# Patient Record
Sex: MALE | Race: WHITE | NOT HISPANIC OR LATINO | Employment: OTHER | URBAN - METROPOLITAN AREA
[De-identification: names, ages, dates, MRNs, and addresses within clinical notes are randomized per-mention and may not be internally consistent; named-entity substitution may affect disease eponyms.]

---

## 2019-03-08 ENCOUNTER — HOSPITAL ENCOUNTER (OUTPATIENT)
Dept: GENERAL RADIOLOGY | Facility: HOSPITAL | Age: 49
Discharge: HOME | End: 2019-03-08

## 2020-05-14 ENCOUNTER — HOSPITAL ENCOUNTER (OUTPATIENT)
Dept: GENERAL RADIOLOGY | Facility: HOSPITAL | Age: 50
Discharge: HOME | End: 2020-05-14

## 2020-06-25 ENCOUNTER — OFFICE VISIT CONVERTED (OUTPATIENT)
Dept: NEUROSURGERY | Facility: CLINIC | Age: 50
End: 2020-06-25
Attending: PHYSICIAN ASSISTANT

## 2021-05-10 NOTE — H&P
History and Physical      Patient Name: Nick Turk   Patient ID: [id number]   Sex: Male   YOB: 1970    Referring Provider: JATIN ANNE    Visit Date: June 25, 2020    Provider: Kim Perez PA-C   Location: [facility]   Location Address: [address] [place] [address]  [id number]   Location Phone: [phone]          Chief Complaint  · Back pain      History Of Present Illness  The patient is a 50 year old /White male, who presents on referral from JATIN ANNE, for a neurosurgical evaluation mid back pain.   He also reports chronic neck and chronic lbp and attends pain management for these issues.. He denies bowel or bladder dysfunction and.   RECENT INTERVENTIONS:  He has been previously treated with epidural steroids and NSAIDs. The epidural steroids were partially effective.   INFORMATION REVIEWED:  The following information was reviewed: radiology reports and images. MRI of the thoracic spine revealed small left T4/5 and small central disc protrusion at T5/6 with degenerative changes in the thoracic spine. These were the most notable findings.       Past Medical History  Lumbago/low back pain; Lumbar spondylosis; Pain, Back; Pain, Chronic Pain Syndrome; Pain, Generalized; Pain, Leg; Pars defect of lumbar spine; Sleep disorder; Spondylolisthesis , lumbar         Past Surgical History  Cholecystectomy; Ganglion cyst excision; Lumbar epidural steroid injections; Lumbar spinal fusion         Allergy List  NO KNOWN DRUG ALLERGIES; Bee Stings       Allergies Reconciled  Family Medical History  *Unremarkable         Social History  Tobacco (Former)         Review of Systems  · Constitutional  o Admits  o : weight gain  o Denies  o : chills, excessive sweating, fatigue, fever, sycope/passing out, weight loss  · Eyes  o Denies  o : changes in vision, blurry vision, double vision  · HENT  o Denies  o : loss of hearing, ringing in the ears, ear aches, sore  "throat, nasal congestion, sinus pain, nose bleeds, seasonal allergies  · Cardiovascular  o Denies  o : blood clots, swollen legs, anemia, easy burising or bleeding, transfusions  · Respiratory  o Denies  o : shortness of breath, dry cough, productive cough, pneumonia, COPD  · Gastrointestinal  o Denies  o : difficulty swallowing, reflux  · Genitourinary  o Denies  o : incontinence  · Neurologic  o Admits  o : difficulty with sleep, weakness  o Denies  o : headache, seizure, stroke, tremor, loss of balance, falls, dizziness/vertigo, numbness/tingling/paresthesia , difficulty with coordination, difficulty with dexterity  · Musculoskeletal  o Admits  o : joint pain, weakness, low back pain, mid back pain  o Denies  o : neck stiffness/pain, swollen lymph nodes, muscle aches, spasms, sciatica, pain radiating in arm, pain radiating in leg  · Endocrine  o Denies  o : diabetes, thyroid disorder  · Psychiatric  o Denies  o : anxiety, depression  · All Others Negative      Vitals  Date Time BP Position Site L\R Cuff Size HR RR TEMP (F) WT  HT  BMI kg/m2 BSA m2 O2 Sat        06/25/2020 01:56 PM        97.9 231lbs 16oz 6'  2\" 29.79 2.34           Physical Examination  · Constitutional  o Appearance  o : well-nourished, well developed, alert, in no acute distress  · Respiratory  o Respiratory Effort  o : breathing unlabored  · Cardiovascular  o Peripheral Vascular System  o :   § Extremities  § : no edema or cyanosis  · Musculoskeletal  o Spine  o :   § Inspection/Palpation  § : ttp in the lower thoracic spine  o Right Lower Extremity  o :   § Inspection/Palpation  § : no joint or limb tenderness to palpation, no edema present, no ecchymosis  § Joint Stability  § : joint stability within normal limits  § Range of Motion  § : range of motion normal, no joint crepitations present, no pain on motion, Anirudh's test negative  o Left Lower Extremity  o :   § Inspection/Palpation  § : no joint or limb tenderness to palpation, no " edema present, no ecchymosis  § Joint Stability  § : joint stability within normal limits  § Range of Motion  § : range of motion normal, no joint crepitations present, no pain on motion, Anirudh's test negative  · Skin and Subcutaneous Tissue  o Extremities  o :   § Right Lower Extremity  § : no lesions or areas of discoloration  § Left Lower Extremity  § : no lesions or areas of discoloration  o Back  o : no lesions or areas of discoloration  · Neurologic  o Mental Status Examination  o :   § Orientation  § : alert and oriented to time, person, place and events  o Motor Examination  o :   § RLE Strength  § : strength normal  § RLE Motor Function  § : tone normal, no atrophy, no abnormal movements noted  § LLE Strength  § : strength normal  § LLE Motor Function  § : tone normal, no atrophy, no abnormal movements noted  o Reflexes  o :   § RLE  § : knee and ankle reflexes 2/4, SLR negative, no clonus  § LLE  § : knee and ankle reflexes 2/4, SLR negative, no clonus  o Sensation  o :   § Light Touch  § : sensation intact to light touch in extremities  o Gait and Station  o :   § Gait Screening  § : normal gait, able to stand without difficulty  · Psychiatric  o Mood and Affect  o : mood normal, affect appropriate          Assessment  · Thoracic disc disorder     722.92/M51.9  · Mid back pain     724.5/M54.9    Problems Reconciled  Plan  · Medications  o Medications have been Reconciled  o Transition of Care or Provider Policy  · Instructions  o Encouraged to follow-up with Primary Care Provider for preventative care.  o The ROS and the PFSH were reviewed at today's visit.  o Call or return to office if symptoms worsen or persist.  o I will send a copy of his office note to his pain management clinic. He has two small upper thoracic disc protrusions but pain is lower than these areas. He will f/u with pain management and thoracic surgery is not recommended. F/U here as needed.  · Associate Tasks  o Task ID [id number]  General Task: please send copy of office note to CPA            Electronically Signed by: Kim Perez PA-C -Author on June 25, 2020 02:19:04 PM

## 2021-05-15 VITALS — TEMPERATURE: 97.9 F | WEIGHT: 232 LBS | BODY MASS INDEX: 29.77 KG/M2 | HEIGHT: 74 IN

## 2021-09-15 ENCOUNTER — OFFICE VISIT (OUTPATIENT)
Dept: FAMILY MEDICINE CLINIC | Facility: CLINIC | Age: 51
End: 2021-09-15

## 2021-09-15 VITALS
TEMPERATURE: 97 F | BODY MASS INDEX: 31.72 KG/M2 | WEIGHT: 234.2 LBS | DIASTOLIC BLOOD PRESSURE: 92 MMHG | HEIGHT: 72 IN | HEART RATE: 74 BPM | OXYGEN SATURATION: 98 % | RESPIRATION RATE: 16 BRPM | SYSTOLIC BLOOD PRESSURE: 159 MMHG

## 2021-09-15 DIAGNOSIS — F17.210 CIGARETTE NICOTINE DEPENDENCE WITHOUT COMPLICATION: ICD-10-CM

## 2021-09-15 DIAGNOSIS — Z12.11 SCREEN FOR COLON CANCER: Primary | ICD-10-CM

## 2021-09-15 DIAGNOSIS — Z00.00 ANNUAL PHYSICAL EXAM: ICD-10-CM

## 2021-09-15 DIAGNOSIS — Z72.0 TOBACCO USE: ICD-10-CM

## 2021-09-15 DIAGNOSIS — R06.81 WITNESSED EPISODE OF APNEA: ICD-10-CM

## 2021-09-15 DIAGNOSIS — R06.83 SNORING: ICD-10-CM

## 2021-09-15 DIAGNOSIS — I49.9 IRREGULAR HEART BEAT: ICD-10-CM

## 2021-09-15 PROBLEM — G47.9 SLEEP DISORDER: Status: ACTIVE | Noted: 2021-09-15

## 2021-09-15 PROBLEM — Z79.4 ENCOUNTER FOR LONG-TERM (CURRENT) USE OF INSULIN: Status: ACTIVE | Noted: 2018-02-08

## 2021-09-15 PROBLEM — M23.90 LIGAMENTOUS LAXITY OF KNEE: Status: ACTIVE | Noted: 2021-02-19

## 2021-09-15 PROBLEM — R52 PAIN, GENERALIZED: Status: ACTIVE | Noted: 2021-09-15

## 2021-09-15 PROBLEM — M25.569 KNEE PAIN: Status: ACTIVE | Noted: 2021-02-19

## 2021-09-15 PROBLEM — M25.512 PAIN IN JOINT OF LEFT SHOULDER: Status: ACTIVE | Noted: 2019-02-27

## 2021-09-15 PROBLEM — M54.6 THORACIC BACK PAIN: Status: ACTIVE | Noted: 2019-02-27

## 2021-09-15 PROBLEM — M47.817 LUMBOSACRAL SPONDYLOSIS WITHOUT MYELOPATHY: Status: ACTIVE | Noted: 2018-03-13

## 2021-09-15 PROBLEM — M47.814 THORACIC SPONDYLOSIS WITHOUT MYELOPATHY: Status: ACTIVE | Noted: 2018-06-05

## 2021-09-15 PROBLEM — M19.012 OSTEOARTHRITIS OF LEFT SHOULDER: Status: ACTIVE | Noted: 2021-09-15

## 2021-09-15 PROCEDURE — 99386 PREV VISIT NEW AGE 40-64: CPT | Performed by: NURSE PRACTITIONER

## 2021-09-15 PROCEDURE — 2014F MENTAL STATUS ASSESS: CPT | Performed by: NURSE PRACTITIONER

## 2021-09-15 PROCEDURE — 3008F BODY MASS INDEX DOCD: CPT | Performed by: NURSE PRACTITIONER

## 2021-09-15 PROCEDURE — 93000 ELECTROCARDIOGRAM COMPLETE: CPT | Performed by: NURSE PRACTITIONER

## 2021-09-15 NOTE — PROGRESS NOTES
"Chief Complaint  Establish Care and Snoring (witnessed apnea)    Subjective        Nick Turk presents to [facility]  Here to establish care.    Lumbar issues sees physician.  Getting good exercise and diet. Works as a contractor.  Hasn't had any recent labs    Has family history of CHF in her 30's. She has an indwelling pacemaker.        Past History:    Medical History: has a past medical history of Back pain, Chronic pain syndrome, Forgetfulness, Gallstones, Hypertension, Leg pain, Lumbago (2015), Lumbar spondylosis, Pain, generalized, Pars defect of lumbar spine (2015), Sleep disorder, and Spondylolisthesis, lumbar region (2015).     Surgical History: has a past surgical history that includes Cholecystectomy; Ganglion cyst excision; Lumbar epidural injection; and Lumbar fusion (2015).     Family History: family history is not on file.     Social History: reports that he has been smoking cigarettes. He has been smoking about 1.50 packs per day. He has never used smokeless tobacco. He reports that he does not drink alcohol and does not use drugs.    Allergies: Bee venom        No current outpatient medications on file.    There are no discontinued medications.      Review of Systems   Constitutional: Negative for fever.   Respiratory: Negative for cough and shortness of breath.    Cardiovascular: Negative for chest pain, palpitations and leg swelling.   Neurological: Negative for dizziness, weakness, numbness and headache.        Objective         Vitals:    09/15/21 0755   BP: 159/92   BP Location: Left arm   Patient Position: Sitting   Cuff Size: Large Adult   Pulse: 74   Resp: 16   Temp: 97 °F (36.1 °C)   TempSrc: Infrared   SpO2: 98%   Weight: 106 kg (234 lb 3.2 oz)   Height: 182.9 cm (72\")     Body mass index is 31.76 kg/m².         Physical Exam  Vitals reviewed.   Constitutional:       Appearance: Normal appearance. He is well-developed.   HENT:      Head: Normocephalic " and atraumatic.      Mouth/Throat:      Pharynx: No oropharyngeal exudate.   Eyes:      Conjunctiva/sclera: Conjunctivae normal.      Pupils: Pupils are equal, round, and reactive to light.   Cardiovascular:      Rate and Rhythm: Normal rate. Rhythm irregular.      Heart sounds: No murmur heard.   No friction rub. No gallop.    Pulmonary:      Effort: Pulmonary effort is normal.      Breath sounds: Normal breath sounds. No wheezing or rhonchi.   Skin:     General: Skin is warm and dry.   Neurological:      Mental Status: He is alert and oriented to person, place, and time.   Psychiatric:         Mood and Affect: Mood and affect normal.         Behavior: Behavior normal.         Thought Content: Thought content normal.         Judgment: Judgment normal.             Result Review :        ECG 12 Lead    Date/Time: 9/15/2021 9:30 AM  Performed by: Shola Car APRN  Authorized by: Shola Car APRN   Previous ECG: no previous ECG available  Rhythm: sinus rhythm  Rate: normal  Conduction: conduction normal  ST Segments: ST segments normal  T Waves: T waves normal  QRS axis: normal  Other: no other findings    Clinical impression: normal ECG                Assessment and Plan     Diagnoses and all orders for this visit:    1. Screen for colon cancer (Primary)  -     Amb referral for Screening Colonoscopy    2. Tobacco use    3. Cigarette nicotine dependence without complication    4. Snoring  -     Ambulatory Referral to Sleep Medicine    5. Witnessed episode of apnea  -     Ambulatory Referral to Sleep Medicine    6. Annual physical exam  Comments:  Reviewed immunizations and discussed diet and exercise.  Orders:  -     Comprehensive metabolic panel; Future  -     Lipid Panel w/ Chol/HDL Ratio; Future  -     Urinalysis With Culture If Indicated -; Future  -     CBC No Differential; Future  -     TSH Rfx On Abnormal To Free T4; Future    7. Irregular heart beat  Comments:  sinus rhythm on EKG.  But with sister with  CHF in her 30's with a pacemaker will get baseline work up.  Orders:  -     Cancel: ECG 12 Lead  -     Cancel: ECG 12 Lead  -     Ambulatory Referral to Cardiology    Other orders  -     ECG 12 Lead              Follow Up     Return in about 1 year (around 9/15/2022).    Patient was given instructions and counseling regarding his condition or for health maintenance advice. Please see specific information pulled into the AVS if appropriate.          Yes

## 2021-10-08 ENCOUNTER — LAB (OUTPATIENT)
Dept: LAB | Facility: HOSPITAL | Age: 51
End: 2021-10-08

## 2021-10-08 DIAGNOSIS — Z00.00 ANNUAL PHYSICAL EXAM: ICD-10-CM

## 2021-10-08 LAB
ALBUMIN SERPL-MCNC: 4.2 G/DL (ref 3.5–5.2)
ALBUMIN/GLOB SERPL: 1.6 G/DL
ALP SERPL-CCNC: 54 U/L (ref 39–117)
ALT SERPL W P-5'-P-CCNC: 31 U/L (ref 1–41)
ANION GAP SERPL CALCULATED.3IONS-SCNC: 8.5 MMOL/L (ref 5–15)
AST SERPL-CCNC: 22 U/L (ref 1–40)
BILIRUB SERPL-MCNC: 0.5 MG/DL (ref 0–1.2)
BUN SERPL-MCNC: 10 MG/DL (ref 6–20)
BUN/CREAT SERPL: 11.2 (ref 7–25)
CALCIUM SPEC-SCNC: 9.3 MG/DL (ref 8.6–10.5)
CHLORIDE SERPL-SCNC: 104 MMOL/L (ref 98–107)
CHOLEST SERPL-MCNC: 94 MG/DL (ref 0–200)
CO2 SERPL-SCNC: 26.5 MMOL/L (ref 22–29)
CREAT SERPL-MCNC: 0.89 MG/DL (ref 0.76–1.27)
DEPRECATED RDW RBC AUTO: 43.5 FL (ref 37–54)
ERYTHROCYTE [DISTWIDTH] IN BLOOD BY AUTOMATED COUNT: 12.2 % (ref 12.3–15.4)
GFR SERPL CREATININE-BSD FRML MDRD: 90 ML/MIN/1.73
GLOBULIN UR ELPH-MCNC: 2.6 GM/DL
GLUCOSE SERPL-MCNC: 97 MG/DL (ref 65–99)
HCT VFR BLD AUTO: 51.4 % (ref 37.5–51)
HDLC SERPL QL: 3.76
HDLC SERPL-MCNC: 25 MG/DL (ref 40–60)
HGB BLD-MCNC: 17 G/DL (ref 13–17.7)
LDLC SERPL CALC-MCNC: 45 MG/DL (ref 0–100)
MCH RBC QN AUTO: 32 PG (ref 26.6–33)
MCHC RBC AUTO-ENTMCNC: 33.1 G/DL (ref 31.5–35.7)
MCV RBC AUTO: 96.6 FL (ref 79–97)
PLATELET # BLD AUTO: 197 10*3/MM3 (ref 140–450)
PMV BLD AUTO: 11.7 FL (ref 6–12)
POTASSIUM SERPL-SCNC: 4.4 MMOL/L (ref 3.5–5.2)
PROT SERPL-MCNC: 6.8 G/DL (ref 6–8.5)
RBC # BLD AUTO: 5.32 10*6/MM3 (ref 4.14–5.8)
SODIUM SERPL-SCNC: 139 MMOL/L (ref 136–145)
TRIGL SERPL-MCNC: 132 MG/DL (ref 0–150)
TSH SERPL DL<=0.05 MIU/L-ACNC: 1.22 UIU/ML (ref 0.27–4.2)
VLDLC SERPL-MCNC: 24 MG/DL (ref 5–40)
WBC # BLD AUTO: 9.48 10*3/MM3 (ref 3.4–10.8)

## 2021-10-08 PROCEDURE — 80053 COMPREHEN METABOLIC PANEL: CPT

## 2021-10-08 PROCEDURE — 80061 LIPID PANEL: CPT

## 2021-10-08 PROCEDURE — 85027 COMPLETE CBC AUTOMATED: CPT

## 2021-10-08 PROCEDURE — 36415 COLL VENOUS BLD VENIPUNCTURE: CPT

## 2021-10-08 PROCEDURE — 84443 ASSAY THYROID STIM HORMONE: CPT

## 2021-10-11 ENCOUNTER — OFFICE VISIT (OUTPATIENT)
Dept: CARDIOLOGY | Facility: CLINIC | Age: 51
End: 2021-10-11

## 2021-10-11 ENCOUNTER — TRANSCRIBE ORDERS (OUTPATIENT)
Dept: FAMILY MEDICINE CLINIC | Facility: CLINIC | Age: 51
End: 2021-10-11

## 2021-10-11 ENCOUNTER — LAB (OUTPATIENT)
Dept: LAB | Facility: HOSPITAL | Age: 51
End: 2021-10-11

## 2021-10-11 VITALS
SYSTOLIC BLOOD PRESSURE: 140 MMHG | BODY MASS INDEX: 31.42 KG/M2 | DIASTOLIC BLOOD PRESSURE: 100 MMHG | WEIGHT: 232 LBS | HEIGHT: 72 IN | HEART RATE: 101 BPM

## 2021-10-11 DIAGNOSIS — F17.200 SMOKER: ICD-10-CM

## 2021-10-11 DIAGNOSIS — Z00.00 ROUTINE GENERAL MEDICAL EXAMINATION AT A HEALTH CARE FACILITY: ICD-10-CM

## 2021-10-11 DIAGNOSIS — Z71.89 CARDIAC RISK COUNSELING: Primary | ICD-10-CM

## 2021-10-11 DIAGNOSIS — Z00.00 ROUTINE GENERAL MEDICAL EXAMINATION AT A HEALTH CARE FACILITY: Primary | ICD-10-CM

## 2021-10-11 LAB
BACTERIA UR QL AUTO: ABNORMAL /HPF
BILIRUB UR QL STRIP: NEGATIVE
CLARITY UR: CLEAR
COLOR UR: YELLOW
GLUCOSE UR STRIP-MCNC: NEGATIVE MG/DL
HGB UR QL STRIP.AUTO: NEGATIVE
HYALINE CASTS UR QL AUTO: ABNORMAL /LPF
KETONES UR QL STRIP: NEGATIVE
LEUKOCYTE ESTERASE UR QL STRIP.AUTO: ABNORMAL
NITRITE UR QL STRIP: NEGATIVE
PH UR STRIP.AUTO: 6 [PH] (ref 5–8)
PROT UR QL STRIP: NEGATIVE
RBC # UR: ABNORMAL /HPF
REF LAB TEST METHOD: ABNORMAL
SP GR UR STRIP: 1.02 (ref 1–1.03)
SQUAMOUS #/AREA URNS HPF: ABNORMAL /HPF
UROBILINOGEN UR QL STRIP: ABNORMAL
WBC UR QL AUTO: ABNORMAL /HPF

## 2021-10-11 PROCEDURE — 81001 URINALYSIS AUTO W/SCOPE: CPT

## 2021-10-11 PROCEDURE — 99204 OFFICE O/P NEW MOD 45 MIN: CPT | Performed by: INTERNAL MEDICINE

## 2021-10-11 RX ORDER — ACETAMINOPHEN 500 MG
500 TABLET ORAL EVERY 6 HOURS PRN
COMMUNITY
End: 2022-09-16

## 2021-10-11 NOTE — PROGRESS NOTES
Chief Complaint  Irregular Heart Beat (New patient)    Subjective            Nick Turk presents to [facility]  History of Present Illness    51-year-old white male.  He has no previous cardiac history.  He is a smoker.  Cardiac risk assessment has been recommended by primary care.  He has no symptoms to report.  He stays very active.  He denies chest pain palpitations or excessive shortness of breath.  His sister has some form of cardiomyopathy and has a defibrillator.  His mother had heart disease which was later onset.  He is on no medication.    PMH  Past Medical History:   Diagnosis Date   • Back pain    • Chronic pain syndrome    • Forgetfulness    • Gallstones    • Hypertension    • Leg pain    • Lumbago 2015   • Lumbar spondylosis    • Pain, generalized    • Pars defect of lumbar spine 2015   • Sleep disorder    • Spondylolisthesis, lumbar region 2015    L4/5         SURGICALHX  Past Surgical History:   Procedure Laterality Date   • CHOLECYSTECTOMY     • GANGLION CYST EXCISION     • LUMBAR EPIDURAL INJECTION     • LUMBAR FUSION  2015        SOC  Social History     Socioeconomic History   • Marital status:    Tobacco Use   • Smoking status: Current Every Day Smoker     Packs/day: 1.50     Types: Cigarettes   • Smokeless tobacco: Never Used   Vaping Use   • Vaping Use: Never used   Substance and Sexual Activity   • Alcohol use: Never   • Drug use: Never   • Sexual activity: Yes     Partners: Female         FAMHX  Family History   Problem Relation Age of Onset   • Heart disease Mother    • No Known Problems Father    • Heart failure Sister           ALLERGY  Allergies   Allergen Reactions   • Bee Venom Swelling        MEDSCURRENT    Current Outpatient Medications:   •  acetaminophen (TYLENOL) 500 MG tablet, Take 500 mg by mouth Every 6 (Six) Hours As Needed for Mild Pain ., Disp: , Rfl:       Review of Systems   Constitutional: Negative.   HENT: Negative.    Eyes:  "Negative.    Cardiovascular: Negative for chest pain and dyspnea on exertion.   Respiratory: Negative.  Negative for shortness of breath.    Endocrine: Negative.    Hematologic/Lymphatic: Negative.    Skin: Negative.    Musculoskeletal: Positive for back pain.   Gastrointestinal: Negative.    Genitourinary: Negative.    Neurological: Negative.    Psychiatric/Behavioral: Negative.         Objective     /100 (BP Location: Left arm)   Pulse 101   Ht 182.9 cm (72\")   Wt 105 kg (232 lb)   BMI 31.46 kg/m²       General Appearance:   · well developed  · well nourished  HENT:   · oropharynx moist  · lips not cyanotic  Neck:  · thyroid not enlarged  · supple  Respiratory:  · no respiratory distress  · normal breath sounds  · no rales  Cardiovascular:  · no jugular venous distention  · regular rhythm  · apical impulse normal  · S1 normal, S2 normal  · no S3, no S4   · no murmur  · no rub, no thrill  · carotid pulses normal; no bruit  · pedal pulses normal  · lower extremity edema: none    Musculoskeletal:  · no clubbing of fingers.   · normocephalic, head atraumatic  Skin:   · warm, dry  Psychiatric:  · judgement and insight appropriate  · normal mood and affect      Result Review :     The following data was reviewed by: Nelson Cheatham MD on 10/11/2021:    CMP    CMP 10/8/21   Glucose 97   BUN 10   Creatinine 0.89   eGFR Non African Am 90   Sodium 139   Potassium 4.4   Chloride 104   Calcium 9.3   Albumin 4.20   Total Bilirubin 0.5   Alkaline Phosphatase 54   AST (SGOT) 22   ALT (SGPT) 31           CBC    CBC 10/8/21   WBC 9.48   RBC 5.32   Hemoglobin 17.0   Hematocrit 51.4 (A)   MCV 96.6   MCH 32.0   MCHC 33.1   RDW 12.2 (A)   Platelets 197   (A) Abnormal value            Lipid Panel    Lipid Panel 10/8/21   Total Cholesterol 94   Triglycerides 132   HDL Cholesterol 25 (A)   VLDL Cholesterol 24   LDL Cholesterol  45   (A) Abnormal value            TSH    TSH 10/8/21   TSH 1.220             Data " reviewed: Primary care records reviewed, EKG done there September 15 reviewed by me showing sinus rhythm, normal tracing.     Procedures                    Assessment and Plan        ASSESSMENT:  Encounter Diagnoses   Name Primary?   • Cardiac risk counseling Yes   • Smoker          PLAN:    1.  Nick is asymptomatic.  Using the pooled cohort equation his estimated 10-year risk is 10% which is relatively high risk for his age.  If he were non-smoker was 4.6%.  I recommend low-dose aspirin daily for prevention.  His most recent LDL was 45, so I do not recommend statin therapy based on his cholesterol numbers alone.  I do recommend a calcium score be done and if there is significant plaque burden, statin therapy would be recommended for secondary prevention.  2.  Smoking cessation counseling  3.  We will call the patient with his calcium score results, no additional diagnostics are needed at this time.  Otherwise he will be followed as needed.          Patient was given instructions and counseling regarding his condition or for health maintenance advice. Please see specific information pulled into the AVS if appropriate.             DAVE Cheatham MD  10/11/2021    11:40 EDT

## 2021-10-14 ENCOUNTER — HOSPITAL ENCOUNTER (OUTPATIENT)
Dept: CT IMAGING | Facility: HOSPITAL | Age: 51
Discharge: HOME | End: 2021-10-14
Admitting: INTERNAL MEDICINE

## 2021-10-14 DIAGNOSIS — Z71.89 CARDIAC RISK COUNSELING: ICD-10-CM

## 2021-10-14 PROCEDURE — 75571 CT HRT W/O DYE W/CA TEST: CPT

## 2021-11-05 ENCOUNTER — PREP FOR SURGERY (OUTPATIENT)
Dept: OTHER | Facility: HOSPITAL | Age: 51
End: 2021-11-05

## 2021-11-05 ENCOUNTER — OFFICE VISIT (OUTPATIENT)
Dept: SURGERY | Facility: CLINIC | Age: 51
End: 2021-11-05

## 2021-11-05 VITALS — WEIGHT: 234.4 LBS | BODY MASS INDEX: 31.75 KG/M2 | HEIGHT: 72 IN | HEART RATE: 86 BPM

## 2021-11-05 DIAGNOSIS — Z12.11 SCREENING FOR MALIGNANT NEOPLASM OF COLON: Primary | ICD-10-CM

## 2021-11-05 PROCEDURE — S0260 H&P FOR SURGERY: HCPCS | Performed by: NURSE PRACTITIONER

## 2021-11-05 RX ORDER — SODIUM CHLORIDE 0.9 % (FLUSH) 0.9 %
3 SYRINGE (ML) INJECTION EVERY 12 HOURS SCHEDULED
Status: CANCELLED | OUTPATIENT
Start: 2021-11-05

## 2021-11-05 RX ORDER — SODIUM CHLORIDE 0.9 % (FLUSH) 0.9 %
10 SYRINGE (ML) INJECTION AS NEEDED
Status: CANCELLED | OUTPATIENT
Start: 2021-11-05

## 2021-11-05 RX ORDER — POLYETHYLENE GLYCOL 3350 17 G/17G
POWDER, FOR SOLUTION ORAL
Qty: 238 PACKET | Refills: 0 | Status: SHIPPED | OUTPATIENT
Start: 2021-11-05 | End: 2022-05-12 | Stop reason: HOSPADM

## 2021-11-05 NOTE — PROGRESS NOTES
Chief Complaint: Colonoscopy (consult)    Subjective      Colonoscopy consultation       History of Present Illness  Nick Turk is a 51 y.o. male presents to [facility] for colonoscopy consultation.    Patient presents today on referral from Shola Presley for screening colonoscopy.    Patient denies any abdominal pain, bowel habit, rectal bleeding.    Denies any family history of colorectal cancer.    No previous colonoscopy.     Objective     Past Medical History:   Diagnosis Date   • Back pain    • Chronic pain syndrome    • Forgetfulness    • Gallstones    • Hypertension    • Leg pain    • Lumbago 2015   • Lumbar spondylosis    • Pain, generalized    • Pars defect of lumbar spine 2015   • Sleep disorder    • Spondylolisthesis, lumbar region 2015    L4/5       Past Surgical History:   Procedure Laterality Date   • CHOLECYSTECTOMY     • GANGLION CYST EXCISION     • LUMBAR EPIDURAL INJECTION     • LUMBAR FUSION  2015         Current Outpatient Medications:   •  acetaminophen (TYLENOL) 500 MG tablet, Take 500 mg by mouth Every 6 (Six) Hours As Needed for Mild Pain ., Disp: , Rfl:   •  polyethylene glycol (MIRALAX) 17 g packet, Take as directed.  Instructions given in office.  Dispense: 238 g bottle, Disp: 238 packet, Rfl: 0    Allergies   Allergen Reactions   • Bee Venom Swelling        Family History   Problem Relation Age of Onset   • Heart disease Mother    • No Known Problems Father    • Heart failure Sister        Social History     Socioeconomic History   • Marital status:    Tobacco Use   • Smoking status: Current Every Day Smoker     Packs/day: 1.50     Types: Cigarettes   • Smokeless tobacco: Never Used   Vaping Use   • Vaping Use: Never used   Substance and Sexual Activity   • Alcohol use: Never   • Drug use: Never   • Sexual activity: Yes     Partners: Female       Review of Systems   Constitutional: Negative for chills and fever.   Gastrointestinal:  "Negative for abdominal distention, abdominal pain, anal bleeding, blood in stool, constipation, diarrhea and rectal pain.        Vital Signs:   Pulse 86   Ht 182.9 cm (72\")   Wt 106 kg (234 lb 6.4 oz)   BMI 31.79 kg/m²      Physical Exam  Constitutional:       Appearance: Normal appearance.   HENT:      Head: Normocephalic.   Cardiovascular:      Rate and Rhythm: Normal rate.   Pulmonary:      Effort: Pulmonary effort is normal.   Abdominal:      General: Abdomen is flat.      Palpations: Abdomen is soft.   Skin:     General: Skin is warm and dry.   Neurological:      General: No focal deficit present.      Mental Status: He is alert and oriented to person, place, and time.   Psychiatric:         Mood and Affect: Mood normal.         Thought Content: Thought content normal.          Result Review :              []  Laboratory  []  Radiology  []  Pathology  []  Microbiology  []  EKG/Telemetry   []  Cardiology/Vascular   [x]  Old records  Today I have reviewed Shola Presley's previous office note.     Assessment and Plan    Diagnoses and all orders for this visit:    1. Screening for malignant neoplasm of colon (Primary)    Other orders  -     polyethylene glycol (MIRALAX) 17 g packet; Take as directed.  Instructions given in office.  Dispense: 238 g bottle  Dispense: 238 packet; Refill: 0        Follow Up   Return for Schedule colonoscopy with Dr. Onofre on 11/16.     Hospital arrival time 1230p    Possible risk/complications, benefits, and alternatives to surgical or invasive procedure have been explained to patient and/or legal guardian.   Patient has been evaluated and can tolerate anesthesia and/or sedation. Risks, benefits, and alternatives to anesthesia and sedation have been explained to patient and/or legal guardian.  Patient verbalizes understanding is willing to proceed with the above plan.    Patient was given instructions and counseling regarding his condition or for health maintenance advice. " Please see specific information pulled into the AVS if appropriate.

## 2021-11-05 NOTE — H&P (VIEW-ONLY)
Chief Complaint: Colonoscopy (consult)    Subjective      Colonoscopy consultation       History of Present Illness  Nick Turk is a 51 y.o. male presents to [facility] for colonoscopy consultation.    Patient presents today on referral from Shola Presley for screening colonoscopy.    Patient denies any abdominal pain, bowel habit, rectal bleeding.    Denies any family history of colorectal cancer.    No previous colonoscopy.     Objective     Past Medical History:   Diagnosis Date   • Back pain    • Chronic pain syndrome    • Forgetfulness    • Gallstones    • Hypertension    • Leg pain    • Lumbago 2015   • Lumbar spondylosis    • Pain, generalized    • Pars defect of lumbar spine 2015   • Sleep disorder    • Spondylolisthesis, lumbar region 2015    L4/5       Past Surgical History:   Procedure Laterality Date   • CHOLECYSTECTOMY     • GANGLION CYST EXCISION     • LUMBAR EPIDURAL INJECTION     • LUMBAR FUSION  2015         Current Outpatient Medications:   •  acetaminophen (TYLENOL) 500 MG tablet, Take 500 mg by mouth Every 6 (Six) Hours As Needed for Mild Pain ., Disp: , Rfl:   •  polyethylene glycol (MIRALAX) 17 g packet, Take as directed.  Instructions given in office.  Dispense: 238 g bottle, Disp: 238 packet, Rfl: 0    Allergies   Allergen Reactions   • Bee Venom Swelling        Family History   Problem Relation Age of Onset   • Heart disease Mother    • No Known Problems Father    • Heart failure Sister        Social History     Socioeconomic History   • Marital status:    Tobacco Use   • Smoking status: Current Every Day Smoker     Packs/day: 1.50     Types: Cigarettes   • Smokeless tobacco: Never Used   Vaping Use   • Vaping Use: Never used   Substance and Sexual Activity   • Alcohol use: Never   • Drug use: Never   • Sexual activity: Yes     Partners: Female       Review of Systems   Constitutional: Negative for chills and fever.   Gastrointestinal:  "Negative for abdominal distention, abdominal pain, anal bleeding, blood in stool, constipation, diarrhea and rectal pain.        Vital Signs:   Pulse 86   Ht 182.9 cm (72\")   Wt 106 kg (234 lb 6.4 oz)   BMI 31.79 kg/m²      Physical Exam  Constitutional:       Appearance: Normal appearance.   HENT:      Head: Normocephalic.   Cardiovascular:      Rate and Rhythm: Normal rate.   Pulmonary:      Effort: Pulmonary effort is normal.   Abdominal:      General: Abdomen is flat.      Palpations: Abdomen is soft.   Skin:     General: Skin is warm and dry.   Neurological:      General: No focal deficit present.      Mental Status: He is alert and oriented to person, place, and time.   Psychiatric:         Mood and Affect: Mood normal.         Thought Content: Thought content normal.          Result Review :              []  Laboratory  []  Radiology  []  Pathology  []  Microbiology  []  EKG/Telemetry   []  Cardiology/Vascular   [x]  Old records  Today I have reviewed Shola Presley's previous office note.     Assessment and Plan    Diagnoses and all orders for this visit:    1. Screening for malignant neoplasm of colon (Primary)    Other orders  -     polyethylene glycol (MIRALAX) 17 g packet; Take as directed.  Instructions given in office.  Dispense: 238 g bottle  Dispense: 238 packet; Refill: 0        Follow Up   Return for Schedule colonoscopy with Dr. Onofre on 11/16.     Hospital arrival time 1230p    Possible risk/complications, benefits, and alternatives to surgical or invasive procedure have been explained to patient and/or legal guardian.   Patient has been evaluated and can tolerate anesthesia and/or sedation. Risks, benefits, and alternatives to anesthesia and sedation have been explained to patient and/or legal guardian.  Patient verbalizes understanding is willing to proceed with the above plan.    Patient was given instructions and counseling regarding his condition or for health maintenance advice. " Please see specific information pulled into the AVS if appropriate.

## 2021-11-15 NOTE — PRE-PROCEDURE INSTRUCTIONS
Pt. Instructed on laxative and skin prep, pre-op meds, clear liquid diet. Ok to take Tylenol a.m. of procedure.

## 2021-11-16 ENCOUNTER — HOSPITAL ENCOUNTER (OUTPATIENT)
Facility: HOSPITAL | Age: 51
Setting detail: HOSPITAL OUTPATIENT SURGERY
Discharge: HOME | End: 2021-11-16
Attending: SURGERY | Admitting: SURGERY

## 2021-11-16 ENCOUNTER — ANESTHESIA (OUTPATIENT)
Dept: GASTROENTEROLOGY | Facility: HOSPITAL | Age: 51
End: 2021-11-16

## 2021-11-16 ENCOUNTER — ANESTHESIA EVENT (OUTPATIENT)
Dept: GASTROENTEROLOGY | Facility: HOSPITAL | Age: 51
End: 2021-11-16

## 2021-11-16 VITALS
SYSTOLIC BLOOD PRESSURE: 142 MMHG | HEIGHT: 72 IN | BODY MASS INDEX: 31.59 KG/M2 | WEIGHT: 233.25 LBS | OXYGEN SATURATION: 99 % | DIASTOLIC BLOOD PRESSURE: 90 MMHG | RESPIRATION RATE: 19 BRPM | HEART RATE: 72 BPM | TEMPERATURE: 97.2 F

## 2021-11-16 DIAGNOSIS — Z12.11 SCREENING FOR MALIGNANT NEOPLASM OF COLON: ICD-10-CM

## 2021-11-16 PROBLEM — Z79.4 ENCOUNTER FOR LONG-TERM (CURRENT) USE OF INSULIN: Status: RESOLVED | Noted: 2018-02-08 | Resolved: 2021-11-16

## 2021-11-16 PROCEDURE — 88305 TISSUE EXAM BY PATHOLOGIST: CPT | Performed by: SURGERY

## 2021-11-16 PROCEDURE — C1889 IMPLANT/INSERT DEVICE, NOC: HCPCS | Performed by: SURGERY

## 2021-11-16 PROCEDURE — 25010000002 PROPOFOL 10 MG/ML EMULSION: Performed by: NURSE ANESTHETIST, CERTIFIED REGISTERED

## 2021-11-16 DEVICE — CLIPPING DEVICE
Type: IMPLANTABLE DEVICE | Site: COLON | Status: FUNCTIONAL
Brand: RESOLUTION CLIP

## 2021-11-16 RX ORDER — PROPOFOL 10 MG/ML
VIAL (ML) INTRAVENOUS AS NEEDED
Status: DISCONTINUED | OUTPATIENT
Start: 2021-11-16 | End: 2021-11-16 | Stop reason: SURG

## 2021-11-16 RX ORDER — SODIUM CHLORIDE, SODIUM LACTATE, POTASSIUM CHLORIDE, CALCIUM CHLORIDE 600; 310; 30; 20 MG/100ML; MG/100ML; MG/100ML; MG/100ML
INJECTION, SOLUTION INTRAVENOUS CONTINUOUS PRN
Status: DISCONTINUED | OUTPATIENT
Start: 2021-11-16 | End: 2021-11-16 | Stop reason: SURG

## 2021-11-16 RX ORDER — SODIUM CHLORIDE, SODIUM LACTATE, POTASSIUM CHLORIDE, CALCIUM CHLORIDE 600; 310; 30; 20 MG/100ML; MG/100ML; MG/100ML; MG/100ML
30 INJECTION, SOLUTION INTRAVENOUS CONTINUOUS
Status: DISCONTINUED | OUTPATIENT
Start: 2021-11-16 | End: 2021-11-16 | Stop reason: HOSPADM

## 2021-11-16 RX ORDER — LIDOCAINE HYDROCHLORIDE 20 MG/ML
INJECTION, SOLUTION INFILTRATION; PERINEURAL AS NEEDED
Status: DISCONTINUED | OUTPATIENT
Start: 2021-11-16 | End: 2021-11-16 | Stop reason: SURG

## 2021-11-16 RX ADMIN — PROPOFOL 30 MG: 10 INJECTION, EMULSION INTRAVENOUS at 14:40

## 2021-11-16 RX ADMIN — LIDOCAINE HYDROCHLORIDE 100 MG: 20 INJECTION, SOLUTION INFILTRATION; PERINEURAL at 14:37

## 2021-11-16 RX ADMIN — PROPOFOL 40 MG: 10 INJECTION, EMULSION INTRAVENOUS at 14:46

## 2021-11-16 RX ADMIN — PROPOFOL 30 MG: 10 INJECTION, EMULSION INTRAVENOUS at 14:43

## 2021-11-16 RX ADMIN — PROPOFOL 50 MG: 10 INJECTION, EMULSION INTRAVENOUS at 14:53

## 2021-11-16 RX ADMIN — PROPOFOL 30 MG: 10 INJECTION, EMULSION INTRAVENOUS at 14:56

## 2021-11-16 RX ADMIN — SODIUM CHLORIDE, POTASSIUM CHLORIDE, SODIUM LACTATE AND CALCIUM CHLORIDE 30 ML/HR: 600; 310; 30; 20 INJECTION, SOLUTION INTRAVENOUS at 13:09

## 2021-11-16 RX ADMIN — PROPOFOL 30 MG: 10 INJECTION, EMULSION INTRAVENOUS at 14:49

## 2021-11-16 RX ADMIN — PROPOFOL 50 MG: 10 INJECTION, EMULSION INTRAVENOUS at 14:54

## 2021-11-16 RX ADMIN — SODIUM CHLORIDE, POTASSIUM CHLORIDE, SODIUM LACTATE AND CALCIUM CHLORIDE: 600; 310; 30; 20 INJECTION, SOLUTION INTRAVENOUS at 14:35

## 2021-11-16 RX ADMIN — PROPOFOL 20 MG: 10 INJECTION, EMULSION INTRAVENOUS at 14:57

## 2021-11-16 RX ADMIN — PROPOFOL 70 MG: 10 INJECTION, EMULSION INTRAVENOUS at 14:37

## 2021-11-16 NOTE — DISCHARGE INSTRUCTIONS
Dr. Onofre's Instructions       • Next colonoscopy in 3 years.    • Follow-up with your primary care clinician at your next scheduled appointment time.

## 2021-11-16 NOTE — ANESTHESIA POSTPROCEDURE EVALUATION
Patient: Nick Turk    Procedure Summary     Date: 11/16/21 Room / Location: [facility] ENDOSCOPY 3 / [facility] ENDOSCOPY    Anesthesia Start: 1435 Anesthesia Stop: 1508    Procedure: COLONOSCOPY WITH POLYPECTOMIES, CLIP APPLICATION X1, CAUTERY (N/A ) Diagnosis:       Screening for malignant neoplasm of colon      (Screening for malignant neoplasm of colon [Z12.11])    Surgeons: Nolberto Onofre MD Provider: Sean Shabazz MD    Anesthesia Type: general, MAC ASA Status: 2          Anesthesia Type: general, MAC    Vitals  Vitals Value Taken Time   /75 11/16/21 1508   Temp 36.4 °C (97.5 °F) 11/16/21 1508   Pulse 72 11/16/21 1509   Resp 16 11/16/21 1508   SpO2 96 % 11/16/21 1509   Vitals shown include unvalidated device data.        Post Anesthesia Care and Evaluation    Patient location during evaluation: bedside  Patient participation: complete - patient participated  Level of consciousness: awake  Pain score: 0  Pain management: adequate  Airway patency: patent  Anesthetic complications: No anesthetic complications  PONV Status: none  Cardiovascular status: acceptable and stable  Respiratory status: acceptable and room air  Hydration status: acceptable    Comments: An Anesthesiologist personally participated in the most demanding procedures (including induction and emergence if applicable) in the anesthesia plan, monitored the course of anesthesia administration at frequent intervals and remained physically present and available for immediate diagnosis and treatment of emergencies.

## 2021-11-16 NOTE — ANESTHESIA PREPROCEDURE EVALUATION
Anesthesia Evaluation     Patient summary reviewed and Nursing notes reviewed   no history of anesthetic complications:  NPO Solid Status: > 8 hours  NPO Liquid Status: > 2 hours           Airway   Mallampati: II  TM distance: >3 FB  Neck ROM: full  No difficulty expected  Dental - normal exam     Pulmonary - normal exam   (+) a smoker Current, sleep apnea,   Cardiovascular - normal exam  Exercise tolerance: good (4-7 METS)    ECG reviewed    (+) hypertension,       Neuro/Psych- negative ROS  GI/Hepatic/Renal/Endo    (+) obesity,       Musculoskeletal     (+) back pain, chronic pain,   Abdominal   (+) obese,    Substance History - negative use     OB/GYN negative ob/gyn ROS         Other   arthritis,                      Anesthesia Plan    ASA 2     general and MAC   (Total IV Anesthesia    Patient understands anesthesia not responsible for dental damage.  )  intravenous induction     Anesthetic plan, all risks, benefits, and alternatives have been provided, discussed and informed consent has been obtained with: patient.    Plan discussed with CRNA.

## 2021-11-18 LAB
CYTO UR: NORMAL
LAB AP CASE REPORT: NORMAL
LAB AP CLINICAL INFORMATION: NORMAL
PATH REPORT.FINAL DX SPEC: NORMAL
PATH REPORT.GROSS SPEC: NORMAL

## 2021-12-15 ENCOUNTER — TREATMENT (OUTPATIENT)
Dept: FAMILY MEDICINE CLINIC | Facility: CLINIC | Age: 51
End: 2021-12-15

## 2021-12-15 ENCOUNTER — TELEPHONE (OUTPATIENT)
Dept: FAMILY MEDICINE CLINIC | Facility: CLINIC | Age: 51
End: 2021-12-15

## 2021-12-15 ENCOUNTER — CLINICAL SUPPORT (OUTPATIENT)
Dept: FAMILY MEDICINE CLINIC | Facility: CLINIC | Age: 51
End: 2021-12-15

## 2021-12-15 ENCOUNTER — OFFICE VISIT (OUTPATIENT)
Dept: SLEEP MEDICINE | Facility: HOSPITAL | Age: 51
End: 2021-12-15

## 2021-12-15 VITALS
SYSTOLIC BLOOD PRESSURE: 162 MMHG | DIASTOLIC BLOOD PRESSURE: 95 MMHG | BODY MASS INDEX: 30.88 KG/M2 | WEIGHT: 233 LBS | TEMPERATURE: 97 F | HEART RATE: 96 BPM | OXYGEN SATURATION: 96 % | HEIGHT: 73 IN

## 2021-12-15 DIAGNOSIS — I49.9 IRREGULAR HEART BEAT: Primary | ICD-10-CM

## 2021-12-15 DIAGNOSIS — G47.33 OSA (OBSTRUCTIVE SLEEP APNEA): Primary | ICD-10-CM

## 2021-12-15 PROCEDURE — G0463 HOSPITAL OUTPT CLINIC VISIT: HCPCS | Performed by: PSYCHIATRY & NEUROLOGY

## 2021-12-15 PROCEDURE — 93000 ELECTROCARDIOGRAM COMPLETE: CPT | Performed by: NURSE PRACTITIONER

## 2021-12-15 NOTE — PROGRESS NOTES
ECG 12 Lead    Date/Time: 12/15/2021 2:34 PM  Performed by: Shola Car APRN  Authorized by: Shola Car APRN   Comparison: not compared with previous ECG   Rhythm: sinus rhythm  Rate: normal  BPM: 77  ST Segments: ST segments normal  T Waves: T waves normal  QRS axis: normal    Clinical impression: normal ECG

## 2021-12-15 NOTE — TELEPHONE ENCOUNTER
Patient with irregular heart rate [facility] at [facility]. Dr. Shrestha requested a EKG.

## 2021-12-28 ENCOUNTER — HOSPITAL ENCOUNTER (OUTPATIENT)
Dept: SLEEP MEDICINE | Facility: HOSPITAL | Age: 51
Discharge: HOME | End: 2021-12-28
Admitting: PSYCHIATRY & NEUROLOGY

## 2021-12-28 DIAGNOSIS — G47.33 OSA (OBSTRUCTIVE SLEEP APNEA): ICD-10-CM

## 2021-12-28 PROCEDURE — 95806 SLEEP STUDY UNATT&RESP EFFT: CPT

## 2021-12-30 DIAGNOSIS — G47.33 OSA (OBSTRUCTIVE SLEEP APNEA): Primary | ICD-10-CM

## 2021-12-31 NOTE — PROGRESS NOTES
[facility] sleep center    Nick Turk  1970  51 y.o.  male      PCP:Shola Car APRN    Type of service: Initial New Patient Office Visit  Date of service: 12/15/2021     Chief complaint: Snoring and apnea episodes    History of present illness;  Nick Turk is a new patient for me and was seen today for sleep related problems of snoring, nonrestorative sleep and witnessed apneas. The symptoms are present for years and they are persistent in nature.  The snoring is present in all positions and it is coming progressively loud.  Patient reports that he has been snoring for several years and has not pursued any treatment for snoring.  Over the past 2 years, his wife has been reporting apnea episodes.  Patient is frequently tired when he wakes up in the morning.  He  has no history of prior sleep evaluation and sleep studies. Patient gives no prior surgery namely tonsillectomy, nasal surgery and UPPP.     Patient gives the following sleep history.  Sleep schedule:  Bedtime: 10 PM  Wake time: 6:30 AM  Normally takes about 10 minutes to fall asleep  Average hours of sleep 8  Number of naps per day 1  He reports that he keeps the same schedule on weekdays and weekends.  He is frequently tired when he wakes up and does not feel rested.    Symptoms  In addition to snoring, nonrestorative sleep and witnessed apneas patient gives the following associated symptoms.  Have you ever awakened gasping for breath, coughing, choking: Yes   Change in weight,  Yes approximately 10 pounds weight gain  Morning headaches  Yes   Awaken with a sore throat or dry mouth  No   Leg jerking at night:  No   Crawly feeling/urge sensation to move in the legs: No   Teeth grinding:No   Have you ever awakened at night with a sour taste or burning sensation in your chest:  No   Do you have muscle weakness with laughing or anger or sleep paralysis:  No   Have you ever felt paralyzed while going to sleep or waking up:  No  "  Sleepwalking, nightmares, No   Nocturia (urination at night): 2-3 times per night  Memory Problem:No     Past medical history: (Relevant to sleep medicine)  1. Noncontributory    • Medications are reviewed by me and documented in the encounter  • Allergies reviewed and documented in encounter    Social history:  Do you drive a commercial vehicle:  No   Shift work:  No   Tobacco use:  Yes, started at age 17 years.  He smokes about 1 pack/day  Alcohol use: 0 per week  Caffeinated drinks: 3 sodas per day    FAMILY HISTORY (Your mother, father, brothers and sisters)  1. Noncontributory    REVIEW OF SYSTEMS.  Full review of systems available on the intake form which is scanned in the media tab.  The relevant positive are noted below  1. [place] Sleepiness Scale :Total score: 4   2. Snoring      Physical exam:  Vitals:    12/15/21 0900   BP: 162/95   Pulse: 96   Temp: 97 °F (36.1 °C)   SpO2: 96%   Weight: 106 kg (233 lb)   Height: 185.4 cm (73\")    Body mass index is 30.74 kg/m².       Physical Exam  Vitals reviewed.   Constitutional:       Appearance: Normal appearance.   HENT:      Head: Normocephalic and atraumatic.      Nose: Nose normal.      Mouth/Throat:      Mouth: Mucous membranes are moist.      Comments: Mallampati class I  Cardiovascular:      Rate and Rhythm: Normal rate. Rhythm irregular.      Heart sounds: Normal heart sounds.      Comments: (?) PACs versus PVCs versus AF  Pulmonary:      Effort: Pulmonary effort is normal.      Breath sounds: Normal breath sounds.   Neurological:      Mental Status: He is alert and oriented to person, place, and time.   Psychiatric:         Mood and Affect: Mood normal.         Behavior: Behavior normal.         Thought Content: Thought content normal.          TSH Results:  TSH    TSH 10/8/21   TSH 1.220                 Assessment and plan:    DAQUAN  Cardiac dysrhythmia      Home sleep apnea test  EKG through his PCP.  (Apparently, patient is undergoing some type of " cardiac evaluation) I suggested doing the EKG to document any arrhythmias.    I spent about 45 minutes with patient for care coordination.  • Return for 31 to 90 days after PAP setup, Follow up after study..  Patient's questions were answered.      Maria Elena Shrestha MD  12/30/2021    28-Apr-2019 20:29

## 2022-01-04 ENCOUNTER — TELEPHONE (OUTPATIENT)
Dept: SLEEP MEDICINE | Facility: HOSPITAL | Age: 52
End: 2022-01-04

## 2022-01-04 NOTE — TELEPHONE ENCOUNTER
Called pt - he is having insurance issues and will call back when he gets them straightened out to let us know which DME Company he wants to use.

## 2022-01-05 ENCOUNTER — TELEPHONE (OUTPATIENT)
Dept: SLEEP MEDICINE | Facility: HOSPITAL | Age: 52
End: 2022-01-05

## 2022-01-28 ENCOUNTER — LAB (OUTPATIENT)
Dept: LAB | Facility: HOSPITAL | Age: 52
End: 2022-01-28

## 2022-01-28 ENCOUNTER — TELEPHONE (OUTPATIENT)
Dept: FAMILY MEDICINE CLINIC | Facility: CLINIC | Age: 52
End: 2022-01-28

## 2022-01-28 DIAGNOSIS — Z20.822 EXPOSURE TO COVID-19 VIRUS: ICD-10-CM

## 2022-01-28 DIAGNOSIS — Z20.822 EXPOSURE TO COVID-19 VIRUS: Primary | ICD-10-CM

## 2022-01-28 PROCEDURE — U0004 COV-19 TEST NON-CDC HGH THRU: HCPCS

## 2022-01-29 LAB — SARS-COV-2 RNA PNL SPEC NAA+PROBE: DETECTED

## 2022-03-09 ENCOUNTER — HOSPITAL ENCOUNTER (OUTPATIENT)
Dept: CARDIOLOGY | Facility: HOSPITAL | Age: 52
Discharge: HOME | End: 2022-03-09
Admitting: NURSE PRACTITIONER

## 2022-03-09 DIAGNOSIS — I49.9 IRREGULAR HEART BEAT: ICD-10-CM

## 2022-03-09 PROCEDURE — 93270 REMOTE 30 DAY ECG REV/REPORT: CPT

## 2022-03-22 ENCOUNTER — HOSPITAL ENCOUNTER (OUTPATIENT)
Dept: GENERAL RADIOLOGY | Facility: HOSPITAL | Age: 52
Discharge: HOME | End: 2022-03-22
Admitting: NURSE PRACTITIONER

## 2022-03-22 ENCOUNTER — TRANSCRIBE ORDERS (OUTPATIENT)
Dept: GENERAL RADIOLOGY | Facility: HOSPITAL | Age: 52
End: 2022-03-22

## 2022-03-22 DIAGNOSIS — R52 PAIN: Primary | ICD-10-CM

## 2022-03-22 DIAGNOSIS — R52 PAIN: ICD-10-CM

## 2022-03-22 PROCEDURE — 73030 X-RAY EXAM OF SHOULDER: CPT

## 2022-04-05 ENCOUNTER — OFFICE VISIT (OUTPATIENT)
Dept: SLEEP MEDICINE | Facility: HOSPITAL | Age: 52
End: 2022-04-05

## 2022-04-05 ENCOUNTER — TRANSCRIBE ORDERS (OUTPATIENT)
Dept: ADMINISTRATIVE | Facility: HOSPITAL | Age: 52
End: 2022-04-05

## 2022-04-05 VITALS
BODY MASS INDEX: 31.68 KG/M2 | SYSTOLIC BLOOD PRESSURE: 152 MMHG | DIASTOLIC BLOOD PRESSURE: 94 MMHG | OXYGEN SATURATION: 97 % | HEART RATE: 77 BPM | HEIGHT: 73 IN | WEIGHT: 239 LBS | TEMPERATURE: 98.2 F

## 2022-04-05 DIAGNOSIS — G47.33 OSA (OBSTRUCTIVE SLEEP APNEA): Primary | ICD-10-CM

## 2022-04-05 DIAGNOSIS — M19.90 OSTEOARTHRITIS, UNSPECIFIED OSTEOARTHRITIS TYPE, UNSPECIFIED SITE: Primary | ICD-10-CM

## 2022-04-05 PROCEDURE — G0463 HOSPITAL OUTPT CLINIC VISIT: HCPCS | Performed by: PSYCHIATRY & NEUROLOGY

## 2022-04-07 NOTE — PROGRESS NOTES
[facility]    SLEEP CLINIC FOLLOW UP PROGRESS NOTE.    Nick Turk  1970  51 y.o.  male      PCP: Shola Car APRN      Date of visit: 4/5/2022  The patient is returning for follow-up visit.  Reason for follow-up visit: Obstructive sleep apnea-to discuss test results and assess initial response and compliance to CPAP treatment    HPI:  This is a 51 y.o. years old patient who has a history of obstructive sleep apnea is here for   compliance follow-up and to discuss test results.  He was recently diagnosed with obstructive sleep apnea after he had home sleep apnea test done because of history of snoring and witnessed apneas.  Home sleep apnea test was done on 12/28/2021.  Sleep apnea is moderate in severity with a AHI of 25.5/hr. Patient is now using positive airway pressure therapy with CPAP and the symptoms of snoring, non-restorative sleep and daytime  sleepiness have improved.  The patient reports that he is no longer snoring.  He is sleeping better.  He denies any problems with current device settings.  Normally goes to bed at 10:30 PM and wakes up at 6:30 AM getting approximately 7 to 8 hours of sleep during the night.  The patient wakes up 0 time(s) during the night.   Feels refreshed after waking up.  Patient also denies headaches and nasal congestion.     Interval past medical/surgical history: Noncontributory    Mediactions and allergies are reviewed by me and documented in the encounter.     SOCIAL ( habits pertaining to sleep medicine)  History of smoking:Yes   History of alcohol use: 0 per week  Caffeine use: 5    REVIEW OF SYSTEMS:   [place] Sleepiness Scale :Total score: 5   Nsaal congestion: No  Dry mouth/nose: No  Post nasal drip; no  Acid reflux/Heartburn: No  Abd bloating: No  Morining headache: No  Anxiety: No  Depression: No    Physical Exam :  CONSTITUTIONAL:  Vitals:    04/05/22 0900   BP: 152/94   Pulse: 77   Temp: 98.2 °F (36.8 °C)   SpO2: 97%   Weight: 108 kg (239 lb)  "  Height: 185.4 cm (73\")    Body mass index is 31.53 kg/m².   Neuropsych: Is awake alert and oriented.  Responses are coherent and relevant.  Mood and affect appeared appropriate.      Data reviewed:  The Smart card downloaded on 4/5/2022 has been reviewed independently by me for compliance and discussed the data with the patient.   Compliance; 97.7%  More than 4 hr use, 70.5%  Average use of the device 4 hours 55 minutes per night  Residual AHI: 0.8 /hr (goal < 5.0 /hr)  Mask type: Full face  DME: [employer]    I have reviewed his results of home sleep apnea test with the patient.  Date of study: 12/28/2021  Total monitoring time: 424.0 minutes  Total number of respiratory events 180.  GARRETT 25.5     ASSESSMENT AND PLAN:  · Obstructive sleep apnea ( G 47.33).  The symptoms of sleep apnea have improved with the device and the treatment.  Patient's compliance with the device is excellent for treatment of sleep apnea.  I have independently reviewed the smart card down load and discussed with the patient the download data and encouraged  the patient to continue to use the device.The residual AHI is acceptable. The patient is also instructed to get the supplies from the DME company and and change them on a regular basis.  A prescription for supplies has been sent to the DME company.  I have also discussed the good sleep hygiene habits and adequate amount of sleep needed for good health.  · Return in about 6 months (around 10/5/2022). . Patient's questions were answered.      Maria Elena Shrestha MD  4/7/2022               "

## 2022-04-12 ENCOUNTER — HOSPITAL ENCOUNTER (OUTPATIENT)
Dept: MRI IMAGING | Facility: HOSPITAL | Age: 52
Discharge: HOME | End: 2022-04-12
Admitting: NURSE PRACTITIONER

## 2022-04-12 ENCOUNTER — APPOINTMENT (OUTPATIENT)
Dept: MRI IMAGING | Facility: HOSPITAL | Age: 52
End: 2022-04-12

## 2022-04-12 DIAGNOSIS — M19.90 OSTEOARTHRITIS, UNSPECIFIED OSTEOARTHRITIS TYPE, UNSPECIFIED SITE: ICD-10-CM

## 2022-04-12 PROCEDURE — 73221 MRI JOINT UPR EXTREM W/O DYE: CPT

## 2022-04-13 LAB
MAXIMAL PREDICTED HEART RATE: 170 BPM
STRESS TARGET HR: 145 BPM

## 2022-04-20 ENCOUNTER — TELEPHONE (OUTPATIENT)
Dept: ORTHOPEDIC SURGERY | Facility: CLINIC | Age: 52
End: 2022-04-20

## 2022-04-20 ENCOUNTER — OFFICE VISIT (OUTPATIENT)
Dept: ORTHOPEDIC SURGERY | Facility: CLINIC | Age: 52
End: 2022-04-20

## 2022-04-20 VITALS — HEIGHT: 73 IN | HEART RATE: 84 BPM | WEIGHT: 244.2 LBS | BODY MASS INDEX: 32.37 KG/M2 | OXYGEN SATURATION: 98 %

## 2022-04-20 DIAGNOSIS — M75.122 NONTRAUMATIC COMPLETE TEAR OF LEFT ROTATOR CUFF: Primary | ICD-10-CM

## 2022-04-20 PROCEDURE — 99204 OFFICE O/P NEW MOD 45 MIN: CPT | Performed by: PHYSICIAN ASSISTANT

## 2022-04-20 RX ORDER — HYDROCODONE BITARTRATE AND ACETAMINOPHEN 7.5; 325 MG/1; MG/1
1 TABLET ORAL EVERY 6 HOURS PRN
COMMUNITY
Start: 2022-03-31 | End: 2022-05-12 | Stop reason: HOSPADM

## 2022-04-20 RX ORDER — DICLOFENAC SODIUM 75 MG/1
75 TABLET, DELAYED RELEASE ORAL 2 TIMES DAILY
Qty: 60 TABLET | Refills: 0 | Status: SHIPPED | OUTPATIENT
Start: 2022-04-20 | End: 2022-09-16

## 2022-04-20 NOTE — PATIENT INSTRUCTIONS
Diclofenac prescribed today to use twice weekly as needed. Do not take with Ibuprofen. Able to take Tylenol as needed as well.     Discussed avoiding overhead motion.     Follow up in 4-6 weeks to discuss further treatment.

## 2022-04-20 NOTE — TELEPHONE ENCOUNTER
Pt wife called pt juancho estes Ally today her recommendation was to seen Been .pt wife  wants dr ledezma to look at Mri and give recommendations on what juancho should do because Dr Minaya 1st available not til 5/2/22

## 2022-04-20 NOTE — TELEPHONE ENCOUNTER
Spoke with Yessi about this patient. Per Yessi print the MRI report and check with Dr. Isabel tomorrow to when he is in the office.

## 2022-04-20 NOTE — PROGRESS NOTES
"Chief Complaint  Initial Evaluation of the Left Shoulder    Subjective          Nick Turk presents to [facility] for evaluation of left shoulder pain.  Patient states that over the past year, he has had worsening left shoulder pain.  Patient has been attending pain management for approximately 1 year receiving glenohumeral joint steroid injections.  He states that he has noticed the injections are not providing him with relief.  His last steroid injection was 1 and half month ago.  Patient states he has been using ibuprofen daily with little to no relief.  He states he noticed working on a house several months ago, he was unable to lift heavy items.  Patient states pain is nearly constant.  He denies any known injury or trauma to his left shoulder.  Patient states that pain management ordered recent MRI and presents today with these results.    Objective   Allergies   Allergen Reactions   • Bee Venom Swelling       Vital Signs:   Pulse 84   Ht 185.4 cm (73\")   Wt 111 kg (244 lb 3.2 oz)   SpO2 98%   BMI 32.22 kg/m²       Physical Exam  Constitutional:       Appearance: Normal appearance. Patient is well-developed and normal weight.   HENT:      Head: Normocephalic.      Right Ear: Hearing and external ear normal.      Left Ear: Hearing and external ear normal.      Nose: Nose normal.   Eyes:      Conjunctiva/sclera: Conjunctivae normal.   Cardiovascular:      Rate and Rhythm: Normal rate.   Pulmonary:      Effort: Pulmonary effort is normal.      Breath sounds: No wheezing or rales.   Abdominal:      Palpations: Abdomen is soft.      Tenderness: There is no abdominal tenderness.   Musculoskeletal:      Cervical back: Normal range of motion.   Skin:     Findings: No rash.   Neurological:      Mental Status: Patient is alert and oriented to person, place, and time.   Psychiatric:         Mood and Affect: Mood and affect normal.         Judgment: Judgment normal.     Ortho " Exam  Left shoulder: Tenderness to palpate along the rotator cuff muscles, AC joint and subacromial space.  No atrophy.  No scapular winging.  Active forward flexion to 155 degrees, pain beyond 110 degrees.  Painful cross body adduction.  Full elbow and wrist range of motion.  Full pronation supination.  Good muscle tone of the deltoid, biceps, triceps, wrist flexors and extensors.  Sensation is intact.  Neurovascular intact distally.    Result Review :            Imaging Results (Most Recent)     None       XR Shoulder 2+ View Left    Result Date: 3/22/2022  Narrative: PROCEDURE: XR SHOULDER 2+ VW LEFT  COMPARISON: [facility], , SHOULDER >OR= 2V LT, 3/08/2019, 11:52.  INDICATIONS: CHRONIC LEFT SHOULDER PAIN. WORSE THE LAST FEW MONTHS WITH LIMITED RANGE OF MOTION. NO KNOWN RECENT INJURY.  FINDINGS:  No evidence of acute fracture or dislocation.  Mild age-appropriate hypertrophic degenerative changes at the acromioclavicular joint.  The glenohumeral joint space is well maintained.  Electronic device in the anterior upper chest wall.  The included left lung is clear.      Impression:  Mild age-appropriate DJD at the AC joint, without radiographic evidence of acute fracture or dislocation.      CARLIN GALARZA MD       Electronically Signed and Approved By: CARLIN GALARZA MD on 3/22/2022 at 14:14             MRI Shoulder Left Without Contrast    Result Date: 4/13/2022  Narrative: PROCEDURE: MRI SHOULDER LEFT WO CONTRAST  COMPARISON: None  INDICATIONS: LEFT SHOULDER PAIN X1 YEAR. PAINFUL RANGE OF MOTION.      TECHNIQUE: A variety of imaging planes and parameters were utilized for visualization of suspected pathology.  Images were performed without contrast.   FINDINGS:  Rotator cuff tendinosis is present with intermediate signal changes and thickening.  There is a complete retracted tear of the supraspinatus tendon with the defect measuring up to 4.4 cm in transverse dimension.  A small amount of  subacromial bursitis is present.  There is a partial articular sided tear of the infraspinatus tendon as well as the teres minor tendon.  There is partial articular sided tearing of the superior to mid fibers of the subscapularis tendon.  No additional complete tear identified.  Fluid is seen along the extracapsular portion of the biceps tendon.  Mild to moderate acromioclavicular and glenohumeral osteoarthritic changes are present.  Pan labral degenerative tearing is present, most pronounced along the superior labrum.  No abnormal focal bone marrow signal is seen. The cortical margins are intact. The volume of the rotator cuff musculature is within normal limits. The surrounding soft tissues are unremarkable.      Impression:   1. Rotator cuff tendinosis with complete retracted tear of the supraspinatus tendon.  There is mild partial articular sided tearing of the infraspinatus, subscapularis and teres minor tendons. 2. Mild to moderate acromioclavicular and glenohumeral osteoarthritis. 3. Pan labral degenerative tearing, most pronounced along the superior labrum. 4. Biceps tenosynovitis..     ADEOLA VERDUZCO MD       Electronically Signed and Approved By: ADEOLA VERDUZCO MD on 4/13/2022 at 13:33                      Assessment and Plan    Problem List Items Addressed This Visit        Musculoskeletal and Injuries    Nontraumatic complete tear of left rotator cuff - Primary        Discussed with patient he is not eligible for steroid injection repeated at this time, due to recent being 1.5-month ago.  Advised patient on new anti-inflammatory medication.  Recommend he take twice daily for 2 weeks  Discussed meeting with surgeon, Dr. Minaya at next available to discuss his other options for treatment given completely retracted rotator cuff tendon and failure with steroid injections.      Follow Up   Return in about 4 weeks (around 5/18/2022).  Patient Instructions   Diclofenac prescribed today to use twice weekly as needed.  Do not take with Ibuprofen. Able to take Tylenol as needed as well.     Discussed avoiding overhead motion.     Follow up in 4-6 weeks to discuss further treatment.    Patient was given instructions and counseling regarding his condition or for health maintenance advice. Please see specific information pulled into the AVS if appropriate.

## 2022-04-21 NOTE — TELEPHONE ENCOUNTER
Per Dr. Isabel see Dr. Minaya as scheduled. Only options would be a shoulder injection or a shoulder replacement in the future. Called and spoke with patient. He understands and will follow up with Dr. Minaya.

## 2022-05-02 ENCOUNTER — PREP FOR SURGERY (OUTPATIENT)
Dept: OTHER | Facility: HOSPITAL | Age: 52
End: 2022-05-02

## 2022-05-02 ENCOUNTER — OFFICE VISIT (OUTPATIENT)
Dept: ORTHOPEDIC SURGERY | Facility: CLINIC | Age: 52
End: 2022-05-02

## 2022-05-02 ENCOUNTER — TELEPHONE (OUTPATIENT)
Dept: ORTHOPEDIC SURGERY | Facility: CLINIC | Age: 52
End: 2022-05-02

## 2022-05-02 VITALS — HEIGHT: 73 IN | WEIGHT: 244 LBS | BODY MASS INDEX: 32.34 KG/M2

## 2022-05-02 DIAGNOSIS — M75.122 COMPLETE TEAR OF LEFT ROTATOR CUFF, UNSPECIFIED WHETHER TRAUMATIC: Primary | ICD-10-CM

## 2022-05-02 DIAGNOSIS — M75.122 NONTRAUMATIC COMPLETE TEAR OF LEFT ROTATOR CUFF: Primary | ICD-10-CM

## 2022-05-02 PROBLEM — M75.102 ROTATOR CUFF TEAR, LEFT: Status: ACTIVE | Noted: 2022-05-02

## 2022-05-02 PROCEDURE — 99214 OFFICE O/P EST MOD 30 MIN: CPT | Performed by: ORTHOPAEDIC SURGERY

## 2022-05-02 RX ORDER — CEFAZOLIN SODIUM 2 G/100ML
2 INJECTION, SOLUTION INTRAVENOUS ONCE
Status: CANCELLED | OUTPATIENT
Start: 2022-05-02 | End: 2022-05-02

## 2022-05-02 RX ORDER — CEFAZOLIN SODIUM IN 0.9 % NACL 3 G/100 ML
3 INTRAVENOUS SOLUTION, PIGGYBACK (ML) INTRAVENOUS ONCE
Status: CANCELLED | OUTPATIENT
Start: 2022-05-02 | End: 2022-05-02

## 2022-05-02 NOTE — TELEPHONE ENCOUNTER
PER PATIENT HE HAS BEEN FULLY VACCINATED AND WILL BE BRINGING IN HIS COVID-19 VACCINATION RECORD CARD PRIOR TO HIS SCHEDULED SURGERY.

## 2022-05-02 NOTE — H&P (VIEW-ONLY)
"Chief Complaint  Follow-up of the Left Shoulder     Subjective      Nick Turk presents to [facility] for a follow-up of left shoulder. Patient has been experiencing pain in the left shoulder for approximately 1 year. He has been seeing pain management for the last year where they have given him glenohumeral joint injections. Injections were not very effective. Patient states he has been using ibuprofen daily with little to no relief.  He states he noticed working on a house several months ago, he was unable to lift heavy items.  Patient states pain is nearly constant.  He denies any known injury or trauma to his left shoulder. Mr. Turk is present today with MRI results of the left shoulder.     Allergies   Allergen Reactions   • Bee Venom Swelling        Social History     Socioeconomic History   • Marital status:    Tobacco Use   • Smoking status: Current Every Day Smoker     Packs/day: 0.75     Types: Cigarettes   • Smokeless tobacco: Never Used   Vaping Use   • Vaping Use: Never used   Substance and Sexual Activity   • Alcohol use: Never   • Drug use: Never   • Sexual activity: Yes     Partners: Female        Review of Systems     Objective   Vital Signs:   Ht 185.4 cm (73\")   Wt 111 kg (244 lb)   BMI 32.19 kg/m²       Physical Exam  Constitutional:       Appearance: Normal appearance. Patient is well-developed and normal weight.   HENT:      Head: Normocephalic.      Right Ear: Hearing and external ear normal.      Left Ear: Hearing and external ear normal.      Nose: Nose normal.   Eyes:      Conjunctiva/sclera: Conjunctivae normal.   Cardiovascular:      Rate and Rhythm: Normal rate.   Pulmonary:      Effort: Pulmonary effort is normal.      Breath sounds: No wheezing or rales.   Abdominal:      Palpations: Abdomen is soft.      Tenderness: There is no abdominal tenderness.   Musculoskeletal:      Cervical back: Normal range of motion.   Skin:     Findings: No " rash.   Neurological:      Mental Status: Patient  is alert and oriented to person, place, and time.   Psychiatric:         Mood and Affect: Mood and affect normal.         Judgment: Judgment normal.       Ortho Exam      LEFT SHOULDER: Active forward elevation to 155 degrees. Pain after 110 degrees of forward elevation. Pain with empty can testing. Pain with cross body adduction. Full pronation and supination with pain. Tenderness about the rotator cuff muscles. No atrophy.  Tender biceps. No swelling. Good tone of deltoid, biceps, triceps, wrist extensors, and wrist flexors.        Procedures        Imaging Results (Most Recent)     None           Result Review :       MRI Shoulder Left Without Contrast    Result Date: 4/13/2022  Narrative: PROCEDURE: MRI SHOULDER LEFT WO CONTRAST  COMPARISON: None  INDICATIONS: LEFT SHOULDER PAIN X1 YEAR. PAINFUL RANGE OF MOTION.      TECHNIQUE: A variety of imaging planes and parameters were utilized for visualization of suspected pathology.  Images were performed without contrast.   FINDINGS:  Rotator cuff tendinosis is present with intermediate signal changes and thickening.  There is a complete retracted tear of the supraspinatus tendon with the defect measuring up to 4.4 cm in transverse dimension.  A small amount of subacromial bursitis is present.  There is a partial articular sided tear of the infraspinatus tendon as well as the teres minor tendon.  There is partial articular sided tearing of the superior to mid fibers of the subscapularis tendon.  No additional complete tear identified.  Fluid is seen along the extracapsular portion of the biceps tendon.  Mild to moderate acromioclavicular and glenohumeral osteoarthritic changes are present.  Pan labral degenerative tearing is present, most pronounced along the superior labrum.  No abnormal focal bone marrow signal is seen. The cortical margins are intact. The volume of the rotator cuff musculature is within normal  limits. The surrounding soft tissues are unremarkable.      Impression:   1. Rotator cuff tendinosis with complete retracted tear of the supraspinatus tendon.  There is mild partial articular sided tearing of the infraspinatus, subscapularis and teres minor tendons. 2. Mild to moderate acromioclavicular and glenohumeral osteoarthritis. 3. Pan labral degenerative tearing, most pronounced along the superior labrum. 4. Biceps tenosynovitis..     ADEOLA VERDUZCO MD       Electronically Signed and Approved By: ADEOLA VERDUZCO MD on 4/13/2022 at 13:33                      Assessment and Plan     DX: Left rotator cuff tear     Risks, benefits and post-operative care for left rotator cuff repair with possible biceps tenodesis. He understands and wishes to proceed.     Discussed surgery., Risks/benefits discussed with patient including, but not limited to: infection, bleeding, neurovascular damage, re-rupture, aesthetic deformity, need for further surgery, and death. and Surgery pamphlet given.    Follow Up     Post-operatively.       Patient was given instructions and counseling regarding his condition or for health maintenance advice. Please see specific information pulled into the AVS if appropriate.     Scribed for Abdoulaye Minaya MD by Jaclyn Marc.  05/02/22   09:18 EDT      I have personally performed the services described in this document as scribed by the above individual and it is both accurate and complete. Abdoulaye Minaya MD 05/02/22

## 2022-05-02 NOTE — PROGRESS NOTES
"Chief Complaint  Follow-up of the Left Shoulder     Subjective      Nick Turk presents to [facility] for a follow-up of left shoulder. Patient has been experiencing pain in the left shoulder for approximately 1 year. He has been seeing pain management for the last year where they have given him glenohumeral joint injections. Injections were not very effective. Patient states he has been using ibuprofen daily with little to no relief.  He states he noticed working on a house several months ago, he was unable to lift heavy items.  Patient states pain is nearly constant.  He denies any known injury or trauma to his left shoulder. Mr. Turk is present today with MRI results of the left shoulder.     Allergies   Allergen Reactions   • Bee Venom Swelling        Social History     Socioeconomic History   • Marital status:    Tobacco Use   • Smoking status: Current Every Day Smoker     Packs/day: 0.75     Types: Cigarettes   • Smokeless tobacco: Never Used   Vaping Use   • Vaping Use: Never used   Substance and Sexual Activity   • Alcohol use: Never   • Drug use: Never   • Sexual activity: Yes     Partners: Female        Review of Systems     Objective   Vital Signs:   Ht 185.4 cm (73\")   Wt 111 kg (244 lb)   BMI 32.19 kg/m²       Physical Exam  Constitutional:       Appearance: Normal appearance. Patient is well-developed and normal weight.   HENT:      Head: Normocephalic.      Right Ear: Hearing and external ear normal.      Left Ear: Hearing and external ear normal.      Nose: Nose normal.   Eyes:      Conjunctiva/sclera: Conjunctivae normal.   Cardiovascular:      Rate and Rhythm: Normal rate.   Pulmonary:      Effort: Pulmonary effort is normal.      Breath sounds: No wheezing or rales.   Abdominal:      Palpations: Abdomen is soft.      Tenderness: There is no abdominal tenderness.   Musculoskeletal:      Cervical back: Normal range of motion.   Skin:     Findings: No " rash.   Neurological:      Mental Status: Patient  is alert and oriented to person, place, and time.   Psychiatric:         Mood and Affect: Mood and affect normal.         Judgment: Judgment normal.       Ortho Exam      LEFT SHOULDER: Active forward elevation to 155 degrees. Pain after 110 degrees of forward elevation. Pain with empty can testing. Pain with cross body adduction. Full pronation and supination with pain. Tenderness about the rotator cuff muscles. No atrophy.  Tender biceps. No swelling. Good tone of deltoid, biceps, triceps, wrist extensors, and wrist flexors.        Procedures        Imaging Results (Most Recent)     None           Result Review :       MRI Shoulder Left Without Contrast    Result Date: 4/13/2022  Narrative: PROCEDURE: MRI SHOULDER LEFT WO CONTRAST  COMPARISON: None  INDICATIONS: LEFT SHOULDER PAIN X1 YEAR. PAINFUL RANGE OF MOTION.      TECHNIQUE: A variety of imaging planes and parameters were utilized for visualization of suspected pathology.  Images were performed without contrast.   FINDINGS:  Rotator cuff tendinosis is present with intermediate signal changes and thickening.  There is a complete retracted tear of the supraspinatus tendon with the defect measuring up to 4.4 cm in transverse dimension.  A small amount of subacromial bursitis is present.  There is a partial articular sided tear of the infraspinatus tendon as well as the teres minor tendon.  There is partial articular sided tearing of the superior to mid fibers of the subscapularis tendon.  No additional complete tear identified.  Fluid is seen along the extracapsular portion of the biceps tendon.  Mild to moderate acromioclavicular and glenohumeral osteoarthritic changes are present.  Pan labral degenerative tearing is present, most pronounced along the superior labrum.  No abnormal focal bone marrow signal is seen. The cortical margins are intact. The volume of the rotator cuff musculature is within normal  limits. The surrounding soft tissues are unremarkable.      Impression:   1. Rotator cuff tendinosis with complete retracted tear of the supraspinatus tendon.  There is mild partial articular sided tearing of the infraspinatus, subscapularis and teres minor tendons. 2. Mild to moderate acromioclavicular and glenohumeral osteoarthritis. 3. Pan labral degenerative tearing, most pronounced along the superior labrum. 4. Biceps tenosynovitis..     ADEOLA VERDUZCO MD       Electronically Signed and Approved By: ADEOLA VERDUZCO MD on 4/13/2022 at 13:33                      Assessment and Plan     DX: Left rotator cuff tear     Risks, benefits and post-operative care for left rotator cuff repair with possible biceps tenodesis. He understands and wishes to proceed.     Discussed surgery., Risks/benefits discussed with patient including, but not limited to: infection, bleeding, neurovascular damage, re-rupture, aesthetic deformity, need for further surgery, and death. and Surgery pamphlet given.    Follow Up     Post-operatively.       Patient was given instructions and counseling regarding his condition or for health maintenance advice. Please see specific information pulled into the AVS if appropriate.     Scribed for Abdoulaye Minaya MD by Jaclyn Marc.  05/02/22   09:18 EDT      I have personally performed the services described in this document as scribed by the above individual and it is both accurate and complete. Abdoulaye Minaya MD 05/02/22

## 2022-05-12 ENCOUNTER — HOSPITAL ENCOUNTER (OUTPATIENT)
Facility: HOSPITAL | Age: 52
Discharge: HOME | End: 2022-05-12
Attending: ORTHOPAEDIC SURGERY | Admitting: ORTHOPAEDIC SURGERY

## 2022-05-12 ENCOUNTER — ANESTHESIA (OUTPATIENT)
Dept: PERIOP | Facility: HOSPITAL | Age: 52
End: 2022-05-12

## 2022-05-12 ENCOUNTER — ANESTHESIA EVENT (OUTPATIENT)
Dept: PERIOP | Facility: HOSPITAL | Age: 52
End: 2022-05-12

## 2022-05-12 VITALS
OXYGEN SATURATION: 94 % | HEART RATE: 82 BPM | RESPIRATION RATE: 16 BRPM | TEMPERATURE: 96.9 F | WEIGHT: 235.67 LBS | DIASTOLIC BLOOD PRESSURE: 88 MMHG | BODY MASS INDEX: 31.23 KG/M2 | HEIGHT: 73 IN | SYSTOLIC BLOOD PRESSURE: 138 MMHG

## 2022-05-12 DIAGNOSIS — M75.122 NONTRAUMATIC COMPLETE TEAR OF LEFT ROTATOR CUFF: ICD-10-CM

## 2022-05-12 PROCEDURE — 23412 REPAIR ROTATOR CUFF CHRONIC: CPT | Performed by: ORTHOPAEDIC SURGERY

## 2022-05-12 PROCEDURE — 25010000002 MIDAZOLAM PER 1 MG: Performed by: STUDENT IN AN ORGANIZED HEALTH CARE EDUCATION/TRAINING PROGRAM

## 2022-05-12 PROCEDURE — 25010000002 EPINEPHRINE PER 0.1 MG: Performed by: ORTHOPAEDIC SURGERY

## 2022-05-12 PROCEDURE — 25010000002 PROPOFOL 10 MG/ML EMULSION: Performed by: NURSE ANESTHETIST, CERTIFIED REGISTERED

## 2022-05-12 PROCEDURE — 25010000002 FENTANYL CITRATE (PF) 50 MCG/ML SOLUTION: Performed by: NURSE ANESTHETIST, CERTIFIED REGISTERED

## 2022-05-12 PROCEDURE — 25010000002 ROPIVACAINE PER 1 MG: Performed by: STUDENT IN AN ORGANIZED HEALTH CARE EDUCATION/TRAINING PROGRAM

## 2022-05-12 PROCEDURE — L3670 SO ACRO/CLAV CAN WEB PRE OTS: HCPCS | Performed by: ORTHOPAEDIC SURGERY

## 2022-05-12 PROCEDURE — C1713 ANCHOR/SCREW BN/BN,TIS/BN: HCPCS | Performed by: ORTHOPAEDIC SURGERY

## 2022-05-12 PROCEDURE — 76942 ECHO GUIDE FOR BIOPSY: CPT | Performed by: ORTHOPAEDIC SURGERY

## 2022-05-12 PROCEDURE — 25010000002 MIDAZOLAM PER 1 MG

## 2022-05-12 PROCEDURE — 25010000002 CEFAZOLIN IN DEXTROSE 2-4 GM/100ML-% SOLUTION: Performed by: ORTHOPAEDIC SURGERY

## 2022-05-12 PROCEDURE — 25010000002 DEXAMETHASONE PER 1 MG: Performed by: NURSE ANESTHETIST, CERTIFIED REGISTERED

## 2022-05-12 PROCEDURE — 23430 REPAIR BICEPS TENDON: CPT | Performed by: ORTHOPAEDIC SURGERY

## 2022-05-12 PROCEDURE — 29824 SHO ARTHRS SRG DSTL CLAVICLC: CPT | Performed by: ORTHOPAEDIC SURGERY

## 2022-05-12 PROCEDURE — 25010000002 ONDANSETRON PER 1 MG: Performed by: NURSE ANESTHETIST, CERTIFIED REGISTERED

## 2022-05-12 DEVICE — SUT FW #2 W/TPR NDL 1/2 CIR 38IN 97CM 26.5MM BLU: Type: IMPLANTABLE DEVICE | Site: SHOULDER | Status: FUNCTIONAL

## 2022-05-12 DEVICE — SUTURE ANCHOR, BIO-CORKSCREW FT
Type: IMPLANTABLE DEVICE | Site: SHOULDER | Status: FUNCTIONAL
Brand: ARTHREX®

## 2022-05-12 DEVICE — SUT FW 2/0 38IN 1BLU 1BLK/WHT  AR7201: Type: IMPLANTABLE DEVICE | Site: SHOULDER | Status: FUNCTIONAL

## 2022-05-12 DEVICE — BIO-COMP SWVLK C, CLD 4.75X19.1MM
Type: IMPLANTABLE DEVICE | Site: SHOULDER | Status: FUNCTIONAL
Brand: ARTHREX®

## 2022-05-12 DEVICE — SYS IMP TENODESIS PROX: Type: IMPLANTABLE DEVICE | Site: SHOULDER | Status: FUNCTIONAL

## 2022-05-12 RX ORDER — ROPIVACAINE HYDROCHLORIDE 5 MG/ML
INJECTION, SOLUTION EPIDURAL; INFILTRATION; PERINEURAL
Status: COMPLETED | OUTPATIENT
Start: 2022-05-12 | End: 2022-05-12

## 2022-05-12 RX ORDER — CEFAZOLIN SODIUM IN 0.9 % NACL 3 G/100 ML
3 INTRAVENOUS SOLUTION, PIGGYBACK (ML) INTRAVENOUS ONCE
Status: DISCONTINUED | OUTPATIENT
Start: 2022-05-12 | End: 2022-05-12

## 2022-05-12 RX ORDER — LIDOCAINE HYDROCHLORIDE 20 MG/ML
INJECTION, SOLUTION EPIDURAL; INFILTRATION; INTRACAUDAL; PERINEURAL AS NEEDED
Status: DISCONTINUED | OUTPATIENT
Start: 2022-05-12 | End: 2022-05-12 | Stop reason: SURG

## 2022-05-12 RX ORDER — MIDAZOLAM HYDROCHLORIDE 1 MG/ML
2 INJECTION INTRAMUSCULAR; INTRAVENOUS ONCE
Status: COMPLETED | OUTPATIENT
Start: 2022-05-12 | End: 2022-05-12

## 2022-05-12 RX ORDER — EPINEPHRINE 1 MG/ML
INJECTION, SOLUTION, CONCENTRATE INTRAVENOUS AS NEEDED
Status: DISCONTINUED | OUTPATIENT
Start: 2022-05-12 | End: 2022-05-12 | Stop reason: HOSPADM

## 2022-05-12 RX ORDER — SUCCINYLCHOLINE/SOD CL,ISO/PF 100 MG/5ML
SYRINGE (ML) INTRAVENOUS AS NEEDED
Status: DISCONTINUED | OUTPATIENT
Start: 2022-05-12 | End: 2022-05-12 | Stop reason: SURG

## 2022-05-12 RX ORDER — KETAMINE HYDROCHLORIDE 50 MG/ML
INJECTION, SOLUTION, CONCENTRATE INTRAMUSCULAR; INTRAVENOUS AS NEEDED
Status: DISCONTINUED | OUTPATIENT
Start: 2022-05-12 | End: 2022-05-12 | Stop reason: SURG

## 2022-05-12 RX ORDER — OXYCODONE HYDROCHLORIDE 5 MG/1
5 TABLET ORAL
Status: DISCONTINUED | OUTPATIENT
Start: 2022-05-12 | End: 2022-05-12 | Stop reason: HOSPADM

## 2022-05-12 RX ORDER — MIDAZOLAM HYDROCHLORIDE 1 MG/ML
INJECTION INTRAMUSCULAR; INTRAVENOUS
Status: COMPLETED
Start: 2022-05-12 | End: 2022-05-12

## 2022-05-12 RX ORDER — DEXAMETHASONE SODIUM PHOSPHATE 4 MG/ML
INJECTION, SOLUTION INTRA-ARTICULAR; INTRALESIONAL; INTRAMUSCULAR; INTRAVENOUS; SOFT TISSUE AS NEEDED
Status: DISCONTINUED | OUTPATIENT
Start: 2022-05-12 | End: 2022-05-12 | Stop reason: SURG

## 2022-05-12 RX ORDER — MIDAZOLAM HYDROCHLORIDE 1 MG/ML
2 INJECTION INTRAMUSCULAR; INTRAVENOUS ONCE
Status: DISCONTINUED | OUTPATIENT
Start: 2022-05-12 | End: 2022-05-12 | Stop reason: HOSPADM

## 2022-05-12 RX ORDER — FENTANYL CITRATE 50 UG/ML
INJECTION, SOLUTION INTRAMUSCULAR; INTRAVENOUS AS NEEDED
Status: DISCONTINUED | OUTPATIENT
Start: 2022-05-12 | End: 2022-05-12 | Stop reason: SURG

## 2022-05-12 RX ORDER — PROPOFOL 10 MG/ML
VIAL (ML) INTRAVENOUS AS NEEDED
Status: DISCONTINUED | OUTPATIENT
Start: 2022-05-12 | End: 2022-05-12 | Stop reason: SURG

## 2022-05-12 RX ORDER — SODIUM CHLORIDE, SODIUM LACTATE, POTASSIUM CHLORIDE, CALCIUM CHLORIDE 600; 310; 30; 20 MG/100ML; MG/100ML; MG/100ML; MG/100ML
9 INJECTION, SOLUTION INTRAVENOUS CONTINUOUS PRN
Status: DISCONTINUED | OUTPATIENT
Start: 2022-05-12 | End: 2022-05-12 | Stop reason: HOSPADM

## 2022-05-12 RX ORDER — CEFAZOLIN SODIUM 2 G/100ML
2 INJECTION, SOLUTION INTRAVENOUS ONCE
Status: COMPLETED | OUTPATIENT
Start: 2022-05-12 | End: 2022-05-12

## 2022-05-12 RX ORDER — OXYCODONE AND ACETAMINOPHEN 7.5; 325 MG/1; MG/1
1 TABLET ORAL EVERY 4 HOURS PRN
Qty: 30 TABLET | Refills: 0 | Status: SHIPPED | OUTPATIENT
Start: 2022-05-12 | End: 2022-06-01 | Stop reason: ALTCHOICE

## 2022-05-12 RX ORDER — MAGNESIUM HYDROXIDE 1200 MG/15ML
LIQUID ORAL AS NEEDED
Status: DISCONTINUED | OUTPATIENT
Start: 2022-05-12 | End: 2022-05-12 | Stop reason: HOSPADM

## 2022-05-12 RX ORDER — EPHEDRINE SULFATE 50 MG/ML
INJECTION, SOLUTION INTRAVENOUS AS NEEDED
Status: DISCONTINUED | OUTPATIENT
Start: 2022-05-12 | End: 2022-05-12 | Stop reason: SURG

## 2022-05-12 RX ORDER — DEXMEDETOMIDINE HYDROCHLORIDE 100 UG/ML
INJECTION, SOLUTION INTRAVENOUS AS NEEDED
Status: DISCONTINUED | OUTPATIENT
Start: 2022-05-12 | End: 2022-05-12 | Stop reason: SURG

## 2022-05-12 RX ORDER — ONDANSETRON 2 MG/ML
INJECTION INTRAMUSCULAR; INTRAVENOUS AS NEEDED
Status: DISCONTINUED | OUTPATIENT
Start: 2022-05-12 | End: 2022-05-12 | Stop reason: SURG

## 2022-05-12 RX ORDER — ACETAMINOPHEN 500 MG
1000 TABLET ORAL ONCE
Status: COMPLETED | OUTPATIENT
Start: 2022-05-12 | End: 2022-05-12

## 2022-05-12 RX ORDER — GLYCOPYRROLATE 0.2 MG/ML
0.2 INJECTION INTRAMUSCULAR; INTRAVENOUS
Status: DISCONTINUED | OUTPATIENT
Start: 2022-05-12 | End: 2022-05-12

## 2022-05-12 RX ORDER — ONDANSETRON 2 MG/ML
4 INJECTION INTRAMUSCULAR; INTRAVENOUS ONCE AS NEEDED
Status: DISCONTINUED | OUTPATIENT
Start: 2022-05-12 | End: 2022-05-12 | Stop reason: HOSPADM

## 2022-05-12 RX ADMIN — DEXMEDETOMIDINE HYDROCHLORIDE 4 MCG: 100 INJECTION, SOLUTION, CONCENTRATE INTRAVENOUS at 12:48

## 2022-05-12 RX ADMIN — ONDANSETRON 4 MG: 2 INJECTION INTRAMUSCULAR; INTRAVENOUS at 11:57

## 2022-05-12 RX ADMIN — LIDOCAINE HYDROCHLORIDE 60 MG: 20 INJECTION, SOLUTION EPIDURAL; INFILTRATION; INTRACAUDAL; PERINEURAL at 11:41

## 2022-05-12 RX ADMIN — EPHEDRINE SULFATE 10 MG: 50 INJECTION INTRAVENOUS at 11:53

## 2022-05-12 RX ADMIN — LIDOCAINE HYDROCHLORIDE 40 MG: 20 INJECTION, SOLUTION EPIDURAL; INFILTRATION; INTRACAUDAL; PERINEURAL at 12:51

## 2022-05-12 RX ADMIN — FENTANYL CITRATE 50 MCG: 50 INJECTION, SOLUTION INTRAMUSCULAR; INTRAVENOUS at 11:41

## 2022-05-12 RX ADMIN — EPHEDRINE SULFATE 10 MG: 50 INJECTION INTRAVENOUS at 12:08

## 2022-05-12 RX ADMIN — DEXMEDETOMIDINE HYDROCHLORIDE 4 MCG: 100 INJECTION, SOLUTION, CONCENTRATE INTRAVENOUS at 12:06

## 2022-05-12 RX ADMIN — PROPOFOL 200 MG: 10 INJECTION, EMULSION INTRAVENOUS at 11:41

## 2022-05-12 RX ADMIN — Medication 200 MG: at 11:42

## 2022-05-12 RX ADMIN — KETAMINE HYDROCHLORIDE 25 MG: 50 INJECTION, SOLUTION INTRAMUSCULAR; INTRAVENOUS at 11:41

## 2022-05-12 RX ADMIN — DEXAMETHASONE SODIUM PHOSPHATE 8 MG: 4 INJECTION INTRA-ARTICULAR; INTRALESIONAL; INTRAMUSCULAR; INTRAVENOUS; SOFT TISSUE at 11:57

## 2022-05-12 RX ADMIN — MIDAZOLAM HYDROCHLORIDE 2 MG: 1 INJECTION, SOLUTION INTRAMUSCULAR; INTRAVENOUS at 10:42

## 2022-05-12 RX ADMIN — MIDAZOLAM HYDROCHLORIDE 2 MG: 1 INJECTION, SOLUTION INTRAMUSCULAR; INTRAVENOUS at 10:39

## 2022-05-12 RX ADMIN — ROPIVACAINE HYDROCHLORIDE 30 ML: 5 INJECTION, SOLUTION EPIDURAL; INFILTRATION; PERINEURAL at 10:41

## 2022-05-12 RX ADMIN — DEXMEDETOMIDINE HYDROCHLORIDE 8 MCG: 100 INJECTION, SOLUTION, CONCENTRATE INTRAVENOUS at 12:43

## 2022-05-12 RX ADMIN — CEFAZOLIN SODIUM 2 G: 2 INJECTION, SOLUTION INTRAVENOUS at 11:49

## 2022-05-12 RX ADMIN — SODIUM CHLORIDE, POTASSIUM CHLORIDE, SODIUM LACTATE AND CALCIUM CHLORIDE 9 ML/HR: 600; 310; 30; 20 INJECTION, SOLUTION INTRAVENOUS at 10:05

## 2022-05-12 RX ADMIN — DEXMEDETOMIDINE HYDROCHLORIDE 4 MCG: 100 INJECTION, SOLUTION, CONCENTRATE INTRAVENOUS at 12:01

## 2022-05-12 RX ADMIN — ACETAMINOPHEN 1000 MG: 500 TABLET ORAL at 10:05

## 2022-05-12 RX ADMIN — SODIUM CHLORIDE, POTASSIUM CHLORIDE, SODIUM LACTATE AND CALCIUM CHLORIDE: 600; 310; 30; 20 INJECTION, SOLUTION INTRAVENOUS at 12:28

## 2022-05-12 RX ADMIN — FENTANYL CITRATE 50 MCG: 50 INJECTION, SOLUTION INTRAMUSCULAR; INTRAVENOUS at 11:45

## 2022-05-12 NOTE — ANESTHESIA POSTPROCEDURE EVALUATION
Patient: Nick Turk    Procedure Summary     Date: 05/12/22 Room / Location: [facility] OR  / [facility]    Anesthesia Start: 1136 Anesthesia Stop: 1309    Procedure: LEFT SHOULDER ARTHROSCOPY WITH ROTATOR CUFF REPAIR, SUBACROMINAL DECOMPRESSION, DISTAL CLAVICULECTOMY, BICEPS TENODESIS (Left Shoulder) Diagnosis:       Nontraumatic complete tear of left rotator cuff      (Nontraumatic complete tear of left rotator cuff [M75.122])    Surgeons: Abdoulaye Minaya MD Provider: Lilian Hernandez DO    Anesthesia Type: general, regional ASA Status: 3          Anesthesia Type: general, regional    Vitals  Vitals Value Taken Time   /91 05/12/22 1345   Temp 36.4 °C (97.5 °F) 05/12/22 1304   Pulse 79 05/12/22 1345   Resp 16 05/12/22 1305   SpO2 95 % 05/12/22 1345           Post Anesthesia Care and Evaluation    Patient location during evaluation: bedside  Patient participation: complete - patient participated  Level of consciousness: awake  Pain management: adequate  Airway patency: patent  Anesthetic complications: No anesthetic complications  PONV Status: none  Cardiovascular status: acceptable and stable  Respiratory status: acceptable  Hydration status: acceptable    Comments: An Anesthesiologist personally participated in the most demanding procedures (including induction and emergence if applicable) in the anesthesia plan, monitored the course of anesthesia administration at frequent intervals and remained physically present and available for immediate diagnosis and treatment of emergencies.

## 2022-05-12 NOTE — DISCHARGE INSTRUCTIONS
DISCHARGE INSTRUCTIONS  Post-Operative  Arthroscopic Shoulder Surgery      For your surgery you had:  General anesthesia (you may have a sore throat for the first 24 hours)  You received an anesthesia medication today that can cause hormonal forms of birth control to be ineffective. You should use a different form of birth control (to prevent pregnancy) for 7 days.   IV sedation.  Local anesthesia  Monitored anesthesia care  You may experience dizziness, drowsiness, or light-headedness for several hours following surgery/procedure.  Do not stay alone today or tonight.  Limit your activity for 24 hours.  Resume your diet slowly.  Follow whatever special dietary instructions you may have been given by your doctor.  You should not drive or operate machinery or drink alcohol for 24 hours or while you are taking pain medication.  You should not sign legally binding documents.  Post-Operative Day #1 is counted as the 1st day after surgery.  [] If you had a regional block, you will not have control of your arm for up to 12 hours.  Protect the arm with a sling or follow your physician's specific instructions.  Post-Operative Day #2  Remove your dressing.  Under the dressing you will either have sutures or staples.  Change dressing once or twice daily.  Place a dry dressing or band-aids over the small incisions.  Prior to dressing change, wash your hands.  Post-Operative Day #4  You may shower.  During the shower, you may let the water hit the incision and roll down but do not scrub or place any soap on the incision.  Do not soak it.  Pat it dry and do not put any ointments on the incision unless directed by surgeon. Then replace the dry dressing.    General Instructions  [] Use ice pack to shoulder for 72 hours.  This can help with reducing swelling and pain relief.  Apply 20 minutes on and 20 minutes off.  Never place ice directly on skin.  Avoid getting dressing wet.  The Cold Therapy System can help reduce swelling and  decrease pain.  Utilize device for 30-60 minutes per session, with 30-60 minute breaks in between sessions.  It is recommended to use, as directed, for the first 72 hours after surgery until bedtime.  After 72 hours, continue using the device as needed until your follow-up appointment with your physician.  Never place directly on skin.  Please refer to the instruction sheet given.  Keep arm elevated with sling to decrease swelling and minimize throbbing pain.  The sling will provide support for your shoulder.  It is important to remove the sling 3-5 times daily to work on range-of-motion of the elbow, wrist and hand.  You will receive a prescription for physical therapy, unless otherwise instructed by physician.  After most shoulder surgeries, it is permissible to start exercises by slowing trying to crawl your fingers up a wall until your arm is parallel to the floor.  While doing this support the affected arm at the elbow or closer to the shoulder.  You may also lean over and let the arm and hand do small or large circles or write the alphabet with your hanging arm to keep it loose.  It is normal to have some pain with these gentle exercises.  The exercises help reduce swelling and pain overall and help to avoid a stiff shoulder post-operatively.  It is okay to come out of the sling for showering or for certain activities of daily living but avoid any lifting or carrying with the affected arm until instructed by the physician especially if you have had a repair of the rotator cuff or some type of reconstructive procedure.  It is okay to come out of the sling and support the arm with a pillow if you remain sedentary.  In general, sling use is preferred following a repair or reconstruction for 4 weeks.  If you have had a SAD (sub acromial decompression), continue sling use more liberally because there was not a repair or reconstruction that could be harmed.          DISCHARGE INSTRUCTIONS  Post-Operative    Arthroscopic Shoulder Surgery      Exercise fingers for 10 minutes every hour while awake.  The physician will typically inform the family and the patient whether or not a repair or reconstruction could be harmed.  If you have had a rotator cuff repair or reconstructive procedure, do not let the arm drop down suddenly from an elevated position down to your side despite improvements made in pain and over the 3 months following repair and reconstruction.  It generally takes 8-12 weeks before the repair or reconstruction does not rely on sutures and anchors that are placed for the repair.  You are generally given a prescription for a narcotic medication.  Take as prescribed.  You may use over-the-counter anti-inflammatory medications such as Motrin or Aleve for additional pain or fever reduction if not allergic.  If you are taking the pain medication prescribed, do not take Tylenol too unless you consult with the pharmacist. The pain medications generally have Tylenol in them and too much Tylenol can be harmful.  Sometimes it is recommended to take an anti-inflammatory in between doses of prescription pain medication if additional pain relief is needed.  Consult with your physician or your pharmacist before taking over-the-counter pain medications/anti-inflammatories.  It is not uncommon to have a fever post-operatively especially in the first 24-48 hours.  Anti-inflammatories can be used for fever reduction also.  It is normal to have some redness or irritation around skin sutures or staples, however, if you have any expanding areas of redness with a persistent fever and increasing pain notify the physician as soon as possible.  The incidence of blood clots is low following arthroscopic procedures, however, if you notice any increasing pain or swelling in your calves or legs, contact the physician as soon as possible.   It is difficult to sleep in the customary fashion following a shoulder surgery.  It is usually  necessary to sleep with the shoulder above the level of the heart such as in a recliner, couch or with the head of the bed elevated.  This should slowly improve over the weeks following shoulder surgery.  If unable to urinate 6 to 8 hours after surgery or urinating frequently in small amounts, notify the physician or go to the nearest Emergency Room.  SPECIAL INSTRUCTIONS:        NOTIFY YOUR PHYSICIAN IF YOU EXPERIENCE:  Numbness of fingers.  Inability to move fingers.  Extreme coldness, paleness or blue dis-coloration of fingers.  Any pain other than from the incision, such as swelling of the arm that blocks circulation of fingers.  Follow verbal instructions from your doctor.  Medications per physician instructions as indicated on Discharge Medication Information Sheet.  You should see  ______________________for follow-up care  on     Phone number: __________________________________  Missing your appointment/follow-up could lead to serious complications  If you have an emergency and cannot reach your doctor, go to an Emergency Room nearest your home.         [employer] COOLING UNIT      Information about your [place] cold therapy unit:  [employer]'s patented Automated Temperature Control™ keeps the water in your unit at the correct temperature (40 ?--60 ?) to reduce pain and swelling.  The motor will run at different speeds to keep the water at the correct temperature. As it runs, the motor will sound different at different speeds.  It may turn itself off at times. That is okay.  Depending on the speed at which the motor is running, the blanket may or may not always be completely filled with water.     Pay attention to the LED light.  A green light means the water in the blanket is the correct temperature.  A flashing green light means it will need more ice soon.  A yellow light means add ice now.  A red light means the unit needs ice or water.  A flashing red light means you should check that there is no kink in  the hose between the unit and the blanket, and   Be sure the unit is not lower than the injury site for the unit to run correctly. Please do not place the unit on the floor.    For further explanation on LED lighting and troubleshooting, please refer to the instruction manual attached to the side of the cooler unit, or call:     [phone]    ([phone])

## 2022-05-12 NOTE — ANESTHESIA PREPROCEDURE EVALUATION
Anesthesia Evaluation     Patient summary reviewed and Nursing notes reviewed   no history of anesthetic complications:  NPO Solid Status: > 8 hours  NPO Liquid Status: > 2 hours           Airway   Mallampati: I  TM distance: >3 FB  Dental          Pulmonary - normal exam   (+) a smoker Current,   Cardiovascular - normal exam  Exercise tolerance: good (4-7 METS)    ECG reviewed    (+) dysrhythmias,       Neuro/Psych  (+) weakness (right arm ),    GI/Hepatic/Renal/Endo    (+) obesity,       Musculoskeletal     (+) back pain, chronic pain,       ROS comment: Left shoulder injury  Abdominal  - normal exam   Substance History - negative use     OB/GYN negative ob/gyn ROS         Other   arthritis,      ROS/Med Hx Other:                     Anesthesia Plan    ASA 3     general and regional   (Patient understands anesthesia not responsible for dental damage. Regional anesthesia options discussed with patient. Pt accepts regional block.)  intravenous induction     Anesthetic plan, all risks, benefits, and alternatives have been provided, discussed and informed consent has been obtained with: patient.    Plan discussed with CRNA.        CODE STATUS:

## 2022-05-12 NOTE — ANESTHESIA PROCEDURE NOTES
Peripheral Block      Patient reassessed immediately prior to procedure    Patient location during procedure: pre-op  Start time: 5/12/2022 10:41 AM  Stop time: 5/12/2022 10:48 AM  Reason for block: at surgeon's request and post-op pain management  Performed by  Anesthesiologist: Lilian Hernandez DO  Preanesthetic Checklist  Completed: patient identified, IV checked, site marked, risks and benefits discussed, surgical consent, monitors and equipment checked, pre-op evaluation and timeout performed  Prep:  Pt Position: supine (HOB elevated)  Sterile barriers:cap, washed/disinfected hands, sterile barriers, gloves, mask, partial drape and alcohol skin prep  Prep: ChloraPrep  Patient monitoring: blood pressure monitoring, continuous pulse oximetry and EKG  Procedure    Sedation: yes  Performed under: local infiltration  Guidance:ultrasound guided and nerve stimulator    ULTRASOUND INTERPRETATION.  Using ultrasound guidance a 22 G gauge needle was placed in close proximity to the brachial plexus nerve, at which point, under ultrasound guidance anesthetic was injected in the area of the nerve and spread of the anesthesia was seen on ultrasound in close proximity thereto.  There were no abnormalities seen on ultrasound; a digital image was taken; and the patient tolerated the procedure with no complications. Images:still images obtained, printed/placed on chart    Laterality:left  Block Type:interscalene  Injection Technique:single-shot  Needle Type:echogenic  Needle Gauge:22 G (2in)  Resistance on Injection: none    Medications Used: ropivacaine (NAROPIN) 0.5 % injection, 30 mL  Med administered at 5/12/2022 10:41 AM      Post Assessment  Injection Assessment: negative aspiration for heme, no paresthesia on injection and incremental injection  Patient Tolerance:comfortable throughout block  Complications:no  Additional Notes  The block or continuous infusion is requested by the referring physician for management of  postoperative pain, or pain related to a procedure. Ultrasound guidance (deemed medically necessary). Painless injection, pt was awake and conversant during the procedure without complications. Needle and surrounding structures visualized throughout procedure. No adverse reactions or complications seen during this period. Post-procedure image showed no signs of complication, and anatomy was consistent with an uncomplicated nerve blockade.

## 2022-05-13 ENCOUNTER — TELEPHONE (OUTPATIENT)
Dept: ORTHOPEDIC SURGERY | Facility: CLINIC | Age: 52
End: 2022-05-13

## 2022-05-13 DIAGNOSIS — M75.122 COMPLETE TEAR OF LEFT ROTATOR CUFF, UNSPECIFIED WHETHER TRAUMATIC: Primary | ICD-10-CM

## 2022-05-13 RX ORDER — HYDROCODONE BITARTRATE AND ACETAMINOPHEN 7.5; 325 MG/1; MG/1
1 TABLET ORAL EVERY 4 HOURS PRN
Qty: 42 TABLET | Refills: 0 | Status: SHIPPED | OUTPATIENT
Start: 2022-05-13 | End: 2022-06-01 | Stop reason: SDUPTHER

## 2022-05-13 NOTE — OP NOTE
SHOULDER ARTHROSCOPY WITH ROTATOR CUFF REPAIR  Procedure Report    Patient Name:  Nick Turk  YOB: 1970    Date of Surgery:  5/12/2022       Pre-op Diagnosis:   Nontraumatic complete tear of left rotator cuff [M75.122]   Impingement  Primary AC arthrosis  Partial biceps tendon tear       Post-Op Diagnosis Codes:     * Nontraumatic complete tear of left rotator cuff [M75.122]    Procedure/CPT® Codes:      Procedure(s):  LEFT SHOULDER ARTHROSCOPY WITH MINI OPEN ROTATOR CUFF REPAIR, SUBACROMINAL DECOMPRESSION, DISTAL CLAVICULECTOMY, subpectoral BICEPS TENODESIS    Staff:  Surgeon(s):  Abdoulaye Minaya MD    Assistant: Coleman Craft    Anesthesia: General    Estimated Blood Loss: minimal    Implants:    Implant Name Type Inv. Item Serial No.  Lot No. LRB No. Used Action   SYS IMP TENODESIS PROX - [id number] Implant SYS IMP TENODESIS PROX  [employer]RE[employer] [id number] Left 1 Implanted   SUT FW #2 W/TPR NDL 1/2 CIR 38IN 97CM 26.5MM IRIS - [id number] Implant SUT FW #2 W/TPR NDL 1/2 CIR 38IN 97CM 26.5MM IRIS  [id number] Left 1 Implanted   SYS IMP TENODESIS PROX - [id number] Implant SYS IMP TENODESIS PROX  ARTH[employer]X [id number] Left 1 Implanted   SUT/ANCH BIOCOMP CORKSCREW FUL/THRD NO2 FW W/NDL 5.5 - [id number] Implant SUT/ANCH BIOCOMP CORKSCREW FUL/THRD NO2 FW W/NDL 5.5  AR[employer]X [id number] Left 1 Implanted   SUT/ANCH BIOCOMP SWIVELOCK KNOTLSS NMBR2/IRIS 4.75X19.1MM - [id number] Implant SUT/ANCH BIOCOMP SWIVELOCK KNOTLSS NMBR2/IRIS 4.75X19.1MM  ARTHREX [id number] Left 1 Implanted   SUT FW 2/0 38IN 1BLU 1BLK/WHT  [id number] - [id number] Implant SUT FW 2/0 38IN 1BLU 1BLK/WHT  [id number]  [id number] Left 1 Implanted       Specimen:          None      Complications: See description    Description of Procedure:   The patient was taken to the operating room and placed supine on the table after interscalene block was done in preoperative holding.  After general endotracheal anesthesia was established, the shoulder was  examined.  The patient had full range of motion, no instability.  The patient was placed in the  right lateral decubitus position with axillary roll, well-padded down upper extremity on a beanbag.  The beanbag was deflated and the  left shoulder and upper extremity were prepped and draped in the standard usual fashion using alcohol and ChloraPrep.  A standard posterior lateral portal was established through a stab incision.  The blunt was entered into the glenohumeral joint atraumatically.  An anterior superior portal was established under direct visualization.  A thorough examination of the shoulder ensued.  The glenohumeral cartilage was well maintained.  The biceps was 50% partially torn and was tenotomized with a vapor wand.  The stump of the biceps was debrided with a shaver.  The labrum was intact.  There were no loose bodies or synovitis.  No significant chondromalacia.  There is evidence of a small partial thickness undersurface tear of the supraspinatus.  It was gently debrided with a shaver.  The camera was placed in the subacromial space.  A bursectomy was completed through the lateral portal.  The coracoacromial ligament was frayed.  It was released with a VAPR wand.  The undersurface of the acromion was prepared for an acromioplasty using the VAPR wand and carried out using a bur and checked using the cutting block technique.  The distal clavicle showed significant signs of acromioclavicular arthrosis and 8-10 mm of bone was removed from the distal clavicle using a bur.  There was evidence of afull thickness supraspinatus rotator cuff tear.  It was debrided with a shaver and a mini open rotator cuff repair ensued using one bioabsorbable Arthrex anchor with double length fiber wire and a convergent suture.  The sutures were passed with a Scorpion needle and tied down incorporating into a double row repair with a swivel lock A stout repair of this rotator cuff tear was achieved.  The wound was copiously  irrigated.  The deltoid was closed with a few figure of eight Vicryl, deep fat with 0-Vicryl, subcutaneous with 2-0 Vicryl and the skin was closed with staples.  A subpectoral incision was then made approximately 2 and half centimeters length dissection was carried down bluntly deep to the pectoralis major tendon.  Deep retractors were placed and the biceps was pulled free of its groove.  Excess biceps tendon was cut and the end was whipstitched with a FiberWire suture.  Suture was loaded onto a tight rope in standard fashion a guidepin was drilled across both cortices in the bicipital groove and then the unicortical reamer was then used.  Guidepin was removed and the tight rope was passed to the far cortex it was deployed and the sutures used to toggle the tendon into the tunnel.  1 limb of the suture was used to go back to the tendon exiting the tunnel and the sutures were tied to themselves completing the tenodesis.  The wound was copiously irrigated and the skin was closed with subcu 2-0 Vicryl and skin was closed with staples.  The incisions were washed and dried, and sterile dressings were applied.  The patient was placed in an abduction pillow and sling and tolerated the procedure well, was extubated and taken to the recovery room.    Abdoulaye Minaya MD     Date: 5/13/2022  Time: 06:49 EDT

## 2022-05-13 NOTE — TELEPHONE ENCOUNTER
PATIENT'S WIFE CALLED STATING THAT PATIENT HAS NOT BEEN ABLE TO REST AT ALL SINCE COMING HOME FROM THE HOSPITAL YESTERDAY. SHE SAID SHE IS NOT SURE IF THE PAIN MEDICATION IS NOT CONTROLLING HIS PAIN OR IF IT IS JUST KEEPING HIM RESTLESS AND IS POTENTIALLY TOO STRONG. PATIENT STATES THAT HE TOOK HYDROCODONE AFTER A PREVIOUS BACK SURGERY AND IT DID NOT MAKE HIM FEEL THIS WAY. PATIENT AND WIFE ARE SEEKING DR SUAREZ'S ADVICE ON WHAT TO DO SO HE CAN GET SOME REST/RELIEF. THEY HAVE BEEN ICING THE SHOULDER, HAVE BEEN PROPPING A PILLOW UP BEHIND IT WHEN HE IS LAYING DOWN BUT PATIENT CANNOT GET COMFORTABLE.

## 2022-05-25 ENCOUNTER — OFFICE VISIT (OUTPATIENT)
Dept: ORTHOPEDIC SURGERY | Facility: CLINIC | Age: 52
End: 2022-05-25

## 2022-05-25 VITALS — WEIGHT: 240.8 LBS | HEART RATE: 112 BPM | BODY MASS INDEX: 31.91 KG/M2 | OXYGEN SATURATION: 97 % | HEIGHT: 73 IN

## 2022-05-25 DIAGNOSIS — Z47.89 AFTERCARE FOLLOWING SURGERY OF THE MUSCULOSKELETAL SYSTEM: Primary | ICD-10-CM

## 2022-05-25 PROCEDURE — 99024 POSTOP FOLLOW-UP VISIT: CPT | Performed by: PHYSICIAN ASSISTANT

## 2022-05-25 RX ORDER — CEPHALEXIN 500 MG/1
500 CAPSULE ORAL EVERY 12 HOURS
Qty: 10 CAPSULE | Refills: 0 | Status: SHIPPED | OUTPATIENT
Start: 2022-05-25 | End: 2022-09-16

## 2022-05-25 NOTE — PROGRESS NOTES
"Chief Complaint  Follow-up of the Left Shoulder and Suture / Staple Removal    Subjective          Nick Turk presents to [facility] for s/p left shoulder arthroscopy with mini open RCR, SAD, DC and subpectoral biceps tenodesis performed on 05/12/22 by Dr. Minaya. Patients states pain is moderate. He had better effect from Hydrocodone vs Oxycodone. He has been compliant with sling use. Denies fever, chills, numbness or tingling.     Objective   Allergies   Allergen Reactions   • Bee Venom Swelling       Vital Signs:   Pulse 112   Ht 185.4 cm (73\")   Wt 109 kg (240 lb 12.8 oz)   SpO2 97%   BMI 31.77 kg/m²       Physical Exam  Constitutional:       Appearance: Normal appearance. Patient is well-developed and normal weight.   HENT:      Head: Normocephalic.      Right Ear: Hearing and external ear normal.      Left Ear: Hearing and external ear normal.      Nose: Nose normal.   Eyes:      Conjunctiva/sclera: Conjunctivae normal.   Cardiovascular:      Rate and Rhythm: Normal rate.   Pulmonary:      Effort: Pulmonary effort is normal.      Breath sounds: No wheezing or rales.   Abdominal:      Palpations: Abdomen is soft.      Tenderness: There is no abdominal tenderness.   Musculoskeletal:      Cervical back: Normal range of motion.   Skin:     Findings: No rash.   Neurological:      Mental Status: Patient is alert and oriented to person, place, and time.   Psychiatric:         Mood and Affect: Mood and affect normal.         Judgment: Judgment normal.     Ortho Exam  Left shoulder: Surgical incisions with mild surrounding erythema, no active drainage, sutures removed.  Moderate swelling.  No discoloration.  Skin dry and intact.   full elbow and wrist flexion and extension, full supination and pronation. Sensation and pulses intact. Neurovascular intact distally.     Result Review :   The following data was reviewed by: SHAHZAD Peterson on 05/25/2022:         Imaging Results (Most " Recent)     None                Assessment and Plan    Problem List Items Addressed This Visit    None     Visit Diagnoses     Aftercare following rotator cuff repair    -  Primary    Relevant Orders    Ambulatory Referral to Physical Therapy POST OP (Completed)          Follow Up   Return in about 4 weeks (around 6/22/2022).  Patient Instructions   Keflex prescribed today to take as directed and to completion. Remove steri strips after 7 days. Call with any changes or concerns.   Continue icing the shoulder as needed.   PT prescription provided today to begin in 2 weeks.   Continue sling use for three weeks. Continue with pendulum shoulder motion and active elbow/wrist/hand motion.       Follow up in 4 weeks.     Patient was given instructions and counseling regarding his condition or for health maintenance advice. Please see specific information pulled into the AVS if appropriate.

## 2022-05-25 NOTE — PATIENT INSTRUCTIONS
Keflex prescribed today to take as directed and to completion. Remove steri strips after 7 days. Call with any changes or concerns.   Continue icing the shoulder as needed.   PT prescription provided today to begin in 2 weeks.   Continue sling use for three weeks. Continue with pendulum shoulder motion and active elbow/wrist/hand motion.       Follow up in 4 weeks.

## 2022-06-01 ENCOUNTER — TELEPHONE (OUTPATIENT)
Dept: ORTHOPEDIC SURGERY | Facility: CLINIC | Age: 52
End: 2022-06-01

## 2022-06-01 DIAGNOSIS — M75.122 COMPLETE TEAR OF LEFT ROTATOR CUFF, UNSPECIFIED WHETHER TRAUMATIC: ICD-10-CM

## 2022-06-01 RX ORDER — HYDROCODONE BITARTRATE AND ACETAMINOPHEN 7.5; 325 MG/1; MG/1
1 TABLET ORAL EVERY 4 HOURS PRN
Qty: 42 TABLET | Refills: 0 | Status: SHIPPED | OUTPATIENT
Start: 2022-06-01 | End: 2022-06-27

## 2022-06-16 ENCOUNTER — TELEPHONE (OUTPATIENT)
Dept: ORTHOPEDIC SURGERY | Facility: CLINIC | Age: 52
End: 2022-06-16

## 2022-06-16 DIAGNOSIS — M19.012 PRIMARY OSTEOARTHRITIS OF LEFT SHOULDER: Primary | ICD-10-CM

## 2022-06-16 RX ORDER — HYDROCODONE BITARTRATE AND ACETAMINOPHEN 7.5; 325 MG/1; MG/1
1 TABLET ORAL EVERY 4 HOURS PRN
Qty: 42 TABLET | Refills: 0 | Status: SHIPPED | OUTPATIENT
Start: 2022-06-16 | End: 2022-09-16

## 2022-06-16 NOTE — TELEPHONE ENCOUNTER
Caller: PATIENT     Relationship: SELF     Best call back number:  [phone]    Requested Prescriptions: HYDROCODONE 7.5  Requested Prescriptions      No prescriptions requested or ordered in this encounter        Pharmacy where request should be sent:  [facility] ON [facility]    Does the patient have less than a 3 day supply:  [x] Yes  [] No    Jeanette Huber Rep   06/16/22 09:58 EDT

## 2022-06-27 ENCOUNTER — OFFICE VISIT (OUTPATIENT)
Dept: ORTHOPEDIC SURGERY | Facility: CLINIC | Age: 52
End: 2022-06-27

## 2022-06-27 VITALS — HEIGHT: 73 IN | WEIGHT: 238.6 LBS | BODY MASS INDEX: 31.62 KG/M2 | OXYGEN SATURATION: 96 % | HEART RATE: 99 BPM

## 2022-06-27 DIAGNOSIS — Z47.89 AFTERCARE FOLLOWING SURGERY OF THE MUSCULOSKELETAL SYSTEM: Primary | ICD-10-CM

## 2022-06-27 PROCEDURE — 99024 POSTOP FOLLOW-UP VISIT: CPT | Performed by: PHYSICIAN ASSISTANT

## 2022-06-27 RX ORDER — DICLOFENAC SODIUM 75 MG/1
75 TABLET, DELAYED RELEASE ORAL 2 TIMES DAILY
Qty: 60 TABLET | Refills: 0 | Status: SHIPPED | OUTPATIENT
Start: 2022-06-27 | End: 2022-09-16

## 2022-06-27 NOTE — PROGRESS NOTES
"Chief Complaint  Follow-up of the Left Shoulder    Subjective          Nick Turk presents to [facility] for s/p left shoulder arthroscopy with mini open rotator cuff repair, subacromial decompression and distal clavicle resection with subpectoral biceps tenodesis performed on 05/12/2022 by Dr. Minaya.  Patient states that he has some soreness of the shoulder today.  He states most of his soreness is after therapy sessions.  He states he has been compliant with postop interventions.  He is returned to work.  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  He denies any labors work in his daily duties.  He is currently attending physical therapy [facility] in [facility] 3 times a week.    Objective   Allergies   Allergen Reactions   • Bee Venom Swelling       Vital Signs:   Pulse 99   Ht 185.4 cm (73\")   Wt 108 kg (238 lb 9.6 oz)   SpO2 96%   BMI 31.48 kg/m²       Physical Exam  Constitutional:       Appearance: Normal appearance. Patient is well-developed and normal weight.   HENT:      Head: Normocephalic.      Right Ear: Hearing and external ear normal.      Left Ear: Hearing and external ear normal.      Nose: Nose normal.   Eyes:      Conjunctiva/sclera: Conjunctivae normal.   Cardiovascular:      Rate and Rhythm: Normal rate.   Pulmonary:      Effort: Pulmonary effort is normal.      Breath sounds: No wheezing or rales.   Abdominal:      Palpations: Abdomen is soft.      Tenderness: There is no abdominal tenderness.   Musculoskeletal:      Cervical back: Normal range of motion.   Skin:     Findings: No rash.   Neurological:      Mental Status: Patient is alert and oriented to person, place, and time.   Psychiatric:         Mood and Affect: Mood and affect normal.         Judgment: Judgment normal.     Ortho Exam  Left shoulder: Well-healed surgical incisions, no swelling or discoloration.  Mildly tender along the anterior shoulder.  Skin dry and " intact.  Full passive forward flexion to 175 degrees.  Abduction to 155 degrees.  Passive external rotation to 50 degrees.  Full active elbow and wrist flexion and extension, full pronation and supination.  Full range of motion of the digits.  Sensation and pulses intact.  Neurovascular intact distally.      Result Review :   The following data was reviewed by: SHAHZAD Peterson on 06/27/2022:         Imaging Results (Most Recent)     None                Assessment and Plan    Problem List Items Addressed This Visit        Musculoskeletal and Injuries    Aftercare following mini open rotator cuff repair - Primary          Follow Up   Return in about 6 weeks (around 8/8/2022).  Patient Instructions   Diclofenac refilled today. Use twice daily, as needed. Continue icing shoulder for pain and swelling. Continue with physical therapy to help with shoulder motion and strength. Avoid heavy lifting, pushing and pulling.       Follow up in 6 weeks.     Patient was given instructions and counseling regarding his condition or for health maintenance advice. Please see specific information pulled into the AVS if appropriate.

## 2022-06-27 NOTE — PATIENT INSTRUCTIONS
Diclofenac refilled today. Use twice daily, as needed. Continue icing shoulder for pain and swelling. Continue with physical therapy to help with shoulder motion and strength. Avoid heavy lifting, pushing and pulling.       Follow up in 6 weeks.

## 2022-08-08 ENCOUNTER — OFFICE VISIT (OUTPATIENT)
Dept: ORTHOPEDIC SURGERY | Facility: CLINIC | Age: 52
End: 2022-08-08

## 2022-08-08 VITALS — HEART RATE: 79 BPM | WEIGHT: 238 LBS | BODY MASS INDEX: 31.54 KG/M2 | HEIGHT: 73 IN | OXYGEN SATURATION: 100 %

## 2022-08-08 DIAGNOSIS — Z47.89 AFTERCARE FOLLOWING SURGERY OF THE MUSCULOSKELETAL SYSTEM: Primary | ICD-10-CM

## 2022-08-08 PROCEDURE — 99024 POSTOP FOLLOW-UP VISIT: CPT | Performed by: PHYSICIAN ASSISTANT

## 2022-08-08 NOTE — PROGRESS NOTES
"Chief Complaint  Pain and Follow-up of the Left Shoulder    Subjective      Nick Turk presents to [facility] for follow-up s/p left shoulder arthroscopy with mini open rotator cuff repair, subacromial decompression and distal clavicle resection with subpectoral biceps tenodesis performed on 5/12/2022 by Dr. Minaya.  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  He states he feels he has reached max benefit from PT and would like to resume home exercises only at this point.  PT note reports left shoulder external rotation to 80 degrees, internal rotation to 70, flexion and abduction 4/5.  He does report that he received a left shoulder steroid injection at [facility] last week with significant relief of pain.    Objective   Allergies   Allergen Reactions   • Bee Venom Swelling       Vital Signs:   Pulse 79   Ht 185.4 cm (73\")   Wt 108 kg (238 lb)   SpO2 100%   BMI 31.40 kg/m²       Physical Exam    Constitutional: Awake, alert. Well nourished appearance.    Integumentary: Warm, dry, intact. No obvious rashes.    HENT: Atraumatic, normocephalic.   Respiratory: Non labored respirations .   Cardiovascular: Intact peripheral pulses.    Psychiatric: Normal mood and affect. A&O X3    Ortho Exam  Left shoulder: Surgical scars well-healed.  No edema.  Forward flexion 170 degrees.  Abduction to 170 degrees.  Internal rotation to L5.  Full supination and pronation.  Full flexion and extension of the wrist.  Sensation intact to light touch.  Distal neurovascular intact.    Imaging Results (Most Recent)     None             Assessment and Plan   Problem List Items Addressed This Visit        Musculoskeletal and Injuries    Aftercare following mini open rotator cuff repair - Primary        Follow Up   Return if symptoms worsen or fail to improve.    Patient Instructions   Patient feels he has reached max benefits from PT and " would like to discontinue this. He has home exercises and would like to continue these.     Advised against heavy or overhead lifting, pushing, or pulling.     Follow up as needed. Call with questions or concerns.     Patient was given instructions and counseling regarding his condition or for health maintenance advice. Please see specific information pulled into the AVS if appropriate.

## 2022-08-08 NOTE — PATIENT INSTRUCTIONS
Patient feels he has reached max benefits from PT and would like to discontinue this. He has home exercises and would like to continue these.     Advised against heavy or overhead lifting, pushing, or pulling.     Follow up as needed. Call with questions or concerns.

## 2022-09-16 ENCOUNTER — OFFICE VISIT (OUTPATIENT)
Dept: FAMILY MEDICINE CLINIC | Facility: CLINIC | Age: 52
End: 2022-09-16

## 2022-09-16 VITALS
OXYGEN SATURATION: 98 % | HEIGHT: 73 IN | BODY MASS INDEX: 30.99 KG/M2 | DIASTOLIC BLOOD PRESSURE: 86 MMHG | RESPIRATION RATE: 16 BRPM | WEIGHT: 233.8 LBS | TEMPERATURE: 97.6 F | HEART RATE: 81 BPM | SYSTOLIC BLOOD PRESSURE: 138 MMHG

## 2022-09-16 DIAGNOSIS — Z11.59 NEED FOR HEPATITIS C SCREENING TEST: Primary | ICD-10-CM

## 2022-09-16 DIAGNOSIS — Z00.00 ANNUAL PHYSICAL EXAM: ICD-10-CM

## 2022-09-16 PROCEDURE — 99396 PREV VISIT EST AGE 40-64: CPT | Performed by: NURSE PRACTITIONER

## 2022-09-16 NOTE — PROGRESS NOTES
Chief Complaint  Annual Exam    Subjective        Nick Turk presents to [facility]  History of Present Illness  Annual exam:   Discussed diet and exercise.  Still working and after shoulder surgery has been doing well and not needing any medications.          Past History:    Medical History: has a past medical history of Back pain, Chronic pain syndrome, Forgetfulness, Leg pain, Lumbago (2015), Lumbar spondylosis, Pain, generalized, Pars defect of lumbar spine (2015), Sleep disorder, and Spondylolisthesis, lumbar region (2015).     Surgical History: has a past surgical history that includes Cholecystectomy; Ganglion cyst excision; Lumbar epidural injection; Lumbar fusion (2015); Colonoscopy (N/A, 11/16/2021); and Shoulder arthroscopy w/ rotator cuff repair (Left, 5/12/2022).     Family History: family history includes Heart disease in his mother; Heart failure in his sister; No Known Problems in his father.     Social History: reports that he has been smoking cigarettes. He has been smoking about 0.75 packs per day. He has never used smokeless tobacco. He reports that he does not drink alcohol and does not use drugs.    Allergies: Bee venom        No current outpatient medications on file.    Medications Discontinued During This Encounter   Medication Reason   • acetaminophen (TYLENOL) 500 MG tablet *Therapy completed   • cephalexin (KEFLEX) 500 MG capsule *Therapy completed   • diclofenac (VOLTAREN) 75 MG EC tablet *Therapy completed   • diclofenac (VOLTAREN) 75 MG EC tablet *Therapy completed   • HYDROcodone-acetaminophen (NORCO) 7.5-325 MG per tablet *Therapy completed         Review of Systems   Constitutional: Negative for fever.   Respiratory: Negative for cough and shortness of breath.    Cardiovascular: Negative for chest pain, palpitations and leg swelling.   Neurological: Negative for dizziness, weakness, numbness and headache.        Objective         Vitals:     "09/16/22 0726   BP: 138/86   BP Location: Right arm   Patient Position: Sitting   Cuff Size: Adult   Pulse: 81   Resp: 16   Temp: 97.6 °F (36.4 °C)   TempSrc: Infrared   SpO2: 98%   Weight: 106 kg (233 lb 12.8 oz)   Height: 185.4 cm (72.99\")     Body mass index is 30.85 kg/m².         Physical Exam  Vitals reviewed.   Constitutional:       Appearance: Normal appearance. He is well-developed.   HENT:      Head: Normocephalic and atraumatic.      Right Ear: Tympanic membrane normal.      Left Ear: Tympanic membrane normal.      Nose: Nose normal.      Mouth/Throat:      Pharynx: No oropharyngeal exudate or posterior oropharyngeal erythema.   Eyes:      Conjunctiva/sclera: Conjunctivae normal.      Pupils: Pupils are equal, round, and reactive to light.   Cardiovascular:      Rate and Rhythm: Normal rate and regular rhythm.      Heart sounds: Normal heart sounds. No murmur heard.    No friction rub. No gallop.   Pulmonary:      Effort: Pulmonary effort is normal.      Breath sounds: Normal breath sounds. No wheezing or rhonchi.   Abdominal:      General: Bowel sounds are normal.      Palpations: Abdomen is soft.      Tenderness: There is no abdominal tenderness.   Musculoskeletal:         General: Normal range of motion.   Skin:     General: Skin is warm and dry.   Neurological:      Mental Status: He is alert and oriented to person, place, and time.   Psychiatric:         Mood and Affect: Mood and affect normal.         Behavior: Behavior normal.         Thought Content: Thought content normal.         Judgment: Judgment normal.             Result Review :               Assessment and Plan     Diagnoses and all orders for this visit:    1. Need for hepatitis C screening test (Primary)  -     Hepatitis C antibody; Future    2. Annual physical exam  -     Comprehensive Metabolic Panel; Future  -     Lipid Panel With / Chol / HDL Ratio; Future  -     Urinalysis With Culture If Indicated -; Future  -     CBC & " Differential; Future  -     TSH Rfx On Abnormal To Free T4; Future              Follow Up     Return in about 1 year (around 9/16/2023).    Patient was given instructions and counseling regarding his condition or for health maintenance advice. Please see specific information pulled into the AVS if appropriate.

## 2023-01-13 ENCOUNTER — HOSPITAL ENCOUNTER (OUTPATIENT)
Dept: GENERAL RADIOLOGY | Facility: HOSPITAL | Age: 53
Discharge: HOME | End: 2023-01-13
Admitting: NURSE PRACTITIONER
Payer: COMMERCIAL

## 2023-01-13 ENCOUNTER — TRANSCRIBE ORDERS (OUTPATIENT)
Dept: GENERAL RADIOLOGY | Facility: HOSPITAL | Age: 53
End: 2023-01-13
Payer: COMMERCIAL

## 2023-01-13 DIAGNOSIS — M47.814 THORACIC SPONDYLOSIS WITHOUT MYELOPATHY: Primary | ICD-10-CM

## 2023-01-13 DIAGNOSIS — M47.814 THORACIC SPONDYLOSIS WITHOUT MYELOPATHY: ICD-10-CM

## 2023-01-13 PROCEDURE — 72072 X-RAY EXAM THORAC SPINE 3VWS: CPT

## 2023-03-25 ENCOUNTER — HOSPITAL ENCOUNTER (EMERGENCY)
Facility: HOSPITAL | Age: 53
Discharge: HOME | End: 2023-03-25
Attending: EMERGENCY MEDICINE | Admitting: EMERGENCY MEDICINE
Payer: COMMERCIAL

## 2023-03-25 ENCOUNTER — APPOINTMENT (OUTPATIENT)
Dept: GENERAL RADIOLOGY | Facility: HOSPITAL | Age: 53
End: 2023-03-25
Payer: COMMERCIAL

## 2023-03-25 VITALS
HEART RATE: 103 BPM | HEIGHT: 73 IN | WEIGHT: 240.3 LBS | SYSTOLIC BLOOD PRESSURE: 149 MMHG | OXYGEN SATURATION: 96 % | TEMPERATURE: 97.9 F | BODY MASS INDEX: 31.85 KG/M2 | DIASTOLIC BLOOD PRESSURE: 81 MMHG | RESPIRATION RATE: 18 BRPM

## 2023-03-25 DIAGNOSIS — M54.50 ACUTE MIDLINE LOW BACK PAIN WITHOUT SCIATICA: Primary | ICD-10-CM

## 2023-03-25 PROCEDURE — 72100 X-RAY EXAM L-S SPINE 2/3 VWS: CPT

## 2023-03-25 PROCEDURE — 99283 EMERGENCY DEPT VISIT LOW MDM: CPT

## 2023-03-25 RX ORDER — HYDROCODONE BITARTRATE AND ACETAMINOPHEN 5; 325 MG/1; MG/1
1 TABLET ORAL EVERY 6 HOURS PRN
Status: DISCONTINUED | OUTPATIENT
Start: 2023-03-25 | End: 2023-03-25 | Stop reason: HOSPADM

## 2023-03-25 RX ORDER — HYDROCODONE BITARTRATE AND ACETAMINOPHEN 5; 325 MG/1; MG/1
1 TABLET ORAL EVERY 6 HOURS PRN
Qty: 12 TABLET | Refills: 0 | Status: SHIPPED | OUTPATIENT
Start: 2023-03-25 | End: 2023-03-29 | Stop reason: SDUPTHER

## 2023-03-25 RX ADMIN — HYDROCODONE BITARTRATE AND ACETAMINOPHEN 1 TABLET: 5; 325 TABLET ORAL at 13:41
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
## 2023-03-25 NOTE — DISCHARGE INSTRUCTIONS
Please schedule follow-up appointment with your neurosurgeon that completed your previous spinal surgery for reevaluation of your new symptoms.  Your x-ray completed in the emergency department today has no changes from the x-ray completed at urgent care which was noted to have wedging at the L1 level.  Take Norco as needed for your pain control.

## 2023-03-27 ENCOUNTER — TELEPHONE (OUTPATIENT)
Dept: NEUROSURGERY | Facility: CLINIC | Age: 53
End: 2023-03-27
Payer: COMMERCIAL

## 2023-03-27 ENCOUNTER — TELEPHONE (OUTPATIENT)
Dept: FAMILY MEDICINE CLINIC | Facility: CLINIC | Age: 53
End: 2023-03-27
Payer: COMMERCIAL

## 2023-03-27 NOTE — TELEPHONE ENCOUNTER
Spoke with pt, no openings today.  Was wanting to get the MRI before the 29th, informed him it would have to be approved through the insurance first.
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
## 2023-03-28 NOTE — TELEPHONE ENCOUNTER
Only operative report I could find was from 2015. Scanned it to chart.  
Pt came into the office regarding er follow up. I have pt scheduled with Cinthya.  
16

## 2023-03-29 ENCOUNTER — OFFICE VISIT (OUTPATIENT)
Dept: NEUROSURGERY | Facility: CLINIC | Age: 53
End: 2023-03-29
Payer: COMMERCIAL

## 2023-03-29 VITALS
SYSTOLIC BLOOD PRESSURE: 166 MMHG | WEIGHT: 240.1 LBS | BODY MASS INDEX: 31.82 KG/M2 | HEIGHT: 73 IN | DIASTOLIC BLOOD PRESSURE: 87 MMHG | HEART RATE: 71 BPM

## 2023-03-29 DIAGNOSIS — Z98.1 HISTORY OF LUMBAR SPINAL FUSION: ICD-10-CM

## 2023-03-29 DIAGNOSIS — M54.42 ACUTE MIDLINE LOW BACK PAIN WITH BILATERAL SCIATICA: ICD-10-CM

## 2023-03-29 DIAGNOSIS — M54.41 ACUTE MIDLINE LOW BACK PAIN WITH BILATERAL SCIATICA: ICD-10-CM
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
## 2023-03-29 DIAGNOSIS — M47.27 OSTEOARTHRITIS OF SPINE WITH RADICULOPATHY, LUMBOSACRAL REGION: ICD-10-CM

## 2023-03-29 PROCEDURE — 99215 OFFICE O/P EST HI 40 MIN: CPT | Performed by: NURSE PRACTITIONER

## 2023-03-29 RX ORDER — METHYLPREDNISOLONE 4 MG/1
TABLET ORAL
Qty: 21 TABLET | Refills: 0 | Status: SHIPPED | OUTPATIENT
Start: 2023-03-29

## 2023-03-29 RX ORDER — HYDROCODONE BITARTRATE AND ACETAMINOPHEN 5; 325 MG/1; MG/1
1 TABLET ORAL EVERY 8 HOURS PRN
Qty: 30 TABLET | Refills: 0 | Status: SHIPPED | OUTPATIENT
Start: 2023-03-29

## 2023-03-29 NOTE — PATIENT INSTRUCTIONS
-MRI Lumbar Spine w/wo  -Physical Therapy  -Medrol dospak  -Norco 5/325 mg every 8 hours as needed for pain  -Follow up in 4 weeks
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
## 2023-03-31 ENCOUNTER — PRIOR AUTHORIZATION (OUTPATIENT)
Dept: NEUROSURGERY | Facility: CLINIC | Age: 53
End: 2023-03-31
Payer: COMMERCIAL

## 2023-04-16 ENCOUNTER — HOSPITAL ENCOUNTER (OUTPATIENT)
Dept: MRI IMAGING | Facility: HOSPITAL | Age: 53
Discharge: HOME | End: 2023-04-16
Admitting: NURSE PRACTITIONER
Payer: COMMERCIAL

## 2023-04-16 DIAGNOSIS — Z98.1 HISTORY OF LUMBAR SPINAL FUSION: ICD-10-CM

## 2023-04-16 DIAGNOSIS — M54.42 ACUTE MIDLINE LOW BACK PAIN WITH BILATERAL SCIATICA: ICD-10-CM

## 2023-04-16 DIAGNOSIS — M54.41 ACUTE MIDLINE LOW BACK PAIN WITH BILATERAL SCIATICA: ICD-10-CM
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
## 2023-04-16 DIAGNOSIS — M47.27 OSTEOARTHRITIS OF SPINE WITH RADICULOPATHY, LUMBOSACRAL REGION: ICD-10-CM

## 2023-04-16 PROCEDURE — A9577 INJ MULTIHANCE: HCPCS | Performed by: NURSE PRACTITIONER

## 2023-04-16 PROCEDURE — 0 GADOBENATE DIMEGLUMINE 529 MG/ML SOLUTION: Performed by: NURSE PRACTITIONER

## 2023-04-16 PROCEDURE — 72158 MRI LUMBAR SPINE W/O & W/DYE: CPT

## 2023-04-16 RX ADMIN — GADOBENATE DIMEGLUMINE 20 ML: 529 INJECTION, SOLUTION INTRAVENOUS at 13:36

## 2023-04-18 ENCOUNTER — TELEPHONE (OUTPATIENT)
Dept: NEUROSURGERY | Facility: CLINIC | Age: 53
End: 2023-04-18
Payer: COMMERCIAL

## 2023-04-18 NOTE — TELEPHONE ENCOUNTER
MRI L Spine demonstrating multilevel degenerative changes. Will review at follow up.    Patient informed.

## 2023-04-26 ENCOUNTER — OFFICE VISIT (OUTPATIENT)
Dept: NEUROSURGERY | Facility: CLINIC | Age: 53
End: 2023-04-26
Payer: COMMERCIAL

## 2023-04-26 VITALS
HEIGHT: 73 IN | HEART RATE: 77 BPM | WEIGHT: 236.5 LBS | DIASTOLIC BLOOD PRESSURE: 99 MMHG | SYSTOLIC BLOOD PRESSURE: 151 MMHG | BODY MASS INDEX: 31.34 KG/M2

## 2023-04-26 DIAGNOSIS — M54.41 ACUTE MIDLINE LOW BACK PAIN WITH BILATERAL SCIATICA: Primary | ICD-10-CM

## 2023-04-26 DIAGNOSIS — M54.42 ACUTE MIDLINE LOW BACK PAIN WITH BILATERAL SCIATICA: Primary | ICD-10-CM

## 2023-04-26 DIAGNOSIS — M47.27 OSTEOARTHRITIS OF SPINE WITH RADICULOPATHY, LUMBOSACRAL REGION: ICD-10-CM
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
## 2023-04-26 DIAGNOSIS — Z98.1 HISTORY OF LUMBAR SPINAL FUSION: ICD-10-CM
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
## 2023-08-14 ENCOUNTER — TELEPHONE (OUTPATIENT)
Dept: FAMILY MEDICINE CLINIC | Facility: CLINIC | Age: 53
End: 2023-08-14
Payer: COMMERCIAL

## 2023-08-14 DIAGNOSIS — Z00.00 WELLNESS EXAMINATION: Primary | ICD-10-CM

## 2023-08-14 NOTE — TELEPHONE ENCOUNTER
Patient has had thrush for over a month since coming back from [place]. He has been in and out of UC getting medication with no relief. Patient wanting to get in to see Shola or have labs put in to see if he has a kidney infection.  Patient would like a call back.

## 2023-08-16 ENCOUNTER — LAB (OUTPATIENT)
Dept: LAB | Facility: HOSPITAL | Age: 53
End: 2023-08-16
Payer: COMMERCIAL

## 2023-08-16 DIAGNOSIS — Z11.59 NEED FOR HEPATITIS C SCREENING TEST: ICD-10-CM

## 2023-08-16 DIAGNOSIS — Z00.00 ANNUAL PHYSICAL EXAM: ICD-10-CM

## 2023-08-16 DIAGNOSIS — Z00.00 WELLNESS EXAMINATION: ICD-10-CM

## 2023-08-16 DIAGNOSIS — Z12.5 SCREENING FOR PROSTATE CANCER: ICD-10-CM

## 2023-08-16 LAB
ALBUMIN SERPL-MCNC: 4.1 G/DL (ref 3.5–5.2)
ALBUMIN/GLOB SERPL: 1.7 G/DL
ALP SERPL-CCNC: 46 U/L (ref 39–117)
ALT SERPL W P-5'-P-CCNC: 35 U/L (ref 1–41)
ANION GAP SERPL CALCULATED.3IONS-SCNC: 9.2 MMOL/L (ref 5–15)
AST SERPL-CCNC: 24 U/L (ref 1–40)
BASOPHILS # BLD AUTO: 0.05 10*3/MM3 (ref 0–0.2)
BASOPHILS NFR BLD AUTO: 0.4 % (ref 0–1.5)
BILIRUB SERPL-MCNC: 0.4 MG/DL (ref 0–1.2)
BILIRUB UR QL STRIP: NEGATIVE
BUN SERPL-MCNC: 11 MG/DL (ref 6–20)
BUN/CREAT SERPL: 12.2 (ref 7–25)
CALCIUM SPEC-SCNC: 9.3 MG/DL (ref 8.6–10.5)
CHLORIDE SERPL-SCNC: 105 MMOL/L (ref 98–107)
CHOLEST SERPL-MCNC: 113 MG/DL (ref 0–200)
CLARITY UR: CLEAR
CO2 SERPL-SCNC: 26.8 MMOL/L (ref 22–29)
COLOR UR: YELLOW
CREAT SERPL-MCNC: 0.9 MG/DL (ref 0.76–1.27)
DEPRECATED RDW RBC AUTO: 42.4 FL (ref 37–54)
EGFRCR SERPLBLD CKD-EPI 2021: 102.8 ML/MIN/1.73
EOSINOPHIL # BLD AUTO: 0.14 10*3/MM3 (ref 0–0.4)
EOSINOPHIL NFR BLD AUTO: 1.3 % (ref 0.3–6.2)
ERYTHROCYTE [DISTWIDTH] IN BLOOD BY AUTOMATED COUNT: 12.1 % (ref 12.3–15.4)
GLOBULIN UR ELPH-MCNC: 2.4 GM/DL
GLUCOSE SERPL-MCNC: 99 MG/DL (ref 65–99)
GLUCOSE UR STRIP-MCNC: NEGATIVE MG/DL
HCT VFR BLD AUTO: 50.1 % (ref 37.5–51)
HCV AB SER DONR QL: NORMAL
HDLC SERPL QL: 3.42
HDLC SERPL-MCNC: 33 MG/DL (ref 40–60)
HGB BLD-MCNC: 17.5 G/DL (ref 13–17.7)
HGB UR QL STRIP.AUTO: NEGATIVE
IMM GRANULOCYTES # BLD AUTO: 0.06 10*3/MM3 (ref 0–0.05)
IMM GRANULOCYTES NFR BLD AUTO: 0.5 % (ref 0–0.5)
KETONES UR QL STRIP: NEGATIVE
LDLC SERPL CALC-MCNC: 64 MG/DL (ref 0–100)
LEUKOCYTE ESTERASE UR QL STRIP.AUTO: NEGATIVE
LYMPHOCYTES # BLD AUTO: 2.14 10*3/MM3 (ref 0.7–3.1)
LYMPHOCYTES NFR BLD AUTO: 19.2 % (ref 19.6–45.3)
MCH RBC QN AUTO: 33.2 PG (ref 26.6–33)
MCHC RBC AUTO-ENTMCNC: 34.9 G/DL (ref 31.5–35.7)
MCV RBC AUTO: 95.1 FL (ref 79–97)
MONOCYTES # BLD AUTO: 1.08 10*3/MM3 (ref 0.1–0.9)
MONOCYTES NFR BLD AUTO: 9.7 % (ref 5–12)
NEUTROPHILS NFR BLD AUTO: 68.9 % (ref 42.7–76)
NEUTROPHILS NFR BLD AUTO: 7.66 10*3/MM3 (ref 1.7–7)
NITRITE UR QL STRIP: NEGATIVE
NRBC BLD AUTO-RTO: 0 /100 WBC (ref 0–0.2)
PH UR STRIP.AUTO: 6 [PH] (ref 5–8)
PLATELET # BLD AUTO: 208 10*3/MM3 (ref 140–450)
PMV BLD AUTO: 11.3 FL (ref 6–12)
POTASSIUM SERPL-SCNC: 4.2 MMOL/L (ref 3.5–5.2)
PROT SERPL-MCNC: 6.5 G/DL (ref 6–8.5)
PROT UR QL STRIP: NEGATIVE
RBC # BLD AUTO: 5.27 10*6/MM3 (ref 4.14–5.8)
SODIUM SERPL-SCNC: 141 MMOL/L (ref 136–145)
SP GR UR STRIP: 1.01 (ref 1–1.03)
TRIGL SERPL-MCNC: 76 MG/DL (ref 0–150)
TSH SERPL DL<=0.05 MIU/L-ACNC: 1.3 UIU/ML (ref 0.27–4.2)
UROBILINOGEN UR QL STRIP: NORMAL
VLDLC SERPL-MCNC: 16 MG/DL (ref 5–40)
WBC NRBC COR # BLD: 11.13 10*3/MM3 (ref 3.4–10.8)

## 2023-08-16 PROCEDURE — 36415 COLL VENOUS BLD VENIPUNCTURE: CPT

## 2023-08-16 PROCEDURE — 81003 URINALYSIS AUTO W/O SCOPE: CPT

## 2023-08-16 PROCEDURE — 80061 LIPID PANEL: CPT

## 2023-08-16 PROCEDURE — 86803 HEPATITIS C AB TEST: CPT

## 2023-08-16 PROCEDURE — G0103 PSA SCREENING: HCPCS

## 2023-08-16 PROCEDURE — 80050 GENERAL HEALTH PANEL: CPT

## 2023-08-17 ENCOUNTER — HOSPITAL ENCOUNTER (OUTPATIENT)
Dept: GENERAL RADIOLOGY | Facility: HOSPITAL | Age: 53
Discharge: HOME | End: 2023-08-17
Admitting: NURSE PRACTITIONER
Payer: COMMERCIAL

## 2023-08-17 ENCOUNTER — OFFICE VISIT (OUTPATIENT)
Dept: FAMILY MEDICINE CLINIC | Facility: CLINIC | Age: 53
End: 2023-08-17
Payer: COMMERCIAL

## 2023-08-17 VITALS
BODY MASS INDEX: 31.28 KG/M2 | HEIGHT: 73 IN | WEIGHT: 236 LBS | RESPIRATION RATE: 16 BRPM | DIASTOLIC BLOOD PRESSURE: 78 MMHG | OXYGEN SATURATION: 96 % | HEART RATE: 95 BPM | SYSTOLIC BLOOD PRESSURE: 142 MMHG | TEMPERATURE: 97.7 F

## 2023-08-17 DIAGNOSIS — F17.210 CIGARETTE NICOTINE DEPENDENCE WITHOUT COMPLICATION: ICD-10-CM

## 2023-08-17 DIAGNOSIS — R05.1 ACUTE COUGH: ICD-10-CM

## 2023-08-17 DIAGNOSIS — B37.0: ICD-10-CM

## 2023-08-17 DIAGNOSIS — D72.829 LEUKOCYTOSIS, UNSPECIFIED TYPE: ICD-10-CM

## 2023-08-17 DIAGNOSIS — R10.13 EPIGASTRIC PAIN: ICD-10-CM

## 2023-08-17 DIAGNOSIS — Z12.5 SCREENING FOR PROSTATE CANCER: Primary | ICD-10-CM

## 2023-08-17 LAB — PSA SERPL-MCNC: 1.54 NG/ML (ref 0–4)

## 2023-08-17 PROCEDURE — 71046 X-RAY EXAM CHEST 2 VIEWS: CPT

## 2023-08-17 PROCEDURE — 87081 CULTURE SCREEN ONLY: CPT | Performed by: NURSE PRACTITIONER

## 2023-08-17 RX ORDER — CLOBETASOL PROPIONATE 0.5 MG/G
CREAM TOPICAL SEE ADMIN INSTRUCTIONS
COMMUNITY
Start: 2023-07-22

## 2023-08-17 RX ORDER — VARENICLINE TARTRATE 0.5 MG/1
0.5 TABLET, FILM COATED ORAL 2 TIMES DAILY
Qty: 60 TABLET | Refills: 1 | Status: SHIPPED | OUTPATIENT
Start: 2023-08-17

## 2023-08-17 RX ORDER — ESOMEPRAZOLE MAGNESIUM 40 MG/1
40 CAPSULE, DELAYED RELEASE ORAL
Qty: 30 CAPSULE | Refills: 0 | Status: SHIPPED | OUTPATIENT
Start: 2023-08-17

## 2023-08-17 RX ORDER — TRIAMCINOLONE ACETONIDE 1 MG/G
CREAM TOPICAL SEE ADMIN INSTRUCTIONS
COMMUNITY
Start: 2023-07-15

## 2023-08-17 NOTE — PROGRESS NOTES
Chief Complaint  Thrush (Nystatin and Diflucan for 7 days and hasn't taken it away), Heartburn, tremors, Fatigue, Headache, nasuea, Nocturia (Strong  smell), Night Sweats, and confustion    Subjective        Nick Turk presents to [facility]  History of Present Illness  Thrush that hs has taken diflucan and rash is not gone.  And has taken nystatin    Heartburn: with tremors and fatigue and headache and nausea that started after mission trip to [place].  Drank bottled water and fresh food.    Having nocturia with night sweats and some confusion.  Will have shaking and muscle cramps.    The following portions of the patient's history were personally reviewed and updated as appropriate: allergies, current medications, past medical history, past surgical history, past family history, and past social history.     Body mass index is 31.14 kg/mý.    BMI is >= 30 and <35. (Class 1 Obesity). The following options were offered after discussion;: weight loss educational material (shared in after visit summary)      Past History:    Medical History: has a past medical history of Back pain, Chronic pain syndrome, Forgetfulness, Leg pain, Lumbago (2015), Lumbar spondylosis, Pain, generalized, Pars defect of lumbar spine (2015), Sleep disorder, and Spondylolisthesis, lumbar region (2015).     Surgical History: has a past surgical history that includes Cholecystectomy; Ganglion cyst excision; Lumbar epidural injection; Lumbar fusion (2015); Colonoscopy (N/A, 11/16/2021); Shoulder arthroscopy w/ rotator cuff repair (Left, 05/12/2022); Back surgery; and Spinal fusion.     Family History: family history includes Heart disease in his mother; Heart failure in his sister; No Known Problems in his father.     Social History: reports that he has been smoking cigarettes. He has a 20.00 pack-year smoking history. He has never used smokeless tobacco. He reports that he does not drink alcohol and does not  "use drugs.    Allergies: Bee venom and Meloxicam          Current Outpatient Medications:     clobetasol (TEMOVATE) 0.05 % cream, Apply  topically to the appropriate area as directed See Admin Instructions. (Patient not taking: Reported on 8/17/2023), Disp: , Rfl:     esomeprazole (nexIUM) 40 MG capsule, Take 1 capsule by mouth Every Morning Before Breakfast., Disp: 30 capsule, Rfl: 0    nystatin (MYCOSTATIN) 100,000 unit/mL suspension, SHAKE LIQUID AND TAKE 5 ML BY MOUTH FOUR TIMES DAILY FOR 14 DAYS, Disp: , Rfl:     triamcinolone (KENALOG) 0.1 % cream, Apply  topically to the appropriate area as directed See Admin Instructions. (Patient not taking: Reported on 8/17/2023), Disp: , Rfl:     varenicline (Chantix) 0.5 MG tablet, Take 1 tablet by mouth 2 (Two) Times a Day., Disp: 60 tablet, Rfl: 1    Medications Discontinued During This Encounter   Medication Reason    HYDROcodone-acetaminophen (NORCO) 5-325 MG per tablet *Therapy completed         Review of Systems   Constitutional:  Negative for fever.   Respiratory:  Negative for cough and shortness of breath.    Cardiovascular:  Negative for chest pain, palpitations and leg swelling.   Neurological:  Negative for dizziness, weakness, numbness and headache.      Objective         Vitals:    08/17/23 1301   BP: 142/78   BP Location: Right arm   Patient Position: Sitting   Cuff Size: Large Adult   Pulse: 95   Resp: 16   Temp: 97.7 øF (36.5 øC)   TempSrc: Temporal   SpO2: 96%   Weight: 107 kg (236 lb)   Height: 185.4 cm (72.99\")     Body mass index is 31.14 kg/mý.         Physical Exam  Vitals reviewed.   Constitutional:       Appearance: Normal appearance. He is well-developed.   HENT:      Head: Normocephalic and atraumatic.      Mouth/Throat:      Pharynx: No oropharyngeal exudate.   Eyes:      Conjunctiva/sclera: Conjunctivae normal.      Pupils: Pupils are equal, round, and reactive to light.   Cardiovascular:      Rate and Rhythm: Normal rate and regular rhythm. "      Heart sounds: Normal heart sounds. No murmur heard.    No friction rub. No gallop.   Pulmonary:      Effort: Pulmonary effort is normal.      Breath sounds: Normal breath sounds. No wheezing or rhonchi.   Skin:     General: Skin is warm and dry.   Neurological:      Mental Status: He is alert and oriented to person, place, and time.   Psychiatric:         Mood and Affect: Mood and affect normal.         Behavior: Behavior normal.         Thought Content: Thought content normal.         Judgment: Judgment normal.           Result Review :               Assessment and Plan     Diagnoses and all orders for this visit:    1. Screening for prostate cancer (Primary)  -     PSA Screen; Future    2. Cigarette nicotine dependence without complication  -     varenicline (Chantix) 0.5 MG tablet; Take 1 tablet by mouth 2 (Two) Times a Day.  Dispense: 60 tablet; Refill: 1  -      CT Chest Low Dose Cancer Screening WO; Future    3. Mouth Candida infection  -     Throat / Upper Respiratory Culture - Swab, Throat; Future    4. Epigastric pain  -     esomeprazole (nexIUM) 40 MG capsule; Take 1 capsule by mouth Every Morning Before Breakfast.  Dispense: 30 capsule; Refill: 0    5. Leukocytosis, unspecified type  -     CBC & Differential; Future  -     XR Chest 2 View; Future    6. Acute cough  -     XR Chest 2 View; Future              Follow Up     Return for Next scheduled follow up.    Patient was given instructions and counseling regarding his condition or for health maintenance advice. Please see specific information pulled into the AVS if appropriate.

## 2023-08-19 LAB — BACTERIA SPEC AEROBE CULT: NORMAL

## 2023-08-21 ENCOUNTER — TELEPHONE (OUTPATIENT)
Dept: FAMILY MEDICINE CLINIC | Facility: CLINIC | Age: 53
End: 2023-08-21
Payer: COMMERCIAL

## 2023-08-21 DIAGNOSIS — B37.0: Primary | ICD-10-CM

## 2023-08-21 NOTE — TELEPHONE ENCOUNTER
Patient states he is still experiencing thrush over his whole tongue.   Patient has seen pcp and gotten lab completed as requested. Patient is unsure of what next steps are.

## 2023-08-21 NOTE — TELEPHONE ENCOUNTER
Pt states he has been taking the Nystatin as well and it isn't helping,  He would like to go to ENT- Dr. Jeffrey

## 2023-08-25 ENCOUNTER — TELEPHONE (OUTPATIENT)
Dept: FAMILY MEDICINE CLINIC | Facility: CLINIC | Age: 53
End: 2023-08-25
Payer: COMMERCIAL

## 2023-08-25 DIAGNOSIS — B37.0: ICD-10-CM

## 2023-08-25 DIAGNOSIS — R41.3 MEMORY CHANGES: Primary | ICD-10-CM
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
## 2023-09-13 ENCOUNTER — LAB REQUISITION (OUTPATIENT)
Dept: LAB | Facility: HOSPITAL | Age: 53
End: 2023-09-13
Payer: COMMERCIAL

## 2023-09-13 DIAGNOSIS — Z00.00 ENCOUNTER FOR GENERAL ADULT MEDICAL EXAMINATION WITHOUT ABNORMAL FINDINGS: ICD-10-CM

## 2023-09-13 PROCEDURE — 87102 FUNGUS ISOLATION CULTURE: CPT | Performed by: OTOLARYNGOLOGY

## 2023-09-13 PROCEDURE — 87205 SMEAR GRAM STAIN: CPT | Performed by: OTOLARYNGOLOGY

## 2023-09-13 PROCEDURE — 87070 CULTURE OTHR SPECIMN AEROBIC: CPT | Performed by: OTOLARYNGOLOGY

## 2023-09-15 LAB
BACTERIA SPEC AEROBE CULT: NORMAL
GRAM STN SPEC: NORMAL
GRAM STN SPEC: NORMAL

## 2023-09-20 LAB — FUNGUS WND CULT: NORMAL

## 2023-09-21 ENCOUNTER — LAB (OUTPATIENT)
Dept: LAB | Facility: HOSPITAL | Age: 53
End: 2023-09-21

## 2023-09-21 DIAGNOSIS — D72.829 LEUKOCYTOSIS, UNSPECIFIED TYPE: ICD-10-CM

## 2023-09-21 LAB
BASOPHILS # BLD AUTO: 0.05 10*3/MM3 (ref 0–0.2)
BASOPHILS NFR BLD AUTO: 0.5 % (ref 0–1.5)
DEPRECATED RDW RBC AUTO: 42.5 FL (ref 37–54)
EOSINOPHIL # BLD AUTO: 0.12 10*3/MM3 (ref 0–0.4)
EOSINOPHIL NFR BLD AUTO: 1.3 % (ref 0.3–6.2)
ERYTHROCYTE [DISTWIDTH] IN BLOOD BY AUTOMATED COUNT: 12.1 % (ref 12.3–15.4)
HCT VFR BLD AUTO: 46 % (ref 37.5–51)
HGB BLD-MCNC: 15.9 G/DL (ref 13–17.7)
IMM GRANULOCYTES # BLD AUTO: 0.05 10*3/MM3 (ref 0–0.05)
IMM GRANULOCYTES NFR BLD AUTO: 0.5 % (ref 0–0.5)
LYMPHOCYTES # BLD AUTO: 3.82 10*3/MM3 (ref 0.7–3.1)
LYMPHOCYTES NFR BLD AUTO: 39.9 % (ref 19.6–45.3)
MCH RBC QN AUTO: 32.8 PG (ref 26.6–33)
MCHC RBC AUTO-ENTMCNC: 34.6 G/DL (ref 31.5–35.7)
MCV RBC AUTO: 94.8 FL (ref 79–97)
MONOCYTES # BLD AUTO: 1.09 10*3/MM3 (ref 0.1–0.9)
MONOCYTES NFR BLD AUTO: 11.4 % (ref 5–12)
NEUTROPHILS NFR BLD AUTO: 4.45 10*3/MM3 (ref 1.7–7)
NEUTROPHILS NFR BLD AUTO: 46.4 % (ref 42.7–76)
NRBC BLD AUTO-RTO: 0 /100 WBC (ref 0–0.2)
PLATELET # BLD AUTO: 200 10*3/MM3 (ref 140–450)
PMV BLD AUTO: 11.1 FL (ref 6–12)
RBC # BLD AUTO: 4.85 10*6/MM3 (ref 4.14–5.8)
WBC NRBC COR # BLD: 9.58 10*3/MM3 (ref 3.4–10.8)

## 2023-09-21 PROCEDURE — 36415 COLL VENOUS BLD VENIPUNCTURE: CPT

## 2023-09-21 PROCEDURE — 85025 COMPLETE CBC W/AUTO DIFF WBC: CPT

## 2023-09-22 ENCOUNTER — OFFICE VISIT (OUTPATIENT)
Dept: FAMILY MEDICINE CLINIC | Facility: CLINIC | Age: 53
End: 2023-09-22
Payer: COMMERCIAL

## 2023-09-22 VITALS
HEIGHT: 73 IN | BODY MASS INDEX: 32.02 KG/M2 | WEIGHT: 241.6 LBS | SYSTOLIC BLOOD PRESSURE: 138 MMHG | TEMPERATURE: 97.7 F | DIASTOLIC BLOOD PRESSURE: 75 MMHG | OXYGEN SATURATION: 97 % | HEART RATE: 69 BPM | RESPIRATION RATE: 18 BRPM

## 2023-09-22 DIAGNOSIS — R41.3 MEMORY CHANGES: ICD-10-CM

## 2023-09-22 DIAGNOSIS — Z00.00 ANNUAL PHYSICAL EXAM: Primary | ICD-10-CM

## 2023-09-22 PROCEDURE — 99396 PREV VISIT EST AGE 40-64: CPT | Performed by: NURSE PRACTITIONER
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
## 2023-09-27 LAB — FUNGUS WND CULT: NORMAL

## 2023-10-02 ENCOUNTER — HOSPITAL ENCOUNTER (OUTPATIENT)
Dept: MRI IMAGING | Facility: HOSPITAL | Age: 53
Discharge: HOME | End: 2023-10-02
Payer: COMMERCIAL

## 2023-10-02 ENCOUNTER — HOSPITAL ENCOUNTER (OUTPATIENT)
Dept: CT IMAGING | Facility: HOSPITAL | Age: 53
Discharge: HOME | End: 2023-10-02
Payer: COMMERCIAL

## 2023-10-02 DIAGNOSIS — F17.210 CIGARETTE NICOTINE DEPENDENCE WITHOUT COMPLICATION: ICD-10-CM

## 2023-10-02 DIAGNOSIS — R41.3 MEMORY CHANGES: ICD-10-CM

## 2023-10-02 PROCEDURE — 71271 CT THORAX LUNG CANCER SCR C-: CPT

## 2023-10-02 PROCEDURE — 70551 MRI BRAIN STEM W/O DYE: CPT

## 2023-10-04 LAB — FUNGUS WND CULT: NORMAL

## 2023-10-11 LAB — FUNGUS WND CULT: NORMAL

## 2024-03-01 ENCOUNTER — TELEPHONE (OUTPATIENT)
Dept: FAMILY MEDICINE CLINIC | Facility: CLINIC | Age: 54
End: 2024-03-01
Payer: COMMERCIAL

## 2024-03-01 DIAGNOSIS — K12.1 MOUTH ULCERS: Primary | ICD-10-CM

## 2024-03-01 NOTE — TELEPHONE ENCOUNTER
Patient is needing a urgent referral for ENT. Patient wife states that she talked to mari at her appointment about her 's spot in his mouth. The dentist yesterday told them to ask his PCP because they don't prioritize dental referral when they send them to ENT. They would like to see dr sheffield.

## 2024-03-07 ENCOUNTER — TELEPHONE (OUTPATIENT)
Dept: FAMILY MEDICINE CLINIC | Facility: CLINIC | Age: 54
End: 2024-03-07
Payer: COMMERCIAL

## 2024-03-07 NOTE — TELEPHONE ENCOUNTER
Inside the referral they are discussing scheduling it as urgent - patient is aware they will call either today or tomorrow to schedule.

## 2024-03-07 NOTE — TELEPHONE ENCOUNTER
"Caller: ROSA PRECIADO    Relationship: Emergency Contact    Best call back number: [phone]     What is the medical concern/diagnosis: \"QUESTIONABLE ARE IN ROOF OF MOUTH\", DARK SPOT, CANCER CONCERNS    What specialty or service is being requested: ENT    What is the provider, practice or medical service name: DR RODRICK FISCHER    What is the office location:     [address] [place] [address]     What is the office phone number: [phone]     Any additional details: PATIENT'S WIFE STATES THAT THE ENT OFFICE DENIED THE REFERRAL FROM THE PATIENT'S DENTIST    PATIENT'S WIFE STATES THAT SHE WOULD LIKE TO KNOW IF THE PATIENT NEEDS TO BE SEEN IN OFFICE ASAP.         "

## 2024-03-11 ENCOUNTER — HOSPITAL ENCOUNTER (OUTPATIENT)
Dept: GENERAL RADIOLOGY | Facility: HOSPITAL | Age: 54
Discharge: HOME | End: 2024-03-11
Admitting: ANESTHESIOLOGY
Payer: COMMERCIAL

## 2024-03-11 ENCOUNTER — TRANSCRIBE ORDERS (OUTPATIENT)
Dept: GENERAL RADIOLOGY | Facility: HOSPITAL | Age: 54
End: 2024-03-11
Payer: COMMERCIAL

## 2024-03-11 DIAGNOSIS — M79.672 LEFT FOOT PAIN: Primary | ICD-10-CM

## 2024-03-11 DIAGNOSIS — M79.672 LEFT FOOT PAIN: ICD-10-CM

## 2024-03-11 PROCEDURE — 73630 X-RAY EXAM OF FOOT: CPT

## 2024-03-12 ENCOUNTER — OFFICE VISIT (OUTPATIENT)
Dept: FAMILY MEDICINE CLINIC | Facility: CLINIC | Age: 54
End: 2024-03-12
Payer: COMMERCIAL

## 2024-03-12 VITALS
SYSTOLIC BLOOD PRESSURE: 124 MMHG | OXYGEN SATURATION: 98 % | RESPIRATION RATE: 16 BRPM | HEIGHT: 73 IN | BODY MASS INDEX: 32.6 KG/M2 | HEART RATE: 52 BPM | DIASTOLIC BLOOD PRESSURE: 80 MMHG | WEIGHT: 246 LBS | TEMPERATURE: 98.7 F

## 2024-03-12 DIAGNOSIS — M79.89 SWELLING OF LOWER EXTREMITY: Primary | ICD-10-CM

## 2024-03-12 PROCEDURE — 99213 OFFICE O/P EST LOW 20 MIN: CPT | Performed by: STUDENT IN AN ORGANIZED HEALTH CARE EDUCATION/TRAINING PROGRAM

## 2024-03-12 RX ORDER — FUROSEMIDE 20 MG/1
20 TABLET ORAL DAILY
Qty: 90 TABLET | Refills: 0 | Status: SHIPPED | OUTPATIENT
Start: 2024-03-12

## 2024-03-12 RX ORDER — DICLOFENAC SODIUM 75 MG/1
75 TABLET, DELAYED RELEASE ORAL 2 TIMES DAILY
COMMUNITY

## 2024-03-12 NOTE — PROGRESS NOTES
"Chief Complaint  Lower extremity swelling    Subjective         Nick Turk is a 53 y.o. male who presents to [facility]      53 years old comes to the clinic today for lower extremity swelling.    Patient has been noticing some swelling of lower extremities bilaterally.  Does report sedentary diet/high salt diet and lots of physical work, patient is building a house so on his feet a lot.    Following up with pain management    Denies any chest pain or shortness of breath or any other acute complaint      Objective   Vital Signs:   Vitals:    03/12/24 1002   BP: 124/80   Pulse: 52   Resp: 16   Temp: 98.7 °F (37.1 °C)   SpO2: 98%   Weight: 112 kg (246 lb)   Height: 185.4 cm (73\")      Body mass index is 32.46 kg/m².   Wt Readings from Last 3 Encounters:   03/12/24 112 kg (246 lb)   09/22/23 110 kg (241 lb 9.6 oz)   08/17/23 107 kg (236 lb)      BP Readings from Last 3 Encounters:   03/12/24 124/80   11/26/23 (!) 173/102   09/22/23 138/75        Patient Care Team:  Shola Car APRN as PCP - General (Nurse Practitioner)  Urvashi Lopez APRN as Nurse Practitioner (Nurse Practitioner)          Physical examination; trace lower extremity pitting edema           Assessment and Plan   Diagnoses and all orders for this visit:    1. Swelling of lower extremity (Primary)  -     furosemide (Lasix) 20 MG tablet; Take 1 tablet by mouth Daily.  Dispense: 90 tablet; Refill: 0      After reviewing patient's symptoms and physical findings, conservative management discussed with low-salt diet/compression stockings/elevation and weight loss.    I will go ahead and prescribe Lasix for patient to use as needed for swelling.    Patient already has appointment with PCP in next few days to follow-up and physical.  Patient to call if any worsening or no improvement  Tobacco Use: Medium Risk (3/12/2024)    Patient History     Smoking Tobacco Use: Former     Smokeless Tobacco Use: Never     Passive " Exposure: Not on file            Follow Up   Return if symptoms worsen or fail to improve.  Patient was given instructions and counseling regarding his condition or for health maintenance advice. Please see specific information pulled into the AVS if appropriate.

## 2024-03-15 ENCOUNTER — OFFICE VISIT (OUTPATIENT)
Dept: FAMILY MEDICINE CLINIC | Facility: CLINIC | Age: 54
End: 2024-03-15
Payer: COMMERCIAL

## 2024-03-15 ENCOUNTER — HOSPITAL ENCOUNTER (OUTPATIENT)
Dept: GENERAL RADIOLOGY | Facility: HOSPITAL | Age: 54
Discharge: HOME | End: 2024-03-15
Admitting: NURSE PRACTITIONER
Payer: COMMERCIAL

## 2024-03-15 VITALS
TEMPERATURE: 97.5 F | WEIGHT: 243 LBS | HEART RATE: 83 BPM | SYSTOLIC BLOOD PRESSURE: 136 MMHG | DIASTOLIC BLOOD PRESSURE: 74 MMHG | BODY MASS INDEX: 32.2 KG/M2 | OXYGEN SATURATION: 99 % | RESPIRATION RATE: 16 BRPM | HEIGHT: 73 IN

## 2024-03-15 DIAGNOSIS — G89.29 CHRONIC LEFT SHOULDER PAIN: ICD-10-CM

## 2024-03-15 DIAGNOSIS — E78.2 MIXED HYPERLIPIDEMIA: ICD-10-CM

## 2024-03-15 DIAGNOSIS — K12.1 MOUTH ULCERS: ICD-10-CM

## 2024-03-15 DIAGNOSIS — M25.512 CHRONIC LEFT SHOULDER PAIN: ICD-10-CM

## 2024-03-15 DIAGNOSIS — R61 SWEATING INCREASE: Primary | ICD-10-CM

## 2024-03-15 DIAGNOSIS — R53.83 FATIGUE, UNSPECIFIED TYPE: ICD-10-CM

## 2024-03-15 PROCEDURE — 99214 OFFICE O/P EST MOD 30 MIN: CPT | Performed by: NURSE PRACTITIONER

## 2024-03-15 PROCEDURE — 73030 X-RAY EXAM OF SHOULDER: CPT

## 2024-03-15 NOTE — PROGRESS NOTES
Chief Complaint  Fatigue and Night Sweats    Subjective        Nick Turk presents to [facility]  History of Present Illness  Fatigue:  with sweating at night but getting up at night to urinate just started water pill  yesterday.  Only using cpap machine occasionally.  States still wakes up sweaty.  Denies chest pain.      States a spot on the back of throat was concerning at Dentist and they thought may need further evaluation.    Rolled foot ant has been going to the chiropractor and is better reviewed xray that is negative.    Shoulder left no longer working as well after surgery.  States can rotator but by the afternoon   Fatigue  Associated symptoms include fatigue. Pertinent negatives include no chest pain, coughing, fever, numbness or weakness.   Night Sweats  Associated symptoms include fatigue. Pertinent negatives include no chest pain, coughing, fever, numbness or weakness.       The following portions of the patient's history were personally reviewed and updated as appropriate: allergies, current medications, past medical history, past surgical history, past family history, and past social history.     Body mass index is 32.07 kg/m².           Past History:    Medical History: has a past medical history of Back pain, Chronic pain syndrome, Forgetfulness, Leg pain, Lumbago (2015), Lumbar spondylosis, Pain, generalized, Pars defect of lumbar spine (2015), Sleep disorder, and Spondylolisthesis, lumbar region (2015).     Surgical History: has a past surgical history that includes Cholecystectomy; Ganglion cyst excision; Lumbar epidural injection; Lumbar fusion (2015); Colonoscopy (N/A, 11/16/2021); Shoulder arthroscopy w/ rotator cuff repair (Left, 05/12/2022); Back surgery; and Spinal fusion.     Family History: family history includes Heart disease in his mother; Heart failure in his sister; No Known Problems in his father.     Social History: reports that he has quit  "smoking. His smoking use included cigarettes. He started smoking about 39 years ago. He has a 39.6 pack-year smoking history. He has never used smokeless tobacco. He reports that he does not drink alcohol and does not use drugs.    Allergies: Bee venom and Meloxicam          Current Outpatient Medications:     diclofenac (VOLTAREN) 75 MG EC tablet, Take 1 tablet by mouth 2 (Two) Times a Day., Disp: , Rfl:     furosemide (Lasix) 20 MG tablet, Take 1 tablet by mouth Daily., Disp: 90 tablet, Rfl: 0    There are no discontinued medications.      Review of Systems   Constitutional:  Positive for fatigue and night sweats. Negative for fever.   Respiratory:  Negative for cough and shortness of breath.    Cardiovascular:  Negative for chest pain, palpitations and leg swelling.   Neurological:  Negative for dizziness, weakness, numbness and headache.        Objective         Vitals:    03/15/24 0740   BP: 136/74   BP Location: Right arm   Patient Position: Sitting   Cuff Size: Large Adult   Pulse: 83   Resp: 16   Temp: 97.5 °F (36.4 °C)   TempSrc: Temporal   SpO2: 99%   Weight: 110 kg (243 lb)   Height: 185.4 cm (72.99\")     Body mass index is 32.07 kg/m².         Physical Exam  Vitals reviewed.   Constitutional:       Appearance: Normal appearance. He is well-developed.   HENT:      Head: Normocephalic and atraumatic.      Mouth/Throat:      Pharynx: No oropharyngeal exudate.        Comments: [place] but unable to see what Dentist saw.  So maybe healed.  Eyes:      Conjunctiva/sclera: Conjunctivae normal.      Pupils: Pupils are equal, round, and reactive to light.   Cardiovascular:      Rate and Rhythm: Normal rate and regular rhythm.      Heart sounds: Normal heart sounds. No murmur heard.     No friction rub. No gallop.   Pulmonary:      Effort: Pulmonary effort is normal.      Breath sounds: Normal breath sounds. No wheezing or rhonchi.   Skin:     General: Skin is warm and dry.   Neurological:      Mental Status: " He is alert and oriented to person, place, and time.   Psychiatric:         Mood and Affect: Mood and affect normal.         Behavior: Behavior normal.         Thought Content: Thought content normal.         Judgment: Judgment normal.             Result Review :               Assessment and Plan     Diagnoses and all orders for this visit:    1. Sweating increase (Primary)  -     Ambulatory Referral to Cardiology  -     Urinalysis With Culture If Indicated -; Future  -     CBC (No Diff); Future    2. Mouth ulcers    3. Fatigue, unspecified type  -     TSH Rfx On Abnormal To Free T4; Future  -     Ferritin; Future  -     Folate; Future  -     Vitamin B12; Future  -     Testosterone; Future    4. Mixed hyperlipidemia  -     Lipid Panel With / Chol / HDL Ratio; Future  -     Comprehensive Metabolic Panel; Future    5. Chronic left shoulder pain  -     XR Shoulder 2+ View Left; Future  -     MRI Shoulder Left Without Contrast; Future              Follow Up     Return for Next scheduled follow up.    Patient was given instructions and counseling regarding his condition or for health maintenance advice. Please see specific information pulled into the AVS if appropriate.

## 2024-03-19 ENCOUNTER — LAB (OUTPATIENT)
Dept: LAB | Facility: HOSPITAL | Age: 54
End: 2024-03-19
Payer: COMMERCIAL

## 2024-03-19 DIAGNOSIS — R53.83 FATIGUE, UNSPECIFIED TYPE: ICD-10-CM

## 2024-03-19 DIAGNOSIS — R61 SWEATING INCREASE: ICD-10-CM

## 2024-03-19 DIAGNOSIS — E78.2 MIXED HYPERLIPIDEMIA: ICD-10-CM

## 2024-03-19 LAB
ALBUMIN SERPL-MCNC: 4.1 G/DL (ref 3.5–5.2)
ALBUMIN/GLOB SERPL: 1.4 G/DL
ALP SERPL-CCNC: 50 U/L (ref 39–117)
ALT SERPL W P-5'-P-CCNC: 37 U/L (ref 1–41)
ANION GAP SERPL CALCULATED.3IONS-SCNC: 10.3 MMOL/L (ref 5–15)
AST SERPL-CCNC: 20 U/L (ref 1–40)
BILIRUB SERPL-MCNC: 0.6 MG/DL (ref 0–1.2)
BILIRUB UR QL STRIP: NEGATIVE
BUN SERPL-MCNC: 16 MG/DL (ref 6–20)
BUN/CREAT SERPL: 16.8 (ref 7–25)
CALCIUM SPEC-SCNC: 9.2 MG/DL (ref 8.6–10.5)
CHLORIDE SERPL-SCNC: 103 MMOL/L (ref 98–107)
CHOLEST SERPL-MCNC: 114 MG/DL (ref 0–200)
CLARITY UR: CLEAR
CO2 SERPL-SCNC: 24.7 MMOL/L (ref 22–29)
COLOR UR: YELLOW
CREAT SERPL-MCNC: 0.95 MG/DL (ref 0.76–1.27)
DEPRECATED RDW RBC AUTO: 43.1 FL (ref 37–54)
EGFRCR SERPLBLD CKD-EPI 2021: 96.3 ML/MIN/1.73
ERYTHROCYTE [DISTWIDTH] IN BLOOD BY AUTOMATED COUNT: 12.9 % (ref 12.3–15.4)
FERRITIN SERPL-MCNC: 306 NG/ML (ref 30–400)
FOLATE SERPL-MCNC: 8.16 NG/ML (ref 4.78–24.2)
GLOBULIN UR ELPH-MCNC: 2.9 GM/DL
GLUCOSE SERPL-MCNC: 104 MG/DL (ref 65–99)
GLUCOSE UR STRIP-MCNC: NEGATIVE MG/DL
HCT VFR BLD AUTO: 48.9 % (ref 37.5–51)
HDLC SERPL QL: 3.08
HDLC SERPL-MCNC: 37 MG/DL (ref 40–60)
HGB BLD-MCNC: 16.7 G/DL (ref 13–17.7)
HGB UR QL STRIP.AUTO: NEGATIVE
HOLD SPECIMEN: NORMAL
KETONES UR QL STRIP: NEGATIVE
LDLC SERPL CALC-MCNC: 59 MG/DL (ref 0–100)
LEUKOCYTE ESTERASE UR QL STRIP.AUTO: NEGATIVE
MCH RBC QN AUTO: 31.5 PG (ref 26.6–33)
MCHC RBC AUTO-ENTMCNC: 34.2 G/DL (ref 31.5–35.7)
MCV RBC AUTO: 92.1 FL (ref 79–97)
NITRITE UR QL STRIP: NEGATIVE
PH UR STRIP.AUTO: 6.5 [PH] (ref 5–8)
PLATELET # BLD AUTO: 250 10*3/MM3 (ref 140–450)
PMV BLD AUTO: 10.9 FL (ref 6–12)
POTASSIUM SERPL-SCNC: 4.8 MMOL/L (ref 3.5–5.2)
PROT SERPL-MCNC: 7 G/DL (ref 6–8.5)
PROT UR QL STRIP: NEGATIVE
RBC # BLD AUTO: 5.31 10*6/MM3 (ref 4.14–5.8)
SODIUM SERPL-SCNC: 138 MMOL/L (ref 136–145)
SP GR UR STRIP: 1.01 (ref 1–1.03)
TESTOST SERPL-MCNC: 310 NG/DL (ref 193–740)
TRIGL SERPL-MCNC: 91 MG/DL (ref 0–150)
TSH SERPL DL<=0.05 MIU/L-ACNC: 1.62 UIU/ML (ref 0.27–4.2)
UROBILINOGEN UR QL STRIP: NORMAL
VIT B12 BLD-MCNC: 226 PG/ML (ref 211–946)
VLDLC SERPL-MCNC: 18 MG/DL (ref 5–40)
WBC NRBC COR # BLD AUTO: 9.34 10*3/MM3 (ref 3.4–10.8)

## 2024-03-19 PROCEDURE — 82728 ASSAY OF FERRITIN: CPT

## 2024-03-19 PROCEDURE — 82746 ASSAY OF FOLIC ACID SERUM: CPT

## 2024-03-19 PROCEDURE — 84403 ASSAY OF TOTAL TESTOSTERONE: CPT

## 2024-03-19 PROCEDURE — 82607 VITAMIN B-12: CPT

## 2024-03-19 PROCEDURE — 80061 LIPID PANEL: CPT

## 2024-03-19 PROCEDURE — 80050 GENERAL HEALTH PANEL: CPT

## 2024-03-19 PROCEDURE — 81003 URINALYSIS AUTO W/O SCOPE: CPT

## 2024-03-19 PROCEDURE — 36415 COLL VENOUS BLD VENIPUNCTURE: CPT

## 2024-03-21 ENCOUNTER — TELEPHONE (OUTPATIENT)
Dept: ORTHOPEDIC SURGERY | Facility: CLINIC | Age: 54
End: 2024-03-21
Payer: COMMERCIAL

## 2024-03-21 NOTE — TELEPHONE ENCOUNTER
Caller: Nick Turk    Relationship to patient: Self    Best call back number: [phone] (home)     Patient is needing: PATIENT IS EST WITH DR SUAREZ AND HAS FRACTURED HIS LEFT FOOT. SEEN AT [facility] YESTERDAY. NO AVAILABILITY TO SCHEDULE WITHIN 24 HOURS. PLEASE ADVISE PATIENT ON GETTING SCHEDULED.

## 2024-03-25 ENCOUNTER — OFFICE VISIT (OUTPATIENT)
Dept: ORTHOPEDIC SURGERY | Facility: CLINIC | Age: 54
End: 2024-03-25
Payer: COMMERCIAL

## 2024-03-25 VITALS
HEIGHT: 72 IN | SYSTOLIC BLOOD PRESSURE: 166 MMHG | BODY MASS INDEX: 32.91 KG/M2 | DIASTOLIC BLOOD PRESSURE: 99 MMHG | WEIGHT: 243 LBS | OXYGEN SATURATION: 98 % | HEART RATE: 94 BPM

## 2024-03-25 DIAGNOSIS — S92.352A CLOSED DISPLACED FRACTURE OF FIFTH METATARSAL BONE OF LEFT FOOT, INITIAL ENCOUNTER: ICD-10-CM

## 2024-03-25 DIAGNOSIS — M79.672 LEFT FOOT PAIN: Primary | ICD-10-CM

## 2024-03-25 DIAGNOSIS — M84.30XS STRESS FRACTURE, UNSPECIFIED SITE, SEQUELA: ICD-10-CM

## 2024-03-25 PROCEDURE — 99203 OFFICE O/P NEW LOW 30 MIN: CPT | Performed by: ORTHOPAEDIC SURGERY

## 2024-03-25 NOTE — PROGRESS NOTES
"Chief Complaint  Initial Evaluation of the Left Foot     Subjective      Nick Turk presents to [facility] for initial evaluation of the left foot. He had no injury.  He went home from work and his foot was hurting.  He builds houses.  He went and had an X ray and placed in a CAM boot.  He has pain over the 5 th metatarsal.  The pain started around 3/6/24.      Allergies   Allergen Reactions    Bee Venom Swelling    Meloxicam Confusion and Other (See Comments)        Social History     Socioeconomic History    Marital status:    Tobacco Use    Smoking status: Former     Average packs/day: 1 pack/day for 39.6 years (39.6 ttl pk-yrs)     Types: Cigarettes     Start date: 4/13/1984    Smokeless tobacco: Never   Vaping Use    Vaping status: Never Used   Substance and Sexual Activity    Alcohol use: Never    Drug use: Never    Sexual activity: Yes     Partners: Female     Birth control/protection: Post-menopausal, Tubal ligation        I reviewed the patient's chief complaint, history of present illness, review of systems, past medical history, surgical history, family history, social history, medications, and allergy list.     Review of Systems     Constitutional: Denies fevers, chills, weight loss  Cardiovascular: Denies chest pain, shortness of breath  Skin: Denies rashes, acute skin changes  Neurologic: Denies headache, loss of consciousness        Vital Signs:   /99   Pulse 94   Ht 182.9 cm (72\")   Wt 110 kg (243 lb)   SpO2 98%   BMI 32.96 kg/m²          Physical Exam  General: Alert. No acute distress    Ortho Exam        LEFT FOOT Positive EHL, FHL, GS and TA. Sensation intact to all 5 nerves of the foot. Positive pulses. Neurovascularly intact. Calf soft, Non-tender. Full ROM of the ankle.  Stable to stress. Tender to the 5 th metacarpal  Intact flexion and extension of toes.  He has been in the boot 3-4 days.       Procedures        Imaging Results (Most " Recent)       Procedure Component Value Units Date/Time    XR Foot 3+ View Left [[id number]] Resulted: 03/25/24 0841     Updated: 03/25/24 0849             Result Review :     X-Ray Report:  Left foot X-Ray  Indication: Evaluation of the left foot  AP/Lateral view(s)  Findings: There is a nondisplaced fracture at the base of the fifth metatarsal.   Prior studies available for comparison: yes       XR foot 3+ views left    Result Date: 3/19/2024  Narrative: INDICATION:  Left foot pain, fifth metatarsal. TECHNIQUE:  Three views of the left foot. COMPARISON:  None Available. FINDINGS:   There is a nondisplaced fracture at the base of the fifth metatarsal.  The alignment is anatomic.  No soft tissue abnormality is seen. IMPRESSION: Nondisplaced fracture at the base of the fifth metatarsal. Signed by Yuko Sanchez MD ##### Final ##### Dictated by:    YUKO SANCHEZ MD-RAD Dictated DT/TM: 03/19/2024 10:31 pm Interpreted and electronically signed by:  YUKO SANCHEZ MD-RAD Signed DT/TM:  03/19/2024 10:40 pm    US Venous Doppler Lower Extremity Right (duplex)    Result Date: 3/19/2024  Narrative: EXAMINATION: Right lower extremity duplex sonogram DATE: 03/19/2024 20:03 ACCESSION: [id number] PROVIDED INDICATION: Swelling COMPARISON: None TECHNIQUE: Real-time grayscale and color flow ultrasonographic images were obtained through the right lower extremity prior to and following compression with the transducer with Doppler analysis of flow in the venous system. FINDINGS: The common femoral, deep femoral, femoral, and popliteal veins are compressible with normal respiratory variation and augmentation. The greater saphenous vein is compressible with no evidence of intraluminal thrombus. A nonspecific fluid collection in the medial right calf measures 4.7 x 2.8 x 0.6 cm. There is no internal flow. IMPRESSION: 1. No right lower extremity deep venous thrombosis. 2. A nonspecific fluid collection in the  medial right calf measures 4.7 x 2.8 x 0.6 cm. This could represent old hematoma or other nonspecific fluid collection. Dictated by: Char cA M.D. Signed by Char Ac M.D. on 3/19/2024 20:34 ##### Final ##### Dictated by:    CHAR AC MD-RAD Dictated DT/TM: 03/19/2024 8:34 pm Interpreted and electronically signed by:  CHAR AC MD-RAD Signed DT/TM:  03/19/2024 8:34 pm    XR Shoulder 2+ View Left    Result Date: 3/15/2024  Narrative: PROCEDURE: XR SHOULDER 2+ VW LEFT  COMPARISON: [facility], , XR SHOULDER 2+ VW LEFT, 3/22/2022, 13:22.  INDICATIONS: LEFT SHOULDER PAIN, LIMITED ROM  FINDINGS:  There is been surgical resection of the distal clavicle.  There are cystic degenerative changes identified in the greater tuberosity.  There is marginal osteophyte formation along the inferior aspect of the glenohumeral joint.  No acute fractures are identified.      Impression:   1. Postop changes at the AC joint. 2. Glenohumeral joint osteoarthritis 3. No acute findings      Fernando Garcia MD       Electronically Signed and Approved By: Fernando Garcia MD on 3/15/2024 at 10:27             XR Foot 3+ View Left    Result Date: 3/11/2024  Narrative: PROCEDURE: XR FOOT 3+ VW LEFT  COMPARISON: None  INDICATIONS: PAIN ALONG THE 5TH METATARSAL SINCE HEARING A POP IN THAT AREA 5 DAYS AGO.  FINDINGS:  Bony structures are intact.  The joint spaces and articular surfaces are preserved.  No fractures or destructive bone lesions are identified.      Impression:  Negative left foot      Fernando Garcia MD       Electronically Signed and Approved By: Fernando Garcia MD on 3/11/2024 at 16:20                     Assessment and Plan     Diagnoses and all orders for this visit:    1. Left foot pain (Primary)  -     XR Foot 3+ View Left    2. Closed displaced fracture of fifth metatarsal bone of left foot, initial encounter    3. Pete Stress fracture,        Discussed the treatment plan with the  patient. I reviewed the X-rays that were obtained 3/11/24 with the patient.     Use crutches to take weight off the foot.      Will X ray the left foot at the next visit.     Call or return if worsening symptoms.    Follow Up     4-6 weeks.      Patient was given instructions and counseling regarding his condition or for health maintenance advice. Please see specific information pulled into the AVS if appropriate.     Scribed for Abdoulaye Minaya MD by Maria Eugenia Paredes MA.  03/25/24   08:39 EDT      I have personally performed the services described in this document as scribed by the above individual and it is both accurate and complete. Abdoulaye Minaya MD 03/25/24

## 2024-04-22 ENCOUNTER — HOSPITAL ENCOUNTER (OUTPATIENT)
Dept: MRI IMAGING | Facility: HOSPITAL | Age: 54
Discharge: HOME | End: 2024-04-22
Admitting: NURSE PRACTITIONER
Payer: COMMERCIAL

## 2024-04-22 DIAGNOSIS — M25.512 CHRONIC LEFT SHOULDER PAIN: ICD-10-CM

## 2024-04-22 DIAGNOSIS — G89.29 CHRONIC LEFT SHOULDER PAIN: ICD-10-CM

## 2024-04-22 PROCEDURE — 73221 MRI JOINT UPR EXTREM W/O DYE: CPT

## 2024-04-26 ENCOUNTER — OFFICE VISIT (OUTPATIENT)
Dept: ORTHOPEDIC SURGERY | Facility: CLINIC | Age: 54
End: 2024-04-26
Payer: COMMERCIAL

## 2024-04-26 VITALS
DIASTOLIC BLOOD PRESSURE: 90 MMHG | HEIGHT: 72 IN | OXYGEN SATURATION: 96 % | SYSTOLIC BLOOD PRESSURE: 155 MMHG | HEART RATE: 90 BPM | BODY MASS INDEX: 32.91 KG/M2 | WEIGHT: 243 LBS

## 2024-04-26 DIAGNOSIS — S92.352D CLOSED DISPLACED FRACTURE OF FIFTH METATARSAL BONE OF LEFT FOOT WITH ROUTINE HEALING, SUBSEQUENT ENCOUNTER: ICD-10-CM

## 2024-04-26 DIAGNOSIS — M79.672 LEFT FOOT PAIN: Primary | ICD-10-CM

## 2024-04-26 NOTE — PROGRESS NOTES
"Chief Complaint  Follow-up of the Left Foot     Subjective      Nick Turk presents to [facility] for follow up the left foot. He had no injury. He went home from work and his foot was hurting. 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 He has pain over the 5 th metatarsal. The pain started around 3/6/24. He notes he is at least 50 % better.  He is still wearing the CAM boot.  He has swelling in the foot at times still.     Allergies   Allergen Reactions    Bee Venom Swelling    Meloxicam Confusion and Other (See Comments)        Social History     Socioeconomic History    Marital status:    Tobacco Use    Smoking status: Former     Average packs/day: 1 pack/day for 39.6 years (39.6 ttl pk-yrs)     Types: Cigarettes     Start date: 4/13/1984    Smokeless tobacco: Never   Vaping Use    Vaping status: Never Used   Substance and Sexual Activity    Alcohol use: Never    Drug use: Never    Sexual activity: Yes     Partners: Female     Birth control/protection: Post-menopausal, Tubal ligation        I reviewed the patient's chief complaint, history of present illness, review of systems, past medical history, surgical history, family history, social history, medications, and allergy list.     Review of Systems     Constitutional: Denies fevers, chills, weight loss  Cardiovascular: Denies chest pain, shortness of breath  Skin: Denies rashes, acute skin changes  Neurologic: Denies headache, loss of consciousness        Vital Signs:   /90   Pulse 90   Ht 182.9 cm (72\")   Wt 110 kg (243 lb)   SpO2 96%   BMI 32.96 kg/m²          Physical Exam  General: Alert. No acute distress    Ortho Exam        LEFT FOOT Positive EHL, FHL, GS and TA. Sensation intact to all 5 nerves of the foot. Positive pulses. Neurovascularly intact. Calf soft, Non-tender. Full ROM of the ankle.  Stable to stress. Tender to the 5 th metacarpal  Intact flexion and extension of " toes.       Procedures        Imaging Results (Most Recent)       Procedure Component Value Units Date/Time    XR Foot 3+ View Left [[id number]] Resulted: 04/26/24 1301     Updated: 04/26/24 1302             Result Review :     X-Ray Report:  Left foot X-Ray  Indication: Evaluation of the left foot  AP/Lateral view(s)  Findings: Interval healing of the 5 th metacarpal fracture.   Prior studies available for comparison: yes                  Assessment and Plan     Diagnoses and all orders for this visit:    1. Left foot pain (Primary)  -     XR Foot 3+ View Left    2. Closed displaced fracture of fifth metatarsal bone of left foot with routine healing, subsequent encounter        Discussed the treatment plan with the patient. I reviewed the X-rays that were obtained today with the patient.     Discussed the treatment options with the patient, operative vs non-operative.     Will X ray at the next visit.        Discussed surgery. and Call or return if worsening symptoms.    Follow Up     3 weeks.  May discuss surgical intervention if not healing well.       Patient was given instructions and counseling regarding his condition or for health maintenance advice. Please see specific information pulled into the AVS if appropriate.     Scribed for Abdoulaye Minaya MD by Maria Eugenia Paredes MA.  04/26/24   12:57 EDT    I have personally performed the services described in this document as scribed by the above individual and it is both accurate and complete. Abdoulaye Minaya MD 04/26/24

## 2024-04-29 ENCOUNTER — OFFICE VISIT (OUTPATIENT)
Dept: ORTHOPEDIC SURGERY | Facility: CLINIC | Age: 54
End: 2024-04-29
Payer: COMMERCIAL

## 2024-04-29 VITALS
OXYGEN SATURATION: 97 % | BODY MASS INDEX: 32.91 KG/M2 | HEIGHT: 72 IN | HEART RATE: 67 BPM | WEIGHT: 243 LBS | DIASTOLIC BLOOD PRESSURE: 85 MMHG | SYSTOLIC BLOOD PRESSURE: 153 MMHG

## 2024-04-29 DIAGNOSIS — M25.561 RIGHT KNEE PAIN, UNSPECIFIED CHRONICITY: Primary | ICD-10-CM

## 2024-04-29 DIAGNOSIS — M17.12 OSTEOARTHRITIS OF LEFT KNEE, UNSPECIFIED OSTEOARTHRITIS TYPE: ICD-10-CM

## 2024-04-29 PROCEDURE — 99203 OFFICE O/P NEW LOW 30 MIN: CPT | Performed by: ORTHOPAEDIC SURGERY

## 2024-04-29 NOTE — PROGRESS NOTES
"Chief Complaint  Initial Evaluation of the Right Knee and Initial Evaluation of the Left Scapula     Subjective      Nick Turk presents to [facility] for initial evaluation of the right knee and the left shoulder.  He had a MRI of the left shoulder and is here to review.  He has pain and swelling in the right knee at times.  He has been wearing a brace as needed.     Allergies   Allergen Reactions    Bee Venom Swelling    Meloxicam Confusion and Other (See Comments)        Social History     Socioeconomic History    Marital status:    Tobacco Use    Smoking status: Former     Average packs/day: 1 pack/day for 39.6 years (39.6 ttl pk-yrs)     Types: Cigarettes     Start date: 4/13/1984    Smokeless tobacco: Never   Vaping Use    Vaping status: Never Used   Substance and Sexual Activity    Alcohol use: Never    Drug use: Never    Sexual activity: Yes     Partners: Female     Birth control/protection: Post-menopausal, Tubal ligation        I reviewed the patient's chief complaint, history of present illness, review of systems, past medical history, surgical history, family history, social history, medications, and allergy list.     Review of Systems     Constitutional: Denies fevers, chills, weight loss  Cardiovascular: Denies chest pain, shortness of breath  Skin: Denies rashes, acute skin changes  Neurologic: Denies headache, loss of consciousness        Vital Signs:   /85   Pulse 67   Ht 182.9 cm (72\")   Wt 110 kg (243 lb)   SpO2 97%   BMI 32.96 kg/m²          Physical Exam  General: Alert. No acute distress    Ortho Exam        RIGHT KNEE Flexion 120. Extension 0. Stable to varus/valgus stress. Stable to anterior/posterior drawer. Neurovascularly intact. Negative [place]a. Negative Lachman. Positive EHL, FHL, HS and TA. Sensation intact to light touch all 5 nerves of the foot. Ambulates with Non-antalgic gait. Patella is well tracking. Calf supple, non-tender. " Positive tenderness to the medial joint line. Negative tenderness to the lateral joint line. Negative Crepitus. Good strength to hamstrings, quadriceps, dorsiflexors, and plantar flexors.  Knee Extensor Mechanism intact    LEFT SHOULDER 170 elevation 100 abduction 60 internal SI joint external.  Sensation intact to light touch, median, radial, ulnar nerve. Positive AIN, PIN, ulnar nerve motor. Positive pulses. Good strength in triceps, biceps, deltoid, wrist extensors and wrist flexors.     Procedures        Imaging Results (Most Recent)       Procedure Component Value Units Date/Time    XR Knee 3 View Right [[id number]] Resulted: 04/29/24 0952     Updated: 04/29/24 0956             Result Review :     X-Ray Report:  Right knee X-Ray  Indication: Evaluation of the right knee  AP/Lateral and [place] view(s)  Findings: Mild arthritis.  No fracture or dislocation.   Prior studies available for comparison: no       XR Foot 3+ View Left    Result Date: 4/26/2024  Narrative: X-Ray Report: Left foot X-Ray Indication: Evaluation of the left foot AP/Lateral view(s) Findings: Interval healing of the 5 th metacarpal fracture. Prior studies available for comparison: yes     MRI Shoulder Left Without Contrast    Result Date: 4/22/2024  Narrative: MRI SHOULDER LEFT WO CONTRAST-  Date of Exam: 4/22/2024 1:36 PM  Indication: shoulder pain and repair of tendons in the past.; M25.512-Pain in left shoulder; G89.29-Other chronic pain  Comparison: Radiographs March 15, 2024, MRI April 12, 2022  Technique: Multiplanar multisequence images of the shoulder were performed according to routine shoulder MRI protocol.  FINDINGS:  Rotator cuff: There are postoperative changes of rotator cuff repair. Suture anchors at the greater tuberosity appear to be in satisfactory positions. There is thinning and irregular intermediate signal in the distal supraspinatus tendon. This also appears to involve the anterior infraspinatus. No significant fluid  signal tendon defect is seen at this time. The teres minor and subscapularis tendons appear intact. There is severe supraspinatus muscle atrophy with more fat signal of the muscle. There appears to be moderate atrophy of the infraspinatus, primarily the superior infraspinatus muscle. There is mild subscapularis and teres minor muscle atrophy.  Glenohumeral joint: There is superior subluxation of the humeral head. There is glenohumeral osteophyte formation with suspected high-grade partial thickness cartilage loss. There is a small amount of glenohumeral joint fluid. There is suggestion of mild axillary capsular thickening and mild posterior-superior periarticular capsular edema signal.  There is degeneration and tear of the labrum, most notably at the posterior-superior labrum where there appear to be tiny paralabral cysts.  Biceps tendon: Status post biceps tenodesis. Suture anchor seen at the anterior-proximal humeral shaft.  Acromioclavicular Joint: There are postoperative changes at the acromioclavicular joint with partial resection of the distal clavicle and acromioplasty.. Alignment is within normal limits. No significant edema or fluid.  Bone: No fracture. No definite suspicious marrow signal abnormality.  Miscellaneous: Mild deltoid muscle atrophy. Small amount of fluid in the subacromial/subdeltoid bursa. No definite axillary adenopathy.      Impression: 1.  Status post rotator cuff repair. Findings in the supraspinatus and anterior infraspinatus tendons which could be related to prior tears and postoperative changes, but new/recurrent partial tear cannot be excluded. No significant full-thickness fluid signal tendon defect is visualized on this exam. 2.  Moderate glenohumeral osteoarthritis with degeneration and tear of the labrum. 3.  Findings of possible glenohumeral capsulitis. Correlate clinically. 4.  Postoperative changes at the acromioclavicular joint and biceps tenodesis.     Electronically Signed  By-Cody Messina MD On:4/22/2024 3:19 PM              Assessment and Plan     Diagnoses and all orders for this visit:    1. Right knee pain, unspecified chronicity (Primary)  -     XR Knee 3 View Right    2. Osteoarthritis of left knee, unspecified osteoarthritis type        Discussed the treatment plan with the patient. I reviewed the X-rays that were obtained today with the patient.     Modify activity as needed.       Call or return if worsening symptoms.    Follow Up     PRN      Patient was given instructions and counseling regarding his condition or for health maintenance advice. Please see specific information pulled into the AVS if appropriate.     Scribed for Abdoulaye Minaya MD by Maria Eugenia Paredes MA.  04/29/24   09:50 EDT    I have personally performed the services described in this document as scribed by the above individual and it is both accurate and complete. Abdoulaye Minaya MD 04/29/24

## 2024-05-13 ENCOUNTER — HOSPITAL ENCOUNTER (INPATIENT)
Facility: HOSPITAL | Age: 54
LOS: 1 days | Discharge: HOME | End: 2024-05-15
Attending: EMERGENCY MEDICINE | Admitting: STUDENT IN AN ORGANIZED HEALTH CARE EDUCATION/TRAINING PROGRAM
Payer: COMMERCIAL

## 2024-05-13 ENCOUNTER — APPOINTMENT (OUTPATIENT)
Dept: CT IMAGING | Facility: HOSPITAL | Age: 54
End: 2024-05-13
Payer: COMMERCIAL

## 2024-05-13 DIAGNOSIS — G45.9 TIA (TRANSIENT ISCHEMIC ATTACK): ICD-10-CM

## 2024-05-13 DIAGNOSIS — Z78.9 DECREASED ACTIVITIES OF DAILY LIVING (ADL): ICD-10-CM

## 2024-05-13 DIAGNOSIS — I50.21 ACUTE SYSTOLIC CONGESTIVE HEART FAILURE: Primary | ICD-10-CM

## 2024-05-13 DIAGNOSIS — R13.10 DYSPHAGIA, UNSPECIFIED TYPE: ICD-10-CM

## 2024-05-13 DIAGNOSIS — R26.2 DIFFICULTY IN WALKING: ICD-10-CM

## 2024-05-13 DIAGNOSIS — I10 HYPERTENSION, UNSPECIFIED TYPE: ICD-10-CM

## 2024-05-13 LAB
ALBUMIN SERPL-MCNC: 3.8 G/DL (ref 3.5–5.2)
ALBUMIN/GLOB SERPL: 1.4 G/DL
ALP SERPL-CCNC: 49 U/L (ref 39–117)
ALT SERPL W P-5'-P-CCNC: 26 U/L (ref 1–41)
ANION GAP SERPL CALCULATED.3IONS-SCNC: 9.3 MMOL/L (ref 5–15)
AST SERPL-CCNC: 17 U/L (ref 1–40)
BASOPHILS # BLD AUTO: 0.06 10*3/MM3 (ref 0–0.2)
BASOPHILS NFR BLD AUTO: 0.6 % (ref 0–1.5)
BILIRUB SERPL-MCNC: 0.2 MG/DL (ref 0–1.2)
BILIRUB UR QL STRIP: NEGATIVE
BUN SERPL-MCNC: 13 MG/DL (ref 6–20)
BUN/CREAT SERPL: 14.9 (ref 7–25)
CALCIUM SPEC-SCNC: 8.7 MG/DL (ref 8.6–10.5)
CHLORIDE SERPL-SCNC: 107 MMOL/L (ref 98–107)
CLARITY UR: CLEAR
CO2 SERPL-SCNC: 21.7 MMOL/L (ref 22–29)
COLOR UR: YELLOW
CREAT SERPL-MCNC: 0.87 MG/DL (ref 0.76–1.27)
DEPRECATED RDW RBC AUTO: 42.7 FL (ref 37–54)
EGFRCR SERPLBLD CKD-EPI 2021: 103.2 ML/MIN/1.73
EOSINOPHIL # BLD AUTO: 0.31 10*3/MM3 (ref 0–0.4)
EOSINOPHIL NFR BLD AUTO: 3.3 % (ref 0.3–6.2)
ERYTHROCYTE [DISTWIDTH] IN BLOOD BY AUTOMATED COUNT: 12.2 % (ref 12.3–15.4)
GLOBULIN UR ELPH-MCNC: 2.8 GM/DL
GLUCOSE SERPL-MCNC: 113 MG/DL (ref 65–99)
GLUCOSE UR STRIP-MCNC: NEGATIVE MG/DL
HCT VFR BLD AUTO: 47.8 % (ref 37.5–51)
HGB BLD-MCNC: 15.8 G/DL (ref 13–17.7)
HGB UR QL STRIP.AUTO: NEGATIVE
IMM GRANULOCYTES # BLD AUTO: 0.03 10*3/MM3 (ref 0–0.05)
IMM GRANULOCYTES NFR BLD AUTO: 0.3 % (ref 0–0.5)
KETONES UR QL STRIP: NEGATIVE
LEUKOCYTE ESTERASE UR QL STRIP.AUTO: NEGATIVE
LYMPHOCYTES # BLD AUTO: 2.41 10*3/MM3 (ref 0.7–3.1)
LYMPHOCYTES NFR BLD AUTO: 26 % (ref 19.6–45.3)
MCH RBC QN AUTO: 31.3 PG (ref 26.6–33)
MCHC RBC AUTO-ENTMCNC: 33.1 G/DL (ref 31.5–35.7)
MCV RBC AUTO: 94.8 FL (ref 79–97)
MONOCYTES # BLD AUTO: 1.11 10*3/MM3 (ref 0.1–0.9)
MONOCYTES NFR BLD AUTO: 12 % (ref 5–12)
NEUTROPHILS NFR BLD AUTO: 5.36 10*3/MM3 (ref 1.7–7)
NEUTROPHILS NFR BLD AUTO: 57.8 % (ref 42.7–76)
NITRITE UR QL STRIP: NEGATIVE
NRBC BLD AUTO-RTO: 0 /100 WBC (ref 0–0.2)
PH UR STRIP.AUTO: 6 [PH] (ref 5–8)
PLATELET # BLD AUTO: 223 10*3/MM3 (ref 140–450)
PMV BLD AUTO: 10.5 FL (ref 6–12)
POTASSIUM SERPL-SCNC: 4 MMOL/L (ref 3.5–5.2)
PROT SERPL-MCNC: 6.6 G/DL (ref 6–8.5)
PROT UR QL STRIP: NEGATIVE
RBC # BLD AUTO: 5.04 10*6/MM3 (ref 4.14–5.8)
SODIUM SERPL-SCNC: 138 MMOL/L (ref 136–145)
SP GR UR STRIP: 1.01 (ref 1–1.03)
TROPONIN T SERPL HS-MCNC: 9 NG/L
UROBILINOGEN UR QL STRIP: NORMAL
WBC NRBC COR # BLD AUTO: 9.28 10*3/MM3 (ref 3.4–10.8)

## 2024-05-13 PROCEDURE — 84484 ASSAY OF TROPONIN QUANT: CPT

## 2024-05-13 PROCEDURE — 80307 DRUG TEST PRSMV CHEM ANLYZR: CPT | Performed by: INTERNAL MEDICINE

## 2024-05-13 PROCEDURE — 81003 URINALYSIS AUTO W/O SCOPE: CPT

## 2024-05-13 PROCEDURE — 36415 COLL VENOUS BLD VENIPUNCTURE: CPT

## 2024-05-13 PROCEDURE — 80053 COMPREHEN METABOLIC PANEL: CPT

## 2024-05-13 PROCEDURE — 93005 ELECTROCARDIOGRAM TRACING: CPT

## 2024-05-13 PROCEDURE — 85025 COMPLETE CBC W/AUTO DIFF WBC: CPT

## 2024-05-13 PROCEDURE — 70450 CT HEAD/BRAIN W/O DYE: CPT

## 2024-05-13 PROCEDURE — 99285 EMERGENCY DEPT VISIT HI MDM: CPT

## 2024-05-13 PROCEDURE — 93010 ELECTROCARDIOGRAM REPORT: CPT | Performed by: INTERNAL MEDICINE

## 2024-05-13 RX ORDER — LOSARTAN POTASSIUM 25 MG/1
25 TABLET ORAL DAILY
COMMUNITY
Start: 2024-05-13

## 2024-05-13 NOTE — ED PROVIDER NOTES
Time: 6:31 PM EDT  Date of encounter:  5/13/2024  Independent Historian/Clinical History and Information was obtained by:   Patient    History is limited by: N/A    Chief Complaint: Hypertension      History of Present Illness:  Patient is a 54 y.o. year old male who presents to the emergency department for evaluation of hypertension.  Was seen at his PCP office today and states his blood pressure was 160/120.  Does not take any medication for hypertension and was prescribed medication today.  Patient states he is taking it twice today with no relief.  Admits to headache and vision changes for the past week.  Wife states that for the past couple days patient has had trouble doing task and getting words out.    HPI    Patient Care Team  Primary Care Provider: Provider, No Known    Past Medical History:     Allergies   Allergen Reactions    Bee Venom Swelling    Meloxicam Confusion and Other (See Comments)     Past Medical History:   Diagnosis Date    Back pain     Chronic pain syndrome     CVA (cerebral vascular accident) 5/14/2024    Forgetfulness     Leg pain     Lumbago 2015    Lumbar spondylosis     Pain, generalized     Pars defect of lumbar spine 2015    Sleep disorder     Spondylolisthesis, lumbar region 2015    L4/5     Past Surgical History:   Procedure Laterality Date    BACK SURGERY      CHOLECYSTECTOMY      COLONOSCOPY N/A 11/16/2021    Procedure: COLONOSCOPY WITH POLYPECTOMIES, CLIP APPLICATION X1, CAUTERY;  Surgeon: Nolberto Onofre MD;  Location: [facility] ENDOSCOPY;  Service: General;  Laterality: N/A;  COLON POLYPS    GANGLION CYST EXCISION      LUMBAR EPIDURAL INJECTION      LUMBAR FUSION  2015    SHOULDER ARTHROSCOPY W/ ROTATOR CUFF REPAIR Left 05/12/2022    Procedure: LEFT SHOULDER ARTHROSCOPY WITH ROTATOR CUFF REPAIR, SUBACROMINAL DECOMPRESSION, DISTAL CLAVICULECTOMY, BICEPS TENODESIS;  Surgeon: Abdoulaye Minaya MD;  [place] [facility] [place] [facility] [place]  Service: [place]opedics;  Laterality: Left;    SPINAL FUSION  "      Family History   Problem Relation Age of Onset    Heart disease Mother     No Known Problems Father     Heart failure Sister     Gustavo Hyperthermia Neg Hx        Home Medications:  Prior to Admission medications    Medication Sig Start Date End Date Taking? Authorizing Provider   diclofenac (VOLTAREN) 75 MG EC tablet Take 1 tablet by mouth 2 (Two) Times a Day.    Provider, MD Caleb   furosemide (Lasix) 20 MG tablet Take 1 tablet by mouth Daily. 3/12/24   Reza Espinoza MD        Social History:   Social History     Tobacco Use    Smoking status: Former     Average packs/day: 1 pack/day for 39.6 years (39.6 ttl pk-yrs)     Types: Cigarettes     Start date: 4/13/1984    Smokeless tobacco: Never   Vaping Use    Vaping status: Never Used   Substance Use Topics    Alcohol use: Never    Drug use: Never         Review of Systems:  Review of Systems   Constitutional:  Negative for chills and fever.   HENT:  Negative for congestion, ear pain and sore throat.    Eyes:  Positive for visual disturbance. Negative for pain.   Respiratory:  Negative for cough, chest tightness and shortness of breath.    Cardiovascular:  Negative for chest pain.   Gastrointestinal:  Negative for abdominal pain, diarrhea, nausea and vomiting.   Genitourinary:  Negative for flank pain and hematuria.   Musculoskeletal:  Negative for joint swelling.   Skin:  Negative for pallor.   Neurological:  Positive for headaches. Negative for seizures.   All other systems reviewed and are negative.       Physical Exam:  /81 (BP Location: Right arm, Patient Position: Lying)   Pulse 67   Temp 97.9 °F (36.6 °C) (Oral)   Resp 18   Ht 188 cm (74\")   Wt 112 kg (246 lb 4.1 oz)   SpO2 93%   BMI 31.62 kg/m²     Physical Exam  Constitutional:       Appearance: Normal appearance.   HENT:      Head: Normocephalic and atraumatic.      Nose: Nose normal.      Mouth/Throat:      Mouth: Mucous membranes are moist.   Eyes:      Extraocular Movements: " Extraocular movements intact.      Conjunctiva/sclera: Conjunctivae normal.      Pupils: Pupils are equal, round, and reactive to light.   Cardiovascular:      Rate and Rhythm: Normal rate and regular rhythm.      Pulses: Normal pulses.      Heart sounds: Normal heart sounds.   Pulmonary:      Effort: Pulmonary effort is normal.      Breath sounds: Normal breath sounds.   Abdominal:      General: There is no distension.      Palpations: Abdomen is soft.      Tenderness: There is no abdominal tenderness.   Musculoskeletal:         General: Normal range of motion.      Cervical back: Normal range of motion.   Skin:     General: Skin is warm and dry.      Capillary Refill: Capillary refill takes less than 2 seconds.      Coloration: Skin is not cyanotic.   Neurological:      General: No focal deficit present.      Mental Status: He is alert and oriented to person, place, and time. Mental status is at baseline.   Psychiatric:         Attention and Perception: Attention and perception normal.         Mood and Affect: Mood normal.         Behavior: Behavior normal.                  Procedures:  Procedures      Medical Decision Making:      Comorbidities that affect care:    Chronic pain syndrome, Hypertension    External Notes reviewed:    Previous Clinic Note: Patient seen orthopedic surgery on 4/29/2024 for right knee pain and osteoarthritis of left knee.      The following orders were placed and all results were independently analyzed by me:  Orders Placed This Encounter   Procedures    CT Head Without Contrast    MRI Brain Without Contrast    MRI Angiogram Head Without Contrast    MRI Angiogram Neck Without Contrast    Comprehensive Metabolic Panel    Urinalysis With Microscopic If Indicated (No Culture) - Urine, Clean Catch    Single High Sensitivity Troponin T    CBC Auto Differential    CBC (No Diff)    Comprehensive Metabolic Panel    Activity - Strict Bed Rest with HOB Flat    Code Status and Medical  Interventions:    IP General Consult (Use specialty-specific consult if known)    Inpatient Hospitalist Consult    ECG 12 Lead Other; htn    Adult Transthoracic Echo Complete W/ Cont if Necessary Per Protocol (With Agitated Saline)    Inpatient Admission    CBC & Differential       Medications Given in the Emergency Department:  Medications   sodium chloride 0.9 % infusion (100 mL/hr Intravenous New Bag 5/14/24 0242)   aspirin chewable tablet 81 mg (has no administration in time range)     Or   aspirin suppository 300 mg (has no administration in time range)   acetaminophen (TYLENOL) tablet 650 mg (has no administration in time range)     Or   acetaminophen (TYLENOL) suppository 650 mg (has no administration in time range)   HYDROcodone-acetaminophen (NORCO) 5-325 MG per tablet 1 tablet (1 tablet Oral Given 5/14/24 0233)        ED Course:    ED Course as of 05/14/24 0251   Mon May 13, 2024   1831 --- PROVIDER IN TRIAGE NOTE ---    The patient was evaluated by Brenden eastman in triage. Orders were placed and the patient is currently awaiting disposition.    [AJ]   2125 ECG 12 Lead Other; htn  Sinus rhythm rate of 77.  Normal NM interval.  Left bundle branch block.  No acute ST elevation.  EKG interpreted by me. [LD]      ED Course User Index  [AJ] Brenden Curtis PA-C  [LD] Jeffery Mixon MD       Labs:    Lab Results (last 24 hours)       Procedure Component Value Units Date/Time    CBC & Differential [[id number]]  (Abnormal) Collected: 05/13/24 1902    Specimen: Blood Updated: 05/13/24 1911    Narrative:      The following orders were created for panel order CBC & Differential.  Procedure                               Abnormality         Status                     ---------                               -----------         ------                     CBC Auto Differential[[id number]]        Abnormal            Final result                 Please view results for these tests on the individual orders.     Comprehensive Metabolic Panel [[id number]]  (Abnormal) Collected: 05/13/24 1902    Specimen: Blood Updated: 05/13/24 1934     Glucose 113 mg/dL      BUN 13 mg/dL      Creatinine 0.87 mg/dL      Sodium 138 mmol/L      Potassium 4.0 mmol/L      Comment: Slight hemolysis detected by analyzer. Result may be falsely elevated.        Chloride 107 mmol/L      CO2 21.7 mmol/L      Calcium 8.7 mg/dL      Total Protein 6.6 g/dL      Albumin 3.8 g/dL      ALT (SGPT) 26 U/L      AST (SGOT) 17 U/L      Alkaline Phosphatase 49 U/L      Total Bilirubin 0.2 mg/dL      Globulin 2.8 gm/dL      A/G Ratio 1.4 g/dL      BUN/Creatinine Ratio 14.9     Anion Gap 9.3 mmol/L      eGFR 103.2 mL/min/1.73     Narrative:      GFR Normal >60  Chronic Kidney Disease <60  Kidney Failure <15      Urinalysis With Microscopic If Indicated (No Culture) - Urine, Clean Catch [[id number]]  (Normal) Collected: 05/13/24 1902    Specimen: Urine, Clean Catch Updated: 05/13/24 1911     Color, UA Yellow     Appearance, UA Clear     pH, UA 6.0     Specific Gravity, UA 1.011     Glucose, UA Negative     Ketones, UA Negative     Bilirubin, UA Negative     Blood, UA Negative     Protein, UA Negative     Leuk Esterase, UA Negative     Nitrite, UA Negative     Urobilinogen, UA 0.2 E.U./dL    Narrative:      Urine microscopic not indicated.    Single High Sensitivity Troponin T [[id number]]  (Normal) Collected: 05/13/24 1902    Specimen: Blood Updated: 05/13/24 1934     HS Troponin T 9 ng/L     Narrative:      High Sensitive Troponin T Reference Range:  <14.0 ng/L- Negative Female for AMI  <22.0 ng/L- Negative Male for AMI  >=14 - Abnormal Female indicating possible myocardial injury.  >=22 - Abnormal Male indicating possible myocardial injury.   Clinicians would have to utilize clinical acumen, EKG, Troponin, and serial changes to determine if it is an Acute Myocardial Infarction or myocardial injury due to an underlying chronic condition.         CBC Auto  Differential [[id number]]  (Abnormal) Collected: 05/13/24 1902    Specimen: Blood Updated: 05/13/24 1911     WBC 9.28 10*3/mm3      RBC 5.04 10*6/mm3      Hemoglobin 15.8 g/dL      Hematocrit 47.8 %      MCV 94.8 fL      MCH 31.3 pg      MCHC 33.1 g/dL      RDW 12.2 %      RDW-SD 42.7 fl      MPV 10.5 fL      Platelets 223 10*3/mm3      Neutrophil % 57.8 %      Lymphocyte % 26.0 %      Monocyte % 12.0 %      Eosinophil % 3.3 %      Basophil % 0.6 %      Immature Grans % 0.3 %      Neutrophils, Absolute 5.36 10*3/mm3      Lymphocytes, Absolute 2.41 10*3/mm3      Monocytes, Absolute 1.11 10*3/mm3      Eosinophils, Absolute 0.31 10*3/mm3      Basophils, Absolute 0.06 10*3/mm3      Immature Grans, Absolute 0.03 10*3/mm3      nRBC 0.0 /100 WBC              Imaging:    CT Head Without Contrast    Result Date: 5/13/2024  CT HEAD WO CONTRAST-  Date of Exam: 5/13/2024 7:06 PM  Indication: Stroke rule out.  Comparison: MRI brain from October 2, 2023  Technique: CT scan of the head without IV contrast.  Automated exposure control and iterative reconstruction methods were used.  FINDINGS No acute intracranial hemorrhage or extra-axial collection is identified. The ventricles appear normal in caliber, with no evidence of mass effect or midline shift. The basal cisterns appear patent. The gray-white differentiation appears preserved.  The calvarium appears intact. The visualized paranasal sinuses are clear. The mastoid air cells are well-aerated.      No acute intracranial process identified.  Electronically Signed By-Olayinka Vaughan MD On:5/13/2024 7:46 PM         Differential Diagnosis and Discussion:    Metabolic: Differential diagnosis includes but is not limited to hypertension, hyperglycemia, hyperkalemia, hypocalcemia, metabolic acidosis, hypokalemia, hypoglycemia, malnutrition, hypothyroidism, hyperthyroidism, and adrenal insufficiency.   Stroke: Differential diagnosis in this patient with signs of possible ischemic  stroke include TIA or ischemic stroke, hemorrhagic stroke, hypoglycemic episode, toxic or metabolic encephalopathy, paresthesias.    All labs were reviewed and interpreted by me.  All X-rays impressions were independently interpreted by me.  EKG was interpreted by me.  CT scan radiology impression was interpreted by me.    MDM  Number of Diagnoses or Management Options  Diagnosis management comments: Patient is afebrile nontoxic-appearing.  Vital signs are stable.  At time of evaluation he is lying in bed in no acute distress.  Blood pressure improved with out intervention.  Labs show no significant abnormality.  CT showed no acute findings.  Patient was discussed with teleneurology who recommended admission for stroke workup.  Discussed patient with hospitalist and will admit for further care.       Amount and/or Complexity of Data Reviewed  Clinical lab tests: reviewed  Tests in the radiology section of CPT®: reviewed  Review and summarize past medical records: yes  Independent visualization of images, tracings, or specimens: yes    Risk of Complications, Morbidity, and/or Mortality  Presenting problems: moderate  Management options: moderate                 Patient Care Considerations:    SEPSIS was considered but is NOT present in the emergency department as SIRS criteria is not present.      Consultants/Shared Management Plan:    Hospitalist: I have discussed the case with Dr Martin who agrees to accept the patient for admission.  Consultant: I have discussed the case with Teleneurology who agrees to consult on the patient.    Social Determinants of Health:    Patient is independent, reliable, and has access to care.       Disposition and Care Coordination:    Admit:   Through independent evaluation of the patient's history, physical, and imperical data, the patient meets criteria for inpatient admission to the hospital.        Final diagnoses:   TIA (transient ischemic attack)   Hypertension, unspecified  type        ED Disposition       ED Disposition   Decision to Admit    Condition   --    Comment   Level of Care: Progressive Care [20]   Diagnosis: CVA (cerebral vascular accident) [628210]   Certification: I Certify That Inpatient Hospital Services Are Medically Necessary For Greater Than 2 Midnights                 This medical record created using voice recognition software.             Jeffery Mixon MD  05/14/24 0259

## 2024-05-14 ENCOUNTER — APPOINTMENT (OUTPATIENT)
Dept: MRI IMAGING | Facility: HOSPITAL | Age: 54
End: 2024-05-14
Payer: COMMERCIAL

## 2024-05-14 ENCOUNTER — APPOINTMENT (OUTPATIENT)
Dept: CARDIOLOGY | Facility: HOSPITAL | Age: 54
End: 2024-05-14
Payer: COMMERCIAL

## 2024-05-14 PROBLEM — I67.4 HYPERTENSIVE ENCEPHALOPATHY: Status: ACTIVE | Noted: 2024-05-14

## 2024-05-14 PROBLEM — I63.9 CVA (CEREBRAL VASCULAR ACCIDENT): Status: ACTIVE | Noted: 2024-05-14

## 2024-05-14 PROBLEM — R03.0 ELEVATED BLOOD PRESSURE READING: Status: ACTIVE | Noted: 2024-05-14

## 2024-05-14 LAB
AMPHET+METHAMPHET UR QL: NEGATIVE
BARBITURATES UR QL SCN: NEGATIVE
BENZODIAZ UR QL SCN: NEGATIVE
BH CV ECHO MEAS - ACS: 2.17 CM
BH CV ECHO MEAS - AO MAX PG: 5 MMHG
BH CV ECHO MEAS - AO MEAN PG: 2.7 MMHG
BH CV ECHO MEAS - AO ROOT DIAM: 2.8 CM
BH CV ECHO MEAS - AO V2 MAX: 111.3 CM/SEC
BH CV ECHO MEAS - AO V2 VTI: 23.2 CM
BH CV ECHO MEAS - AVA(I,D): 2.23 CM2
BH CV ECHO MEAS - EDV(CUBED): 156.1 ML
BH CV ECHO MEAS - EDV(MOD-SP2): 212 ML
BH CV ECHO MEAS - EDV(MOD-SP4): 208 ML
BH CV ECHO MEAS - EF(MOD-BP): 19.1 %
BH CV ECHO MEAS - EF(MOD-SP2): 22.2 %
BH CV ECHO MEAS - EF(MOD-SP4): 16.3 %
BH CV ECHO MEAS - ESV(CUBED): 109.1 ML
BH CV ECHO MEAS - ESV(MOD-SP2): 165 ML
BH CV ECHO MEAS - ESV(MOD-SP4): 174 ML
BH CV ECHO MEAS - FS: 11.3 %
BH CV ECHO MEAS - IVS/LVPW: 0.76 CM
BH CV ECHO MEAS - IVSD: 0.87 CM
BH CV ECHO MEAS - LA DIMENSION: 4.1 CM
BH CV ECHO MEAS - LAT PEAK E' VEL: 6 CM/SEC
BH CV ECHO MEAS - LV DIASTOLIC VOL/BSA (35-75): 88.3 CM2
BH CV ECHO MEAS - LV MASS(C)D: 208 GRAMS
BH CV ECHO MEAS - LV MAX PG: 3 MMHG
BH CV ECHO MEAS - LV MEAN PG: 1.71 MMHG
BH CV ECHO MEAS - LV SYSTOLIC VOL/BSA (12-30): 73.8 CM2
BH CV ECHO MEAS - LV V1 MAX: 87 CM/SEC
BH CV ECHO MEAS - LV V1 VTI: 16.3 CM
BH CV ECHO MEAS - LVIDD: 5.4 CM
BH CV ECHO MEAS - LVIDS: 4.8 CM
BH CV ECHO MEAS - LVOT AREA: 3.2 CM2
BH CV ECHO MEAS - LVOT DIAM: 2.01 CM
BH CV ECHO MEAS - LVPWD: 1.15 CM
BH CV ECHO MEAS - MED PEAK E' VEL: 5.4 CM/SEC
BH CV ECHO MEAS - MV A MAX VEL: 59.6 CM/SEC
BH CV ECHO MEAS - MV DEC SLOPE: 229 CM/SEC2
BH CV ECHO MEAS - MV DEC TIME: 0.28 SEC
BH CV ECHO MEAS - MV E MAX VEL: 64.7 CM/SEC
BH CV ECHO MEAS - MV E/A: 1.09
BH CV ECHO MEAS - PA V2 MAX: 111.3 CM/SEC
BH CV ECHO MEAS - RVDD: 3.5 CM
BH CV ECHO MEAS - SV(LVOT): 51.8 ML
BH CV ECHO MEAS - SV(MOD-SP2): 47 ML
BH CV ECHO MEAS - SV(MOD-SP4): 34 ML
BH CV ECHO MEAS - SVI(LVOT): 22 ML/M2
BH CV ECHO MEAS - SVI(MOD-SP2): 19.9 ML/M2
BH CV ECHO MEAS - SVI(MOD-SP4): 14.4 ML/M2
BH CV ECHO MEAS - TAPSE (>1.6): 2.09 CM
BH CV ECHO MEAS - TR MAX PG: 16.4 MMHG
BH CV ECHO MEAS - TR MAX VEL: 202.2 CM/SEC
BH CV ECHO MEASUREMENTS AVERAGE E/E' RATIO: 11.35
CANNABINOIDS SERPL QL: NEGATIVE
CHOLEST SERPL-MCNC: 94 MG/DL (ref 0–200)
COCAINE UR QL: NEGATIVE
FENTANYL UR-MCNC: NEGATIVE NG/ML
GLUCOSE BLDC GLUCOMTR-MCNC: 115 MG/DL (ref 70–99)
HBA1C MFR BLD: 5 % (ref 4.8–5.6)
HDLC SERPL-MCNC: 30 MG/DL (ref 40–60)
IVRT: 119 MS
LDLC SERPL CALC-MCNC: 44 MG/DL (ref 0–100)
LDLC/HDLC SERPL: 1.44 {RATIO}
LEFT ATRIUM VOLUME INDEX: 27.2 ML/M2
LV EF 2D ECHO EST: 35 %
METHADONE UR QL SCN: NEGATIVE
OPIATES UR QL: NEGATIVE
OXYCODONE UR QL SCN: NEGATIVE
QT INTERVAL: 419 MS
QTC INTERVAL: 472 MS
TRIGL SERPL-MCNC: 104 MG/DL (ref 0–150)
VLDLC SERPL-MCNC: 20 MG/DL (ref 5–40)

## 2024-05-14 PROCEDURE — 70551 MRI BRAIN STEM W/O DYE: CPT

## 2024-05-14 PROCEDURE — 92610 EVALUATE SWALLOWING FUNCTION: CPT

## 2024-05-14 PROCEDURE — 99231 SBSQ HOSP IP/OBS SF/LOW 25: CPT | Performed by: PSYCHIATRY & NEUROLOGY

## 2024-05-14 PROCEDURE — 97165 OT EVAL LOW COMPLEX 30 MIN: CPT

## 2024-05-14 PROCEDURE — 94799 UNLISTED PULMONARY SVC/PX: CPT

## 2024-05-14 PROCEDURE — 25010000002 SULFUR HEXAFLUORIDE MICROSPH 60.7-25 MG RECONSTITUTED SUSPENSION: Performed by: INTERNAL MEDICINE

## 2024-05-14 PROCEDURE — 80061 LIPID PANEL: CPT | Performed by: STUDENT IN AN ORGANIZED HEALTH CARE EDUCATION/TRAINING PROGRAM

## 2024-05-14 PROCEDURE — 97161 PT EVAL LOW COMPLEX 20 MIN: CPT

## 2024-05-14 PROCEDURE — 93306 TTE W/DOPPLER COMPLETE: CPT | Performed by: INTERNAL MEDICINE

## 2024-05-14 PROCEDURE — 93306 TTE W/DOPPLER COMPLETE: CPT

## 2024-05-14 PROCEDURE — 94761 N-INVAS EAR/PLS OXIMETRY MLT: CPT

## 2024-05-14 PROCEDURE — 70547 MR ANGIOGRAPHY NECK W/O DYE: CPT

## 2024-05-14 PROCEDURE — 99222 1ST HOSP IP/OBS MODERATE 55: CPT | Performed by: PSYCHIATRY & NEUROLOGY

## 2024-05-14 PROCEDURE — 25810000003 SODIUM CHLORIDE 0.9 % SOLUTION: Performed by: STUDENT IN AN ORGANIZED HEALTH CARE EDUCATION/TRAINING PROGRAM

## 2024-05-14 PROCEDURE — 83036 HEMOGLOBIN GLYCOSYLATED A1C: CPT | Performed by: STUDENT IN AN ORGANIZED HEALTH CARE EDUCATION/TRAINING PROGRAM

## 2024-05-14 PROCEDURE — 99222 1ST HOSP IP/OBS MODERATE 55: CPT | Performed by: STUDENT IN AN ORGANIZED HEALTH CARE EDUCATION/TRAINING PROGRAM

## 2024-05-14 PROCEDURE — 25010000002 KETOROLAC TROMETHAMINE PER 15 MG: Performed by: FAMILY MEDICINE

## 2024-05-14 PROCEDURE — 70544 MR ANGIOGRAPHY HEAD W/O DYE: CPT

## 2024-05-14 PROCEDURE — 82948 REAGENT STRIP/BLOOD GLUCOSE: CPT | Performed by: STUDENT IN AN ORGANIZED HEALTH CARE EDUCATION/TRAINING PROGRAM

## 2024-05-14 PROCEDURE — 25010000002 KETOROLAC TROMETHAMINE PER 15 MG: Performed by: INTERNAL MEDICINE

## 2024-05-14 RX ORDER — HYDRALAZINE HYDROCHLORIDE 20 MG/ML
10 INJECTION INTRAMUSCULAR; INTRAVENOUS EVERY 4 HOURS PRN
Status: DISCONTINUED | OUTPATIENT
Start: 2024-05-14 | End: 2024-05-15 | Stop reason: HOSPADM

## 2024-05-14 RX ORDER — FAMOTIDINE 20 MG/1
20 TABLET, FILM COATED ORAL NIGHTLY
Status: DISCONTINUED | OUTPATIENT
Start: 2024-05-14 | End: 2024-05-15 | Stop reason: HOSPADM

## 2024-05-14 RX ORDER — HYDROCODONE BITARTRATE AND ACETAMINOPHEN 5; 325 MG/1; MG/1
1 TABLET ORAL EVERY 6 HOURS PRN
Status: DISCONTINUED | OUTPATIENT
Start: 2024-05-14 | End: 2024-05-15 | Stop reason: HOSPADM

## 2024-05-14 RX ORDER — SODIUM CHLORIDE 9 MG/ML
100 INJECTION, SOLUTION INTRAVENOUS CONTINUOUS
Status: DISCONTINUED | OUTPATIENT
Start: 2024-05-14 | End: 2024-05-14

## 2024-05-14 RX ORDER — ASPIRIN 81 MG/1
81 TABLET, CHEWABLE ORAL DAILY
Status: DISCONTINUED | OUTPATIENT
Start: 2024-05-14 | End: 2024-05-15 | Stop reason: HOSPADM

## 2024-05-14 RX ORDER — TEMAZEPAM 15 MG/1
15 CAPSULE ORAL NIGHTLY PRN
Status: DISCONTINUED | OUTPATIENT
Start: 2024-05-14 | End: 2024-05-15 | Stop reason: HOSPADM

## 2024-05-14 RX ORDER — SODIUM CHLORIDE 9 MG/ML
40 INJECTION, SOLUTION INTRAVENOUS AS NEEDED
Status: DISCONTINUED | OUTPATIENT
Start: 2024-05-14 | End: 2024-05-15 | Stop reason: HOSPADM

## 2024-05-14 RX ORDER — LOSARTAN POTASSIUM 50 MG/1
50 TABLET ORAL
Status: DISCONTINUED | OUTPATIENT
Start: 2024-05-15 | End: 2024-05-15 | Stop reason: HOSPADM

## 2024-05-14 RX ORDER — ATORVASTATIN CALCIUM 40 MG/1
80 TABLET, FILM COATED ORAL NIGHTLY
Status: DISCONTINUED | OUTPATIENT
Start: 2024-05-14 | End: 2024-05-15 | Stop reason: HOSPADM

## 2024-05-14 RX ORDER — LOSARTAN POTASSIUM 50 MG/1
50 TABLET ORAL
Status: DISCONTINUED | OUTPATIENT
Start: 2024-05-14 | End: 2024-05-14

## 2024-05-14 RX ORDER — ACETAMINOPHEN 650 MG/1
650 SUPPOSITORY RECTAL EVERY 4 HOURS PRN
Status: DISCONTINUED | OUTPATIENT
Start: 2024-05-14 | End: 2024-05-15 | Stop reason: HOSPADM

## 2024-05-14 RX ORDER — BUTALBITAL, ACETAMINOPHEN AND CAFFEINE 300; 40; 50 MG/1; MG/1; MG/1
1 CAPSULE ORAL EVERY 6 HOURS PRN
Status: DISCONTINUED | OUTPATIENT
Start: 2024-05-14 | End: 2024-05-15 | Stop reason: HOSPADM

## 2024-05-14 RX ORDER — CYCLOBENZAPRINE HCL 10 MG
10 TABLET ORAL ONCE
Status: COMPLETED | OUTPATIENT
Start: 2024-05-14 | End: 2024-05-14

## 2024-05-14 RX ORDER — ASPIRIN 300 MG/1
300 SUPPOSITORY RECTAL DAILY
Status: DISCONTINUED | OUTPATIENT
Start: 2024-05-14 | End: 2024-05-15 | Stop reason: HOSPADM

## 2024-05-14 RX ORDER — SODIUM CHLORIDE 0.9 % (FLUSH) 0.9 %
10 SYRINGE (ML) INJECTION EVERY 12 HOURS SCHEDULED
Status: DISCONTINUED | OUTPATIENT
Start: 2024-05-14 | End: 2024-05-15 | Stop reason: HOSPADM

## 2024-05-14 RX ORDER — ONDANSETRON 2 MG/ML
4 INJECTION INTRAMUSCULAR; INTRAVENOUS EVERY 6 HOURS PRN
Status: DISCONTINUED | OUTPATIENT
Start: 2024-05-14 | End: 2024-05-15 | Stop reason: HOSPADM

## 2024-05-14 RX ORDER — KETOROLAC TROMETHAMINE 30 MG/ML
15 INJECTION, SOLUTION INTRAMUSCULAR; INTRAVENOUS ONCE
Status: COMPLETED | OUTPATIENT
Start: 2024-05-14 | End: 2024-05-14

## 2024-05-14 RX ORDER — ENOXAPARIN SODIUM 100 MG/ML
40 INJECTION SUBCUTANEOUS EVERY 24 HOURS
Status: DISCONTINUED | OUTPATIENT
Start: 2024-05-14 | End: 2024-05-15 | Stop reason: HOSPADM

## 2024-05-14 RX ORDER — SODIUM CHLORIDE 0.9 % (FLUSH) 0.9 %
10 SYRINGE (ML) INJECTION AS NEEDED
Status: DISCONTINUED | OUTPATIENT
Start: 2024-05-14 | End: 2024-05-15 | Stop reason: HOSPADM

## 2024-05-14 RX ORDER — KETOROLAC TROMETHAMINE 30 MG/ML
30 INJECTION, SOLUTION INTRAMUSCULAR; INTRAVENOUS EVERY 6 HOURS PRN
Status: DISCONTINUED | OUTPATIENT
Start: 2024-05-14 | End: 2024-05-15 | Stop reason: HOSPADM

## 2024-05-14 RX ORDER — ACETAMINOPHEN 325 MG/1
650 TABLET ORAL EVERY 4 HOURS PRN
Status: DISCONTINUED | OUTPATIENT
Start: 2024-05-14 | End: 2024-05-15 | Stop reason: HOSPADM

## 2024-05-14 RX ADMIN — ASPIRIN 81 MG: 81 TABLET, CHEWABLE ORAL at 08:50

## 2024-05-14 RX ADMIN — HYDROCODONE BITARTRATE AND ACETAMINOPHEN 1 TABLET: 5; 325 TABLET ORAL at 02:33

## 2024-05-14 RX ADMIN — ATORVASTATIN CALCIUM 80 MG: 40 TABLET, FILM COATED ORAL at 20:38

## 2024-05-14 RX ADMIN — FAMOTIDINE 20 MG: 20 TABLET ORAL at 20:38

## 2024-05-14 RX ADMIN — BUTALBITAL, ACETAMINOPHEN AND CAFFEINE 1 CAPSULE: 300; 40; 50 CAPSULE ORAL at 11:11

## 2024-05-14 RX ADMIN — KETOROLAC TROMETHAMINE 30 MG: 30 INJECTION, SOLUTION INTRAMUSCULAR; INTRAVENOUS at 13:33

## 2024-05-14 RX ADMIN — Medication 10 ML: at 08:50

## 2024-05-14 RX ADMIN — CYCLOBENZAPRINE 10 MG: 10 TABLET, FILM COATED ORAL at 22:14

## 2024-05-14 RX ADMIN — KETOROLAC TROMETHAMINE 15 MG: 30 INJECTION, SOLUTION INTRAMUSCULAR; INTRAVENOUS at 20:43

## 2024-05-14 RX ADMIN — SULFUR HEXAFLUORIDE 2 ML: KIT at 07:00

## 2024-05-14 RX ADMIN — TEMAZEPAM 15 MG: 15 CAPSULE ORAL at 20:38

## 2024-05-14 RX ADMIN — Medication 10 ML: at 20:42

## 2024-05-14 RX ADMIN — ACETAMINOPHEN 650 MG: 325 TABLET ORAL at 06:32

## 2024-05-14 RX ADMIN — HYDROCODONE BITARTRATE AND ACETAMINOPHEN 1 TABLET: 5; 325 TABLET ORAL at 08:50

## 2024-05-14 RX ADMIN — SODIUM CHLORIDE 100 ML/HR: 9 INJECTION, SOLUTION INTRAVENOUS at 02:42

## 2024-05-14 NOTE — H&P
Patient Care Team:  Provider, No Known as PCP - General Lopez, April, APRN as Nurse Practitioner (Nurse Practitioner)    Chief complaint hypertension, visual changes    Subjective     Patient is a 54 y.o. male presents with complaints of hypertension at home.  Patient states that he had blood pressure in his PCPs office of 160/120.  He does not take any medication for hypertension however was prescribed some today.  He took it twice without improvement of his blood pressure.  He has had headaches as well as visual disturbance.  Patient states that over the past week he has had some blurry vision and tunnel vision.  Yesterday however he had 16 episodes the day prior.  Patient and wife also recount that the patient took an abnormally long time and multiple attempts to perform an easy task that he would not have a problem with.  This involved measuring out liquid into a measuring cup.      Review of Systems   Pertinent items are noted in HPI    History  Past Medical History:   Diagnosis Date    Back pain     Chronic pain syndrome     CVA (cerebral vascular accident) 5/14/2024    Forgetfulness     Leg pain     Lumbago 2015    Lumbar spondylosis     Pain, generalized     Pars defect of lumbar spine 2015    Sleep disorder     Spondylolisthesis, lumbar region 2015    L4/5     Past Surgical History:   Procedure Laterality Date    BACK SURGERY      CHOLECYSTECTOMY      COLONOSCOPY N/A 11/16/2021    Procedure: COLONOSCOPY WITH POLYPECTOMIES, CLIP APPLICATION X1, CAUTERY;  Surgeon: Nolberto Onofre MD;  Location: [facility] ENDOSCOPY;  Service: General;  Laterality: N/A;  COLON POLYPS    GANGLION CYST EXCISION      LUMBAR EPIDURAL INJECTION      LUMBAR FUSION  2015    SHOULDER ARTHROSCOPY W/ ROTATOR CUFF REPAIR Left 05/12/2022    Procedure: LEFT SHOULDER ARTHROSCOPY WITH ROTATOR CUFF REPAIR, SUBACROMINAL DECOMPRESSION, DISTAL CLAVICULECTOMY, BICEPS TENODESIS;  Surgeon: Abdoulaye Minaya MD;  Location: [facility] OR [facility] [place];  Service:  Orthopedics;  Laterality: Left;    SPINAL FUSION       Family History   Problem Relation Age of Onset    Heart disease Mother     No Known Problems Father     Heart failure Sister     Malig Hyperthermia Neg Hx      Social History     Tobacco Use    Smoking status: Former     Average packs/day: 1 pack/day for 39.6 years (39.6 ttl pk-yrs)     Types: Cigarettes     Start date: 4/13/1984    Smokeless tobacco: Never   Vaping Use    Vaping status: Never Used   Substance Use Topics    Alcohol use: Never    Drug use: Never     Medications Prior to Admission   Medication Sig Dispense Refill Last Dose    losartan (COZAAR) 25 MG tablet    5/13/2024 at 1400     Allergies:  Bee venom and Meloxicam    Objective     Vital Signs  Temp:  [97.5 °F (36.4 °C)-97.9 °F (36.6 °C)] 97.9 °F (36.6 °C)  Heart Rate:  [67-80] 67  Resp:  [16-18] 18  BP: (128-191)/() 145/81    Physical Exam:      General Appearance:  Alert, cooperative, in no acute distress   Head:  Normocephalic, without obvious abnormality, atraumatic   Eyes:  Lids and lashes normal, conjunctivae and sclerae normal, no icterus, no pallor, corneas clear, PERRLA   Ears:  Ears appear intact with no abnormalities noted   Throat:  No oral lesions, no thrush, oral mucosa moist   Neck:  No adenopathy, supple, trachea midline, no thyromegaly, no carotid bruit, no JVD   Back:  No kyphosis present, no scoliosis present, no skin lesions, erythema or scars, no tenderness to percussion or palpation, range of motion normal   Lungs:  Clear to auscultation, respirations regular, even and unlabored    Heart:  Regular rhythm and normal rate, normal S1 and S2, no murmur, no gallop, no rub, no click   Chest Wall:  No abnormalities observed   Abdomen:  Normal bowel sounds, no masses, no organomegaly, soft non-tender, non-distended, no guarding, no rebound tenderness   Rectal:  Deferred   Extremities:  Moves all extremities well, no edema, no cyanosis, no redness   Pulses:  Pulses palpable and  equal bilaterally   Skin:  No bleeding, bruising or rash   Lymph nodes:  No palpable adenopathy   Neurologic:  Cranial nerves 2 - 12 grossly intact, sensation intact, DTR present and equal bilaterally           Results Review:    I reviewed the patient's new clinical results.  I reviewed the patient's new imaging results and agree with the interpretation.  I reviewed the patient's other test results and agree with the interpretation  I personally viewed and interpreted the patient's EKG/Telemetry data    Assessment & Plan       CVA (cerebral vascular accident)    Elevated blood pressure reading    Hypertensive encephalopathy      Admit to hospitalist service  Start patient on stroke pathway  Neurochecks  MRI head, MRA head and neck  Aspirin  Speech therapy, PT OT  Supportive care        I discussed the patients findings and my recommendations with patient and family.     Fernando Martin MD  05/14/24  04:18 EDT

## 2024-05-14 NOTE — THERAPY EVALUATION
Acute Care - Physical Therapy Initial Evaluation   Raghav     Patient Name: Nick Turk  : 1970  MRN: [id number]  Today's Date: 2024    Admit date: 2024     Referring Physician: Amarjit Johnston MD     Surgery Date:* No surgery found *            Visit Dx:     ICD-10-CM ICD-9-CM   1. TIA (transient ischemic attack)  G45.9 435.9   2. Hypertension, unspecified type  I10 401.9   3. Decreased activities of daily living (ADL)  Z78.9 V49.89   4. Difficulty in walking  R26.2 719.7     Patient Active Problem List   Diagnosis    Congenital spondylolisthesis    Lumbosacral spondylosis without myelopathy    Disorder of vertebra    Knee pain    Ligamentous laxity of knee    Osteoarthritis of left shoulder    Pain in joint of left shoulder    Pain, generalized    Sleep disorder    Low back pain    Thoracic back pain    Thoracic spondylosis without myelopathy    Nontraumatic complete tear of left rotator cuff    Rotator cuff tear, left    Aftercare following mini open rotator cuff repair    CVA (cerebral vascular accident)    Elevated blood pressure reading    Hypertensive encephalopathy     Past Medical History:   Diagnosis Date    Back pain     Chronic pain syndrome     CVA (cerebral vascular accident) 2024    Forgetfulness     Leg pain     Lumbago 2015    Lumbar spondylosis     Pain, generalized     Pars defect of lumbar spine 2015    Sleep disorder     Spondylolisthesis, lumbar region 2015    L4/5     Past Surgical History:   Procedure Laterality Date    BACK SURGERY      CHOLECYSTECTOMY      COLONOSCOPY N/A 2021    Procedure: COLONOSCOPY WITH POLYPECTOMIES, CLIP APPLICATION X1, CAUTERY;  Surgeon: Nolberto Onofre MD;  Location: [facility] ENDOSCOPY;  Service: General;  Laterality: N/A;  COLON POLYPS    GANGLION CYST EXCISION      LUMBAR EPIDURAL INJECTION      LUMBAR FUSION  2015    SHOULDER ARTHROSCOPY W/ ROTATOR CUFF REPAIR Left 2022    Procedure: LEFT SHOULDER ARTHROSCOPY WITH ROTATOR  CUFF REPAIR, SUBACROMINAL DECOMPRESSION, DISTAL CLAVICULECTOMY, BICEPS TENODESIS;  Surgeon: Abdoulaye Minaya MD;  [place] [facility] [place] [facility] [place]  Service: Orthopedics;  Laterality: Left;    SPINAL FUSION       PT Assessment (Last 12 Hours)       PT Evaluation and Treatment       Row Name 05/14/24 1056          Physical Therapy Time and Intention    Subjective Information no complaints (P)   -     Document Type evaluation (P)   -     Mode of Treatment individual therapy;physical therapy (P)   -     Patient Effort excellent (P)   -     Symptoms Noted During/After Treatment none (P)   -       Row Name 05/14/24 1056          General Information    Patient Profile Reviewed yes (P)   -     Patient Observations alert;cooperative;agree to therapy (P)   -     Prior Level of Function independent:;all household mobility;community mobility;ADL's (P)   -     Equipment Currently Used at Home none (P)   -     Existing Precautions/Restrictions no known precautions/restrictions (P)   -     Barriers to Rehab none identified (P)   -[facility] Name 05/14/24 1056          Living Environment    Current Living Arrangements home (P)   -     Home Accessibility stairs to enter home (P)   -     People in Home spouse;child(flavia), dependent (P)   Wife and 2 daughters  -     Primary Care Provided by self (P)   -       Row Name 05/14/24 1056          Home Main Entrance    Number of Stairs, Main Entrance none (P)   -     Stair Railings, Main Entrance none (P)   -       Row Name 05/14/24 1056          Home Use of Assistive/Adaptive Equipment    Equipment Currently Used at Home none (P)   -[facility] Name 05/14/24 1056          Pain    Pretreatment Pain Rating 0/10 - no pain (P)   -     Posttreatment Pain Rating 0/10 - no pain (P)   -[facility] Name 05/14/24 1056          Cognition    Orientation Status (Cognition) oriented x 4 (P)   -[facility] Name 05/14/24 1056          Range of Motion (ROM)    Range of Motion  bilateral lower extremities;ROM is WFL (P)   -MF       Row Name 05/14/24 1056          Strength (Manual Muscle Testing)    Strength (Manual Muscle Testing) bilateral lower extremities;strength is WFL (P)   All 5/5  -MF       Row Name 05/14/24 1056          Bed Mobility    Comment, (Bed Mobility) No bed mobility. Patient seated EOB upon arrival. (P)   -MF       Row Name 05/14/24 1056          Transfers    Transfers sit-stand transfer;stand-sit transfer (P)   -MF       Row Name 05/14/24 1056          Sit-Stand Transfer    Sit-Stand [place] (Transfers) independent (P)   -MF       Row Name 05/14/24 1056          Stand-Sit Transfer    Stand-Sit [place] (Transfers) independent (P)   -MF       Row Name 05/14/24 1056          Gait/Stairs (Locomotion)    Gait/Stairs Locomotion gait/ambulation independence (P)   -     [place] Level (Gait) independent (P)   -     Patient was able to Ambulate yes (P)   -     Distance in Feet (Gait) 200 (P)   -MF       Row Name 05/14/24 1056          Balance    Balance Assessment standing dynamic balance (P)   -     Dynamic Standing Balance independent (P)   -     Position/Device Used, Standing Balance unsupported (P)   -MF       Row Name 05/14/24 1056          Plan of Care Review    Plan of Care Reviewed With patient (P)   -     Outcome Evaluation Patient presents with no strength or ROM deficits of the LEs. Skilled therapy is not needed at this time. He is safe to return home when cleared. (P)   -MF       Row Name 05/14/24 1056          Positioning and Restraints    Pre-Treatment Position in bed (P)   -     Post Treatment Position bed (P)   -     In Bed sitting EOB;call light within reach;encouraged to call for assist;exit alarm on (P)   -MF       Row Name 05/14/24 1056          Therapy Assessment/Plan (PT)    Criteria for Skilled Interventions Met (PT) no problems identified which require skilled intervention (P)   -     Therapy Frequency (PT) evaluation only  (P)   -       Row Name 05/14/24 1056          PT Evaluation Complexity    History, PT Evaluation Complexity 3 or more personal factors and/or comorbidities (P)   -     Examination of Body Systems (PT Eval Complexity) total of 4 or more elements (P)   -     Clinical Presentation (PT Evaluation Complexity) stable (P)   -     Clinical Decision Making (PT Evaluation Complexity) low complexity (P)   -     Overall Complexity (PT Evaluation Complexity) low complexity (P)   -       Row Name 05/14/24 1056          Therapy Plan Review/Discharge Plan (PT)    Therapy Plan Review (PT) evaluation/treatment results reviewed (P)   -               User Key  (r) = Recorded By, (t) = Taken By, (c) = Cosigned By      Initials Name Provider Type     Edward Campbell, PT Student PT Student                    Physical Therapy Education       Title: PT OT SLP Therapies (Done)       Topic: Physical Therapy (Done)       Point: Mobility training (Done)       Learning Progress Summary             Patient Acceptance, E,TB, VU by  at 5/14/2024 1101                         Point: Precautions (Done)       Learning Progress Summary             Patient Acceptance, E,TB, VU by  at 5/14/2024 1101                                         User Key       Initials Effective Dates Name Provider Type Discipline     04/01/24 -  Edward Campbell, PT Student PT Student PT                  PT Recommendation and Plan  Anticipated Discharge Disposition (PT): (P) home  Therapy Frequency (PT): (P) evaluation only  Plan of Care Reviewed With: (P) patient  Outcome Evaluation: (P) Patient presents with no strength or ROM deficits of the LEs. Skilled therapy is not needed at this time. He is safe to return home when cleared.   Outcome Measures       Row Name 05/14/24 1100             How much help from another person do you currently need...    Turning from your back to your side while in flat bed without using bedrails? 4 (P)   -      Moving from  lying on back to sitting on the side of a flat bed without bedrails? 4 (P)   -MF      Moving to and from a bed to a chair (including a wheelchair)? 4 (P)   -MF      Standing up from a chair using your arms (e.g., wheelchair, bedside chair)? 4 (P)   -MF      Climbing 3-5 steps with a railing? 4 (P)   -MF      To walk in hospital room? 4 (P)   -MF      AM-PAC 6 Clicks Score (PT) 24 (P)   -MF      Highest Level of Mobility Goal 8 --> Walked 250 feet or more (P)   -MF         Functional Assessment    Outcome Measure Options AM-PAC 6 Clicks Basic Mobility (PT) (P)   -MF                User Key  (r) = Recorded By, (t) = Taken By, (c) = Cosigned By      Initials Name Provider Type    Edward Montano, PT Student PT Student                     Time Calculation:    PT Charges       Row Name 05/14/24 1055             Time Calculation    PT Received On 05/14/24 (P)   -MF         Untimed Charges    PT Eval/Re-eval Minutes 31 (P)   -MF         Total Minutes    Untimed Charges Total Minutes 31 (P)   -MF       Total Minutes 31 (P)   -MF                User Key  (r) = Recorded By, (t) = Taken By, (c) = Cosigned By      Initials Name Provider Type    Edward Montano, PT Student PT Student                  Therapy Charges for Today       Code Description Service Date Service Provider Modifiers Qty    99691317182  PT EVAL LOW COMPLEXITY 3 5/14/2024 Edward Campbell, PT Student GP 1            PT G-Codes  Outcome Measure Options: (P) AM-PAC 6 Clicks Basic Mobility (PT)  AM-PAC 6 Clicks Score (PT): (P) 24  AM-PAC 6 Clicks Score (OT): 24    Edward Campbell PT Student  5/14/2024

## 2024-05-14 NOTE — CONSULTS
[facility]  [facility] Tele[facility]urology Consult Services    Stat Consult    Patient Name:   Nick Turk  YOB: 1970  Identification Number:   MRN - [id number]  Date of Service:   05/13/2024 22:22:24    Diagnosis:        G45.9 - Transient cerebral ischemic attack, unspecified        I67.4 - Hypertensive encephalopathy    Impression  55 y/o male with possible TIA, vs hypertensive encephalopathy  currently non-focal neuro exam  recc MRI brain/MRA head and neck  stroke eval      Recommendations:  Our recommendations are outlined below.    Diagnostic Studies :  MRI head without contrastMRA head without contrastMRA neck with contrast    Laboratory Studies :  Lipid panel  I ordered  Hemoglobin A1c    Antithrombotic Medication :  Aspirin 81 mg PO daily    Nursing Recommendations :  IV Fluids, avoid dextrose containing fluids, Maintain euglycemiaNeuro checks q4 hrs x 24 hrs and then per shiftHead of bed 30 degreesContinue with Telemetry    Consultations :  Recommend Speech therapy if failed dysphagia screenPhysical therapy/Occupational therapy      ----------------------------------------------------------------------------------------------------      Advanced Imaging:  Advanced Imaging Deferred because:    Non-disabling symptoms as verified by the patient; no cortical signs so not consistent with LVO        Metrics:  [facility] Notification Time: 05/13/2024 22:20:30  Stamp Time: 05/13/2024 22:22:24  Callback Response Time: 05/13/2024 22:25:32    Primary Provider Notified of Diagnostic Impression and Management Plan on: 05/14/2024 00:40:57      CT HEAD:  Reviewed        ----------------------------------------------------------------------------------------------------    Chief Complaint:  speech changes    History of Present Illness:  Patient is a 54 year old Male.  54M Presents to ED with elevated BP and wife reported he has difficulty getting words out and vision changes describe as blurry  over the weekend and an episode of confusion today that completely resolved. HCT negative  No hx of stroke  Symptoms were brief lasting a few minutes  Denies focal weakness, numbness, gait changes       Past Medical History:       Hypertension    Medications:    No Anticoagulant use   No Antiplatelet use  Reviewed EMR for current medications    Allergies:   Reviewed    Social History:  Patient Is:   Smoking: No  Alcohol Use: No  Drug Use: No    Family History:    There is no family history of premature cerebrovascular disease pertinent to this consultation    ROS :  14 Points Review of Systems was performed and was negative except mentioned in HPI.    Past Surgical History:  There Is No Surgical History Contributory To Today’s Visit       Examination:  BP(150/105), Pulse(72),  1A: Level of Consciousness - Alert; keenly responsive + 0  1B: Ask Month and Age - Both Questions Right + 0  1C: Blink Eyes & Squeeze Hands - Performs Both Tasks + 0  2: Test Horizontal Extraocular Movements - Normal + 0  3: Test Visual Fields - No Visual Loss + 0  4: Test Facial Palsy (Use Grimace if Obtunded) - Normal symmetry + 0  5A: Test Left Arm Motor Drift - No Drift for 10 Seconds + 0  5B: Test Right Arm Motor Drift - No Drift for 10 Seconds + 0  6A: Test Left Leg Motor Drift - No Drift for 5 Seconds + 0  6B: Test Right Leg Motor Drift - No Drift for 5 Seconds + 0  7: Test Limb Ataxia (FNF/Heel-Shin) - No Ataxia + 0  8: Test Sensation - Normal; No sensory loss + 0  9: Test Language/Aphasia - Normal; No aphasia + 0  10: Test Dysarthria - Normal + 0  11: Test Extinction/Inattention - No abnormality + 0    NIHSS Score: 0    Spoke with : ED doc        Patient / Family was informed the Neurology Consult would occur via TeleHealth consult by way of interactive audio and video telecommunications and consented to receiving care in this manner.    Patient is being evaluated for possible acute neurologic impairment and high probability of  imminent or life - threatening deterioration.I spent total of 35 minutes providing care to this patient, including time for face to face visit via telemedicine, review of medical records, imaging studies and discussion of findings with providers, the patient and / or family.      Dr Matthew Strange      [facility]  For Inpatient follow-up with [facility] physician please call [facility] [phone]. This is not an outpatient service. Post hospital discharge, please contact hospital directly.    Please do not communicate with [facility] physicians via secure chat. If you have any questions, Please contact [facility].  Please call or reconsult our service if there are any clinical or diagnostic changes.

## 2024-05-14 NOTE — THERAPY EVALUATION
Acute Care - Speech Language Pathology   Swallow Initial Evaluation  Raghav     Patient Name: Nick Turk  : 1970  MRN: [id number]  Today's Date: 2024               Admit Date: 2024    Visit Dx:     ICD-10-CM ICD-9-CM   1. TIA (transient ischemic attack)  G45.9 435.9   2. Hypertension, unspecified type  I10 401.9   3. Decreased activities of daily living (ADL)  Z78.9 V49.89   4. Difficulty in walking  R26.2 719.7   5. Dysphagia, unspecified type  R13.10 787.20     Patient Active Problem List   Diagnosis    Congenital spondylolisthesis    Lumbosacral spondylosis without myelopathy    Disorder of vertebra    Knee pain    Ligamentous laxity of knee    Osteoarthritis of left shoulder    Pain in joint of left shoulder    Pain, generalized    Sleep disorder    Low back pain    Thoracic back pain    Thoracic spondylosis without myelopathy    Nontraumatic complete tear of left rotator cuff    Rotator cuff tear, left    Aftercare following mini open rotator cuff repair    CVA (cerebral vascular accident)    Elevated blood pressure reading    Hypertensive encephalopathy     Past Medical History:   Diagnosis Date    Back pain     Chronic pain syndrome     CVA (cerebral vascular accident) 2024    Forgetfulness     Leg pain     Lumbago 2015    Lumbar spondylosis     Pain, generalized     Pars defect of lumbar spine 2015    Sleep disorder     Spondylolisthesis, lumbar region 2015    L4/5     Past Surgical History:   Procedure Laterality Date    BACK SURGERY      CHOLECYSTECTOMY      COLONOSCOPY N/A 2021    Procedure: COLONOSCOPY WITH POLYPECTOMIES, CLIP APPLICATION X1, CAUTERY;  Surgeon: Nolberto Onofre MD;  Location: [facility] ENDOSCOPY;  Service: General;  Laterality: N/A;  COLON POLYPS    GANGLION CYST EXCISION      LUMBAR EPIDURAL INJECTION      LUMBAR FUSION  2015    SHOULDER ARTHROSCOPY W/ ROTATOR CUFF REPAIR Left 2022    Procedure: LEFT SHOULDER ARTHROSCOPY WITH ROTATOR CUFF REPAIR,  SUBACROMINAL DECOMPRESSION, DISTAL CLAVICULECTOMY, BICEPS TENODESIS;  Surgeon: Abdoulaye Minaya MD;  [place] [facility] [place] [facility] [place]  Service: Orthopedics;  Laterality: Left;    SPINAL FUSION         SLP Recommendation and Plan  SLP Swallowing Diagnosis: swallow WFL/no suspected pharyngeal impairment (05/14/24 1103)  SLP Diet Recommendation: regular textures, thin liquids (05/14/24 1103)  Recommended Precautions and Strategies: upright posture during/after eating (05/14/24 1103)  SLP Rec. for Method of Medication Administration: meds whole (05/14/24 1103)     Monitor for Signs of Aspiration: notify SLP if any concerns (05/14/24 1103)     Swallow Criteria for Skilled Therapeutic Interventions Met: no problems identified which require skilled intervention (05/14/24 1103)  Anticipated Discharge Disposition (SLP): No further SLP services warranted, home (05/14/24 1103)     Therapy Frequency (Swallow): evaluation only (05/14/24 1103)                                                     SWALLOW EVALUATION (Last 72 Hours)       SLP Adult Swallow Evaluation       Row Name 05/14/24 1103                   Rehab Evaluation    Document Type evaluation  -SN        Subjective Information no complaints  -SN        Patient Observations alert;cooperative  -SN        Patient Effort good  -SN        Symptoms Noted During/After Treatment none  -SN           General Information    Current Method of Nutrition regular textures;thin liquids  -SN        Prior Level of Function-Swallowing no diet consistency restrictions  -SN        Plans/Goals Discussed with patient  -SN        Barriers to Rehab none identified  -SN        Patient's Goals for Discharge return home  -SN           Oral Motor Structure and Function    Dentition Assessment natural, present and adequate  -SN        Secretion Management WNL/WFL  -SN        Mucosal Quality moist, healthy  -SN        Gag Response WFL  -SN        Volitional Swallow WFL  -SN        Volitional Cough WFL  -SN            Oral Musculature and Cranial Nerve Assessment    Oral Motor General Assessment WFL  -SN           General Eating/Swallowing Observations    Respiratory Support Currently in Use room air  -SN        Eating/Swallowing Skills self-fed  -SN        Positioning During Eating upright 90 degree;upright in bed  -SN        Utensils Used spoon;cup;straw  -SN        Consistencies Trialed regular textures;pureed;thin liquids  -SN           Clinical Swallow Eval    Oral Prep Phase WFL  -SN        Oral Transit WFL  -SN        Oral Residue WFL  -SN        Pharyngeal Phase no overt signs/symptoms of pharyngeal impairment  -SN        Esophageal Phase unremarkable  -SN        Clinical Swallow Evaluation Summary PAtient tolerating consistencies at bedside. Oral motor examination within normal limits. MRI results pending.  -SN           SLP Evaluation Clinical Impression    SLP Swallowing Diagnosis swallow WFL/no suspected pharyngeal impairment  -SN        Swallow Criteria for Skilled Therapeutic Interventions Met no problems identified which require skilled intervention  -SN           Recommendations    Therapy Frequency (Swallow) evaluation only  -SN        SLP Diet Recommendation regular textures;thin liquids  -SN        Recommended Precautions and Strategies upright posture during/after eating  -SN        SLP Rec. for Method of Medication Administration meds whole  -SN        Monitor for Signs of Aspiration notify SLP if any concerns  -SN                  User Key  (r) = Recorded By, (t) = Taken By, (c) = Cosigned By      Initials Name Effective Dates    Lucia Null MS-CCC/SLP, CNT 03/31/21 -                 Patient scored level 7 of 7 on functional communication measures for swallowing, indicating a 0% limitation in function.  At bedside, patient exhibits swallow function adequate for tolerance of regular solids and thin liquids.  No further direct speech pathology services are needed at this time.    EDUCATION  The  patient has been educated in the following areas:   Dysphagia (Swallowing Impairment).                Time Calculation:    Time Calculation- SLP       Row Name 05/14/24 1103             Time Calculation- SLP    SLP Start Time 1000  -SN      SLP Stop Time 1045  -SN      SLP Time Calculation (min) 45 min  -SN      SLP Received On 05/14/24  -SN         Untimed Charges    70976-XC Eval Oral Pharyng Swallow Minutes 45  -SN         Total Minutes    Untimed Charges Total Minutes 45  -SN       Total Minutes 45  -SN                User Key  (r) = Recorded By, (t) = Taken By, (c) = Cosigned By      Initials Name Provider Type    SN Lucia Mayberry MS-CCC/SLP, LEONEL Speech and Language Pathologist                    Therapy Charges for Today       Code Description Service Date Service Provider Modifiers Qty    27681264274 HC ST EVAL ORAL PHARYNG SWALLOW 3 5/14/2024 Lucia Mayberry MS-CCC/SLP, LEONEL GN 1                 BEVERLY Wilson/LEONEL THURSTON  5/14/2024

## 2024-05-14 NOTE — PLAN OF CARE
Goal Outcome Evaluation:  Plan of Care Reviewed With: (P) patient           Outcome Evaluation: (P) Patient presents with no strength or ROM deficits of the LEs. Skilled therapy is not needed at this time. He is safe to return home when cleared.      Anticipated Discharge Disposition (PT): (P) home

## 2024-05-14 NOTE — THERAPY EVALUATION
Patient Name: Nick Turk  : 1970    MRN: [id number]                              Today's Date: 2024       Admit Date: 2024    Visit Dx:     ICD-10-CM ICD-9-CM   1. TIA (transient ischemic attack)  G45.9 435.9   2. Hypertension, unspecified type  I10 401.9   3. Decreased activities of daily living (ADL)  Z78.9 V49.89     Patient Active Problem List   Diagnosis    Congenital spondylolisthesis    Lumbosacral spondylosis without myelopathy    Disorder of vertebra    Knee pain    Ligamentous laxity of knee    Osteoarthritis of left shoulder    Pain in joint of left shoulder    Pain, generalized    Sleep disorder    Low back pain    Thoracic back pain    Thoracic spondylosis without myelopathy    Nontraumatic complete tear of left rotator cuff    Rotator cuff tear, left    Aftercare following mini open rotator cuff repair    CVA (cerebral vascular accident)    Elevated blood pressure reading    Hypertensive encephalopathy     Past Medical History:   Diagnosis Date    Back pain     Chronic pain syndrome     CVA (cerebral vascular accident) 2024    Forgetfulness     Leg pain     Lumbago     Lumbar spondylosis     Pain, generalized     Pars defect of lumbar spine     Sleep disorder     Spondylolisthesis, lumbar region 2015    L4/5     Past Surgical History:   Procedure Laterality Date    BACK SURGERY      CHOLECYSTECTOMY      COLONOSCOPY N/A 2021    Procedure: COLONOSCOPY WITH POLYPECTOMIES, CLIP APPLICATION X1, CAUTERY;  Surgeon: Nolberto Onofre MD;  Location: [facility] ENDOSCOPY;  Service: General;  Laterality: N/A;  COLON POLYPS    GANGLION CYST EXCISION      LUMBAR EPIDURAL INJECTION      LUMBAR FUSION  2015    SHOULDER ARTHROSCOPY W/ ROTATOR CUFF REPAIR Left 2022    Procedure: LEFT SHOULDER ARTHROSCOPY WITH ROTATOR CUFF REPAIR, SUBACROMINAL DECOMPRESSION, DISTAL CLAVICULECTOMY, BICEPS TENODESIS;  Surgeon: Abdoulaye Minaya MD;  Location: [facility] OR [facility];  Service: Orthopedics;   Laterality: Left;    SPINAL FUSION        General Information       Row Name 05/14/24 1044          OT Time and Intention    Document Type evaluation  -ES     Mode of Treatment individual therapy;occupational therapy  -ES       Row Name 05/14/24 1044          General Information    Patient Profile Reviewed yes  -ES     Prior Level of Function independent:;ADL's;all household mobility;community mobility  Patient independent with ADLs at baseline. Patient is employed. No device for functional mobility. Walk in shower, standing shower completion. [place] in stance. No home O2 use. Denies recent falls.  -ES     Existing Precautions/Restrictions no known precautions/restrictions  -ES     Barriers to Rehab none identified  -ES       Row Name 05/14/24 1044          Occupational Profile    Reason for Services/Referral (Occupational Profile) Patient is 54yr old male admitted to [facility] on 5/13/2024 with reports of hypertension, headache and visual disturbances.  OT evaluation and treatment ordered d/t recent decline in ADLs/transfer ability and discharge planning recommendations. No previous OT services for current condition.  -ES       Row Name 05/14/24 1044          Living Environment    People in Home spouse;child(flavia), adult  -ES       Row Name 05/14/24 1044          Cognition    Orientation Status (Cognition) oriented x 4  Patient pleasant and cooperative, agreeable to therapy evaluation.  -ES               User Key  (r) = Recorded By, (t) = Taken By, (c) = Cosigned By      Initials Name Provider Type    ES Kim Blue, OTR/L, CSRS Occupational Therapist                     Mobility/ADL's       Row Name 05/14/24 1047          Bed Mobility    Bed Mobility supine-sit;sit-supine  -ES     Supine-Sit [place] (Bed Mobility) independent  -ES     Sit-Supine [place] (Bed Mobility) independent  -ES       Row Name 05/14/24 1047          Transfers    Transfers sit-stand transfer;stand-sit transfer  -ES        Row Name 05/14/24 1047          Sit-Stand Transfer    Sit-Stand [place] (Transfers) independent  -ES     Assistive Device (Sit-Stand Transfers) other (see comments)  no assistive device  -ES       Row Name 05/14/24 1047          Stand-Sit Transfer    Stand-Sit [place] (Transfers) independent  -ES     Assistive Device (Stand-Sit Transfers) other (see comments)  no assistive device  -ES       Row Name 05/14/24 1047          Functional Mobility    Functional Mobility- Ind. Level independent  -ES     Functional Mobility- Device other (see comments)  no assistive device  -ES       Row Name 05/14/24 1047          Activities of Daily Living    BADL Assessment/Intervention bathing;upper body dressing;lower body dressing;grooming;feeding;toileting  -ES       Row Name 05/14/24 1047          Bathing Assessment/Intervention    [place] Level (Bathing) bathing skills;independent  -ES       Row Name 05/14/24 1047          Upper Body Dressing Assessment/Training    [place] Level (Upper Body Dressing) upper body dressing skills;independent  -ES       Row Name 05/14/24 1047          Lower Body Dressing Assessment/Training    [place] Level (Lower Body Dressing) lower body dressing skills;independent  -ES       Row Name 05/14/24 1047          Grooming Assessment/Training    [place] Level (Grooming) grooming skills;independent  -ES       Row Name 05/14/24 1047          Self-Feeding Assessment/Training    [place] Level (Feeding) feeding skills;independent  -ES       Row Name 05/14/24 1047          Toileting Assessment/Training    [place] Level (Toileting) toileting skills;independent  -ES               User Key  (r) = Recorded By, (t) = Taken By, (c) = Cosigned By      Initials Name Provider Type    Kim Altmairano, RADHAR/L, CSRS Occupational Therapist                   Obj/Interventions       Row Name 05/14/24 1048          Sensory Assessment (Somatosensory)    Sensory Assessment (Somatosensory)  sensation intact  -ES     Sensory Assessment bilateral upper extremity sensation intact to light and heavy touch, no sensory deficits reported at time of assessment  -ES       Row Name 05/14/24 1048          Vision Assessment/Intervention    Visual Impairment/Limitations WFL  -ES     Vision Assessment Comment quick vision screen intact to all four visual quadrants.  -ES       Row Name 05/14/24 1048          Range of Motion Comprehensive    General Range of Motion no range of motion deficits identified  -ES       Row Name 05/14/24 1048          Strength Comprehensive (MMT)    General Manual Muscle Testing (MMT) Assessment no strength deficits identified  -ES     Comment, General Manual Muscle Testing (MMT) Assessment BUEs 4+/5  -ES       Row Name 05/14/24 1048          Motor Skills    Motor Skills functional endurance  -ES     Functional Endurance good  -ES       Row Name 05/14/24 1048          Balance    Balance Assessment sitting dynamic balance;standing dynamic balance  -ES     Dynamic Sitting Balance independent  -ES     Position, Sitting Balance unsupported  -ES     Dynamic Standing Balance independent  -ES     Position/Device Used, Standing Balance unsupported  no assistive device  -ES               User Key  (r) = Recorded By, (t) = Taken By, (c) = Cosigned By      Initials Name Provider Type    ES Kim Blue, OTR/L, CSRS Occupational Therapist                   Goals/Plan    No documentation.                  Clinical Impression       Row Name 05/14/24 1050          Plan of Care Review    Plan of Care Reviewed With patient  -ES     Outcome Evaluation Patient presents at or near baseline functional status at time of evaluation with no identified functional deficits that impede patient independence with activities of daily living.  No indicated need for skilled occupational therapy intervention in the acute care setting.  Occupational therapy will sign off at this time.  -ES       Row Name 05/14/24 1054           Therapy Assessment/Plan (OT)    Criteria for Skilled Therapeutic Interventions Met (OT) no problems identified which require skilled intervention  -ES     Therapy Frequency (OT) evaluation only  -ES       Row Name 05/14/24 1050          Therapy Plan Review/Discharge Plan (OT)    Anticipated Discharge Disposition (OT) home  -ES       Row Name 05/14/24 1050          Positioning and Restraints    Pre-Treatment Position in bed  -ES     Post Treatment Position bed  -ES     In Bed supine;call light within reach;encouraged to call for assist;exit alarm on  -ES               User Key  (r) = Recorded By, (t) = Taken By, (c) = Cosigned By      Initials Name Provider Type    ES Kim Blue, OTR/L, CSRS Occupational Therapist                   Outcome Measures       Row Name 05/14/24 1051          How much help from another is currently needed...    Putting on and taking off regular lower body clothing? 4  -ES     Bathing (including washing, rinsing, and drying) 4  -ES     Toileting (which includes using toilet bed pan or urinal) 4  -ES     Putting on and taking off regular upper body clothing 4  -ES     Taking care of personal grooming (such as brushing teeth) 4  -ES     Eating meals 4  -ES     AM-PAC 6 Clicks Score (OT) 24  -ES       Row Name 05/14/24 0753 05/14/24 0230       How much help from another person do you currently need...    Turning from your back to your side while in flat bed without using bedrails? 4  -JR 4  -EP    Moving from lying on back to sitting on the side of a flat bed without bedrails? 4  -JR 4  -EP    Moving to and from a bed to a chair (including a wheelchair)? 4  -JR 4  -EP    Standing up from a chair using your arms (e.g., wheelchair, bedside chair)? 4  -JR 4  -EP    Climbing 3-5 steps with a railing? 4  -JR 4  -EP    To walk in hospital room? 4  -JR 4  -EP    AM-PAC 6 Clicks Score (PT) 24  -JR 24  -EP    Highest Level of Mobility Goal 8 --> Walked 250 feet or more  -JR 8 --> Walked 250  feet or more  -EP      Row Name 05/14/24 1051          Functional Assessment    Outcome Measure Options AM-PAC 6 Clicks Daily Activity (OT)  -ES               User Key  (r) = Recorded By, (t) = Taken By, (c) = Cosigned By      Initials Name Provider Type    Daniela More, RN Registered Nurse    ES Kim Blue, OTR/L, CSRS Occupational Therapist    Kim Garg RN Registered Nurse                      OT Recommendation and Plan  Therapy Frequency (OT): evaluation only  Plan of Care Review  Plan of Care Reviewed With: patient  Outcome Evaluation: Patient presents at or near baseline functional status at time of evaluation with no identified functional deficits that impede patient independence with activities of daily living.  No indicated need for skilled occupational therapy intervention in the acute care setting.  Occupational therapy will sign off at this time.     Time Calculation:   Evaluation Complexity (OT)  Review Occupational Profile/Medical/Therapy History Complexity: brief/low complexity  Assessment, Occupational Performance/Identification of Deficit Complexity: 3-5 performance deficits  Clinical Decision Making Complexity (OT): problem focused assessment/low complexity  Overall Complexity of Evaluation (OT): low complexity     Time Calculation- OT       Row Name 05/14/24 1051             Time Calculation- OT    OT Received On 05/14/24  -ES      OT Goal Re-Cert Due Date 05/23/24  -ES         Untimed Charges    OT Eval/Re-eval Minutes 32  -ES         Total Minutes    Untimed Charges Total Minutes 32  -ES       Total Minutes 32  -ES                User Key  (r) = Recorded By, (t) = Taken By, (c) = Cosigned By      Initials Name Provider Type    Kim Altamirano, OTR/L, CSRS Occupational Therapist                  Therapy Charges for Today       Code Description Service Date Service Provider Modifiers Qty    [id number]  OT EVAL LOW COMPLEXITY 3 5/14/2024 Kim Blue, OTR/L, CSRS GO 1                  Kim Blue, OTR/L, CSRS  5/14/2024

## 2024-05-14 NOTE — PLAN OF CARE
Goal Outcome Evaluation:            Q 2 vitals until 0800. Patient states head hurting pain medication didn't touch it 9/10 pain. No sign of distress noted. NIH q shift or PRN , neuro checks q2.

## 2024-05-14 NOTE — CONSULTS
Consult received per Stroke Protocol. Patient without a noted DM diagnosis, with a current HbA1c of 5%, and an estimated average glucose of 97 mg/dl. Blood glucose values have remained WDL, with a low of 113 mg/dl, and a high of 115 mg/dl.

## 2024-05-14 NOTE — PROGRESS NOTES
"[facility]  [facility] Tele[facility]    Routine Consult Follow-Up    Patient Name:   Nick Turk  YOB: 1970  Identification Number:   MRN - [id number]  Date of Service:   05/14/2024 08:04:50    Diagnosis        G45.9 - Transient cerebral ischemic attack, unspecified        I67.4 - Hypertensive encephalopathy    Impression  55 y/o male PMH HTN who presents with transient word finding difficulty, blurry vision, difficulty performing tasks would usually not have issue with which occurred in setting of HTN. Diff dx includes TIA/stroke vs hypertensive encephalopathy. Recommend:    MRI brain w/o cont  MRA head/neck  PT/OT  ASA and statin for now  BP- gradual reduction to normotension  neurology will follow    Our recommendations are outlined below    Diagnostic Studies :  MRI head without contrastMRA head without contrastMRA neck with contrast    Antithrombotic Medication :  Aspirin 81 mg PO dailyStatins for LDL goal less than 70    Nursing Recommendations :  IV Fluids, avoid dextrose containing fluids, Maintain euglycemiaNeuro checks q4 hrs x 24 hrs and then per shiftHead of bed 30 degreesContinue with Telemetry    Consultations :  Recommend Speech therapy if failed dysphagia screenPhysical therapy/Occupational therapy    Disposition :  Neurology will followOutpatient Neurology follow up in 1-3 weeks    Subjective  No acute events overnight. HA remains.    Hospital Course  54M Presents to ED with elevated BP and wife reported he has difficulty getting words out and vision changes over the weekend and an episode of confusion today that completely resolved. HCT negative. Denies loss of awareness, confusion was more difficulty when doing an activity, had slight blurry vision when driving but no loss of vision. Would say wrong word that didn't mean to say.    Per H&P: \"54 y.o. male presents with complaints of hypertension at home. Patient states that he had blood pressure in his " "PCPs office of 160/120. He does not take any medication for hypertension however was prescribed some today. He took it twice without improvement of his blood pressure. He has had headaches as well as visual disturbance. Patient states that over the past week he has had some blurry vision and tunnel vision. Yesterday however he had 16 episodes the day prior. Patient and wife also recount that the patient took an abnormally long time and multiple attempts to perform an easy task that he would not have a problem with. This involved measuring out liquid into a measuring cup\"    No anti platelet.    Imaging  CT head w/o cont:  No acute intracranial process identified.      Examination  BP(145/81), Pulse(88), Temp(97.9), Resp(18),  1A: Level of Consciousness - Alert; keenly responsive + 0  1B: Ask Month and Age - Both Questions Right + 0  1C: Blink Eyes & Squeeze Hands - Performs Both Tasks + 0  2: Test Horizontal Extraocular Movements - Normal + 0  3: Test Visual Fields - No Visual Loss + 0  4: Test Facial Palsy (Use Grimace if Obtunded) - Normal symmetry + 0  5A: Test Left Arm Motor Drift - No Drift for 10 Seconds + 0  5B: Test Right Arm Motor Drift - No Drift for 10 Seconds + 0  6A: Test Left Leg Motor Drift - No Drift for 5 Seconds + 0  6B: Test Right Leg Motor Drift - No Drift for 5 Seconds + 0  7: Test Limb Ataxia (FNF/Heel-Shin) - No Ataxia + 0  8: Test Sensation - Normal; No sensory loss + 0  9: Test Language/Aphasia - Normal; No aphasia + 0  10: Test Dysarthria - Normal + 0  11: Test Extinction/Inattention - No abnormality + 0    NIHSS Score: 0  NIHSS Free Text : AAOx 3  Left arm tingling          Patient/Family was informed the Neurology Consult would happen via TeleHealth consult by way of interactive audio and video telecommunications and consented to receiving care in this manner.    Telehealth Neurology consultation was provided. I spent minutes providing telehealth care. This includes time spent for face to " face visit via telemedicine, review of medical records, imaging studies and discussion of findings with providers, the patient and/or family.      Dr Grace Wong      [facility]  For Inpatient follow-up with [facility] physician please call [facility] [phone]. This is not an outpatient service. Post hospital discharge, please contact hospital directly.    Please do not communicate with [facility] physicians via secure chat. If you have any questions, Please contact [facility].  Please call or reconsult our service if there are any clinical or diagnostic changes.

## 2024-05-15 ENCOUNTER — READMISSION MANAGEMENT (OUTPATIENT)
Dept: CALL CENTER | Facility: HOSPITAL | Age: 54
End: 2024-05-15
Payer: COMMERCIAL

## 2024-05-15 VITALS
RESPIRATION RATE: 16 BRPM | OXYGEN SATURATION: 97 % | TEMPERATURE: 98.2 F | HEART RATE: 73 BPM | BODY MASS INDEX: 31.6 KG/M2 | DIASTOLIC BLOOD PRESSURE: 64 MMHG | WEIGHT: 246.25 LBS | HEIGHT: 74 IN | SYSTOLIC BLOOD PRESSURE: 124 MMHG

## 2024-05-15 PROBLEM — I50.21 ACUTE SYSTOLIC CONGESTIVE HEART FAILURE: Status: ACTIVE | Noted: 2024-05-15

## 2024-05-15 PROBLEM — I63.9 CVA (CEREBRAL VASCULAR ACCIDENT): Status: RESOLVED | Noted: 2024-05-14 | Resolved: 2024-05-15

## 2024-05-15 PROBLEM — I67.4 HYPERTENSIVE ENCEPHALOPATHY: Status: RESOLVED | Noted: 2024-05-14 | Resolved: 2024-05-15

## 2024-05-15 PROBLEM — R03.0 ELEVATED BLOOD PRESSURE READING: Status: RESOLVED | Noted: 2024-05-14 | Resolved: 2024-05-15

## 2024-05-15 LAB
ALBUMIN SERPL-MCNC: 3.6 G/DL (ref 3.5–5.2)
ALBUMIN/GLOB SERPL: 1.5 G/DL
ALP SERPL-CCNC: 45 U/L (ref 39–117)
ALT SERPL W P-5'-P-CCNC: 22 U/L (ref 1–41)
ANION GAP SERPL CALCULATED.3IONS-SCNC: 7.9 MMOL/L (ref 5–15)
AST SERPL-CCNC: 14 U/L (ref 1–40)
BILIRUB SERPL-MCNC: 0.2 MG/DL (ref 0–1.2)
BUN SERPL-MCNC: 17 MG/DL (ref 6–20)
BUN/CREAT SERPL: 21 (ref 7–25)
CALCIUM SPEC-SCNC: 8.8 MG/DL (ref 8.6–10.5)
CHLORIDE SERPL-SCNC: 107 MMOL/L (ref 98–107)
CO2 SERPL-SCNC: 25.1 MMOL/L (ref 22–29)
CREAT SERPL-MCNC: 0.81 MG/DL (ref 0.76–1.27)
DEPRECATED RDW RBC AUTO: 43.5 FL (ref 37–54)
EGFRCR SERPLBLD CKD-EPI 2021: 105.4 ML/MIN/1.73
ERYTHROCYTE [DISTWIDTH] IN BLOOD BY AUTOMATED COUNT: 12.2 % (ref 12.3–15.4)
GLOBULIN UR ELPH-MCNC: 2.4 GM/DL
GLUCOSE SERPL-MCNC: 109 MG/DL (ref 65–99)
HCT VFR BLD AUTO: 47.2 % (ref 37.5–51)
HGB BLD-MCNC: 15.5 G/DL (ref 13–17.7)
MCH RBC QN AUTO: 31.5 PG (ref 26.6–33)
MCHC RBC AUTO-ENTMCNC: 32.8 G/DL (ref 31.5–35.7)
MCV RBC AUTO: 95.9 FL (ref 79–97)
NT-PROBNP SERPL-MCNC: 610.7 PG/ML (ref 0–900)
PLATELET # BLD AUTO: 199 10*3/MM3 (ref 140–450)
PMV BLD AUTO: 10.9 FL (ref 6–12)
POTASSIUM SERPL-SCNC: 4.3 MMOL/L (ref 3.5–5.2)
PROT SERPL-MCNC: 6 G/DL (ref 6–8.5)
RBC # BLD AUTO: 4.92 10*6/MM3 (ref 4.14–5.8)
SODIUM SERPL-SCNC: 140 MMOL/L (ref 136–145)
WBC NRBC COR # BLD AUTO: 7.98 10*3/MM3 (ref 3.4–10.8)

## 2024-05-15 PROCEDURE — 36415 COLL VENOUS BLD VENIPUNCTURE: CPT | Performed by: STUDENT IN AN ORGANIZED HEALTH CARE EDUCATION/TRAINING PROGRAM

## 2024-05-15 PROCEDURE — 85027 COMPLETE CBC AUTOMATED: CPT | Performed by: STUDENT IN AN ORGANIZED HEALTH CARE EDUCATION/TRAINING PROGRAM

## 2024-05-15 PROCEDURE — 80053 COMPREHEN METABOLIC PANEL: CPT | Performed by: STUDENT IN AN ORGANIZED HEALTH CARE EDUCATION/TRAINING PROGRAM

## 2024-05-15 PROCEDURE — 99239 HOSP IP/OBS DSCHRG MGMT >30: CPT | Performed by: INTERNAL MEDICINE

## 2024-05-15 PROCEDURE — 83880 ASSAY OF NATRIURETIC PEPTIDE: CPT | Performed by: INTERNAL MEDICINE

## 2024-05-15 PROCEDURE — 99231 SBSQ HOSP IP/OBS SF/LOW 25: CPT | Performed by: PSYCHIATRY & NEUROLOGY

## 2024-05-15 RX ORDER — ASPIRIN 81 MG/1
81 TABLET, CHEWABLE ORAL DAILY
Qty: 30 TABLET | Refills: 0 | Status: SHIPPED | OUTPATIENT
Start: 2024-05-16 | End: 2024-06-15

## 2024-05-15 RX ORDER — ATORVASTATIN CALCIUM 20 MG/1
20 TABLET, FILM COATED ORAL DAILY
Qty: 30 TABLET | Refills: 0 | Status: SHIPPED | OUTPATIENT
Start: 2024-05-15 | End: 2024-05-24 | Stop reason: HOSPADM

## 2024-05-15 RX ORDER — CARVEDILOL 3.12 MG/1
3.12 TABLET ORAL 2 TIMES DAILY WITH MEALS
Qty: 60 TABLET | Refills: 0 | Status: SHIPPED | OUTPATIENT
Start: 2024-05-15 | End: 2024-06-14

## 2024-05-15 RX ORDER — METOPROLOL SUCCINATE 25 MG/1
25 TABLET, EXTENDED RELEASE ORAL
Status: DISCONTINUED | OUTPATIENT
Start: 2024-05-15 | End: 2024-05-15

## 2024-05-15 RX ADMIN — LOSARTAN POTASSIUM 50 MG: 50 TABLET, FILM COATED ORAL at 09:09

## 2024-05-15 RX ADMIN — ASPIRIN 81 MG: 81 TABLET, CHEWABLE ORAL at 09:08

## 2024-05-15 RX ADMIN — Medication 10 ML: at 09:09

## 2024-05-15 NOTE — PLAN OF CARE
Goal Outcome Evaluation:            Patient bp stable . Headache pain has resolved. No sign of distress noted.

## 2024-05-15 NOTE — DISCHARGE SUMMARY
[facility]        HOSPITALIST  DISCHARGE SUMMARY    Patient Name: Nick Turk  : 1970  MRN: [id number]    Date of Admission: 2024  Date of Discharge:  5/15/2024  Primary Care Physician: Ashley Cabezas APRN    Consults       Date and Time Order Name Status Description    2024 12:41 AM Inpatient Hospitalist Consult      2024 10:08 PM IP General Consult (Use specialty-specific consult if known)              Active and Resolved Hospital Problems:  Active Hospital Problems    Diagnosis POA   • Acute systolic congestive heart failure [I50.21] No      Resolved Hospital Problems    Diagnosis POA   • **CVA (cerebral vascular accident) [I63.9] Yes   • Elevated blood pressure reading [R03.0] Yes   • Hypertensive encephalopathy [I67.4] Yes   Obstructive sleep apnea on home CPAP.  Global hypokinesis with a EF of 35%.  New diagnosis.  Blurred vision and confusion.  Possibly TIA versus hypertensive encephalopathy.  Resolved.    Hospital Course     Hospital Course:  Nick Turk is a 54 y.o. male  presents with complaints of hypertension at home.  Patient states that he had blood pressure in his PCPs office of 160/120.  He does not take any medication for hypertension however was prescribed some today.  He took it twice without improvement of his blood pressure.  He has had headaches as well as visual disturbance.  Patient states that over the past week he has had some blurry vision and tunnel vision.  Yesterday however he had 16 episodes the day prior.  Patient and wife also recount that the patient took an abnormally long time and multiple attempts to perform an easy task that he would not have a problem with.  This involved measuring out liquid into a measuring cup.   Patient seen by neurology.  MRI of the brain, MRA of the head and neck negative.  Patient cleared by neurology    Interval follow-up;  Blood pressure stable.  Denies orthopnea or PND.  Remains on room air.  No more  headache, blurred vision or confusion.  Ambulating without any problem.  Does have left walking boot cast.  Discussed with cardiology on-call over the phone about his global hypokinesis and low EF.  No need of stress test as an inpatient since there is no chest pain and troponin is negative.    DISCHARGE Follow Up Recommendations for labs and diagnostics: PCP, cardiology and neurology.  Cardiology to set up loop recorder as an outpatient.  Continue home CPAP.      Day of Discharge     Vital Signs:  Temp:  [97.1 °F (36.2 °C)-98.2 °F (36.8 °C)] 98.2 °F (36.8 °C)  Heart Rate:  [63-73] 73  Resp:  [16-20] 16  BP: (118-166)/() 124/64    Physical Exam:   pearance:   Alert, cooperative, in no acute distress   Head:   Normocephalic, without obvious abnormality, atraumatic   Eyes:   Lids and lashes normal, conjunctivae and sclerae normal, no icterus, no pallor, corneas clear, PERRLA   Ears:   Ears appear intact with no abnormalities noted   Throat:   No oral lesions, no thrush, oral mucosa moist   Neck:   No adenopathy, supple, trachea midline, no thyromegaly, no carotid bruit, no JVD   Back:   No kyphosis present, no scoliosis present, no skin lesions, erythema or scars, no tenderness to percussion or palpation, range of motion normal   Lungs:   Clear to auscultation, respirations regular, even and unlabored    Heart:   Regular rhythm and normal rate, normal S1 and S2, no murmur, no gallop, no rub, no click   Chest Wall:   No abnormalities observed   Abdomen:   Normal bowel sounds, no masses, no organomegaly, soft non-tender, non-distended, no guarding, no rebound tenderness   Rectal:   Deferred   Extremities:   Left lower extremity in walking boot, no edema, no cyanosis, no redness   Pulses:   Pulses palpable and equal bilaterally   Skin:   No bleeding, bruising or rash   Lymph nodes:   No palpable adenopathy   Neurologic:   Cranial nerves 2 - 12 grossly intact, sensation intact, DTR present and equal bilaterally        Discharge Details        Discharge Medications        New Medications        Instructions Start Date   aspirin 81 MG chewable tablet   81 mg, Oral, Daily   Start Date: May 16, 2024     atorvastatin 20 MG tablet  Commonly known as: Lipitor   20 mg, Oral, Daily      carvedilol 3.125 MG tablet  Commonly known as: Coreg   3.125 mg, Oral, 2 Times Daily With Meals      empagliflozin 10 MG tablet tablet  Commonly known as: JARDIANCE   10 mg, Oral, Daily             Continue These Medications        Instructions Start Date   losartan 25 MG tablet  Commonly known as: COZAAR   Take 1 tablet by mouth Daily.               Allergies   Allergen Reactions   • Bee Venom Swelling   • Meloxicam Confusion and Other (See Comments)       Discharge Disposition:  Home or Self Care in private vehicle with spouse    Diet:  Diet Instructions       Diet: Cardiac Diets; Healthy Heart (2-3 Na+); Thin (IDDSI 0)      Discharge Diet: Cardiac Diets    Cardiac Diet: Healthy Heart (2-3 Na+)    Fluid Consistency: Thin (IDDSI 0)            Discharge Activity:   Activity Instructions       Activity as Tolerated              CODE STATUS:  Code Status and Medical Interventions:   Ordered at: 05/14/24 0047     Level Of Support Discussed With:    Patient     Code Status (Patient has no pulse and is not breathing):    CPR (Attempt to Resuscitate)     Medical Interventions (Patient has pulse or is breathing):    Full Support         Future Appointments   Date Time Provider Department Center   5/17/2024  2:45 PM Abdoulaye Minaya MD [facility]   9/25/2024  7:00 AM Shola Car APRN [facility]       Additional Instructions for the Follow-ups that You Need to Schedule       Discharge Follow-up with PCP   As directed       Currently Documented PCP:    Ashley Cabezas APRN    PCP Phone Number:    [phone]     Follow Up Details: 1 week        Discharge Follow-up with Specified Provider: Dr. Shrestha; 3 Weeks   As directed      To: Dr. Shrestha    Follow Up: 3 Weeks   Follow Up Details: TIA versus hypertensive encephalopathy        Discharge Follow-up with Specified Provider: Dr. Tobin; 2 Weeks   As directed      To: Dr. Tobin   Follow Up: 2 Weeks   Follow Up Details: Acute systolic CHF.  Global hypokinesis.  Need loop recorder for possible TIA                Pertinent  and/or Most Recent Results     PROCEDURES:   * Cannot find OR case *     LAB RESULTS:      Lab 05/15/24  0357 05/13/24  1902   WBC 7.98 9.28   HEMOGLOBIN 15.5 15.8   HEMATOCRIT 47.2 47.8   PLATELETS 199 223   NEUTROS ABS  --  5.36   IMMATURE GRANS (ABS)  --  0.03   LYMPHS ABS  --  2.41   MONOS ABS  --  1.11*   EOS ABS  --  0.31   MCV 95.9 94.8         Lab 05/15/24  0357 05/14/24  0351 05/13/24  1902   SODIUM 140  --  138   POTASSIUM 4.3  --  4.0   CHLORIDE 107  --  107   CO2 25.1  --  21.7*   ANION GAP 7.9  --  9.3   BUN 17  --  13   CREATININE 0.81  --  0.87   EGFR 105.4  --  103.2   GLUCOSE 109*  --  113*   CALCIUM 8.8  --  8.7   HEMOGLOBIN A1C  --  5.00  --          Lab 05/15/24  0357 05/13/24  1902   TOTAL PROTEIN 6.0 6.6   ALBUMIN 3.6 3.8   GLOBULIN 2.4 2.8   ALT (SGPT) 22 26   AST (SGOT) 14 17   BILIRUBIN 0.2 0.2   ALK PHOS 45 49         Lab 05/15/24  0357 05/13/24  1902   PROBNP 610.7  --    HSTROP T  --  9         Lab 05/14/24 0351   CHOLESTEROL 94   LDL CHOL 44   HDL CHOL 30*   TRIGLYCERIDES 104             Brief Urine Lab Results  (Last result in the past 365 days)        Color   Clarity   Blood   Leuk Est   Nitrite   Protein   CREAT   Urine HCG        05/13/24 1902 Yellow   Clear   Negative   Negative   Negative   Negative                 Microbiology Results (last 10 days)       ** No results found for the last 240 hours. **            MRI Brain Without Contrast    Result Date: 5/14/2024  Impression: Unremarkable brain MRI, MRA neck and MRA brain. Specifically no signs of a recent infarct, flow-limiting stenoses or large vessel occlusion.   Electronically Signed ByNick POOLE  MD Rachel On:5/14/2024 11:15 AM      MRI Angiogram Head Without Contrast    Result Date: 5/14/2024  Impression: Unremarkable brain MRI, MRA neck and MRA brain. Specifically no signs of a recent infarct, flow-limiting stenoses or large vessel occlusion.   Electronically Signed ByNick Ramos MD On:5/14/2024 11:15 AM      MRI Angiogram Neck Without Contrast    Result Date: 5/14/2024  Impression: Unremarkable brain MRI, MRA neck and MRA brain. Specifically no signs of a recent infarct, flow-limiting stenoses or large vessel occlusion.   Electronically Signed By-Aiden Ramos MD On:5/14/2024 11:15 AM      CT Head Without Contrast    Result Date: 5/13/2024  Impression: No acute intracranial process identified.  Electronically Signed By-Olayinka Vaughan MD On:5/13/2024 7:46 PM      MRI Shoulder Left Without Contrast    Result Date: 4/22/2024  Impression: 1.  Status post rotator cuff repair. Findings in the supraspinatus and anterior infraspinatus tendons which could be related to prior tears and postoperative changes, but new/recurrent partial tear cannot be excluded. No significant full-thickness fluid signal tendon defect is visualized on this exam. 2.  Moderate glenohumeral osteoarthritis with degeneration and tear of the labrum. 3.  Findings of possible glenohumeral capsulitis. Correlate clinically. 4.  Postoperative changes at the acromioclavicular joint and biceps tenodesis.     Electronically Signed ByAbhilash Messina MD On:4/22/2024 3:19 PM               Results for orders placed during the hospital encounter of 05/13/24    Adult Transthoracic Echo Complete W/ Cont if Necessary Per Protocol (With Agitated Saline)    Interpretation Summary  •  The study is technically difficult for diagnosis.  •  Left ventricular systolic function is moderately decreased. Estimated left ventricular EF = 35%  •  Left ventricular wall thickness is consistent with hypertrophy probable mild in nature difficult to say if his  ventricular not as walls were not well-seen in multiple different views  •  The left atrial cavity is mildly dilated.  •  Global hypokinesis more severely affecting the septum with a dyssynchronous contraction pattern possibly secondary  Conduction abnormality      Imaging Results (All)       Procedure Component Value Units Date/Time    MRI Brain Without Contrast [[id number]] Collected: 05/14/24 1109     Updated: 05/14/24 1118    Narrative:      EXAMINATION: MRI BRAIN WO CONTRAST-, MRI ANGIOGRAM NECK WO CONTRAST-,  MRI ANGIOGRAM HEAD WO CONTRAST-     DATE OF EXAM: 5/14/2024 10:15 AM     INDICATION: Stroke, follow up.     COMPARISON: Head CT 5/13/2024     TECHNIQUE: Multiplanar multisequence images of the brain were performed  without contrast according to routine brain MRI protocol. 3D  time-of-flight imaging of the head and neck obtained per angiogram  protocol.     FINDINGS:  MRI brain: No areas of diffusion restriction to suggest a recent  infarct. Negative for acute intracranial hemorrhage, large mass lesion,  midline shift or hydrocephalus. Normal parenchymal volume. No  significant T2/FLAIR signal abnormality. No extra-axial collection.  Major intracranial flow voids appear preserved. Globes symmetric.  Midline structures normal. No suspicious areas of susceptibility  artifact to suggest sequelae of prior hemorrhage. No obstructive sinus  disease or air-fluid level. No large mastoid effusion.     MRA neck: No flow signal abnormality to suggest large vessel occlusion,  flow-limiting stenoses, vascular malformation or aneurysm.     MRA head: No flow signal abnormality to suggest large vessel occlusion,  flow-limiting stenoses, vascular malformation or aneurysm. Anterior  communicating artery appears patent. Bilateral posterior communicating  arteries appear patent.       Impression:      Unremarkable brain MRI, MRA neck and MRA brain. Specifically no signs of  a recent infarct, flow-limiting stenoses or large  vessel occlusion.        Electronically Signed ByNick Ramos MD On:5/14/2024 11:15 AM       MRI Angiogram Head Without Contrast [[id number]] Collected: 05/14/24 1109     Updated: 05/14/24 1118    Narrative:      EXAMINATION: MRI BRAIN WO CONTRAST-, MRI ANGIOGRAM NECK WO CONTRAST-,  MRI ANGIOGRAM HEAD WO CONTRAST-     DATE OF EXAM: 5/14/2024 10:15 AM     INDICATION: Stroke, follow up.     COMPARISON: Head CT 5/13/2024     TECHNIQUE: Multiplanar multisequence images of the brain were performed  without contrast according to routine brain MRI protocol. 3D  time-of-flight imaging of the head and neck obtained per angiogram  protocol.     FINDINGS:  MRI brain: No areas of diffusion restriction to suggest a recent  infarct. Negative for acute intracranial hemorrhage, large mass lesion,  midline shift or hydrocephalus. Normal parenchymal volume. No  significant T2/FLAIR signal abnormality. No extra-axial collection.  Major intracranial flow voids appear preserved. Globes symmetric.  Midline structures normal. No suspicious areas of susceptibility  artifact to suggest sequelae of prior hemorrhage. No obstructive sinus  disease or air-fluid level. No large mastoid effusion.     MRA neck: No flow signal abnormality to suggest large vessel occlusion,  flow-limiting stenoses, vascular malformation or aneurysm.     MRA head: No flow signal abnormality to suggest large vessel occlusion,  flow-limiting stenoses, vascular malformation or aneurysm. Anterior  communicating artery appears patent. Bilateral posterior communicating  arteries appear patent.       Impression:      Unremarkable brain MRI, MRA neck and MRA brain. Specifically no signs of  a recent infarct, flow-limiting stenoses or large vessel occlusion.        Electronically Signed ByNick Ramos MD On:5/14/2024 11:15 AM       MRI Angiogram Neck Without Contrast [[id number]] Collected: 05/14/24 1109     Updated: 05/14/24 1118    Narrative:      EXAMINATION: MRI  BRAIN WO CONTRAST-, MRI ANGIOGRAM NECK WO CONTRAST-,  MRI ANGIOGRAM HEAD WO CONTRAST-     DATE OF EXAM: 5/14/2024 10:15 AM     INDICATION: Stroke, follow up.     COMPARISON: Head CT 5/13/2024     TECHNIQUE: Multiplanar multisequence images of the brain were performed  without contrast according to routine brain MRI protocol. 3D  time-of-flight imaging of the head and neck obtained per angiogram  protocol.     FINDINGS:  MRI brain: No areas of diffusion restriction to suggest a recent  infarct. Negative for acute intracranial hemorrhage, large mass lesion,  midline shift or hydrocephalus. Normal parenchymal volume. No  significant T2/FLAIR signal abnormality. No extra-axial collection.  Major intracranial flow voids appear preserved. Globes symmetric.  Midline structures normal. No suspicious areas of susceptibility  artifact to suggest sequelae of prior hemorrhage. No obstructive sinus  disease or air-fluid level. No large mastoid effusion.     MRA neck: No flow signal abnormality to suggest large vessel occlusion,  flow-limiting stenoses, vascular malformation or aneurysm.     MRA head: No flow signal abnormality to suggest large vessel occlusion,  flow-limiting stenoses, vascular malformation or aneurysm. Anterior  communicating artery appears patent. Bilateral posterior communicating  arteries appear patent.       Impression:      Unremarkable brain MRI, MRA neck and MRA brain. Specifically no signs of  a recent infarct, flow-limiting stenoses or large vessel occlusion.        Electronically Signed By-Aiden Ramos MD On:5/14/2024 11:15 AM       CT Head Without Contrast [[id number]] Collected: 05/13/24 1943     Updated: 05/13/24 1948    Narrative:      CT HEAD WO CONTRAST-     Date of Exam: 5/13/2024 7:06 PM     Indication: Stroke rule out.     Comparison: MRI brain from October 2, 2023     Technique: CT scan of the head without IV contrast.  Automated exposure  control and iterative reconstruction methods  were used.     FINDINGS  No acute intracranial hemorrhage or extra-axial collection is  identified. The ventricles appear normal in caliber, with no evidence of  mass effect or midline shift. The basal cisterns appear patent. The  gray-white differentiation appears preserved.     The calvarium appears intact. The visualized paranasal sinuses are  clear. The mastoid air cells are well-aerated.       Impression:      No acute intracranial process identified.     Electronically Signed By-Olayinka Vaughan MD On:5/13/2024 7:46 PM                Labs Pending at Discharge:          Time spent on Discharge including face to face service: 40 minutes  Part of this note may be an electronic transcription/translation of spoken language to printed text using the Dragon Dictation System.     TElectronically signed by Amarjit Johnston MD, 05/15/24, 6:00 PM EDT.

## 2024-05-15 NOTE — PLAN OF CARE
Goal Outcome Evaluation:                           Pt discharging home accompanied by wife. IV and telemetry dc'ed.

## 2024-05-15 NOTE — PROGRESS NOTES
[facility]  [facility] TeleNeurology Consult Services    Routine Consult Follow-Up    Patient Name:   Nick Turk  YOB: 1970  Identification Number:   MRN - [id number]  Date of Service:   05/15/2024 07:32:13    Diagnosis        G45.9 - Transient cerebral ischemic attack, unspecified        I67.4 - Hypertensive encephalopathy    Impression  55 y/o male PMH HTN who presents with transient word finding difficulty, blurry vision, difficulty performing tasks would usually not have issue with which occurred in setting of HTN. MRI brain, MRA head/neck unremarkable. Diff dx includes TIA/stroke vs hypertensive encephalopathy. Recommend:    Continue ASA 81 mg daily and statin for possibility of TIA  BP- gradual reduction to normotension  Outpatient f/up with neurology in 2-3 weeks  Outpatient cardiac monitoring given LA enlargement on TTE  Cardiac w/up reduced EF per primary team  Please call with any further questions.    Our recommendations are outlined below    Diagnostic Studies :  Holter/Loop Recorder as outpatient with cardiology follow up    Antithrombotic Medication :  Aspirin 81 mg PO dailyStatins for LDL goal less than 70    Nursing Recommendations :  IV Fluids, avoid dextrose containing fluids, Maintain euglycemiaNeuro checks q4 hrs x 24 hrs and then per shiftHead of bed 30 degreesContinue with Telemetry    Consultations :  Recommend Speech therapy if failed dysphagia screenPhysical therapy/Occupational therapy    Disposition :  No further recommendationsOutpatient Neurology follow up in 1-3 weeks    Subjective  No acute events overnight.    Hospital Course  54M Presents to ED with elevated BP and wife reported he has difficulty getting words out and vision changes over the weekend and an episode of confusion today that completely resolved. HCT negative. Denies loss of awareness, confusion was more difficulty when doing an activity, had slight blurry vision when driving but no loss of  "vision. Would say wrong word that didn't mean to say.    Per H&P: \"54 y.o. male presents with complaints of hypertension at home. Patient states that he had blood pressure in his PCPs office of 160/120. He does not take any medication for hypertension however was prescribed some today. He took it twice without improvement of his blood pressure. He has had headaches as well as visual disturbance. Patient states that over the past week he has had some blurry vision and tunnel vision. Yesterday however he had 16 episodes the day prior. Patient and wife also recount that the patient took an abnormally long time and multiple attempts to perform an easy task that he would not have a problem with. This involved measuring out liquid into a measuring cup\"    No anti platelet.    Imaging  CT head w/o cont:  No acute intracranial process identified.    MRI brain, MRA head/neck:  Unremarkable brain MRI, MRA neck and MRA brain. Specifically no signs of  a recent infarct, flow-limiting stenoses or large vessel occlusion.    TTE:   The study is technically difficult for diagnosis.   Left ventricular systolic function is moderately decreased. Estimated left ventricular EF = 35%   Left ventricular wall thickness is consistent with hypertrophy probable mild in nature difficult to say if his ventricular not as walls were not well-seen in multiple different views   The left atrial cavity is mildly dilated.   Global hypokinesis more severely affecting the septum with a dyssynchronous contraction pattern possibly secondary  Conduction abnormality        Examination  BP(123/70), Pulse(65), Temp(97.5), Resp(17),  1A: Level of Consciousness - Alert; keenly responsive + 0  1B: Ask Month and Age - Both Questions Right + 0  1C: Blink Eyes & Squeeze Hands - Performs Both Tasks + 0  2: Test Horizontal Extraocular Movements - Normal + 0  3: Test Visual Fields - No Visual Loss + 0  4: Test Facial Palsy (Use Grimace if Obtunded) - Normal symmetry + " 0  5A: Test Left Arm Motor Drift - No Drift for 10 Seconds + 0  5B: Test Right Arm Motor Drift - No Drift for 10 Seconds + 0  6A: Test Left Leg Motor Drift - No Drift for 5 Seconds + 0  6B: Test Right Leg Motor Drift - No Drift for 5 Seconds + 0  7: Test Limb Ataxia (FNF/Heel-Shin) - No Ataxia + 0  8: Test Sensation - Normal; No sensory loss + 0  9: Test Language/Aphasia - Normal; No aphasia + 0  10: Test Dysarthria - Normal + 0  11: Test Extinction/Inattention - No abnormality + 0    NIHSS Score: 0  NIHSS Free Text : AAOx 3  Left arm tingling          Patient/Family was informed the Neurology Consult would happen via TeleHealth consult by way of interactive audio and video telecommunications and consented to receiving care in this manner.    Telehealth Neurology consultation was provided. I spent minutes providing telehealth care. This includes time spent for face to face visit via telemedicine, review of medical records, imaging studies and discussion of findings with providers, the patient and/or family.      Dr Grace Wong      [facility]  For Inpatient follow-up with [facility] physician please call [facility] [phone]. This is not an outpatient service. Post hospital discharge, please contact hospital directly.    Please do not communicate with [facility] physicians via secure chat. If you have any questions, Please contact [facility].  Please call or reconsult our service if there are any clinical or diagnostic changes.

## 2024-05-16 NOTE — OUTREACH NOTE
Prep Survey      Flowsheet Row Responses   [facility] facility patient discharged from? [facility]   Is LACE score < 7 ? Yes   Eligibility Readm Mgmt   Discharge diagnosis CVA   Does the patient have one of the following disease processes/diagnoses(primary or secondary)? Stroke   Does the patient have Home health ordered? No   Is there a DME ordered? No   Prep survey completed? Yes            MATHEUS MARINELLI - Registered Nurse

## 2024-05-17 ENCOUNTER — OFFICE VISIT (OUTPATIENT)
Dept: ORTHOPEDIC SURGERY | Facility: CLINIC | Age: 54
End: 2024-05-17
Payer: COMMERCIAL

## 2024-05-17 VITALS
BODY MASS INDEX: 31.57 KG/M2 | HEART RATE: 76 BPM | OXYGEN SATURATION: 96 % | SYSTOLIC BLOOD PRESSURE: 142 MMHG | WEIGHT: 246 LBS | DIASTOLIC BLOOD PRESSURE: 86 MMHG | HEIGHT: 74 IN

## 2024-05-17 DIAGNOSIS — M79.672 LEFT FOOT PAIN: Primary | ICD-10-CM

## 2024-05-17 DIAGNOSIS — S92.352D CLOSED DISPLACED FRACTURE OF FIFTH METATARSAL BONE OF LEFT FOOT WITH ROUTINE HEALING, SUBSEQUENT ENCOUNTER: ICD-10-CM

## 2024-05-17 NOTE — PROGRESS NOTES
"Chief Complaint  Follow-up of the Left Foot     Subjective      Nick Turk presents to [facility] for follow up of the left foot. He went home from work and his foot was hurting. 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 He has pain over the 5 th metatarsal. The pain started around 3/6/24. He notes he is at least 90 % better.  He is still wearing the CAM boot.  He has swelling in the foot at times still.  He has no pain with walking barefoot.     Allergies   Allergen Reactions    Bee Venom Swelling    Meloxicam Confusion and Other (See Comments)        Social History     Socioeconomic History    Marital status:    Tobacco Use    Smoking status: Former     Average packs/day: 1 pack/day for 39.6 years (39.6 ttl pk-yrs)     Types: Cigarettes     Start date: 4/13/1984    Smokeless tobacco: Never   Vaping Use    Vaping status: Never Used   Substance and Sexual Activity    Alcohol use: Never    Drug use: Never    Sexual activity: Yes     Partners: Female     Birth control/protection: Post-menopausal, Tubal ligation        I reviewed the patient's chief complaint, history of present illness, review of systems, past medical history, surgical history, family history, social history, medications, and allergy list.     Review of Systems     Constitutional: Denies fevers, chills, weight loss  Cardiovascular: Denies chest pain, shortness of breath  Skin: Denies rashes, acute skin changes  Neurologic: Denies headache, loss of consciousness        Vital Signs:   /86   Pulse 76   Ht 188 cm (74\")   Wt 112 kg (246 lb)   SpO2 96%   BMI 31.58 kg/m²          Physical Exam  General: Alert. No acute distress    Ortho Exam        LEFT FOOT Positive EHL, FHL, GS and TA. Sensation intact to all 5 nerves of the foot. Positive pulses. Neurovascularly intact. Calf soft, Non-tender. Full ROM of the ankle. Stable to stress. Non Tender to the 5 th metacarpal Intact " flexion and extension of toes.       Procedures        Imaging Results (Most Recent)       Procedure Component Value Units Date/Time    XR Foot 3+ View Left [[id number]] Resulted: 05/17/24 1453     Updated: 05/17/24 1455             Result Review :     X-Ray Report:  Left foot X-Ray  Indication: Evaluation of the left foot  AP/Lateral view(s)  Findings:  Interval healing of the 5 th metacarpal fracture   Prior studies available for comparison: yes                  Assessment and Plan     Diagnoses and all orders for this visit:    1. Left foot pain (Primary)  -     XR Foot 3+ View Left    2. Closed displaced fracture of fifth metatarsal bone of left foot with routine healing, subsequent encounter        Discussed the treatment plan with the patient. I reviewed the X-rays that were obtained today with the patient.     Continue CAM boot.     Will obtain X-Rays at next visit.    Call or return if worsening symptoms.    Follow Up     3-4 weeks with X ray.        Patient was given instructions and counseling regarding his condition or for health maintenance advice. Please see specific information pulled into the AVS if appropriate.     Scribed for Abdoulaye Minaya MD by Maria Eugenia Paredes MA.  05/17/24   14:58 EDT    I have personally performed the services described in this document as scribed by the above individual and it is both accurate and complete. Abdoulaye Minaya MD 05/17/24

## 2024-05-20 ENCOUNTER — HOSPITAL ENCOUNTER (INPATIENT)
Facility: HOSPITAL | Age: 54
LOS: 3 days | Discharge: HOME | DRG: 287 | End: 2024-05-24
Attending: EMERGENCY MEDICINE | Admitting: STUDENT IN AN ORGANIZED HEALTH CARE EDUCATION/TRAINING PROGRAM
Payer: COMMERCIAL

## 2024-05-20 ENCOUNTER — READMISSION MANAGEMENT (OUTPATIENT)
Dept: CALL CENTER | Facility: HOSPITAL | Age: 54
End: 2024-05-20
Payer: COMMERCIAL

## 2024-05-20 ENCOUNTER — APPOINTMENT (OUTPATIENT)
Dept: GENERAL RADIOLOGY | Facility: HOSPITAL | Age: 54
DRG: 287 | End: 2024-05-20
Payer: COMMERCIAL

## 2024-05-20 DIAGNOSIS — R26.2 DIFFICULTY IN WALKING: ICD-10-CM

## 2024-05-20 DIAGNOSIS — Z78.9 DECREASED ACTIVITIES OF DAILY LIVING (ADL): ICD-10-CM

## 2024-05-20 DIAGNOSIS — I50.21 ACUTE HFREF (HEART FAILURE WITH REDUCED EJECTION FRACTION): ICD-10-CM

## 2024-05-20 DIAGNOSIS — I63.9 CEREBROVASCULAR ACCIDENT (CVA), UNSPECIFIED MECHANISM: ICD-10-CM

## 2024-05-20 DIAGNOSIS — I24.9 ACUTE CORONARY SYNDROME: ICD-10-CM

## 2024-05-20 DIAGNOSIS — R07.9 CHEST PAIN, UNSPECIFIED TYPE: Primary | ICD-10-CM

## 2024-05-20 PROBLEM — I51.9 LEFT VENTRICULAR SYSTOLIC DYSFUNCTION (LVSD): Status: ACTIVE | Noted: 2024-05-20

## 2024-05-20 PROBLEM — I10 ESSENTIAL HYPERTENSION: Status: ACTIVE | Noted: 2024-05-20

## 2024-05-20 PROBLEM — I51.89 LEFT VENTRICULAR SYSTOLIC DYSFUNCTION (LVSD): Status: ACTIVE | Noted: 2024-05-20

## 2024-05-20 LAB
ALBUMIN SERPL-MCNC: 4.3 G/DL (ref 3.5–5.2)
ALBUMIN/GLOB SERPL: 1.4 G/DL
ALP SERPL-CCNC: 58 U/L (ref 39–117)
ALT SERPL W P-5'-P-CCNC: 32 U/L (ref 1–41)
ANION GAP SERPL CALCULATED.3IONS-SCNC: 9.7 MMOL/L (ref 5–15)
AST SERPL-CCNC: 22 U/L (ref 1–40)
BASOPHILS # BLD AUTO: 0.07 10*3/MM3 (ref 0–0.2)
BASOPHILS NFR BLD AUTO: 0.6 % (ref 0–1.5)
BILIRUB SERPL-MCNC: 0.7 MG/DL (ref 0–1.2)
BUN SERPL-MCNC: 15 MG/DL (ref 6–20)
BUN/CREAT SERPL: 16.5 (ref 7–25)
CALCIUM SPEC-SCNC: 9 MG/DL (ref 8.6–10.5)
CHLORIDE SERPL-SCNC: 103 MMOL/L (ref 98–107)
CO2 SERPL-SCNC: 24.3 MMOL/L (ref 22–29)
CREAT SERPL-MCNC: 0.91 MG/DL (ref 0.76–1.27)
DEPRECATED RDW RBC AUTO: 41.5 FL (ref 37–54)
EGFRCR SERPLBLD CKD-EPI 2021: 100.8 ML/MIN/1.73
EOSINOPHIL # BLD AUTO: 0.27 10*3/MM3 (ref 0–0.4)
EOSINOPHIL NFR BLD AUTO: 2.4 % (ref 0.3–6.2)
ERYTHROCYTE [DISTWIDTH] IN BLOOD BY AUTOMATED COUNT: 12.1 % (ref 12.3–15.4)
GEN 5 2HR TROPONIN T REFLEX: 11 NG/L
GLOBULIN UR ELPH-MCNC: 3.1 GM/DL
GLUCOSE SERPL-MCNC: 114 MG/DL (ref 65–99)
HCT VFR BLD AUTO: 49.1 % (ref 37.5–51)
HGB BLD-MCNC: 16.8 G/DL (ref 13–17.7)
HOLD SPECIMEN: NORMAL
HOLD SPECIMEN: NORMAL
IMM GRANULOCYTES # BLD AUTO: 0.04 10*3/MM3 (ref 0–0.05)
IMM GRANULOCYTES NFR BLD AUTO: 0.4 % (ref 0–0.5)
LIPASE SERPL-CCNC: 74 U/L (ref 13–60)
LYMPHOCYTES # BLD AUTO: 1.99 10*3/MM3 (ref 0.7–3.1)
LYMPHOCYTES NFR BLD AUTO: 17.7 % (ref 19.6–45.3)
MAGNESIUM SERPL-MCNC: 2 MG/DL (ref 1.6–2.6)
MCH RBC QN AUTO: 31.8 PG (ref 26.6–33)
MCHC RBC AUTO-ENTMCNC: 34.2 G/DL (ref 31.5–35.7)
MCV RBC AUTO: 93 FL (ref 79–97)
MONOCYTES # BLD AUTO: 1.26 10*3/MM3 (ref 0.1–0.9)
MONOCYTES NFR BLD AUTO: 11.2 % (ref 5–12)
NEUTROPHILS NFR BLD AUTO: 67.7 % (ref 42.7–76)
NEUTROPHILS NFR BLD AUTO: 7.63 10*3/MM3 (ref 1.7–7)
NRBC BLD AUTO-RTO: 0 /100 WBC (ref 0–0.2)
NT-PROBNP SERPL-MCNC: 291.7 PG/ML (ref 0–900)
PLATELET # BLD AUTO: 229 10*3/MM3 (ref 140–450)
PMV BLD AUTO: 10.5 FL (ref 6–12)
POTASSIUM SERPL-SCNC: 4.2 MMOL/L (ref 3.5–5.2)
PROT SERPL-MCNC: 7.4 G/DL (ref 6–8.5)
RBC # BLD AUTO: 5.28 10*6/MM3 (ref 4.14–5.8)
SODIUM SERPL-SCNC: 137 MMOL/L (ref 136–145)
TROPONIN T DELTA: 0 NG/L
TROPONIN T SERPL HS-MCNC: 11 NG/L
WBC NRBC COR # BLD AUTO: 11.26 10*3/MM3 (ref 3.4–10.8)
WHOLE BLOOD HOLD COAG: NORMAL
WHOLE BLOOD HOLD SPECIMEN: NORMAL

## 2024-05-20 PROCEDURE — 93005 ELECTROCARDIOGRAM TRACING: CPT

## 2024-05-20 PROCEDURE — 25010000002 HEPARIN (PORCINE) PER 1000 UNITS: Performed by: INTERNAL MEDICINE

## 2024-05-20 PROCEDURE — 83880 ASSAY OF NATRIURETIC PEPTIDE: CPT | Performed by: EMERGENCY MEDICINE

## 2024-05-20 PROCEDURE — 71045 X-RAY EXAM CHEST 1 VIEW: CPT

## 2024-05-20 PROCEDURE — 36415 COLL VENOUS BLD VENIPUNCTURE: CPT

## 2024-05-20 PROCEDURE — 84484 ASSAY OF TROPONIN QUANT: CPT | Performed by: EMERGENCY MEDICINE

## 2024-05-20 PROCEDURE — 83690 ASSAY OF LIPASE: CPT | Performed by: EMERGENCY MEDICINE

## 2024-05-20 PROCEDURE — 93005 ELECTROCARDIOGRAM TRACING: CPT | Performed by: EMERGENCY MEDICINE

## 2024-05-20 PROCEDURE — 85025 COMPLETE CBC W/AUTO DIFF WBC: CPT

## 2024-05-20 PROCEDURE — 99223 1ST HOSP IP/OBS HIGH 75: CPT | Performed by: INTERNAL MEDICINE

## 2024-05-20 PROCEDURE — 83735 ASSAY OF MAGNESIUM: CPT | Performed by: EMERGENCY MEDICINE

## 2024-05-20 PROCEDURE — 99285 EMERGENCY DEPT VISIT HI MDM: CPT

## 2024-05-20 PROCEDURE — 80053 COMPREHEN METABOLIC PANEL: CPT | Performed by: EMERGENCY MEDICINE

## 2024-05-20 RX ORDER — LOSARTAN POTASSIUM 25 MG/1
25 TABLET ORAL DAILY
Status: DISCONTINUED | OUTPATIENT
Start: 2024-05-20 | End: 2024-05-21

## 2024-05-20 RX ORDER — SODIUM CHLORIDE 0.9 % (FLUSH) 0.9 %
10 SYRINGE (ML) INJECTION EVERY 12 HOURS SCHEDULED
Status: DISCONTINUED | OUTPATIENT
Start: 2024-05-20 | End: 2024-05-24 | Stop reason: HOSPADM

## 2024-05-20 RX ORDER — ASPIRIN 81 MG/1
81 TABLET, CHEWABLE ORAL DAILY
Status: DISCONTINUED | OUTPATIENT
Start: 2024-05-20 | End: 2024-05-24 | Stop reason: HOSPADM

## 2024-05-20 RX ORDER — BISACODYL 10 MG
10 SUPPOSITORY, RECTAL RECTAL DAILY PRN
Status: DISCONTINUED | OUTPATIENT
Start: 2024-05-20 | End: 2024-05-24 | Stop reason: HOSPADM

## 2024-05-20 RX ORDER — POLYETHYLENE GLYCOL 3350 17 G/17G
17 POWDER, FOR SOLUTION ORAL DAILY PRN
Status: DISCONTINUED | OUTPATIENT
Start: 2024-05-20 | End: 2024-05-24 | Stop reason: HOSPADM

## 2024-05-20 RX ORDER — HEPARIN SODIUM 5000 [USP'U]/ML
5000 INJECTION, SOLUTION INTRAVENOUS; SUBCUTANEOUS EVERY 8 HOURS SCHEDULED
Status: DISCONTINUED | OUTPATIENT
Start: 2024-05-20 | End: 2024-05-21

## 2024-05-20 RX ORDER — NITROGLYCERIN 0.4 MG/1
0.4 TABLET SUBLINGUAL
Status: DISCONTINUED | OUTPATIENT
Start: 2024-05-20 | End: 2024-05-21

## 2024-05-20 RX ORDER — SODIUM CHLORIDE 0.9 % (FLUSH) 0.9 %
10 SYRINGE (ML) INJECTION AS NEEDED
Status: DISCONTINUED | OUTPATIENT
Start: 2024-05-20 | End: 2024-05-21

## 2024-05-20 RX ORDER — BISACODYL 5 MG/1
5 TABLET, DELAYED RELEASE ORAL DAILY PRN
Status: DISCONTINUED | OUTPATIENT
Start: 2024-05-20 | End: 2024-05-24 | Stop reason: HOSPADM

## 2024-05-20 RX ORDER — AMOXICILLIN 250 MG
2 CAPSULE ORAL 2 TIMES DAILY PRN
Status: DISCONTINUED | OUTPATIENT
Start: 2024-05-20 | End: 2024-05-24 | Stop reason: HOSPADM

## 2024-05-20 RX ORDER — SODIUM CHLORIDE 0.9 % (FLUSH) 0.9 %
10 SYRINGE (ML) INJECTION AS NEEDED
Status: DISCONTINUED | OUTPATIENT
Start: 2024-05-20 | End: 2024-05-24 | Stop reason: HOSPADM

## 2024-05-20 RX ORDER — SODIUM CHLORIDE 9 MG/ML
40 INJECTION, SOLUTION INTRAVENOUS AS NEEDED
Status: DISCONTINUED | OUTPATIENT
Start: 2024-05-20 | End: 2024-05-24 | Stop reason: HOSPADM

## 2024-05-20 RX ORDER — ATORVASTATIN CALCIUM 20 MG/1
20 TABLET, FILM COATED ORAL NIGHTLY
Status: DISCONTINUED | OUTPATIENT
Start: 2024-05-20 | End: 2024-05-22

## 2024-05-20 RX ORDER — CARVEDILOL 3.12 MG/1
3.12 TABLET ORAL 2 TIMES DAILY WITH MEALS
Status: DISCONTINUED | OUTPATIENT
Start: 2024-05-20 | End: 2024-05-21

## 2024-05-20 RX ORDER — TESTOSTERONE CYPIONATE 200 MG/ML
100 INJECTION, SOLUTION INTRAMUSCULAR
COMMUNITY
Start: 2024-05-13

## 2024-05-20 RX ADMIN — NITROGLYCERIN 1 INCH: 20 OINTMENT TOPICAL at 13:09

## 2024-05-20 RX ADMIN — HEPARIN SODIUM 5000 UNITS: 5000 INJECTION INTRAVENOUS; SUBCUTANEOUS at 21:00

## 2024-05-20 RX ADMIN — HEPARIN SODIUM 5000 UNITS: 5000 INJECTION INTRAVENOUS; SUBCUTANEOUS at 16:34

## 2024-05-20 RX ADMIN — EMPAGLIFLOZIN 10 MG: 10 TABLET, FILM COATED ORAL at 16:34

## 2024-05-20 RX ADMIN — ATORVASTATIN CALCIUM 20 MG: 20 TABLET, FILM COATED ORAL at 21:00

## 2024-05-20 RX ADMIN — Medication 10 ML: at 21:00

## 2024-05-20 NOTE — H&P
[facility]   HOSPITALIST HISTORY AND PHYSICAL  Date: 2024   Patient Name: Nick Turk  : 1970  MRN: [id number]  Primary Care Physician:  Ashley Cabezas APRN  Date of admission: 2024    Subjective   Subjective     Chief Complaint: Chest pain    HPI:  Nick Turk is a 54 y.o. male with recently diagnosed to systolic congestive heart failure, obstructive sleep apnea on CPAP at home, history of chronic tobacco abuse (previously smoked 1.5 packs/day for 39 years and quit smoking in 2023), obesity (BMI: 31.91) that presented to the ED with complaints of chest pain and discomfort.  Patient was at PCP appointment this morning with his heart rate was reportedly varying between the 40s up into the 80s and patient had associated chest discomfort.  He was given dose of nitroglycerin by PCP and then sent to ED for evaluation.  Patient had recurrent chest discomfort in the ED and was given Nitropaste, there is subsequent improvement in his chest discomfort.  Of note, patient was recently admitted to the hospital from -15/2024 and treated for hypertension.  During that hospitalization patient echocardiogram done that showed patient to have EF of 35% and global hypokinesis.  Patient was discharged on aspirin, atorvastatin, carvedilol, Jardiance and told to continue home losartan.  Additionally, holding it at home patient has noted that he is becoming more easily fatigued than he used to and patient also noted that he did experience diaphoresis today while at work much more easily than he had in the past.  Hospitalist service contacted for further evaluation and management.      Personal History     Past Medical History:  Chronic systolic congestive heart failure  Obstructive sleep apnea on CPAP at home  History of chronic tobacco abuse with cigarettes  Obesity  Essential hypertension    Past Surgical History:  Cholecystectomy  Lumbar fusion    Family History:   Mother: History of heart  disease  Father: No reported history of heart disease    Social History:   Tobacco: Smoker.  Previously smoked 1.5 packs/day for 39 years  Alcohol: Denies use  Illicit Substances: Denies use    Home Medications:  Testosterone Cypionate, aspirin, atorvastatin, carvedilol, empagliflozin, and losartan    Allergies:  Allergies   Allergen Reactions    Bee Venom Swelling    Meloxicam Confusion and Other (See Comments)       Review of Systems  All systems were reviewed and negative except for:   -Cardiovascular ROS: positive for - chest pain and irregular heartbeat    Objective   Objective     Vitals:   Temp:  [98.2 °F (36.8 °C)] 98.2 °F (36.8 °C)  Heart Rate:  [65-80] 72  Resp:  [16-18] 18  BP: (117-125)/(72-88) 122/88    Physical Exam    Constitutional: Awake, alert, no acute distress   Eyes: Pupils equal, sclerae anicteric, no conjunctival injection   HENT: NCAT, mucous membranes moist   Neck: Supple, no thyromegaly, no lymphadenopathy, trachea midline   Respiratory: Clear to auscultation bilaterally, nonlabored respirations    Cardiovascular: RRR, no murmurs, rubs, or gallops, palpable pedal pulses bilaterally   Gastrointestinal: Positive bowel sounds, soft, nontender, nondistended   Musculoskeletal: No bilateral ankle edema, no clubbing or cyanosis to extremities   Psychiatric: Appropriate affect, cooperative   Neurologic: Oriented x 3, strength symmetric in all extremities, Cranial Nerves grossly intact, speech clear   Skin: No rashes     Result Review    Result Review:  I have personally reviewed the results from the time of this admission to 5/20/2024 13:59 EDT and agree with these findings:  [x]  Laboratory:  CMP          5/13/2024    19:02 5/15/2024    03:57 5/20/2024    10:29   CMP   Glucose 113  109  114    BUN 13  17  15    Creatinine 0.87  0.81  0.91    EGFR 103.2  105.4  100.8    Sodium 138  140  137    Potassium 4.0  4.3  4.2    Chloride 107  107  103    Calcium 8.7  8.8  9.0    Total Protein 6.6  6.0  7.4     Albumin 3.8  3.6  4.3    Globulin 2.8  2.4  3.1    Total Bilirubin 0.2  0.2  0.7    Alkaline Phosphatase 49  45  58    AST (SGOT) 17  14  22    ALT (SGPT) 26  22  32    Albumin/Globulin Ratio 1.4  1.5  1.4    BUN/Creatinine Ratio 14.9  21.0  16.5    Anion Gap 9.3  7.9  9.7      CBC          5/13/2024    19:02 5/15/2024    03:57 5/20/2024    10:29   CBC   WBC 9.28  7.98  11.26    RBC 5.04  4.92  5.28    Hemoglobin 15.8  15.5  16.8    Hematocrit 47.8  47.2  49.1    MCV 94.8  95.9  93.0    MCH 31.3  31.5  31.8    MCHC 33.1  32.8  34.2    RDW 12.2  12.2  12.1    Platelets 223  199  229    High-sensitivity troponin negative and flat.  proBNP not elevated.  []  Microbiology:  [x]  Radiology: CXR imaging personally viewed.  No acute infiltrate noted.  No evidence of pleural effusion.  [x]  EKG/Telemetry:   []  Cardiology/Vascular:   []  Pathology:  []  Old records:  []  Other:      Assessment & Plan   Assessment / Plan     Assessment:  Chest pain, rule out ACS  Chronic systolic congestive heart failure  Essential hypertension  History of chronic tobacco abuse with cigarettes  Obesity (BMI: 31.91)    Plan:  -Admit to telemetry bed  -Care discussed with the ED physician.  Patient was given nitroglycerin paste in the emergency department.  -Cardiology service to be consulted.  Patient with reduced ejection fraction manage prior to recent admission was unknown.  Patient also experiencing chest pain and discomfort with minimal exertion that is improved with nitroglycerin.  Cardiology to evaluate patient and possibly pursue cardiac catheterization  -Patient to remain n.p.o. pending cardiology evaluation  -Start appropriate home medications once medication list is reconciled and reviewed  -As needed nitroglycerin available for chest discomfort  -Monitor electrolytes and renal function with BMP and magnesium level in the AM  -Monitor WBC and Hgb with CBC in the AM  -Clinical course will dictate further management     DVT  Prophylaxis: Heparin  Diet: NPO  Dispo: Admit to telemetry bed  Code Status: Full code     Personally reviewed patients labs and imaging, discussed with patient and nurse at bedside. Discussed management with the ED physician and the following consultants: Cardiology.     Part of this note may be an electronic transcription/translation of spoken language to printed text using the Dragon dictation system.      DVT prophylaxis:  Medical DVT prophylaxis orders are signed and held.          CODE STATUS:    Code Status (Patient has no pulse and is not breathing): CPR (Attempt to Resuscitate)  Medical Interventions (Patient has pulse or is breathing): Full Support      Admission Status: I believe this patient meets inpatient status.    Electronically signed by Vipul Elaine MD, 05/20/24, 1:59 PM EDT.

## 2024-05-20 NOTE — ED PROVIDER NOTES
Time: 1:23 PM EDT  Date of encounter:  5/20/2024  Independent Historian/Clinical History and Information was obtained by:   Patient and Family    History is limited by: N/A    Chief Complaint: Chest pain      History of Present Illness:  Patient is a 54 y.o. year old male who presents to the emergency department for evaluation of chest pain relieved with nitroglycerin at PCP office.  Patient has recent diagnosis of acute systolic congestive heart failure with an ejection fraction of 35% was not started on diuretics.  He presents complaining of chest discomfort in the left chest.    HPI    Patient Care Team  Primary Care Provider: Ashley Cabezas APRN    Past Medical History:     Allergies   Allergen Reactions    Bee Venom Swelling    Meloxicam Confusion and Other (See Comments)     Past Medical History:   Diagnosis Date    Back pain     Chronic pain syndrome     CVA (cerebral vascular accident) 5/14/2024    Forgetfulness     Leg pain     Lumbago 2015    Lumbar spondylosis     Pain, generalized     Pars defect of lumbar spine 2015    Sleep disorder     Spondylolisthesis, lumbar region 2015    L4/5     Past Surgical History:   Procedure Laterality Date    BACK SURGERY      CHOLECYSTECTOMY      COLONOSCOPY N/A 11/16/2021    Procedure: COLONOSCOPY WITH POLYPECTOMIES, CLIP APPLICATION X1, CAUTERY;  Surgeon: Nolberto Onofre MD;  Location: [facility] ENDOSCOPY;  Service: General;  Laterality: N/A;  COLON POLYPS    GANGLION CYST EXCISION      LUMBAR EPIDURAL INJECTION      LUMBAR FUSION  2015    SHOULDER ARTHROSCOPY W/ ROTATOR CUFF REPAIR Left 05/12/2022    Procedure: LEFT SHOULDER ARTHROSCOPY WITH ROTATOR CUFF REPAIR, SUBACROMINAL DECOMPRESSION, DISTAL CLAVICULECTOMY, BICEPS TENODESIS;  Surgeon: Abdoulaye Minaya MD;  Location: [facility] OR [facility] [place];  Service: Orthopedics;  Laterality: Left;    SPINAL FUSION       Family History   Problem Relation Age of Onset    Heart disease Mother     No Known Problems Father     Heart failure Sister      Malig Hyperthermia Neg Hx        Home Medications:  Prior to Admission medications    Medication Sig Start Date End Date Taking? Authorizing Provider   aspirin 81 MG chewable tablet Chew 1 tablet Daily for 30 days. 5/16/24 6/15/24  Amarjit Johnston MD   atorvastatin (Lipitor) 20 MG tablet Take 1 tablet by mouth Daily for 30 days. 5/15/24 6/14/24  Amarjit Johnston MD   carvedilol (Coreg) 3.125 MG tablet Take 1 tablet by mouth 2 (Two) Times a Day With Meals for 30 days. 5/15/24 6/14/24  Amarjit Johnston MD   empagliflozin (JARDIANCE) 10 MG tablet tablet Take 1 tablet by mouth Daily for 30 days. 5/15/24 6/14/24  Amarjit Johnston MD   losartan (COZAAR) 25 MG tablet Take 1 tablet by mouth Daily. 5/13/24   ProviderCaleb MD        Social History:   Social History     Tobacco Use    Smoking status: Former     Average packs/day: 1 pack/day for 39.6 years (39.6 ttl pk-yrs)     Types: Cigarettes     Start date: 4/13/1984    Smokeless tobacco: Never   Vaping Use    Vaping status: Never Used   Substance Use Topics    Alcohol use: Never    Drug use: Never         Review of Systems:  Review of Systems   Constitutional:  Negative for chills and fever.   HENT:  Negative for congestion, rhinorrhea and sore throat.    Eyes:  Negative for pain and visual disturbance.   Respiratory:  Negative for apnea, cough, chest tightness and shortness of breath.    Cardiovascular:  Positive for chest pain. Negative for palpitations.   Gastrointestinal:  Negative for abdominal pain, diarrhea, nausea and vomiting.   Genitourinary:  Negative for difficulty urinating and dysuria.   Musculoskeletal:  Negative for joint swelling and myalgias.   Skin:  Negative for color change.   Neurological:  Negative for seizures and headaches.   Psychiatric/Behavioral: Negative.     All other systems reviewed and are negative.       Physical Exam:  /82 (BP Location: Right arm, Patient Position: Lying)   Pulse 80   Temp 97.5 °F (36.4 °C) (Oral)    "Resp 18   Ht 188 cm (74.02\")   Wt 113 kg (248 lb 10.9 oz)   SpO2 95%   BMI 31.91 kg/m²     Physical Exam  Vitals and nursing note reviewed.   Constitutional:       General: He is not in acute distress.     Appearance: Normal appearance. He is not toxic-appearing.   HENT:      Head: Normocephalic and atraumatic.      Jaw: There is normal jaw occlusion.   Eyes:      General: Lids are normal.      Extraocular Movements: Extraocular movements intact.      Conjunctiva/sclera: Conjunctivae normal.      Pupils: Pupils are equal, round, and reactive to light.   Cardiovascular:      Rate and Rhythm: Normal rate and regular rhythm.      Pulses: Normal pulses.      Heart sounds: Normal heart sounds.   Pulmonary:      Effort: Pulmonary effort is normal. No respiratory distress.      Breath sounds: Normal breath sounds. No wheezing or rhonchi.   Abdominal:      General: Abdomen is flat.      Palpations: Abdomen is soft.      Tenderness: There is no abdominal tenderness. There is no guarding or rebound.   Musculoskeletal:         General: Normal range of motion.      Cervical back: Normal range of motion and neck supple.      Right lower leg: No edema.      Left lower leg: No edema.   Skin:     General: Skin is warm and dry.   Neurological:      Mental Status: He is alert and oriented to person, place, and time. Mental status is at baseline.   Psychiatric:         Mood and Affect: Mood normal.                  Procedures:  Procedures      Medical Decision Making:      Comorbidities that affect care:    Hypertension, diabetes, hyperlipidemia    External Notes reviewed:    Hospital Discharge Summary: After admission for CVA and hypertension management       The following orders were placed and all results were independently analyzed by me:  Orders Placed This Encounter   Procedures    XR Chest 1 View    [place] Draw    High Sensitivity Troponin T    Comprehensive Metabolic Panel    Lipase    BNP    Magnesium    CBC Auto " Differential    High Sensitivity Troponin T 2Hr    Basic Metabolic Panel    CBC (No Diff)    Magnesium    NPO Diet NPO Type: Strict NPO    Diet: Cardiac; Healthy Heart (2-3 Na+); Fluid Consistency: Thin (IDDSI 0)    Undress & Gown    Continuous Pulse Oximetry    Vital Signs    Notify Provider (With Default Parameters)    Ambulate Patient    Up in Chair    Up With Assistance    Intake & Output    Weigh Patient    Daily Weights    Oral Care    Saline Lock & Maintain IV Access    Obtain Informed Consent    Hold Apixaban, Rivaroxaban, Edoxaban, Dabigatran, Vorapaxar, Atopaxar 48 Hours Before Scheduled Cardiac Procedure    Please Call and Notify Pharmacy of Order to Hold Apixaban, Rivaroxaban, Edoxaban, Dabigatran, Vorapaxar, Atopaxar 48 Hours Before Scheduled Cardiac Procedure    Hold Metformin & Metformin Containing Products 48 Hours Prior to Scheduled Cardiac Procedure    Notify Pharmacy of Order to Hold Metformin & Metformin Containing Products 48 Hours Prior to Scheduled Cardiac Procedure    Notify MD    Code Status and Medical Interventions:    Inpatient Cardiology Consult    Oxygen Therapy- Nasal Cannula; Titrate 1-6 LPM Per SpO2; 90% or Greater    ECG 12 Lead ED Triage Standing Order; Chest Pain    ECG 12 Lead ED Triage Standing Order; Chest Pain    Insert Peripheral IV    Insert Peripheral IV    Inpatient Admission    CBC & Differential    Green Top (Gel)    Lavender Top    Gold Top - SST    Light Blue Top       Medications Given in the Emergency Department:  Medications   sodium chloride 0.9 % flush 10 mL (has no administration in time range)   aspirin chewable tablet 81 mg (81 mg Oral Not Given 5/20/24 1634)   atorvastatin (LIPITOR) tablet 20 mg (has no administration in time range)   empagliflozin (JARDIANCE) tablet 10 mg (10 mg Oral Given 5/20/24 1634)   losartan (COZAAR) tablet 25 mg ( Oral Dose Auto Held 5/28/24 0900)   carvedilol (COREG) tablet 3.125 mg ( Oral Dose Auto Held 5/28/24 1800)   sodium  chloride 0.9 % flush 10 mL (has no administration in time range)   sodium chloride 0.9 % flush 10 mL (has no administration in time range)   sodium chloride 0.9 % infusion 40 mL (has no administration in time range)   heparin (porcine) 5000 UNIT/ML injection 5,000 Units (5,000 Units Subcutaneous Given 5/20/24 1634)   sennosides-docusate (PERICOLACE) 8.6-50 MG per tablet 2 tablet (has no administration in time range)     And   polyethylene glycol (MIRALAX) packet 17 g (has no administration in time range)     And   bisacodyl (DULCOLAX) EC tablet 5 mg (has no administration in time range)     And   bisacodyl (DULCOLAX) suppository 10 mg (has no administration in time range)   nitroglycerin (NITROSTAT) SL tablet 0.4 mg (has no administration in time range)   nitroglycerin (NITROSTAT) ointment 1 inch (1 inch Topical Given 5/20/24 1309)        ED Course:    ED Course as of 05/20/24 1842   Mon May 20, 2024   1244 ECG 12 Lead ED Triage Standing Order; Chest Pain [TC]      ED Course User Index  [TC] Jose De Los Santos MD       Labs:    Lab Results (last 24 hours)       Procedure Component Value Units Date/Time    High Sensitivity Troponin T [[id number]]  (Normal) Collected: 05/20/24 1029    Specimen: Blood Updated: 05/20/24 1056     HS Troponin T 11 ng/L     Narrative:      High Sensitive Troponin T Reference Range:  <14.0 ng/L- Negative Female for AMI  <22.0 ng/L- Negative Male for AMI  >=14 - Abnormal Female indicating possible myocardial injury.  >=22 - Abnormal Male indicating possible myocardial injury.   Clinicians would have to utilize clinical acumen, EKG, Troponin, and serial changes to determine if it is an Acute Myocardial Infarction or myocardial injury due to an underlying chronic condition.         CBC & Differential [[id number]]  (Abnormal) Collected: 05/20/24 1029    Specimen: Blood Updated: 05/20/24 1034    Narrative:      The following orders were created for panel order CBC & Differential.  Procedure                                Abnormality         Status                     ---------                               -----------         ------                     CBC Auto Differential[[id number]]        Abnormal            Final result                 Please view results for these tests on the individual orders.    Comprehensive Metabolic Panel [[id number]]  (Abnormal) Collected: 05/20/24 1029    Specimen: Blood Updated: 05/20/24 1056     Glucose 114 mg/dL      BUN 15 mg/dL      Creatinine 0.91 mg/dL      Sodium 137 mmol/L      Potassium 4.2 mmol/L      Comment: Slight hemolysis detected by analyzer. Result may be falsely elevated.        Chloride 103 mmol/L      CO2 24.3 mmol/L      Calcium 9.0 mg/dL      Total Protein 7.4 g/dL      Albumin 4.3 g/dL      ALT (SGPT) 32 U/L      AST (SGOT) 22 U/L      Alkaline Phosphatase 58 U/L      Total Bilirubin 0.7 mg/dL      Globulin 3.1 gm/dL      A/G Ratio 1.4 g/dL      BUN/Creatinine Ratio 16.5     Anion Gap 9.7 mmol/L      eGFR 100.8 mL/min/1.73     Narrative:      GFR Normal >60  Chronic Kidney Disease <60  Kidney Failure <15      Lipase [[id number]]  (Abnormal) Collected: 05/20/24 1029    Specimen: Blood Updated: 05/20/24 1056     Lipase 74 U/L     BNP [[id number]]  (Normal) Collected: 05/20/24 1029    Specimen: Blood Updated: 05/20/24 1054     proBNP 291.7 pg/mL     Narrative:      This assay is used as an aid in the diagnosis of individuals suspected of having heart failure. It can be used as an aid in the diagnosis of acute decompensated heart failure (ADHF) in patients presenting with signs and symptoms of ADHF to the emergency department (ED). In addition, NT-proBNP of <300 pg/mL indicates ADHF is not likely.    Age Range Result Interpretation  NT-proBNP Concentration (pg/mL:      <50             Positive            >450                   Gray                 300-450                    Negative             <300    50-75           Positive            >900                  Iraheta                 300-900                  Negative            <300      >75             Positive            >1800                  Gray                300-1800                  Negative            <300    Magnesium [[id number]]  (Normal) Collected: 05/20/24 1029    Specimen: Blood Updated: 05/20/24 1056     Magnesium 2.0 mg/dL     CBC Auto Differential [[id number]]  (Abnormal) Collected: 05/20/24 1029    Specimen: Blood Updated: 05/20/24 1034     WBC 11.26 10*3/mm3      RBC 5.28 10*6/mm3      Hemoglobin 16.8 g/dL      Hematocrit 49.1 %      MCV 93.0 fL      MCH 31.8 pg      MCHC 34.2 g/dL      RDW 12.1 %      RDW-SD 41.5 fl      MPV 10.5 fL      Platelets 229 10*3/mm3      Neutrophil % 67.7 %      Lymphocyte % 17.7 %      Monocyte % 11.2 %      Eosinophil % 2.4 %      Basophil % 0.6 %      Immature Grans % 0.4 %      Neutrophils, Absolute 7.63 10*3/mm3      Lymphocytes, Absolute 1.99 10*3/mm3      Monocytes, Absolute 1.26 10*3/mm3      Eosinophils, Absolute 0.27 10*3/mm3      Basophils, Absolute 0.07 10*3/mm3      Immature Grans, Absolute 0.04 10*3/mm3      nRBC 0.0 /100 WBC     High Sensitivity Troponin T 2Hr [[id number]]  (Normal) Collected: 05/20/24 1249    Specimen: Blood Updated: 05/20/24 1321     HS Troponin T 11 ng/L      Troponin T Delta 0 ng/L     Narrative:      High Sensitive Troponin T Reference Range:  <14.0 ng/L- Negative Female for AMI  <22.0 ng/L- Negative Male for AMI  >=14 - Abnormal Female indicating possible myocardial injury.  >=22 - Abnormal Male indicating possible myocardial injury.   Clinicians would have to utilize clinical acumen, EKG, Troponin, and serial changes to determine if it is an Acute Myocardial Infarction or myocardial injury due to an underlying chronic condition.                  Imaging:    XR Chest 1 View    Result Date: 5/20/2024  XR CHEST 1 VW-  Date of Exam: 5/20/2024 11:03 AM  Indication: Chest Pain Triage Protocol  Comparison: August 17, 2023  Findings: The heart is not  definitely enlarged. The lungs seem clear. There are no pleural effusions.      Impression: 1.  An acute pulmonary process is not apparent.   Electronically Signed By-Chalo Villafuerte MD On:5/20/2024 11:06 AM         Differential Diagnosis and Discussion:    Chest Pain:  Based on the patient's signs and symptoms, I considered aortic dissection, myocardial infaction, pulmonary embolism, cardiac tamponade, pericarditis, pneumothorax, musculoskeletal chest pain and other differential diagnosis as an etiology of the patient's chest pain.     All labs were reviewed and interpreted by me.  All X-rays impressions were independently interpreted by me.  EKG was interpreted by me.    MDM  Number of Diagnoses or Management Options  Chest pain, unspecified type  Diagnosis management comments: In summary this is a 54-year-old male patient presents to the emergency department for evaluation of chest pain.  He did receive nitroglycerin at the PCP office with improvement of his chest pain.  CBC independently reviewed by me and shows no critical abnormalities.  CMP independently reviewed by me and shows no critical abnormalities.  High-sensitivity troponin unremarkable, EKG shows no acute ischemia, chest x-ray unremarkable.  Given patient's complaint he is a moderate risk heart score and nitroglycerin ointment was applied.  Cardiology consulted.  Patient case has been discussed with the hospitalist team who will admit to the hospital for further evaluation and continuation of treatment.                 Patient Care Considerations:    CT CHEST: I considered ordering a CT scan of the chest, however no shortness of breath      Consultants/Shared Management Plan:    Hospitalist: I have discussed the case with Dr. Elaine who agrees to accept the patient for admission.  Consultant: I have discussed the case with Dr. Gavino Bautista who agrees to consult on the patient.    Social Determinants of Health:    Patient is independent, reliable, and has  access to care.       Disposition and Care Coordination:    Admit:   Through independent evaluation of the patient's history, physical, and imperical data, the patient meets criteria for inpatient admission to the hospital.        Final diagnoses:   Chest pain, unspecified type        ED Disposition       ED Disposition   Decision to Admit    Condition   --    Comment   Level of Care: Telemetry [5]   Diagnosis: Chest pain [[id number]]   Certification: I Certify That Inpatient Hospital Services Are Medically Necessary For Greater Than 2 Midnights                 This medical record created using voice recognition software.             Jose De Los Santos MD  05/20/24 9807

## 2024-05-20 NOTE — PLAN OF CARE
Problem: Adult Inpatient Plan of Care  Goal: Plan of Care Review  Outcome: Ongoing, Progressing  Flowsheets (Taken 5/20/2024 1650)  Progress: improving  Plan of Care Reviewed With: patient  Outcome Evaluation: Patient admitted to 4MTU from ED this afternoon. Patient alert and oriented throughout shift. VSS. Patient ad ozzie, no gait deficit noted. Patient NPO at midnight for potential cardiac cath tomorrow. Continue with plan of care.   Goal Outcome Evaluation:  Plan of Care Reviewed With: patient        Progress: improving  Outcome Evaluation: Patient admitted to 4MTU from ED this afternoon. Patient alert and oriented throughout shift. VSS. Patient ad ozzie, no gait deficit noted. Patient NPO at midnight for potential cardiac cath tomorrow. Continue with plan of care.

## 2024-05-21 ENCOUNTER — APPOINTMENT (OUTPATIENT)
Dept: CT IMAGING | Facility: HOSPITAL | Age: 54
DRG: 287 | End: 2024-05-21
Payer: COMMERCIAL

## 2024-05-21 PROBLEM — I50.22 CHRONIC SYSTOLIC CONGESTIVE HEART FAILURE, NYHA CLASS 2: Status: ACTIVE | Noted: 2024-05-21

## 2024-05-21 LAB
ANION GAP SERPL CALCULATED.3IONS-SCNC: 5.9 MMOL/L (ref 5–15)
ANION GAP SERPL CALCULATED.3IONS-SCNC: 8.8 MMOL/L (ref 5–15)
BUN SERPL-MCNC: 19 MG/DL (ref 6–20)
BUN SERPL-MCNC: 19 MG/DL (ref 6–20)
BUN/CREAT SERPL: 21.6 (ref 7–25)
BUN/CREAT SERPL: 22.1 (ref 7–25)
CALCIUM SPEC-SCNC: 8.8 MG/DL (ref 8.6–10.5)
CALCIUM SPEC-SCNC: 9 MG/DL (ref 8.6–10.5)
CHLORIDE SERPL-SCNC: 104 MMOL/L (ref 98–107)
CHLORIDE SERPL-SCNC: 106 MMOL/L (ref 98–107)
CO2 SERPL-SCNC: 27.1 MMOL/L (ref 22–29)
CO2 SERPL-SCNC: 27.2 MMOL/L (ref 22–29)
CREAT SERPL-MCNC: 0.86 MG/DL (ref 0.76–1.27)
CREAT SERPL-MCNC: 0.88 MG/DL (ref 0.76–1.27)
DEPRECATED RDW RBC AUTO: 42.8 FL (ref 37–54)
EGFRCR SERPLBLD CKD-EPI 2021: 102.8 ML/MIN/1.73
EGFRCR SERPLBLD CKD-EPI 2021: 103.5 ML/MIN/1.73
ERYTHROCYTE [DISTWIDTH] IN BLOOD BY AUTOMATED COUNT: 12 % (ref 12.3–15.4)
GLUCOSE SERPL-MCNC: 90 MG/DL (ref 65–99)
GLUCOSE SERPL-MCNC: 97 MG/DL (ref 65–99)
HCT VFR BLD AUTO: 47.2 % (ref 37.5–51)
HGB BLD-MCNC: 15.5 G/DL (ref 13–17.7)
MAGNESIUM SERPL-MCNC: 2.1 MG/DL (ref 1.6–2.6)
MCH RBC QN AUTO: 31.5 PG (ref 26.6–33)
MCHC RBC AUTO-ENTMCNC: 32.8 G/DL (ref 31.5–35.7)
MCV RBC AUTO: 95.9 FL (ref 79–97)
PLATELET # BLD AUTO: 203 10*3/MM3 (ref 140–450)
PMV BLD AUTO: 11 FL (ref 6–12)
POTASSIUM SERPL-SCNC: 3.8 MMOL/L (ref 3.5–5.2)
POTASSIUM SERPL-SCNC: 4.1 MMOL/L (ref 3.5–5.2)
QT INTERVAL: 432 MS
QT INTERVAL: 437 MS
QTC INTERVAL: 474 MS
QTC INTERVAL: 501 MS
RBC # BLD AUTO: 4.92 10*6/MM3 (ref 4.14–5.8)
SODIUM SERPL-SCNC: 139 MMOL/L (ref 136–145)
SODIUM SERPL-SCNC: 140 MMOL/L (ref 136–145)
WBC NRBC COR # BLD AUTO: 9.18 10*3/MM3 (ref 3.4–10.8)

## 2024-05-21 PROCEDURE — G0378 HOSPITAL OBSERVATION PER HR: HCPCS

## 2024-05-21 PROCEDURE — 93458 L HRT ARTERY/VENTRICLE ANGIO: CPT | Performed by: INTERNAL MEDICINE

## 2024-05-21 PROCEDURE — 83735 ASSAY OF MAGNESIUM: CPT | Performed by: INTERNAL MEDICINE

## 2024-05-21 PROCEDURE — B2111ZZ FLUOROSCOPY OF MULTIPLE CORONARY ARTERIES USING LOW OSMOLAR CONTRAST: ICD-10-PCS | Performed by: INTERNAL MEDICINE

## 2024-05-21 PROCEDURE — 85027 COMPLETE CBC AUTOMATED: CPT | Performed by: INTERNAL MEDICINE

## 2024-05-21 PROCEDURE — 36415 COLL VENOUS BLD VENIPUNCTURE: CPT | Performed by: INTERNAL MEDICINE

## 2024-05-21 PROCEDURE — 25010000002 MIDAZOLAM PER 1MG: Performed by: INTERNAL MEDICINE

## 2024-05-21 PROCEDURE — 94761 N-INVAS EAR/PLS OXIMETRY MLT: CPT

## 2024-05-21 PROCEDURE — C1769 GUIDE WIRE: HCPCS | Performed by: INTERNAL MEDICINE

## 2024-05-21 PROCEDURE — 80048 BASIC METABOLIC PNL TOTAL CA: CPT | Performed by: INTERNAL MEDICINE

## 2024-05-21 PROCEDURE — 25510000001 IOPAMIDOL PER 1 ML: Performed by: INTERNAL MEDICINE

## 2024-05-21 PROCEDURE — 99214 OFFICE O/P EST MOD 30 MIN: CPT | Performed by: INTERNAL MEDICINE

## 2024-05-21 PROCEDURE — 4A023N7 MEASUREMENT OF CARDIAC SAMPLING AND PRESSURE, LEFT HEART, PERCUTANEOUS APPROACH: ICD-10-PCS | Performed by: INTERNAL MEDICINE

## 2024-05-21 PROCEDURE — C1894 INTRO/SHEATH, NON-LASER: HCPCS | Performed by: INTERNAL MEDICINE

## 2024-05-21 PROCEDURE — 25010000002 HEPARIN (PORCINE) PER 1000 UNITS: Performed by: INTERNAL MEDICINE

## 2024-05-21 PROCEDURE — 70450 CT HEAD/BRAIN W/O DYE: CPT

## 2024-05-21 PROCEDURE — 94799 UNLISTED PULMONARY SVC/PX: CPT

## 2024-05-21 PROCEDURE — 25810000003 SODIUM CHLORIDE 0.9 % SOLUTION: Performed by: INTERNAL MEDICINE

## 2024-05-21 PROCEDURE — 25010000002 FENTANYL CITRATE (PF) 50 MCG/ML SOLUTION: Performed by: INTERNAL MEDICINE

## 2024-05-21 RX ORDER — HEPARIN SODIUM 1000 [USP'U]/ML
INJECTION, SOLUTION INTRAVENOUS; SUBCUTANEOUS
Status: DISCONTINUED | OUTPATIENT
Start: 2024-05-21 | End: 2024-05-21 | Stop reason: HOSPADM

## 2024-05-21 RX ORDER — VERAPAMIL HYDROCHLORIDE 2.5 MG/ML
INJECTION, SOLUTION INTRAVENOUS
Status: DISCONTINUED | OUTPATIENT
Start: 2024-05-21 | End: 2024-05-21 | Stop reason: HOSPADM

## 2024-05-21 RX ORDER — MORPHINE SULFATE 2 MG/ML
1 INJECTION, SOLUTION INTRAMUSCULAR; INTRAVENOUS EVERY 4 HOURS PRN
Status: DISCONTINUED | OUTPATIENT
Start: 2024-05-21 | End: 2024-05-21

## 2024-05-21 RX ORDER — ONDANSETRON 4 MG/1
4 TABLET, ORALLY DISINTEGRATING ORAL EVERY 6 HOURS PRN
Status: DISCONTINUED | OUTPATIENT
Start: 2024-05-21 | End: 2024-05-24 | Stop reason: HOSPADM

## 2024-05-21 RX ORDER — ASPIRIN 81 MG/1
TABLET, CHEWABLE ORAL
Status: DISCONTINUED | OUTPATIENT
Start: 2024-05-21 | End: 2024-05-21 | Stop reason: HOSPADM

## 2024-05-21 RX ORDER — NALOXONE HCL 0.4 MG/ML
0.4 VIAL (ML) INJECTION
Status: DISCONTINUED | OUTPATIENT
Start: 2024-05-21 | End: 2024-05-24 | Stop reason: HOSPADM

## 2024-05-21 RX ORDER — MIDAZOLAM HYDROCHLORIDE 2 MG/2ML
INJECTION, SOLUTION INTRAMUSCULAR; INTRAVENOUS
Status: DISCONTINUED | OUTPATIENT
Start: 2024-05-21 | End: 2024-05-21 | Stop reason: HOSPADM

## 2024-05-21 RX ORDER — ENOXAPARIN SODIUM 100 MG/ML
40 INJECTION SUBCUTANEOUS DAILY
Status: DISCONTINUED | OUTPATIENT
Start: 2024-05-21 | End: 2024-05-22

## 2024-05-21 RX ORDER — LOSARTAN POTASSIUM 25 MG/1
25 TABLET ORAL DAILY
Status: DISCONTINUED | OUTPATIENT
Start: 2024-05-21 | End: 2024-05-24 | Stop reason: HOSPADM

## 2024-05-21 RX ORDER — NITROGLYCERIN 0.4 MG/1
0.4 TABLET SUBLINGUAL
Status: DISCONTINUED | OUTPATIENT
Start: 2024-05-21 | End: 2024-05-24 | Stop reason: HOSPADM

## 2024-05-21 RX ORDER — ACETAMINOPHEN 325 MG/1
650 TABLET ORAL EVERY 4 HOURS PRN
Status: DISCONTINUED | OUTPATIENT
Start: 2024-05-21 | End: 2024-05-24 | Stop reason: HOSPADM

## 2024-05-21 RX ORDER — SODIUM CHLORIDE 9 MG/ML
75 INJECTION, SOLUTION INTRAVENOUS CONTINUOUS
Status: ACTIVE | OUTPATIENT
Start: 2024-05-21 | End: 2024-05-22

## 2024-05-21 RX ORDER — SODIUM CHLORIDE 9 MG/ML
100 INJECTION, SOLUTION INTRAVENOUS CONTINUOUS
Status: ACTIVE | OUTPATIENT
Start: 2024-05-21 | End: 2024-05-21

## 2024-05-21 RX ORDER — LIDOCAINE HYDROCHLORIDE 20 MG/ML
INJECTION, SOLUTION INFILTRATION; PERINEURAL
Status: DISCONTINUED | OUTPATIENT
Start: 2024-05-21 | End: 2024-05-21 | Stop reason: HOSPADM

## 2024-05-21 RX ORDER — ONDANSETRON 2 MG/ML
4 INJECTION INTRAMUSCULAR; INTRAVENOUS EVERY 6 HOURS PRN
Status: DISCONTINUED | OUTPATIENT
Start: 2024-05-21 | End: 2024-05-24 | Stop reason: HOSPADM

## 2024-05-21 RX ORDER — FENTANYL CITRATE 50 UG/ML
INJECTION, SOLUTION INTRAMUSCULAR; INTRAVENOUS
Status: DISCONTINUED | OUTPATIENT
Start: 2024-05-21 | End: 2024-05-21 | Stop reason: HOSPADM

## 2024-05-21 RX ORDER — SPIRONOLACTONE 25 MG/1
25 TABLET ORAL DAILY
Status: DISCONTINUED | OUTPATIENT
Start: 2024-05-21 | End: 2024-05-24 | Stop reason: HOSPADM

## 2024-05-21 RX ADMIN — Medication 10 ML: at 20:04

## 2024-05-21 RX ADMIN — ATORVASTATIN CALCIUM 20 MG: 20 TABLET, FILM COATED ORAL at 20:04

## 2024-05-21 RX ADMIN — Medication 10 ML: at 08:04

## 2024-05-21 RX ADMIN — ACETAMINOPHEN 650 MG: 325 TABLET ORAL at 13:41

## 2024-05-21 RX ADMIN — SODIUM CHLORIDE 75 ML/HR: 9 INJECTION, SOLUTION INTRAVENOUS at 14:26

## 2024-05-21 RX ADMIN — SPIRONOLACTONE 25 MG: 25 TABLET ORAL at 08:03

## 2024-05-21 RX ADMIN — LOSARTAN POTASSIUM 25 MG: 25 TABLET, FILM COATED ORAL at 08:03

## 2024-05-21 RX ADMIN — EMPAGLIFLOZIN 10 MG: 10 TABLET, FILM COATED ORAL at 08:03

## 2024-05-21 NOTE — PROGRESS NOTES
[facility]   Hospitalist Progress Note  Date: 2024  Patient Name: Nick Turk  : 1970  MRN: [id number]  Date of admission: 2024  Consultants:   -Cardiology: Dr. Lv Bautista    Subjective   Subjective     Chief Complaint: Chest pain    Summary:   Nick Turk is a 54 y.o. male with recently diagnosed to systolic congestive heart failure, obstructive sleep apnea on CPAP at home, history of chronic tobacco abuse (previously smoked 1.5 packs/day for 39 years and quit smoking in 2023), obesity (BMI: 31.91) that presented to the ED with complaints of chest pain and discomfort.  Cardiology consulted.  Patient taken for cardiac catheterization on 2024 which showed an LVEDP of 22 mmHg due to LV dysfunction, mild nonobstructive CAD.  Cardiology recommending optimization of medical therapy.    Interval Followup:   Patient seen after cardiac catheterization.  Still a little drowsy from procedure.  Denies any active chest pain.  No complaints of shortness of breath.  Nursing with no additional acute issues to report.    Procedures:   -Left heart catheterization (2024)    Objective   Objective     Vitals:   Temp:  [97.3 °F (36.3 °C)-97.9 °F (36.6 °C)] 97.7 °F (36.5 °C)  Heart Rate:  [57-97] 97  Resp:  [18] 18  BP: (112-152)/() 151/104  Flow (L/min):  [2] 2  Physical Exam   Gen: No acute distress, Conversant, Pleasant, sitting up in bed  Resp: CTAB, No w/r/r, No respiratory distress appreciated  Card: RRR, No m/r/g  Abd: Soft, Nontender, Nondistended, + bowel sounds    Result Review    Result Review:  I have personally reviewed the results as below and agree with these findings:  []  Laboratory:   CMP          5/15/2024    03:57 2024    10:29 2024    04:39   CMP   Glucose 109  114  97    BUN 17  15  19    Creatinine 0.81  0.91  0.88    EGFR 105.4  100.8  102.8    Sodium 140  137  139    Potassium 4.3  4.2  4.1    Chloride 107  103  106    Calcium 8.8  9.0  9.0     Total Protein 6.0  7.4     Albumin 3.6  4.3     Globulin 2.4  3.1     Total Bilirubin 0.2  0.7     Alkaline Phosphatase 45  58     AST (SGOT) 14  22     ALT (SGPT) 22  32     Albumin/Globulin Ratio 1.5  1.4     BUN/Creatinine Ratio 21.0  16.5  21.6    Anion Gap 7.9  9.7  5.9      CBC          5/15/2024    03:57 5/20/2024    10:29 5/21/2024    04:39   CBC   WBC 7.98  11.26  9.18    RBC 4.92  5.28  4.92    Hemoglobin 15.5  16.8  15.5    Hematocrit 47.2  49.1  47.2    MCV 95.9  93.0  95.9    MCH 31.5  31.8  31.5    MCHC 32.8  34.2  32.8    RDW 12.2  12.1  12.0    Platelets 199  229  203    Magnesium within normal limits  []  Microbiology:   []  Radiology:   [x]  EKG/Telemetry:    []  Cardiology/Vascular:    []  Pathology:  []  Old records:  []  Other:    Assessment & Plan   Assessment / Plan     Assessment:  Chest pain  Chronic systolic congestive heart failure, not acutely exacerbated  Essential hypertension  Transient bradycardia  History of chronic tobacco abuse with cigarettes  Obesity (BMI: 30.84)    Plan:  -Cardiology consulted and following, appreciate assistance and recommendations in the care of this patient.  -Continue aspirin, atorvastatin, Jardiance, losartan and spironolactone  -Due to insurance Jardiance not covered as an outpatient so at discharge patient will discharge on Farxiga  -Patient still a little disoriented after procedure.  Not safe for discharge at this time.  Suspect this to improve over time and likely patient can discharge on morning of 05/22/2024  -Management discussed with cardiology.  Recommending guideline directed medical therapy.  Patient also will have a cardiac monitor set up to evaluate for underlying arrhythmia per cardiology.  -Will monitor electrolytes and renal function with BMP and magnesium level in the AM  -Will monitor WBC and Hgb with CBC in the AM  -Clinical course will dictate further management     DVT Prophylaxis: Lovenox  Diet:   Diet Order   Procedures    Diet:  Cardiac; Healthy Heart (2-3 Na+); Fluid Consistency: Thin (IDDSI 0)     Dispo: Home when medically appropriate for discharge     Personally reviewed patients labs and imaging, discussed with patient and nurse at bedside. Discussed management with the following consultants: Cardiology.     Part of this note may be an electronic transcription/translation of spoken language to printed text using the Dragon dictation system.    DVT prophylaxis:  Medical DVT prophylaxis orders are present.        CODE STATUS:   Code Status (Patient has no pulse and is not breathing): CPR (Attempt to Resuscitate)  Medical Interventions (Patient has pulse or is breathing): Full Support        Electronically signed by Vipul Elaine MD, 5/21/2024, 14:00 EDT.

## 2024-05-21 NOTE — CONSULTS
[facility]   Cardiology Consult Note    Patient Name: Nick Turk  : 1970  MRN: [id number]  Primary Care Physician:  Ashley Cabezas APRN  Referring Physician: No ref. provider found    Date of admission: 2024    Subjective   Subjective     Reason for Consultation : Chest discomfort and heart failure chest pain and shortness of breath    Chief Complaint : Chest pain    HPI:  Nick Turk is a 54 y.o. male The patient has past medical history of essential hypertension and hyperlipidemia.  He was recently in the emergency room due to elevated blood pressure and was sent home on medical therapy.  Earlier today the patient had a chest discomfort associated with shortness of breath.  EKG showed normal sinus rhythm with repolarization abnormalities.  Previous echo done recently showed an EF of 35%.  The patient is a active smoker about 1.5 pack/day for over 40 years.  He is high second troponin was negative chest discomfort    Review of Systems   All systems were reviewed and negative except for: Chest discomfort and diaphoresis    Personal History     Past Medical History:   Diagnosis Date    Back pain     Chronic pain syndrome     CVA (cerebral vascular accident) 2024    Essential hypertension 2024    Forgetfulness     Leg pain     Lumbago 2015    Lumbar spondylosis     Pain, generalized     Pars defect of lumbar spine     Sleep disorder     Spondylolisthesis, lumbar region 2015    L4/5        Reviewed      Family History: family history includes Heart disease in his mother; Heart failure in his sister; No Known Problems in his father. Otherwise pertinent FHx was reviewed and not pertinent to current issue.    Social History:  reports that he has quit smoking. His smoking use included cigarettes. He started smoking about 40 years ago. He has a 39.6 pack-year smoking history. He has never used smokeless tobacco. He reports that he does not drink alcohol and does not use drugs.    Home  Medications:  Testosterone Cypionate, aspirin, atorvastatin, carvedilol, empagliflozin, and losartan    Allergies:  Allergies   Allergen Reactions    Bee Venom Swelling    Meloxicam Confusion and Other (See Comments)       Objective    Objective     Vitals:   Temp:  [97.5 °F (36.4 °C)-98.2 °F (36.8 °C)] 97.7 °F (36.5 °C)  Heart Rate:  [65-80] 71  Resp:  [16-18] 18  BP: (116-125)/(72-88) 120/73      Physical Exam:   Constitutional: Awake, alert, No acute distress    Eyes: PERRLA, sclerae anicteric, no conjunctival injection   HENT: NCAT, mucous membranes moist   Neck: Supple, no thyromegaly, no lymphadenopathy, trachea midline   Respiratory: Clear to auscultation bilaterally, nonlabored respirations    Cardiovascular: RRR, no murmurs, rubs, or gallops, palpable pedal pulses bilaterally   Gastrointestinal: Positive bowel sounds, soft, nontender, nondistended   Musculoskeletal: No bilateral ankle edema, no clubbing or cyanosis to extremities   Psychiatric: Appropriate affect, cooperative   Neurologic: Oriented x 3, strength symmetric in all extremities, Cranial Nerves grossly intact to confrontation, speech clear   Skin: No rashes     Result Review    Result Review:  I have personally reviewed the results from the time of this admission to 5/20/2024 23:24 EDT and agree with these findings:  [x]  Laboratory  []  Microbiology  [x]  Radiology  [x]  EKG/Telemetry   [x]  Cardiology/Vascular   []  Pathology  [x]  Old records  []  Other:  Most notable findings include:     CMP          5/13/2024    19:02 5/15/2024    03:57 5/20/2024    10:29   CMP   Glucose 113  109  114    BUN 13  17  15    Creatinine 0.87  0.81  0.91    EGFR 103.2  105.4  100.8    Sodium 138  140  137    Potassium 4.0  4.3  4.2    Chloride 107  107  103    Calcium 8.7  8.8  9.0    Total Protein 6.6  6.0  7.4    Albumin 3.8  3.6  4.3    Globulin 2.8  2.4  3.1    Total Bilirubin 0.2  0.2  0.7    Alkaline Phosphatase 49  45  58    AST (SGOT) 17  14  22    ALT  (SGPT) 26  22  32    Albumin/Globulin Ratio 1.4  1.5  1.4    BUN/Creatinine Ratio 14.9  21.0  16.5    Anion Gap 9.3  7.9  9.7       CBC          5/13/2024    19:02 5/15/2024    03:57 5/20/2024    10:29   CBC   WBC 9.28  7.98  11.26    RBC 5.04  4.92  5.28    Hemoglobin 15.8  15.5  16.8    Hematocrit 47.8  47.2  49.1    MCV 94.8  95.9  93.0    MCH 31.3  31.5  31.8    MCHC 33.1  32.8  34.2    RDW 12.2  12.2  12.1    Platelets 223  199  229       Lab Results   Component Value Date    TROPONINT 11 05/20/2024         Assessment & Plan   Assessment / Plan     Brief Patient Summary:  Nick Turk is a 54 y.o. male who has major risk factor for coronary artery disease now with intermittent episodes of chest discomfort associated with diaphoresis and some shortness of breath.  His EKG showed normal sinus rhythm with repolarization abnormalities.  The high sensitive troponins was unremarkable.  His 2D echo showed EF of 35% with global hypokinesis.  I would optimize medical therapy with antiplatelet    Active Hospital Problems:  Active Hospital Problems    Diagnosis     **Chest pain     Acute coronary syndrome     Essential hypertension     Left ventricular systolic dysfunction (LVSD)          Plan: I would optimize medical therapy with antiplatelets, statin therapy and blood pressure control.  Consider the use of nitrates.  I would start Jardiance 10 mg oral daily and Aldactone as tolerated.  Consider the use of angiotensin receptor blockers as tolerated.  Will proceed with a left heart catheterization and possible revascularization if indicated.  Keep the patient n.p.o. past midnight.    I spent over 45 minutes of time during this evaluation including 50% of the time in coordination of care.    ,    Electronically signed by Lv Bautista MD, 05/20/24, 11:17 PM EDT.

## 2024-05-21 NOTE — PROGRESS NOTES
[facility]     Cardiology Progress Note    Patient Name: Nick Turk  : 1970  MRN: [id number]  Primary Care Physician:  Ashley Cabezas APRN  Date of admission: 2024    Subjective   Subjective     Chief Complaint: Chest discomfort    Interval HPI:    Patient with hypertension was recently discharged after ED visit with well-controlled blood pressure.  Developed chest discomfort and generalized weakness.  His EKG shows sinus rhythm and left bundle branch block.  The high second troponin was unremarkable.  He had a 2D echo which showed an EF of 35%.    Review of Systems   All systems were reviewed and negative except for: Chest discomfort    Objective   Objective     Vitals:   Temp:  [97.3 °F (36.3 °C)-98.2 °F (36.8 °C)] 97.3 °F (36.3 °C)  Heart Rate:  [65-81] 81  Resp:  [16-18] 18  BP: (116-138)/(72-88) 132/86  Flow (L/min):  [2] 2  Physical Exam      Alert, oriented  Neck. No bruit, JVD +  Heart. Regular, no gallops,  Lungs: clear to auscultation  Abdomen: soft, bs+  LE no edema  Neurologic. No motor deficits.     Scheduled Meds:aspirin, 81 mg, Oral, Daily  atorvastatin, 20 mg, Oral, Nightly  [Held by provider] carvedilol, 3.125 mg, Oral, BID With Meals  empagliflozin, 10 mg, Oral, Daily  heparin (porcine), 5,000 Units, Subcutaneous, Q8H  [Held by provider] losartan, 25 mg, Oral, Daily  sodium chloride, 10 mL, Intravenous, Q12H  spironolactone, 25 mg, Oral, Daily      Continuous Infusions:sodium chloride, 100 mL/hr           Result Review    Result Review:  I have personally reviewed the results from the time of this admission to 2024 07:34 EDT and agree with these findings:  [x]  Laboratory  []  Microbiology  [x]  Radiology  [x]  EKG/Telemetry   [x]  Cardiology/Vascular   []  Pathology  []  Old records  []  Other:  Most notable findings include:     CBC          5/15/2024    03:57 2024    10:29 2024    04:39   CBC   WBC 7.98  11.26  9.18    RBC 4.92  5.28  4.92    Hemoglobin 15.5   16.8  15.5    Hematocrit 47.2  49.1  47.2    MCV 95.9  93.0  95.9    MCH 31.5  31.8  31.5    MCHC 32.8  34.2  32.8    RDW 12.2  12.1  12.0    Platelets 199  229  203      CMP          5/15/2024    03:57 5/20/2024    10:29 5/21/2024    04:39   CMP   Glucose 109  114  97    BUN 17  15  19    Creatinine 0.81  0.91  0.88    EGFR 105.4  100.8  102.8    Sodium 140  137  139    Potassium 4.3  4.2  4.1    Chloride 107  103  106    Calcium 8.8  9.0  9.0    Total Protein 6.0  7.4     Albumin 3.6  4.3     Globulin 2.4  3.1     Total Bilirubin 0.2  0.7     Alkaline Phosphatase 45  58     AST (SGOT) 14  22     ALT (SGPT) 22  32     Albumin/Globulin Ratio 1.5  1.4     BUN/Creatinine Ratio 21.0  16.5  21.6    Anion Gap 7.9  9.7  5.9       CARDIAC LABS:      Lab 05/20/24  1249 05/20/24  1029 05/15/24  0357   PROBNP  --  291.7 610.7   HSTROP T 11 11  --         Assessment & Plan   Assessment / Plan     Brief Patient Summary:  Nick Turk is a 54 y.o. male with admitted episode of chest discomfort and uncontrolled hypertension.  Had  transient bradycardia.  His EF is 35%.    Active Hospital Problems:  Active Hospital Problems    Diagnosis     **Chest pain     Acute coronary syndrome     Essential hypertension     Left ventricular systolic dysfunction (LVSD)            Plan:     The patient had a left heart catheterization which showed an LVEDP of 22 mmHg due to LV dysfunction.  He has mild nonobstructive coronary artery disease.  Plan is to optimize medical therapy with Jardiance, angiotensin receptor blocker and Aldactone as tolerated.  The beta-blockers are being held due to the episode of bradycardia.  Patient should have a cardiac monitor for about 14 days upon discharge to evaluate underlying rhythm.  Patient class II heart failure [place] Heart Association.    I spent over 25 minutes during this evaluation including 50% of the time in coordination of care       CODE STATUS:   Code Status (Patient has no pulse and is not  breathing): CPR (Attempt to Resuscitate)  Medical Interventions (Patient has pulse or is breathing): Full Support      Electronically signed by Lv Bautista MD, 05/21/24, 7:34 AM EDT.

## 2024-05-21 NOTE — PLAN OF CARE
Goal Outcome Evaluation:           Progress: no change  Outcome Evaluation: No acute changes throughout shift, pt had cardiac cath this AM, remains drowsy, MD aware, NS running @ 75 ml/hr.

## 2024-05-21 NOTE — OUTREACH NOTE
Stroke Week 1 Survey      Flowsheet Row Responses   Religion facility patient discharged from? [facility]   Does the patient have one of the following disease processes/diagnoses(primary or secondary)? Stroke   Week 1 attempt successful? No   Unsuccessful attempts Attempt 1   Revoke Readmitted            MATHEUS MARINELLI - Registered Nurse

## 2024-05-21 NOTE — PLAN OF CARE
Goal Outcome Evaluation:            Patient alert and oriented x4, patient denies pain, patient can make needs known, NPO at midnight urine output noted.

## 2024-05-22 ENCOUNTER — APPOINTMENT (OUTPATIENT)
Dept: CT IMAGING | Facility: HOSPITAL | Age: 54
DRG: 287 | End: 2024-05-22
Payer: COMMERCIAL

## 2024-05-22 ENCOUNTER — APPOINTMENT (OUTPATIENT)
Dept: MRI IMAGING | Facility: HOSPITAL | Age: 54
DRG: 287 | End: 2024-05-22
Payer: COMMERCIAL

## 2024-05-22 LAB
ANION GAP SERPL CALCULATED.3IONS-SCNC: 7.2 MMOL/L (ref 5–15)
BUN SERPL-MCNC: 19 MG/DL (ref 6–20)
BUN/CREAT SERPL: 19 (ref 7–25)
CALCIUM SPEC-SCNC: 9 MG/DL (ref 8.6–10.5)
CHLORIDE SERPL-SCNC: 105 MMOL/L (ref 98–107)
CO2 SERPL-SCNC: 25.8 MMOL/L (ref 22–29)
CREAT SERPL-MCNC: 1 MG/DL (ref 0.76–1.27)
DEPRECATED RDW RBC AUTO: 43.1 FL (ref 37–54)
EGFRCR SERPLBLD CKD-EPI 2021: 90 ML/MIN/1.73
ERYTHROCYTE [DISTWIDTH] IN BLOOD BY AUTOMATED COUNT: 12.1 % (ref 12.3–15.4)
GLUCOSE BLDC GLUCOMTR-MCNC: 150 MG/DL (ref 70–99)
GLUCOSE SERPL-MCNC: 92 MG/DL (ref 65–99)
HCT VFR BLD AUTO: 51.6 % (ref 37.5–51)
HGB BLD-MCNC: 17 G/DL (ref 13–17.7)
MAGNESIUM SERPL-MCNC: 2.1 MG/DL (ref 1.6–2.6)
MCH RBC QN AUTO: 31.9 PG (ref 26.6–33)
MCHC RBC AUTO-ENTMCNC: 32.9 G/DL (ref 31.5–35.7)
MCV RBC AUTO: 96.8 FL (ref 79–97)
PLATELET # BLD AUTO: 221 10*3/MM3 (ref 140–450)
PMV BLD AUTO: 11 FL (ref 6–12)
POTASSIUM SERPL-SCNC: 4.2 MMOL/L (ref 3.5–5.2)
RBC # BLD AUTO: 5.33 10*6/MM3 (ref 4.14–5.8)
SODIUM SERPL-SCNC: 138 MMOL/L (ref 136–145)
WBC NRBC COR # BLD AUTO: 9.76 10*3/MM3 (ref 3.4–10.8)

## 2024-05-22 PROCEDURE — G0378 HOSPITAL OBSERVATION PER HR: HCPCS

## 2024-05-22 PROCEDURE — 25010000002 ENOXAPARIN PER 10 MG: Performed by: INTERNAL MEDICINE

## 2024-05-22 PROCEDURE — 80048 BASIC METABOLIC PNL TOTAL CA: CPT | Performed by: INTERNAL MEDICINE

## 2024-05-22 PROCEDURE — 99214 OFFICE O/P EST MOD 30 MIN: CPT | Performed by: INTERNAL MEDICINE

## 2024-05-22 PROCEDURE — 0 GADOBENATE DIMEGLUMINE 529 MG/ML SOLUTION: Performed by: INTERNAL MEDICINE

## 2024-05-22 PROCEDURE — 70496 CT ANGIOGRAPHY HEAD: CPT

## 2024-05-22 PROCEDURE — 70553 MRI BRAIN STEM W/O & W/DYE: CPT

## 2024-05-22 PROCEDURE — 83735 ASSAY OF MAGNESIUM: CPT | Performed by: INTERNAL MEDICINE

## 2024-05-22 PROCEDURE — 25510000001 IOPAMIDOL PER 1 ML: Performed by: INTERNAL MEDICINE

## 2024-05-22 PROCEDURE — 70498 CT ANGIOGRAPHY NECK: CPT

## 2024-05-22 PROCEDURE — 94799 UNLISTED PULMONARY SVC/PX: CPT

## 2024-05-22 PROCEDURE — 85027 COMPLETE CBC AUTOMATED: CPT | Performed by: INTERNAL MEDICINE

## 2024-05-22 PROCEDURE — A9577 INJ MULTIHANCE: HCPCS | Performed by: INTERNAL MEDICINE

## 2024-05-22 PROCEDURE — 82948 REAGENT STRIP/BLOOD GLUCOSE: CPT

## 2024-05-22 RX ORDER — CLOPIDOGREL BISULFATE 75 MG/1
75 TABLET ORAL DAILY
Status: DISCONTINUED | OUTPATIENT
Start: 2024-05-22 | End: 2024-05-24 | Stop reason: HOSPADM

## 2024-05-22 RX ORDER — ATORVASTATIN CALCIUM 40 MG/1
80 TABLET, FILM COATED ORAL NIGHTLY
Status: DISCONTINUED | OUTPATIENT
Start: 2024-05-22 | End: 2024-05-24 | Stop reason: HOSPADM

## 2024-05-22 RX ADMIN — ASPIRIN 81 MG: 81 TABLET, CHEWABLE ORAL at 08:40

## 2024-05-22 RX ADMIN — Medication 10 ML: at 08:40

## 2024-05-22 RX ADMIN — ATORVASTATIN CALCIUM 80 MG: 40 TABLET, FILM COATED ORAL at 20:28

## 2024-05-22 RX ADMIN — EMPAGLIFLOZIN 10 MG: 10 TABLET, FILM COATED ORAL at 08:40

## 2024-05-22 RX ADMIN — CLOPIDOGREL BISULFATE 75 MG: 75 TABLET ORAL at 11:07

## 2024-05-22 RX ADMIN — IOPAMIDOL 70 ML: 755 INJECTION, SOLUTION INTRAVENOUS at 22:05

## 2024-05-22 RX ADMIN — SPIRONOLACTONE 25 MG: 25 TABLET ORAL at 08:40

## 2024-05-22 RX ADMIN — Medication 10 ML: at 20:28

## 2024-05-22 RX ADMIN — GADOBENATE DIMEGLUMINE 20 ML: 529 INJECTION, SOLUTION INTRAVENOUS at 09:59

## 2024-05-22 RX ADMIN — ENOXAPARIN SODIUM 40 MG: 100 INJECTION SUBCUTANEOUS at 08:40

## 2024-05-22 RX ADMIN — LOSARTAN POTASSIUM 25 MG: 25 TABLET, FILM COATED ORAL at 08:40

## 2024-05-22 NOTE — NURSING NOTE
MRI results relayed to this RN @ 1038, Hospitalist and Cardiology made aware, transfer orders and appropriate orders placed by hospitalist. Patient and family updated @ bedside of plan of care and pending transfer. Report given to BLAYNE Carlos, This nurse then escorted patient and family to 218-2,

## 2024-05-22 NOTE — PROGRESS NOTES
"[facility]  [facility]    Routine Consult New    Patient Name:   Nick Turk  YOB: 1970  Identification Number:   MRN - [id number]  Date of Service:   05/22/2024 07:54:16    Diagnosis        I63.30 - Cerebrovascular accident (CVA) due to thrombosis of cerebral artery (HCCC)    Impression  54yoM with hx of HTN, CHF, bradycardia, came in for CP evaluation, and is now s/p cath. Having double vision since cath. EOM intact on exam. Per their description of \"discoordinate\" gaze and persistent double vision, suspect a small brainstem stroke. further imaging with MRI brain reveals and acute ischemic left midbrain stroke.  A1c 5.0 LDL 44  Echo 5/14 with EF 35%, mildly dilated LA,  continue antiplatelets ASA/Plavix. For stroke prevention, can DC Plavix after 21 days (although I have no objection if he needs to stay on dual therapy for longer period for other medica indication)  statin for goal LDL <70  telemetry. holter at discharge to watch for afib  -checking CTAs as part of stroke workup       Antithrombotic Medication :  Aspirin 81 mg PO dailyClopidogrel 75 mg dailyStatins for LDL goal less than 70    Nursing Recommendations :  IV Fluids, avoid dextrose containing fluids, Maintain euglycemiaNeuro checks q4 hrs x 24 hrs and then per shiftHead of bed 30 degreesContinue with Telemetry    Consultations :  Recommend Speech therapy if failed dysphagia screenPhysical therapy/Occupational therapy    Disposition :  Neurology will follow    ----------------------------------------------------------------------------------------------------    Chief Complaint:  blurry vision, family request    History of Present Illness:  54yoM with hx of HTN, CHF, bradycardia, came in for CP evaluation, and is now s/p cath.  [place] more disoriented after the procedure, back to baseline cognitive state now. But still c/o double vison.  Has double vision \"since he woke up from the procedure\". " "It is \"vertical and constant\". Denies focal weakness. No numbness or sensory loss.  no HA, no speech, no swallowing issues.  Feels balance is \"off\" since the procedure.  Wife at bedside describes his \"R eye yesterday was discoordinate as if deviating to the side\"         Past Medical History:       Hypertension    Medications:    No Anticoagulant use   Antiplatelet use: Yes ASA81, [id number]  Reviewed EMR for current medications    Allergies:   Reviewed  Description: meloxicam    Social History:  Alcohol Use: No    Family History:    There is no family history of premature cerebrovascular disease pertinent to this consultation    ROS :  14 Points Review of Systems was performed and was negative except mentioned in HPI.    Past Surgical History:  There Is No Surgical History Contributory To Today’s Visit    Examination    Neuro Exam:  General: Alert,Awake, Oriented to Time, Place, Person    Speech: Fluent:    Language: Intact:    Face: Symmetric:    Facial Sensation: Intact:    Visual Fields: Intact:    Extraocular Movements: Intact:  ductions and versions normal, no abnormalities perceived    Motor Exam: No Drift:    Sensation: Intact:    Coordination: Intact: RUE, RLE, LUE, LLE           Patient/Family was informed the Neurology Consult would happen via TeleHealth consult by way of interactive audio and video telecommunications and consented to receiving care in this manner.    Telehealth Neurology consultation was provided. I spent 15 minutes providing telehealth care. This includes time spent for face to face visit via telemedicine, review of medical records, imaging studies and discussion of findings with providers, the patient and/or family.      Dr Maciej Walden      [facility]  For Inpatient follow-up with [facility] physician please call [facility] [phone]. This is not an outpatient service. Post hospital discharge, please contact hospital directly.    Please do not communicate with [facility] " physicians via secure chat. If you have any questions, Please contact [facility].  Please call or reconsult our service if there are any clinical or diagnostic changes.

## 2024-05-22 NOTE — PROGRESS NOTES
[facility]     Cardiology Progress Note    Patient Name: Nick Turk  : 1970  MRN: [id number]  Primary Care Physician:  Ashley Cabezas APRN  Date of admission: 2024    Subjective   Subjective     Chief Complaint: Double vision    Interval HPI:    Patient cardiac wise stable.  He has known ischemic cardiomyopathy probably due to hypertension.  He had a coronary angiography yesterday which showed mild no significant coronary artery disease.  The LVEDP was elevated about 22 mmHg suggestive of LV dysfunction.  He was sedated and it took a while to get off sedation.  The patient reported double vision and some unsteady gait.  Denies muscle weakness tingling.  His speech is clear.  He had a non-contrast head CT which was which was unremarkable.    Review of Systems   All systems were reviewed and negative except for: diplopia    Objective   Objective     Vitals:   Temp:  [97.3 °F (36.3 °C)-98.1 °F (36.7 °C)] 98.1 °F (36.7 °C)  Heart Rate:  [] 77  Resp:  [18] 18  BP: (112-158)/() 158/88  Flow (L/min):  [2] 2  Physical Exam      Alert, oriented  Neck. No jvd, no bruits.  Lungs : no rales, no wheezing  Heart: regular, no gallops  Lungs No rales, no wheezing  Abdomen: soft, bs+  LE no edema  Neurologic: No nystagmus, no motor deficits. Gait disturbance improves when he covers one eye.       Scheduled Meds:aspirin, 81 mg, Oral, Daily  atorvastatin, 20 mg, Oral, Nightly  empagliflozin, 10 mg, Oral, Daily  enoxaparin, 40 mg, Subcutaneous, Daily  losartan, 25 mg, Oral, Daily  sodium chloride, 10 mL, Intravenous, Q12H  spironolactone, 25 mg, Oral, Daily      Continuous Infusions:Pharmacy to Dose enoxaparin (LOVENOX),            Result Review    Result Review:  I have personally reviewed the results from the time of this admission to 2024 08:37 EDT and agree with these findings:  [x]  Laboratory  []  Microbiology  [x]  Radiology  [x]  EKG/Telemetry   [x]  Cardiology/Vascular   []  Pathology  []   Old records  []  Other:  Most notable findings include:     CBC          5/20/2024    10:29 5/21/2024    04:39 5/22/2024    05:17   CBC   WBC 11.26  9.18  9.76    RBC 5.28  4.92  5.33    Hemoglobin 16.8  15.5  17.0    Hematocrit 49.1  47.2  51.6    MCV 93.0  95.9  96.8    MCH 31.8  31.5  31.9    MCHC 34.2  32.8  32.9    RDW 12.1  12.0  12.1    Platelets 229  203  221      CMP          5/20/2024    10:29 5/21/2024    04:39 5/21/2024    18:44 5/22/2024    05:17   CMP   Glucose 114  97  90  92    BUN 15  19  19  19    Creatinine 0.91  0.88  0.86  1.00    EGFR 100.8  102.8  103.5  90.0    Sodium 137  139  140  138    Potassium 4.2  4.1  3.8  4.2    Chloride 103  106  104  105    Calcium 9.0  9.0  8.8  9.0    Total Protein 7.4       Albumin 4.3       Globulin 3.1       Total Bilirubin 0.7       Alkaline Phosphatase 58       AST (SGOT) 22       ALT (SGPT) 32       Albumin/Globulin Ratio 1.4       BUN/Creatinine Ratio 16.5  21.6  22.1  19.0    Anion Gap 9.7  5.9  8.8  7.2       CARDIAC LABS:      Lab 05/20/24  1249 05/20/24  1029   PROBNP  --  291.7   HSTROP T 11 11        Assessment & Plan   Assessment / Plan     Brief Patient Summary:  Nick Turk is a 54 y.o. male with class II heart failure [place] Heart Association, with his EF of 35%.  He has mild coronary artery disease.  Patient now with double vision and steady gait no apparent motor deficit.  His noncontrast head CT was unremarkable.    Active Hospital Problems:  Active Hospital Problems    Diagnosis     **Chest pain     Acute coronary syndrome     Chronic systolic congestive heart failure, NYHA class 2     Essential hypertension     Left ventricular systolic dysfunction (LVSD)            Plan:     Neurological consult was requested for possible TIA stroke after cardiac catheterization, possible posterior circulation.  The patient scheduled to have a MRI of the brain today.  Will continue with blood pressure control and antiplatelet therapy.  Continue with  statin therapy.  Initiate physical therapy.  Had a discussion with the patient and patient's wife in regards to chances of TIA stroke after cardiac catheterization is about 1%.  This did the fact that his neurological problem now may be related to the procedure.  Wait for the full for consultation and recommendations.  Also discussed the possibility of looking for paroxysmal atrial fibrillation with a cardiac monitor upon discharge.    I Spent over 25 minutes of time during this evaluation including 50% of time in coordination of care       CODE STATUS:   Code Status (Patient has no pulse and is not breathing): CPR (Attempt to Resuscitate)  Medical Interventions (Patient has pulse or is breathing): Full Support      Electronically signed by Lv Bautista MD, 05/22/24, 8:37 AM EDT.

## 2024-05-22 NOTE — PLAN OF CARE
Goal Outcome Evaluation:           Patient alert and oriented x4, patient able to ambulate to bathroom, frequent rounfing due to pt complaints of double vision since awakening from procedure 5/21/2024 see MD to nurse communication, Urine output noted, oral rehydration promoted.

## 2024-05-22 NOTE — PROGRESS NOTES
[facility]   Hospitalist Progress Note  Date: 2024  Patient Name: Nick Turk  : 1970  MRN: [id number]  Date of admission: 2024  Consultants:   -Cardiology: Dr. Lv Bautista  -Neurology    Subjective   Subjective     Chief Complaint: Chest pain    Summary:   Nick Turk is a 54 y.o. male with recently diagnosed to systolic congestive heart failure, obstructive sleep apnea on CPAP at home, history of chronic tobacco abuse (previously smoked 1.5 packs/day for 39 years and quit smoking in 2023), obesity (BMI: 31.91) that presented to the ED with complaints of chest pain and discomfort.  Cardiology consulted.  Patient taken for cardiac catheterization on 2024 which showed an LVEDP of 22 mmHg due to LV dysfunction, mild nonobstructive CAD.  Cardiology recommending optimization of medical therapy.    Interval Followup:   There was some delay in patient coming off of sedation.  Patient had some complaints of vision and unsteady gait.  No focal deficits noted.  Patient with continued double vision this morning, neurology consulted and MRI imaging of the brain ordered.  Patient denies any chest pain or shortness of breath.  Patient actually stated he is feeling a little bit better today than he was when he first came to the hospital.    Procedures:   -Left heart catheterization (2024)    Objective   Objective     Vitals:   Temp:  [97.3 °F (36.3 °C)-98.1 °F (36.7 °C)] 98.1 °F (36.7 °C)  Heart Rate:  [] 89  Resp:  [18] 18  BP: (120-158)/() 158/88  Physical Exam   Gen: No acute distress, sitting up in bed, pleasant, conversant  Resp: CTAB, No w/r/r, good aeration, equal chest rise bilaterally  Card: RRR, No m/r/g  Abd: Soft, Nontender, Nondistended, + bowel sounds    Result Review    Result Review:  I have personally reviewed the results as below and agree with these findings:  []  Laboratory:   CMP          2024    10:29 2024    04:39 2024    18:44  5/22/2024    05:17   CMP   Glucose 114  97  90  92    BUN 15  19  19  19    Creatinine 0.91  0.88  0.86  1.00    EGFR 100.8  102.8  103.5  90.0    Sodium 137  139  140  138    Potassium 4.2  4.1  3.8  4.2    Chloride 103  106  104  105    Calcium 9.0  9.0  8.8  9.0    Total Protein 7.4       Albumin 4.3       Globulin 3.1       Total Bilirubin 0.7       Alkaline Phosphatase 58       AST (SGOT) 22       ALT (SGPT) 32       Albumin/Globulin Ratio 1.4       BUN/Creatinine Ratio 16.5  21.6  22.1  19.0    Anion Gap 9.7  5.9  8.8  7.2      CBC          5/20/2024    10:29 5/21/2024    04:39 5/22/2024    05:17   CBC   WBC 11.26  9.18  9.76    RBC 5.28  4.92  5.33    Hemoglobin 16.8  15.5  17.0    Hematocrit 49.1  47.2  51.6    MCV 93.0  95.9  96.8    MCH 31.8  31.5  31.9    MCHC 34.2  32.8  32.9    RDW 12.1  12.0  12.1    Platelets 229  203  221    Magnesium within normal limits  []  Microbiology:   [x]  Radiology: MRI brain imaging personally reviewed.  Acute infarct within the flo/midbrain  [x]  EKG/Telemetry:    []  Cardiology/Vascular:    []  Pathology:  []  Old records:  []  Other:    Assessment & Plan   Assessment / Plan     Assessment:  Acute infarct with an flo/midbrain  Chronic systolic congestive heart failure, not acutely exacerbated  Essential hypertension  Transient bradycardia  History of chronic tobacco abuse with cigarettes  Obesity (BMI: 30.84)    Plan:  -Cardiology and Neurology consulted and following, appreciate assistance and recommendations in the care of this patient.  -Continue aspirin, atorvastatin, Jardiance, losartan and spironolactone  -Due to insurance Jardiance not covered as an outpatient so at discharge patient will discharge on Farxiga  -MRI brain imaging obtained and findings noted above.  Will start Plavix.  -CTA head and neck ordered  -Continue telemetry monitoring.  -Care discussed with cardiology.  Patient may benefit with a cardiac monitor upon discharge to look for paroxysmal atrial  fibrillation.  -Monitor electrolytes and renal function with BMP and magnesium level in the AM  -Monitor WBC and Hgb with CBC in the AM  -Clinical course will dictate further management     DVT Prophylaxis: Lovenox  Diet:   Diet Order   Procedures    Diet: Cardiac; Healthy Heart (2-3 Na+); Fluid Consistency: Thin (IDDSI 0)     Dispo: Home when medically appropriate for discharge     Personally reviewed patients labs and imaging, discussed with patient and nurse at bedside. Discussed management with the following consultants: Cardiology and Neurology.     Part of this note may be an electronic transcription/translation of spoken language to printed text using the Dragon dictation system.    DVT prophylaxis:  Medical and mechanical DVT prophylaxis orders are present.        CODE STATUS:   Code Status (Patient has no pulse and is not breathing): CPR (Attempt to Resuscitate)  Medical Interventions (Patient has pulse or is breathing): Full Support        Electronically signed by Vipul Elaine MD, 5/22/2024, 10:51 EDT.

## 2024-05-22 NOTE — PLAN OF CARE
Goal Outcome Evaluation:  Plan of Care Reviewed With: patient        Progress: no change               VSS. Pt has rested this shift. No complaints of pain. Will continue to monitor

## 2024-05-23 PROBLEM — I63.9 STROKE: Status: ACTIVE | Noted: 2024-05-23

## 2024-05-23 LAB
ANION GAP SERPL CALCULATED.3IONS-SCNC: 7.9 MMOL/L (ref 5–15)
BUN SERPL-MCNC: 14 MG/DL (ref 6–20)
BUN/CREAT SERPL: 16.3 (ref 7–25)
CALCIUM SPEC-SCNC: 8.9 MG/DL (ref 8.6–10.5)
CHLORIDE SERPL-SCNC: 107 MMOL/L (ref 98–107)
CHOLEST SERPL-MCNC: 78 MG/DL (ref 0–200)
CO2 SERPL-SCNC: 23.1 MMOL/L (ref 22–29)
CREAT SERPL-MCNC: 0.86 MG/DL (ref 0.76–1.27)
DEPRECATED RDW RBC AUTO: 42.4 FL (ref 37–54)
EGFRCR SERPLBLD CKD-EPI 2021: 103.5 ML/MIN/1.73
ERYTHROCYTE [DISTWIDTH] IN BLOOD BY AUTOMATED COUNT: 12.2 % (ref 12.3–15.4)
GLUCOSE SERPL-MCNC: 110 MG/DL (ref 65–99)
HBA1C MFR BLD: 5.5 % (ref 4.8–5.6)
HCT VFR BLD AUTO: 50 % (ref 37.5–51)
HDLC SERPL-MCNC: 31 MG/DL (ref 40–60)
HGB BLD-MCNC: 16.9 G/DL (ref 13–17.7)
LDLC SERPL CALC-MCNC: 20 MG/DL (ref 0–100)
LDLC/HDLC SERPL: 0.45 {RATIO}
MAGNESIUM SERPL-MCNC: 2.2 MG/DL (ref 1.6–2.6)
MCH RBC QN AUTO: 31.8 PG (ref 26.6–33)
MCHC RBC AUTO-ENTMCNC: 33.8 G/DL (ref 31.5–35.7)
MCV RBC AUTO: 94 FL (ref 79–97)
PHOSPHATE SERPL-MCNC: 3.2 MG/DL (ref 2.5–4.5)
PLATELET # BLD AUTO: 213 10*3/MM3 (ref 140–450)
PMV BLD AUTO: 10.7 FL (ref 6–12)
POTASSIUM SERPL-SCNC: 4.2 MMOL/L (ref 3.5–5.2)
RBC # BLD AUTO: 5.32 10*6/MM3 (ref 4.14–5.8)
SODIUM SERPL-SCNC: 138 MMOL/L (ref 136–145)
TRIGL SERPL-MCNC: 166 MG/DL (ref 0–150)
VLDLC SERPL-MCNC: 27 MG/DL (ref 5–40)
WBC NRBC COR # BLD AUTO: 9.99 10*3/MM3 (ref 3.4–10.8)

## 2024-05-23 PROCEDURE — 94799 UNLISTED PULMONARY SVC/PX: CPT

## 2024-05-23 PROCEDURE — 97165 OT EVAL LOW COMPLEX 30 MIN: CPT

## 2024-05-23 PROCEDURE — 97161 PT EVAL LOW COMPLEX 20 MIN: CPT

## 2024-05-23 PROCEDURE — 80061 LIPID PANEL: CPT | Performed by: INTERNAL MEDICINE

## 2024-05-23 PROCEDURE — 84100 ASSAY OF PHOSPHORUS: CPT | Performed by: INTERNAL MEDICINE

## 2024-05-23 PROCEDURE — 83735 ASSAY OF MAGNESIUM: CPT | Performed by: INTERNAL MEDICINE

## 2024-05-23 PROCEDURE — 85027 COMPLETE CBC AUTOMATED: CPT | Performed by: INTERNAL MEDICINE

## 2024-05-23 PROCEDURE — 80048 BASIC METABOLIC PNL TOTAL CA: CPT | Performed by: INTERNAL MEDICINE

## 2024-05-23 PROCEDURE — 83036 HEMOGLOBIN GLYCOSYLATED A1C: CPT | Performed by: INTERNAL MEDICINE

## 2024-05-23 PROCEDURE — 92610 EVALUATE SWALLOWING FUNCTION: CPT

## 2024-05-23 PROCEDURE — 99232 SBSQ HOSP IP/OBS MODERATE 35: CPT | Performed by: INTERNAL MEDICINE

## 2024-05-23 RX ADMIN — EMPAGLIFLOZIN 10 MG: 10 TABLET, FILM COATED ORAL at 09:01

## 2024-05-23 RX ADMIN — SPIRONOLACTONE 25 MG: 25 TABLET ORAL at 09:01

## 2024-05-23 RX ADMIN — ATORVASTATIN CALCIUM 80 MG: 40 TABLET, FILM COATED ORAL at 20:28

## 2024-05-23 RX ADMIN — Medication 10 ML: at 20:29

## 2024-05-23 RX ADMIN — Medication 10 ML: at 09:01

## 2024-05-23 RX ADMIN — ASPIRIN 81 MG: 81 TABLET, CHEWABLE ORAL at 09:01

## 2024-05-23 RX ADMIN — CLOPIDOGREL BISULFATE 75 MG: 75 TABLET ORAL at 09:01

## 2024-05-23 RX ADMIN — LOSARTAN POTASSIUM 25 MG: 25 TABLET, FILM COATED ORAL at 09:01

## 2024-05-23 NOTE — THERAPY EVALUATION
Acute Care - Physical Therapy Initial Evaluation   [facility]     Patient Name: Nick Turk  : 1970  MRN: [id number]  Today's Date: 2024    Admit date: 2024     Referring Physician: Osiris Lee MD     Surgery Date:2024   Procedure(s) (LRB):  Cardiac Catheterization/Vascular Study (Left)        Visit Dx:     ICD-10-CM ICD-9-CM   1. Chest pain, unspecified type  R07.9 786.50   2. Acute coronary syndrome  I24.9 411.1   3. Difficulty in walking  R26.2 719.7     Patient Active Problem List   Diagnosis    Congenital spondylolisthesis    Lumbosacral spondylosis without myelopathy    Disorder of vertebra    Knee pain    Ligamentous laxity of knee    Osteoarthritis of left shoulder    Pain in joint of left shoulder    Pain, generalized    Sleep disorder    Low back pain    Thoracic back pain    Thoracic spondylosis without myelopathy    Nontraumatic complete tear of left rotator cuff    Rotator cuff tear, left    Aftercare following mini open rotator cuff repair    Acute systolic congestive heart failure    Chest pain    Acute coronary syndrome    Essential hypertension    Left ventricular systolic dysfunction (LVSD)    Chronic systolic congestive heart failure, NYHA class 2     Past Medical History:   Diagnosis Date    Back pain     Chronic pain syndrome     CVA (cerebral vascular accident) 2024    Essential hypertension 2024    Forgetfulness     Leg pain     Lumbago     Lumbar spondylosis     Pain, generalized     Pars defect of lumbar spine     Sleep disorder     Spondylolisthesis, lumbar region     L4/5     Past Surgical History:   Procedure Laterality Date    BACK SURGERY      CARDIAC CATHETERIZATION Left 2024    Procedure: Cardiac Catheterization/Vascular Study;  Surgeon: vL Hunt MD;  Location: [facility] INVASIVE LOCATION;  Service: Cardiovascular;  Laterality: Left;    CHOLECYSTECTOMY      COLONOSCOPY N/A 2021    Procedure: COLONOSCOPY WITH  POLYPECTOMIES, CLIP APPLICATION X1, CAUTERY;  Surgeon: Nolberto Onofre MD;  Location: [facility] ENDOSCOPY;  Service: General;  Laterality: N/A;  COLON POLYPS    GANGLION CYST EXCISION      LUMBAR EPIDURAL INJECTION      LUMBAR FUSION  2015    SHOULDER ARTHROSCOPY W/ ROTATOR CUFF REPAIR Left 05/12/2022    Procedure: LEFT SHOULDER ARTHROSCOPY WITH ROTATOR CUFF REPAIR, SUBACROMINAL DECOMPRESSION, DISTAL CLAVICULECTOMY, BICEPS TENODESIS;  Surgeon: Abdoulaye Minaya MD;  Location: [facility] OR [facility] [place];  Service: Orthopedics;  Laterality: Left;    SPINAL FUSION       PT Assessment (Last 12 Hours)       PT Evaluation and Treatment       Row Name 05/23/24 1045          Physical Therapy Time and Intention    Subjective Information complains of  Double vision  -MARSHALL     Document Type evaluation  -MARSHALL     Mode of Treatment individual therapy;physical therapy  -MARSHALL     Patient Effort good  -MARSHALL     Symptoms Noted During/After Treatment other (see comments)  Double vision  -MARSHALL       Row Name 05/23/24 1045          General Information    Patient Profile Reviewed yes  -MARSHALL     Patient Observations alert;cooperative;agree to therapy  -MARSHALL     Prior Level of Function independent:  -MARSHALL     Equipment Currently Used at Home none  -MARSHALL     Existing Precautions/Restrictions no known precautions/restrictions  -MARSHALL     Limitations/Impairments visual  Double vision  -MARSHALL     Risks Reviewed patient:  -MARSHALL       Row Name 05/23/24 1045          Living Environment    Current Living Arrangements home  -MARSHALL     People in Home spouse;child(flavia), dependent  -MARSHALL     Primary Care Provided by self  -MARSHALL       Row Name 05/23/24 1045          Home Use of Assistive/Adaptive Equipment    Equipment Currently Used at Home none  -MARSHALL       Row Name 05/23/24 1045          Pain    Pretreatment Pain Rating 0/10 - no pain  -MARSHALL     Posttreatment Pain Rating 0/10 - no pain  -MARSHALL       Row Name 05/23/24 1045          Cognition    Orientation Status (Cognition) oriented x 3  -MARSHALL       Row  Name 05/23/24 1045          Range of Motion (ROM)    Range of Motion bilateral lower extremities;ROM is L  -MARSHALL       Row Name 05/23/24 1045          Strength (Manual Muscle Testing)    Strength (Manual Muscle Testing) bilateral lower extremities;strength is [facility]  -MARSHALL       Row Name 05/23/24 1045          Transfers    Transfers stand-sit transfer  -MARSHALL       Row Name 05/23/24 1045          Stand-Sit Transfer    Stand-Sit [place] (Transfers) independent  -MARSHALL       Row Name 05/23/24 1045          Gait/Stairs (Locomotion)    Gait/Stairs Locomotion gait/ambulation independence  -MARSHALL     [place] Level (Gait) independent  -MARSHALL     Patient was able to Ambulate yes  -MARSHALL     Distance in Feet (Gait) 250  -MARSHALL     Deviations/Abnormal Patterns (Gait) base of support, narrow;weight shifting decreased;brynn decreased;gait speed decreased;stride length decreased  -MARSHALL       Row Name 05/23/24 1045          Safety Issues, Functional Mobility    Impairments Affecting Function (Mobility) balance;coordination;postural/trunk control;visual/perceptual  -MARSHALL       Row Name 05/23/24 1045          Balance    Balance Assessment standing dynamic balance  -MARSHALL     Dynamic Standing Balance minimal assist  -MARSHALL     Comment, Balance LOB with Tightrope walking, [place]. Minimal deviation with walking w/head turns, walking w/eyes closed.  -MARSHALL       Row Name 05/23/24 1045          Positioning and Restraints    Pre-Treatment Position other (comment)  Walking in hallway w/ OT.  -MARSHALL     Post Treatment Position bed  -MARSHALL     In Bed sitting EOB;call light within reach;encouraged to call for assist;exit alarm on  -MARSHALL       Row Name 05/23/24 1045          Therapy Assessment/Plan (PT)    Rehab Potential (PT) good, to achieve stated therapy goals  -MARSHALL     Criteria for Skilled Interventions Met (PT) yes;meets criteria  -MARSHALL     Therapy Frequency (PT) daily  -MARSHALL     Predicted Duration of Therapy Intervention (PT) 10 days  -MARSHALL     Problem List (PT) problems  related to;balance;coordination;mobility;vision;postural control  -MARSHALL     Activity Limitations Related to Problem List (PT) other (see comments)  LOB with advanced dynamic standing balance activies  -MARSHALL       Row Name 05/23/24 1045          PT Evaluation Complexity    History, PT Evaluation Complexity 1-2 personal factors and/or comorbidities  -MARSHALL     Examination of Body Systems (PT Eval Complexity) total of 4 or more elements  -MARSHALL     Clinical Presentation (PT Evaluation Complexity) stable  -MARSHALL     Clinical Decision Making (PT Evaluation Complexity) low complexity  -MARSHALL     Overall Complexity (PT Evaluation Complexity) low complexity  -MARSHALL       Row Name 05/23/24 1045          Therapy Plan Review/Discharge Plan (PT)    Therapy Plan Review (PT) evaluation/treatment results reviewed;patient  -MARSHALL       Row Name 05/23/24 1045          Physical Therapy Goals    Balance Goal Selection (PT) balance, PT goal 1  -MARSHALL       Row Name 05/23/24 1045          Balance Goal 1 (PT)    Activity/Assistive Device (Balance Goal 1, PT) other (see comments)  Pt will heel-toe tightrope walk 10 ft without LOB.  -MARSHALL     [place] Level/Cues Needed (Balance Goal 1, PT) independent  -MARSHALL     Time Frame (Balance Goal 1, PT) long term goal (LTG);10 days  -MARSHALL               User Key  (r) = Recorded By, (t) = Taken By, (c) = Cosigned By      Initials Name Provider Type    Lowell Metzger, PT Physical Therapist                    Physical Therapy Education       Title: PT OT SLP Therapies (Done)       Topic: Physical Therapy (Done)       Point: Mobility training (Done)       Learning Progress Summary             Patient Acceptance, E,TB, VU by MARSHALL at 5/23/2024 1113                         Point: Precautions (Done)       Learning Progress Summary             Patient Acceptance, E,TB, VU by MARSHALL at 5/23/2024 1113                                         User Key       Initials Effective Dates Name Provider Type Discipline    MARSHALL 06/03/21 -  Obdulia  Lowell LUNA PT Physical Therapist PT                  PT Recommendation and Plan  Anticipated Discharge Disposition (PT): home with outpatient therapy services  Planned Therapy Interventions (PT): balance training, gait training, motor coordination training, postural re-education, strengthening  Therapy Frequency (PT): daily      Outcome Measures       Row Name 05/23/24 1110             How much help from another person do you currently need...    Turning from your back to your side while in flat bed without using bedrails? 4  -MARSHALL      Moving from lying on back to sitting on the side of a flat bed without bedrails? 4  -MARSHALL      Moving to and from a bed to a chair (including a wheelchair)? 4  -MARSHALL      Standing up from a chair using your arms (e.g., wheelchair, bedside chair)? 4  -MARSHALL      Climbing 3-5 steps with a railing? 4  -MARSHALL      To walk in hospital room? 4  -MARSHALL      AM-PAC 6 Clicks Score (PT) 24  -MARSHALL      Highest Level of Mobility Goal 8 --> Walked 250 feet or more  -MARSHALL         Functional Assessment    Outcome Measure Options AM-PAC 6 Clicks Basic Mobility (PT)  -MARSHALL                User Key  (r) = Recorded By, (t) = Taken By, (c) = Cosigned By      Initials Name Provider Type    Lowell Metzger PT Physical Therapist                     Time Calculation:    PT Charges       Row Name 05/23/24 1044 05/23/24 1041          Time Calculation    PT Received On -- 05/23/24  -MARSHALL     PT Goal Re-Cert Due Date 06/01/24  -MARSHALL --        Untimed Charges    PT Eval/Re-eval Minutes -- 32  -MARSHALL        Total Minutes    Untimed Charges Total Minutes -- 32  -MARSHALL      Total Minutes -- 32  -MARSHALL               User Key  (r) = Recorded By, (t) = Taken By, (c) = Cosigned By      Initials Name Provider Type    Lowell Metzger PT Physical Therapist                  Therapy Charges for Today       Code Description Service Date Service Provider Modifiers Qty    14341235167 HC PT EVAL LOW COMPLEXITY 3 5/23/2024 Lowell Steiner PT GP 1             PT G-Codes  Outcome Measure Options: AM-PAC 6 Clicks Basic Mobility (PT)  AM-PAC 6 Clicks Score (PT): 24    Lowell Steiner, PT  5/23/2024

## 2024-05-23 NOTE — PROGRESS NOTES
[facility]   Hospitalist Progress Note  Date: 2024  Patient Name: Nick Turk  : 1970  MRN: [id number]  Date of admission: 2024  Room/Bed: 218/2      Subjective   Subjective     Chief Complaint: Chest pain    Summary:Nick Turk is a 54 y.o. male with recently diagnosed to systolic congestive heart failure, obstructive sleep apnea on CPAP at home, history of chronic tobacco abuse (previously smoked 1.5 packs/day for 39 years and quit smoking in 2023), obesity (BMI: 31.91) that presented to the ED with complaints of chest pain and discomfort. Cardiology consulted. Patient taken for cardiac catheterization on 2024 which showed an LVEDP of 22 mmHg due to LV dysfunction, mild nonobstructive CAD. Cardiology recommending optimization of medical therapy.  Hospital course complicated by double vision persistent after cardiac cath.  CT head without contrast was obtained which showed no acute intracranial abnormality.  MRI brain with and without contrast showed acute diffusion restriction abnormality within the flo/midbrain just to the left of midline; possible late acute infarct.  No evidence of MRAs.  Neurology was consulted.  Underwent CT angio head and neck which showed no emergent large vessel occlusion appreciated.  Neurology and cardiology following the patient.    Interval Followup: No acute events overnight.  Patient is sitting comfortably in bed, not in acute distress.  Stated his diplopia is improving compared to yesterday, does have episodes where he cannot clearly see without double vision.  Denied any other motor or sensory deficit.  Speech clear.  Denied any fever, chills, rigors, headache, nausea, chest pain, difficulty breathing.  Family at bedside.    Review of Systems    All systems reviewed and negative except for what is outlined above.      Objective   Objective     Vitals:   Temp:  [97.3 °F (36.3 °C)-98.2 °F (36.8 °C)] 97.9 °F (36.6 °C)  Heart Rate:  []  109  Resp:  [16-18] 18  BP: (125-150)/() 150/107    Physical Exam   General: Awake, alert, NAD  Cardiovascular: RRR, no murmurs   Pulmonary: CTA bilaterally; no wheezes; no conversational dyspnea  Neuro: Alert, awake, oriented x 3.  No nystagmus or cranial nerve defect appreciated.  No facial droop.      Result Review    Result Review:  I have personally reviewed these results:  [x]  Laboratory      Lab 05/23/24  0455 05/22/24  0517 05/21/24  0439 05/20/24  1029   WBC 9.99 9.76 9.18 11.26*   HEMOGLOBIN 16.9 17.0 15.5 16.8   HEMATOCRIT 50.0 51.6* 47.2 49.1   PLATELETS 213 221 203 229   NEUTROS ABS  --   --   --  7.63*   IMMATURE GRANS (ABS)  --   --   --  0.04   LYMPHS ABS  --   --   --  1.99   MONOS ABS  --   --   --  1.26*   EOS ABS  --   --   --  0.27   MCV 94.0 96.8 95.9 93.0         Lab 05/23/24  0455 05/22/24  0517 05/21/24  1844 05/21/24  0439   SODIUM 138 138 140 139   POTASSIUM 4.2 4.2 3.8 4.1   CHLORIDE 107 105 104 106   CO2 23.1 25.8 27.2 27.1   ANION GAP 7.9 7.2 8.8 5.9   BUN 14 19 19 19   CREATININE 0.86 1.00 0.86 0.88   EGFR 103.5 90.0 103.5 102.8   GLUCOSE 110* 92 90 97   CALCIUM 8.9 9.0 8.8 9.0   MAGNESIUM 2.2 2.1  --  2.1   PHOSPHORUS 3.2  --   --   --          Lab 05/20/24  1029   TOTAL PROTEIN 7.4   ALBUMIN 4.3   GLOBULIN 3.1   ALT (SGPT) 32   AST (SGOT) 22   BILIRUBIN 0.7   ALK PHOS 58   LIPASE 74*         Lab 05/20/24  1249 05/20/24  1029   PROBNP  --  291.7   HSTROP T 11 11         Lab 05/23/24  0455   CHOLESTEROL 78   LDL CHOL 20   HDL CHOL 31*   TRIGLYCERIDES 166*             Brief Urine Lab Results  (Last result in the past 365 days)        Color   Clarity   Blood   Leuk Est   Nitrite   Protein   CREAT   Urine HCG        05/13/24 1902 Yellow   Clear   Negative   Negative   Negative   Negative                 [x]  Microbiology   Microbiology Results (last 10 days)       ** No results found for the last 240 hours. **          [x]  Radiology  CT Angiogram Head w AI Analysis of  LVO    Result Date: 5/23/2024  (COMBINED)  1. The studies are limited due to limitations in the contrast bolus predominately.  2. Grossly, no emergent large vessel occlusion is appreciated, especially involving the left P1 segment or elsewhere in the left posterior cerebral artery (PCA) territory.  3. Grossly, no hemodynamically significant arterial stenoses are appreciated.  4. No gross evidence of a cerebral saccular aneurysm.  5. No definite arterial dissection.  6. The vertebral arteries are patent and codominant.  7. Please see above comments for further detail.      Please note that portions of this note were completed with a voice recognition program.         Electronically Signed By-Nick Parker MD On:5/23/2024 1:42 AM      CT Angiogram Neck    Result Date: 5/23/2024  (COMBINED)  1. The studies are limited due to limitations in the contrast bolus predominately.  2. Grossly, no emergent large vessel occlusion is appreciated, especially involving the left P1 segment or elsewhere in the left posterior cerebral artery (PCA) territory.  3. Grossly, no hemodynamically significant arterial stenoses are appreciated.  4. No gross evidence of a cerebral saccular aneurysm.  5. No definite arterial dissection.  6. The vertebral arteries are patent and codominant.  7. Please see above comments for further detail.      Please note that portions of this note were completed with a voice recognition program.         Electronically Signed ByNadine Parker MD On:5/23/2024 1:42 AM      MRI Brain With & Without Contrast    Result Date: 5/22/2024   1. Acute diffusion restriction abnormality is noted within the flo/midbrain just to the left of midline. This is a new finding from the comparison. Findings compatible with a possible late acute infarct  No evidence of hemorrhage or abnormal enhancement noted.  The remainder of the MRI of the head appears stable without acute abnormality  Findings were called to the patient's nurse  at 10:35 a.m.    Electronically Signed By-Corby Mendoza On:5/22/2024 10:38 AM      CT Head Without Contrast    Result Date: 5/22/2024  No CT findings of acute intracranial abnormality.  Electronically Signed By-Cody Messina MD On:5/22/2024 7:29 AM      XR Chest 1 View    Result Date: 5/20/2024  Impression: 1.  An acute pulmonary process is not apparent.   Electronically Signed By-Chalo Villafuerte MD On:5/20/2024 11:06 AM     []  EKG/Telemetry   []  Cardiology/Vascular   []  Pathology  []  Old records  []  Other:    Assessment & Plan   Assessment / Plan     Assessment:  Acute infarct within flo/midbrain  Chronic systolic congestive heart failure, not acutely exacerbated  Essential hypertension  Transient bradycardia  History of chronic tobacco abuse with cigarettes  Obesity (BMI: 30.84)    Plan:  Patient currently being managed in medicine service.  Presented with chest pain.  Troponin was negative and EKG showed left bundle branch block.  Transthoracic echo showed EF of 35%.  Underwent left heart cath on 5/21 which showed minimal nonobstructive CAD with elevated LVEDP of 22 mmHg suggesting LV dysfunction.  Patient developed diplopia post left heart cath.  MRI was obtained which showed late acute infarct within the flo/midbrain just left of midline.  Neurology following the patient.  HbA1c 5.5 with LDL 44.  Currently on aspirin and Plavix.  Plan to discontinue Plavix after 21 days and continue aspirin thereafter.  Continue statin.  Plan for Holter at discharge to watch for A-fib.  Cardiology following the patient, appreciate further input.  Appreciate further input by teleneurology.  Patient participated with PT and OT who recommended home with outpatient therapy services.  Transient bradycardia improved.  Likely discharge in next 24 hours if stable.     Discussed with RN.    DVT prophylaxis:  Medical and mechanical DVT prophylaxis orders are present.        CODE STATUS:   Code Status (Patient has no pulse and is  not breathing): CPR (Attempt to Resuscitate)  Medical Interventions (Patient has pulse or is breathing): Full Support      Electronically signed by Osiris Lee MD, 05/23/24, 9:44 AM EDT.

## 2024-05-23 NOTE — PROGRESS NOTES
[facility]     Cardiology Progress Note    Patient Name: Nick Turk  : 1970  MRN: [id number]  Primary Care Physician:  Ashley Cabezas APRN  Date of admission: 2024    Subjective   Subjective     Chief Complaint: The patient has persistent diplopia.    Interval HPI:    Patient otherwise stable    Review of Systems   All systems were reviewed and negative except for: Double vision    Objective   Objective     Vitals:   Temp:  [97.3 °F (36.3 °C)-98.1 °F (36.7 °C)] 97.9 °F (36.6 °C)  Heart Rate:  [] 109  Resp:  [16-18] 18  BP: (125-150)/() 150/107  Physical Exam      Alert, oriented  Neck. No jvd, no bruits.  Lungs : no rales, no wheezing  Heart: regular, no gallops  Lungs No rales, no wheezing  Abdomen: soft, bs+  LE no edema  Neurologic: No nystagmus, no motor deficits. Gait disturbance improves when he covers one eye.     Scheduled Meds:aspirin, 81 mg, Oral, Daily  atorvastatin, 80 mg, Oral, Nightly  clopidogrel, 75 mg, Oral, Daily  empagliflozin, 10 mg, Oral, Daily  losartan, 25 mg, Oral, Daily  sodium chloride, 10 mL, Intravenous, Q12H  spironolactone, 25 mg, Oral, Daily      Continuous Infusions:Pharmacy to Dose enoxaparin (LOVENOX),            Result Review    Result Review:  I have personally reviewed the results from the time of this admission to 2024 11:09 EDT and agree with these findings:  [x]  Laboratory  []  Microbiology  [x]  Radiology  [x]  EKG/Telemetry   [x]  Cardiology/Vascular   []  Pathology  []  Old records  []  Other:  Most notable findings include:     CBC          2024    04:39 2024    05:17 2024    04:55   CBC   WBC 9.18  9.76  9.99    RBC 4.92  5.33  5.32    Hemoglobin 15.5  17.0  16.9    Hematocrit 47.2  51.6  50.0    MCV 95.9  96.8  94.0    MCH 31.5  31.9  31.8    MCHC 32.8  32.9  33.8    RDW 12.0  12.1  12.2    Platelets 203  221  213      CMP          2024    04:39 2024    18:44 2024    05:17 2024    04:55   CMP    Glucose 97  90  92  110    BUN 19  19  19  14    Creatinine 0.88  0.86  1.00  0.86    EGFR 102.8  103.5  90.0  103.5    Sodium 139  140  138  138    Potassium 4.1  3.8  4.2  4.2    Chloride 106  104  105  107    Calcium 9.0  8.8  9.0  8.9    BUN/Creatinine Ratio 21.6  22.1  19.0  16.3    Anion Gap 5.9  8.8  7.2  7.9       CARDIAC LABS:      Lab 05/20/24  1249 05/20/24  1029   PROBNP  --  291.7   HSTROP T 11 11        Assessment & Plan   Assessment / Plan     Brief Patient Summary:  Nick Turk is a 54 y.o. male with  class II heart failure New [place] Heart Association, with his EF of 35%.  He has mild coronary artery disease.  Patient now with double vision and steady gait no apparent motor deficit.     Active Hospital Problems:  Active Hospital Problems    Diagnosis     **Chest pain     Acute coronary syndrome     Chronic systolic congestive heart failure, NYHA class 2     Essential hypertension     Left ventricular systolic dysfunction (LVSD)            Plan:     The patient have an abnormal brain MRI which showed diffusion restricted showed an abnormality in the flo and midbrain to the left of the midline suggestive of possible late acute small infarct.  His neurological symptoms are improving.  He denies cardiac symptoms.  I will continue with dual antiplatelet therapy.  Continue with statin therapy including Jardiance, angiotensin receptor blockers and  Aldactone as tolerated.  Patient will need a 14 days cardiac monitor as an outpatient.      Spent over 25 minutes of time during this evaluation with 50% of time coordination of care.       CODE STATUS:   Code Status (Patient has no pulse and is not breathing): CPR (Attempt to Resuscitate)  Medical Interventions (Patient has pulse or is breathing): Full Support      Electronically signed by Lv Bautista MD, 05/23/24, 11:09 AM EDT.

## 2024-05-23 NOTE — THERAPY EVALUATION
Acute Care - Speech Language Pathology   Swallow Initial Evaluation  [facility]     Patient Name: Nick Turk  : 1970  MRN: [id number]  Today's Date: 2024               Admit Date: 2024    Visit Dx:     ICD-10-CM ICD-9-CM   1. Chest pain, unspecified type  R07.9 786.50   2. Acute coronary syndrome  I24.9 411.1   3. Difficulty in walking  R26.2 719.7     Patient Active Problem List   Diagnosis    Congenital spondylolisthesis    Lumbosacral spondylosis without myelopathy    Disorder of vertebra    Knee pain    Ligamentous laxity of knee    Osteoarthritis of left shoulder    Pain in joint of left shoulder    Pain, generalized    Sleep disorder    Low back pain    Thoracic back pain    Thoracic spondylosis without myelopathy    Nontraumatic complete tear of left rotator cuff    Rotator cuff tear, left    Aftercare following mini open rotator cuff repair    Acute systolic congestive heart failure    Chest pain    Acute coronary syndrome    Essential hypertension    Left ventricular systolic dysfunction (LVSD)    Chronic systolic congestive heart failure, NYHA class 2     Past Medical History:   Diagnosis Date    Back pain     Chronic pain syndrome     CVA (cerebral vascular accident) 2024    Essential hypertension 2024    Forgetfulness     Leg pain     Lumbago     Lumbar spondylosis     Pain, generalized     Pars defect of lumbar spine     Sleep disorder     Spondylolisthesis, lumbar region     L4/5     Past Surgical History:   Procedure Laterality Date    BACK SURGERY      CARDIAC CATHETERIZATION Left 2024    Procedure: Cardiac Catheterization/Vascular Study;  Surgeon: Lv Hunt MD;  Location: [facility] CATH INVASIVE LOCATION;  Service: Cardiovascular;  Laterality: Left;    CHOLECYSTECTOMY      COLONOSCOPY N/A 2021    Procedure: COLONOSCOPY WITH POLYPECTOMIES, CLIP APPLICATION X1, CAUTERY;  Surgeon: Nolberto Onofre MD;  Location: [facility] ENDOSCOPY;  Service:  General;  Laterality: N/A;  COLON POLYPS    GANGLION CYST EXCISION      LUMBAR EPIDURAL INJECTION      LUMBAR FUSION  2015    SHOULDER ARTHROSCOPY W/ ROTATOR CUFF REPAIR Left 05/12/2022    Procedure: LEFT SHOULDER ARTHROSCOPY WITH ROTATOR CUFF REPAIR, SUBACROMINAL DECOMPRESSION, DISTAL CLAVICULECTOMY, BICEPS TENODESIS;  Surgeon: Abdoulaye Minaya MD;  Location: [facility];  Service: Orthopedics;  Laterality: Left;    SPINAL FUSION         Inpatient Speech Pathology Dysphagia Evaluation        PAIN SCALE: None noted    PRECAUTIONS/CONTRAINDICATIONS: None noted    SUSPECTED ABUSE/NEGLECT/EXPLOITATION: None noted    SOCIAL/PSYCHOLOGICAL NEEDS/BARRIERS: None noted    PAST SOCIAL HISTORY: Lives at home    PRIOR FUNCTION: Independent    PATIENT GOALS/EXPECTATIONS: Did not report    HISTORY: This patient is a very pleasant 54-year-old male admitted with the above diagnosis.  Patient denies any difficulty swallowing or prior history of dysphagia.    CURRENT DIET LEVEL: Regular diet-thin liquids    OBJECTIVE:    TEST ADMINISTERED: Clinical dysphagia evaluation    COGNITION/SAFETY AWARENESS: Alert and oriented    BEHAVIORAL OBSERVATIONS: Pleasant and cooperative nt cooperative    ORAL MOTOR EXAM: Lingual and labial strength and range of motion within functional limits    VOICE QUALITY: Within normal limits    REFLEX EXAM: Deferred    POSTURE: 90 degrees upright    FEEDING/SWALLOWING FUNCTION: Assessed with full range of consistencies with thin liquids from spoon cup and straw, purée consistency soft and hard solids    CLINICAL OBSERVATIONS: Oral stage is characterized by good bolus preparation and control.  Timely oral transit.  Pharyngeal phase is timely with good laryngeal elevation per palpation.  No clinical signs or symptoms of aspiration noted.  Vocal quality remained clear to auscultation.  Denies globus sensation after completion of swallow.    DYSPHAGIA CRITERIA: N/A    FUNCTIONAL ASSESSMENT INSTRUMENT: Patient  currently scored a level 7 of 7 on Functional Communication Measures for swallowing indicating a 0% limitation in function.    ASSESSMENT/ PLAN OF CARE:  Pt presents with functional oropharyngeal swallow.  No clinical signs or symptoms of aspiration noted.    REHAB POTENTIAL:  Pt has good rehab potential.  The following limitations may influence improvement/ length of tx medical status.    RECOMMENDATIONS:   1.   DIET: Regular diet-thin liquids    2.  POSITION: 90 degrees upright for all intake    3.  COMPENSATORY STRATEGIES: Slow rate, small bites/drinks    Pt/responsible party agrees with plan of care and has been informed of all alternatives, risks and benefits.        EDUCATION  The patient has been educated in the following areas:   Dysphagia (Swallowing Impairment).       Natali Ayoub, SLP  5/23/2024

## 2024-05-23 NOTE — PROGRESS NOTES
"[facility]  [facility]    Routine Consult Follow-Up    Patient Name:   Nick Turk  YOB: 1970  Identification Number:   MRN - [id number]  Date of Service:   05/23/2024 08:43:06    Diagnosis        I63.30 - C[facility]    Impression  54yoM with hx of HTN, CHF, bradycardia, came in for CP evaluation, and is now s/p cath. Having double vision since cath. EOM intact on exam. Per their description of \"discoordinate\" gaze and persistent double vision, suspect a small brainstem stroke. further imaging with MRI brain reveals and acute ischemic left midbrain stroke.  A1c 5.0 LDL 44  Echo 5/14 with EF 35%, mildly dilated LA,  continue antiplatelets ASA/Plavix. For stroke prevention, can DC Plavix after 21 days (although I have no objection if he needs to stay on dual therapy for longer period for other medica indication)  statin for goal LDL <70  telemetry. holter at discharge to watch for afib    Recommended ophthalmology eval as OP (evaluation for prism glasses)     Diagnostic Studies :  Holter/Loop Recorder as outpatient with cardiology follow up    Antithrombotic Medication :  Statins for LDL goal less than 70    Nursing Recommendations :  IV Fluids, avoid dextrose containing fluids, Maintain euglycemiaNeuro checks q4 hrs x 24 hrs and then per shiftHead of bed 30 degreesContinue with Telemetry    Consultations :  Recommend Speech therapy if failed dysphagia screenPhysical therapy/Occupational therapy    Disposition :  No further recommendations  Will signoff please contact for any questions  Outpatient Neurology follow up in 3-6 weeks    Subjective  symptoms improving. double vision not as remarkable as before  reviewed MRI with him - midbrain stroke  already on DAPT - will continue  rec ophtho as OP    Imaging  CTA head neck -  1. The studies are limited due to limitations in the contrast " bolus  predominately.  2. Grossly, no emergent large vessel occlusion is appreciated,  especially involving the left P1 segment or elsewhere in the left  posterior cerebral artery (PCA) territory.  3. Grossly, no hemodynamically significant arterial stenoses are  appreciated.  4. No gross evidence of a cerebral saccular aneurysm.  5. No definite arterial dissection.  6. The vertebral arteries are patent and codominant.       Examination  BP(150/107),    Neuro Exam:  General: Alert,Awake, Oriented to Time, Place, Person    Speech: Fluent:    Language: Intact:    Face: Symmetric:    Facial Sensation: Intact:    Visual Fields: Intact:    Extraocular Movements: Intact:  ductions and versions normal, no abnormalities perceived    Motor Exam: No Drift:    Sensation: Intact:    Coordination: Intact: RUE, RLE, LUE, LLE           Patient/Family was informed the Neurology Consult would happen via TeleHealth consult by way of interactive audio and video telecommunications and consented to receiving care in this manner.    Telehealth Neurology consultation was provided. I spent 14 minutes providing telehealth care. This includes time spent for face to face visit via telemedicine, review of medical records, imaging studies and discussion of findings with providers, the patient and/or family.      Dr Maciej Walden      [facility]  For Inpatient follow-up with [facility] physician please call [facility] [phone]. This is not an outpatient service. Post hospital discharge, please contact hospital directly.    Please do not communicate with [facility] physicians via secure chat. If you have any questions, Please contact [facility].  Please call or reconsult our service if there are any clinical or diagnostic changes.

## 2024-05-23 NOTE — THERAPY EVALUATION
Patient Name: Nick Turk  : 1970    MRN: [id number]                              Today's Date: 2024       Admit Date: 2024    Visit Dx:     ICD-10-CM ICD-9-CM   1. Chest pain, unspecified type  R07.9 786.50   2. Acute coronary syndrome  I24.9 411.1   3. Difficulty in walking  R26.2 719.7   4. Decreased activities of daily living (ADL)  Z78.9 V49.89     Patient Active Problem List   Diagnosis    Congenital spondylolisthesis    Lumbosacral spondylosis without myelopathy    Disorder of vertebra    Knee pain    Ligamentous laxity of knee    Osteoarthritis of left shoulder    Pain in joint of left shoulder    Pain, generalized    Sleep disorder    Low back pain    Thoracic back pain    Thoracic spondylosis without myelopathy    Nontraumatic complete tear of left rotator cuff    Rotator cuff tear, left    Aftercare following mini open rotator cuff repair    Acute systolic congestive heart failure    Chest pain    Acute coronary syndrome    Essential hypertension    Left ventricular systolic dysfunction (LVSD)    Chronic systolic congestive heart failure, NYHA class 2     Past Medical History:   Diagnosis Date    Back pain     Chronic pain syndrome     CVA (cerebral vascular accident) 2024    Essential hypertension 2024    Forgetfulness     Leg pain     Lumbago     Lumbar spondylosis     Pain, generalized     Pars defect of lumbar spine 2015    Sleep disorder     Spondylolisthesis, lumbar region     L4/5     Past Surgical History:   Procedure Laterality Date    BACK SURGERY      CARDIAC CATHETERIZATION Left 2024    Procedure: Cardiac Catheterization/Vascular Study;  Surgeon: Lv Hunt MD;  Location: [facility] CATH INVASIVE LOCATION;  Service: Cardiovascular;  Laterality: Left;    CHOLECYSTECTOMY      COLONOSCOPY N/A 2021    Procedure: COLONOSCOPY WITH POLYPECTOMIES, CLIP APPLICATION X1, CAUTERY;  Surgeon: Nolberto Onofre MD;  Location: [facility] ENDOSCOPY;  Service:  General;  Laterality: N/A;  COLON POLYPS    GANGLION CYST EXCISION      LUMBAR EPIDURAL INJECTION      LUMBAR FUSION  2015    SHOULDER ARTHROSCOPY W/ ROTATOR CUFF REPAIR Left 05/12/2022    Procedure: LEFT SHOULDER ARTHROSCOPY WITH ROTATOR CUFF REPAIR, SUBACROMINAL DECOMPRESSION, DISTAL CLAVICULECTOMY, BICEPS TENODESIS;  Surgeon: Abdoulaye Minaya MD;  [facility]: [facility];  Service: Orthopedics;  Laterality: Left;    SPINAL FUSION        General Information       Row Name 05/23/24 1449          OT Time and Intention    Document Type evaluation  -ES     Mode of Treatment individual therapy;occupational therapy  -ES       Row Name 05/23/24 1449          General Information    Patient Profile Reviewed yes  -ES     Prior Level of Function independent:;ADL's;all household mobility  Patient independent with B and IADLs at baseline. Patient is employed as contractor. No device for functional mobility. Standing shower and grooming completion. no home O2 use. Patient denies recent falls.  -ES     Existing Precautions/Restrictions fall  -ES     Barriers to Rehab none identified  -ES       Row Name 05/23/24 1449          Occupational Profile    Reason for Services/Referral (Occupational Profile) Patient is 54 yr old male admitted to [facility] on [facility]/20/2024 with reports of chest pain. During hopsital stay, patient had stroke RRT called, MRI positive for L flo/midbrain CVA. OT evaluation and treatment ordered d/t recent decline in ADLs/transfer ability and discharge planning recommendations. No previous OT services for current condition.  -ES       Row Name 05/23/24 1449          Living Environment    People in Home spouse;child(flavia), dependent  -ES       Row Name 05/23/24 1449          Cognition    Orientation Status (Cognition) oriented x 3  patient pleasant and cooperative, agreeable to therapy evaluation  -ES       Row Name 05/23/24 1449          Safety Issues, Functional Mobility    Impairments Affecting  Function (Mobility) balance;coordination;visual/perceptual  -ES               User Key  (r) = Recorded By, (t) = Taken By, (c) = Cosigned By      Initials Name Provider Type    Kim Altamirano OTR/L, CSRS Occupational Therapist                     Mobility/ADL's       Row Name 05/23/24 1454          Bed Mobility    Bed Mobility supine-sit;sit-supine  -ES     Supine-Sit [place] (Bed Mobility) standby assist  -ES     Sit-Supine [place] (Bed Mobility) standby assist  -ES       Row Name 05/23/24 1454          Transfers    Transfers sit-stand transfer;stand-sit transfer  -ES       Row Name 05/23/24 1454          Sit-Stand Transfer    Sit-Stand [place] (Transfers) standby assist  -ES     Assistive Device (Sit-Stand Transfers) other (see comments)  no AD  -ES       Row Name 05/23/24 1454          Stand-Sit Transfer    Stand-Sit [place] (Transfers) standby assist  -ES     Assistive Device (Stand-Sit Transfers) other (see comments)  no AD  -ES       Row Name 05/23/24 1454          Functional Mobility    Functional Mobility- Ind. Level standby assist  -ES     Functional Mobility- Device other (see comments)  no AD  -ES     Functional Mobility- Comment Patient demonstrates several losses of balance during functional mobility, SBA without use of AD.  -ES       Row Name 05/23/24 1454          Activities of Daily Living    BADL Assessment/Intervention bathing;upper body dressing;lower body dressing;grooming;feeding;toileting  -ES       Row Name 05/23/24 1454          Bathing Assessment/Intervention    [place] Level (Bathing) bathing skills;independent  -ES       Row Name 05/23/24 1454          Upper Body Dressing Assessment/Training    [place] Level (Upper Body Dressing) upper body dressing skills;independent  -ES       Row Name 05/23/24 1454          Lower Body Dressing Assessment/Training    [place] Level (Lower Body Dressing) lower body dressing skills;independent  -ES       Row Name  05/23/24 1454          Grooming Assessment/Training    [place] Level (Grooming) grooming skills;independent  -ES       Row Name 05/23/24 1454          Self-Feeding Assessment/Training    [place] Level (Feeding) feeding skills;independent  -ES       Row Name 05/23/24 1454          Toileting Assessment/Training    [place] Level (Toileting) toileting skills;independent  -ES               User Key  (r) = Recorded By, (t) = Taken By, (c) = Cosigned By      Initials Name Provider Type    ES Kim Blue, OTR/L, CSRS Occupational Therapist                   Obj/Interventions       Row Name 05/23/24 1456          Sensory Assessment (Somatosensory)    Sensory Assessment (Somatosensory) sensation intact  -ES     Sensory Assessment bilateral upper extremity sensation intact to light and heavy touch.  -ES       Row Name 05/23/24 1456          Vision Assessment/Intervention    Visual Impairment/Limitations diplopia  -ES     Vision Assessment Comment Patient endorses double vision. With unilateral vision testing, covering other eye, diplopia disappears. With mobility, diplopia impacts patient mobility and balance  -ES       Row Name 05/23/24 1456          Range of Motion Comprehensive    General Range of Motion bilateral upper extremity ROM WNL  -ES       Row Name 05/23/24 1456          Strength Comprehensive (MMT)    General Manual Muscle Testing (MMT) Assessment no strength deficits identified  -ES     Comment, General Manual Muscle Testing (MMT) Assessment BUEs 4+/5 with symmetrical strength  -ES       Row Name 05/23/24 1456          Motor Skills    Motor Skills functional endurance  -ES     Functional Endurance good  -ES       Row Name 05/23/24 1456          Balance    Balance Assessment sitting dynamic balance;standing dynamic balance  -ES     Dynamic Sitting Balance independent  -ES     Position, Sitting Balance unsupported;sitting edge of bed  -ES     Dynamic Standing Balance standby assist  -ES      Position/Device Used, Standing Balance unsupported  -ES               User Key  (r) = Recorded By, (t) = Taken By, (c) = Cosigned By      Initials Name Provider Type    Kim Altamirano OTR/L, CSRS Occupational Therapist                   Goals/Plan    No documentation.                  Clinical Impression       Row Name 05/23/24 1500          Plan of Care Review    Plan of Care Reviewed With patient  -ES     Outcome Evaluation Patient presents at or near baseline functional status at time of evaluation with no identified functional deficits that impede patient independence with activities of daily living.  No indicated need for skilled occupational therapy intervention in the acute care setting.  Occupational therapy will sign off at this time. Patient may benefit from outpatient vestibular therapy services to address continued diploipa if this impacts patient independence.  -ES       Row Name 05/23/24 1500          Therapy Assessment/Plan (OT)    Criteria for Skilled Therapeutic Interventions Met (OT) no problems identified which require skilled intervention  -ES     Therapy Frequency (OT) evaluation only  -ES       Row Name 05/23/24 1500          Therapy Plan Review/Discharge Plan (OT)    Anticipated Discharge Disposition (OT) home with outpatient therapy services  -       Row Name 05/23/24 1500          Positioning and Restraints    Pre-Treatment Position in bed  -ES     Post Treatment Position bed  -ES               User Key  (r) = Recorded By, (t) = Taken By, (c) = Cosigned By      Initials Name Provider Type    Kim Altamirano OTR/L, CSRS Occupational Therapist                   Outcome Measures       Row Name 05/23/24 1501          How much help from another is currently needed...    Putting on and taking off regular lower body clothing? 4  -ES     Bathing (including washing, rinsing, and drying) 4  -ES     Toileting (which includes using toilet bed pan or urinal) 4  -ES     Putting on and taking off  regular upper body clothing 4  -ES     Taking care of personal grooming (such as brushing teeth) 4  -ES     Eating meals 4  -ES     AM-PAC 6 Clicks Score (OT) 24  -ES       Row Name 05/23/24 1110 05/23/24 0752       How much help from another person do you currently need...    Turning from your back to your side while in flat bed without using bedrails? 4  -MARSHALL 4  -RS    Moving from lying on back to sitting on the side of a flat bed without bedrails? 4  -MARSHALL 4  -RS    Moving to and from a bed to a chair (including a wheelchair)? 4  -MARSHALL 4  -RS    Standing up from a chair using your arms (e.g., wheelchair, bedside chair)? 4  -MARSHALL 4  -RS    Climbing 3-5 steps with a railing? 4  -MARSHALL 4  -RS    To walk in hospital room? 4  -MARSHALL 4  -RS    AM-PAC 6 Clicks Score (PT) 24  -MARSHALL 24  -RS    Highest Level of Mobility Goal 8 --> Walked 250 feet or more  -MARSHALL 8 --> Walked 250 feet or more  -RS      Row Name 05/23/24 1501 05/23/24 1110       Functional Assessment    Outcome Measure Options AM-PAC 6 Clicks Daily Activity (OT)  -ES AM-PAC 6 Clicks Basic Mobility (PT)  -MARSHALL              User Key  (r) = Recorded By, (t) = Taken By, (c) = Cosigned By      Initials Name Provider Type    MARSHALL Lowell Steiner, PT Physical Therapist    Kim Altamirano OTR/L, CSRS Occupational Therapist    RS Raisa Pena, RN Registered Nurse                      OT Recommendation and Plan  Therapy Frequency (OT): evaluation only  Plan of Care Review  Plan of Care Reviewed With: patient  Outcome Evaluation: Patient presents at or near baseline functional status at time of evaluation with no identified functional deficits that impede patient independence with activities of daily living.  No indicated need for skilled occupational therapy intervention in the acute care setting.  Occupational therapy will sign off at this time. Patient may benefit from outpatient vestibular therapy services to address continued diploipa if this impacts patient independence.     Time  Calculation:   Evaluation Complexity (OT)  Review Occupational Profile/Medical/Therapy History Complexity: brief/low complexity  Assessment, Occupational Performance/Identification of Deficit Complexity: 1-3 performance deficits  Clinical Decision Making Complexity (OT): problem focused assessment/low complexity  Overall Complexity of Evaluation (OT): low complexity     Time Calculation- OT       Row Name 05/23/24 1501             Time Calculation- OT    OT Received On 05/23/24  -ES         Untimed Charges    OT Eval/Re-eval Minutes 21  -ES         Total Minutes    Untimed Charges Total Minutes 21  -ES       Total Minutes 21  -ES                User Key  (r) = Recorded By, (t) = Taken By, (c) = Cosigned By      Initials Name Provider Type    ES Kim Blue, OTR/L, CSRS Occupational Therapist                  Therapy Charges for Today       Code Description Service Date Service Provider Modifiers Qty    [id number]  OT EVAL LOW COMPLEXITY 2 5/23/2024 Kim Blue, OTR/L, CSRS GO 1                 Kim Blue OTR/L, CSRS  5/23/2024

## 2024-05-23 NOTE — PLAN OF CARE
Goal Outcome Evaluation:  Plan of Care Reviewed With: patient, spouse        Progress: improving  Outcome Evaluation: No acute events this shift. VSS

## 2024-05-23 NOTE — PLAN OF CARE
Goal Outcome Evaluation:  Plan of Care Reviewed With: patient           Outcome Evaluation: Patient presents at or near baseline functional status at time of evaluation with no identified functional deficits that impede patient independence with activities of daily living.  No indicated need for skilled occupational therapy intervention in the acute care setting.  Occupational therapy will sign off at this time. Patient may benefit from outpatient vestibular therapy services to address continued diploipa if this impacts patient independence.      Anticipated Discharge Disposition (OT): home with outpatient therapy services

## 2024-05-23 NOTE — PAYOR COMM NOTE
"Nick Turk (54 y.o. Male)       Date of Birth   1970    Social Security Number       [address]   [address] [facility]    Home Phone   [phone]    MRN   [id number]       [facility]    Marital Status                               Admission Date   24    Admission Type   Emergency    Admitting Provider   Osiris Lee MD    Attending Provider   Osiris Lee MD    Department, Room/Bed   [facility] PROGRESSIVE CARE UNIT, 218/2       Discharge Date       Discharge Disposition       Discharge Destination                                 Attending Provider: Osiris Lee MD    Allergies: Bee Venom, Meloxicam    Isolation: None   Infection: None   Code Status: CPR    Ht: 188 cm (74.02\")   Wt: 110 kg (243 lb 2.7 oz)    Admission Cmt: None   Principal Problem: Chest pain [R07.9]                   Active Insurance as of 2024       Primary Coverage       Payor Plan Insurance Group Employer/Plan Group    ANTH [employer] ANTH TRANSITIONS PATHWAY O AGUAYO ONLY [id number]       Payor Plan Address Payor Plan Phone Number Payor Plan Fax Number Effective Dates    [address]   2023 - None Entered    [place] [address]         Subscriber Name Subscriber Birth Date Member ID       ROSA TURK 1971 [id number]                     Emergency Contacts        (Rel.) Home Phone Work Phone Mobile Phone    ROSA TURK (Spouse) [phone] -- [phone]                 History & Physical        Vipul Elaine MD at [address] [place] [address] HISTORY AND PHYSICAL  Date: 2024   Patient Name: Nick Turk  : 1970  MRN: [id number]  Primary Care Physician:  Ashley Cabezas APRN  Date of admission: 2024    Subjective  Subjective     Chief Complaint: Chest pain    HPI:  Nick Turk is a 54 y.o. male with recently diagnosed to systolic congestive heart failure, obstructive sleep apnea on CPAP " at home, history of chronic tobacco abuse (previously smoked 1.5 packs/day for 39 years and quit smoking in November 2023), obesity (BMI: 31.91) that presented to the ED with complaints of chest pain and discomfort.  Patient was at PCP appointment this morning with his heart rate was reportedly varying between the 40s up into the 80s and patient had associated chest discomfort.  He was given dose of nitroglycerin by PCP and then sent to ED for evaluation.  Patient had recurrent chest discomfort in the ED and was given Nitropaste, there is subsequent improvement in his chest discomfort.  Of note, patient was recently admitted to the hospital from 05/13-15/2024 and treated for hypertension.  During that hospitalization patient echocardiogram done that showed patient to have EF of 35% and global hypokinesis.  Patient was discharged on aspirin, atorvastatin, carvedilol, Jardiance and told to continue home losartan.  Additionally, holding it at home patient has noted that he is becoming more easily fatigued than he used to and patient also noted that he did experience diaphoresis today while at work much more easily than he had in the past.  Hospitalist service contacted for further evaluation and management.      Personal History     Past Medical History:  Chronic systolic congestive heart failure  Obstructive sleep apnea on CPAP at home  History of chronic tobacco abuse with cigarettes  Obesity  Essential hypertension    Past Surgical History:  Cholecystectomy  Lumbar fusion    Family History:   Mother: History of heart disease  Father: No reported history of heart disease    Social History:   Tobacco: Smoker.  Previously smoked 1.5 packs/day for 39 years  Alcohol: Denies use  Illicit Substances: Denies use    Home Medications:  Testosterone Cypionate, aspirin, atorvastatin, carvedilol, empagliflozin, and losartan    Allergies:  Allergies   Allergen Reactions    Bee Venom Swelling    Meloxicam Confusion and Other (See  Comments)       Review of Systems  All systems were reviewed and negative except for:   -Cardiovascular ROS: positive for - chest pain and irregular heartbeat    Objective  Objective     Vitals:   Temp:  [98.2 °F (36.8 °C)] 98.2 °F (36.8 °C)  Heart Rate:  [65-80] 72  Resp:  [16-18] 18  BP: (117-125)/(72-88) 122/88    Physical Exam    Constitutional: Awake, alert, no acute distress   Eyes: Pupils equal, sclerae anicteric, no conjunctival injection   HENT: NCAT, mucous membranes moist   Neck: Supple, no thyromegaly, no lymphadenopathy, trachea midline   Respiratory: Clear to auscultation bilaterally, nonlabored respirations    Cardiovascular: RRR, no murmurs, rubs, or gallops, palpable pedal pulses bilaterally   Gastrointestinal: Positive bowel sounds, soft, nontender, nondistended   Musculoskeletal: No bilateral ankle edema, no clubbing or cyanosis to extremities   Psychiatric: Appropriate affect, cooperative   Neurologic: Oriented x 3, strength symmetric in all extremities, Cranial Nerves grossly intact, speech clear   Skin: No rashes     Result Review   Result Review:  I have personally reviewed the results from the time of this admission to 5/20/2024 13:59 EDT and agree with these findings:  [x]  Laboratory:  CMP          5/13/2024    19:02 5/15/2024    03:57 5/20/2024    10:29   CMP   Glucose 113  109  114    BUN 13  17  15    Creatinine 0.87  0.81  0.91    EGFR 103.2  105.4  100.8    Sodium 138  140  137    Potassium 4.0  4.3  4.2    Chloride 107  107  103    Calcium 8.7  8.8  9.0    Total Protein 6.6  6.0  7.4    Albumin 3.8  3.6  4.3    Globulin 2.8  2.4  3.1    Total Bilirubin 0.2  0.2  0.7    Alkaline Phosphatase 49  45  58    AST (SGOT) 17  14  22    ALT (SGPT) 26  22  32    Albumin/Globulin Ratio 1.4  1.5  1.4    BUN/Creatinine Ratio 14.9  21.0  16.5    Anion Gap 9.3  7.9  9.7      CBC          5/13/2024    19:02 5/15/2024    03:57 5/20/2024    10:29   CBC   WBC 9.28  7.98  11.26    RBC 5.04  4.92  5.28     Hemoglobin 15.8  15.5  16.8    Hematocrit 47.8  47.2  49.1    MCV 94.8  95.9  93.0    MCH 31.3  31.5  31.8    MCHC 33.1  32.8  34.2    RDW 12.2  12.2  12.1    Platelets 223  199  229    High-sensitivity troponin negative and flat.  proBNP not elevated.  []  Microbiology:  [x]  Radiology: CXR imaging personally viewed.  No acute infiltrate noted.  No evidence of pleural effusion.  [x]  EKG/Telemetry:   []  Cardiology/Vascular:   []  Pathology:  []  Old records:  []  Other:      Assessment & Plan  Assessment / Plan     Assessment:  Chest pain, rule out ACS  Chronic systolic congestive heart failure  Essential hypertension  History of chronic tobacco abuse with cigarettes  Obesity (BMI: 31.91)    Plan:  -Admit to telemetry bed  -Care discussed with the ED physician.  Patient was given nitroglycerin paste in the emergency department.  -Cardiology service to be consulted.  Patient with reduced ejection fraction manage prior to recent admission was unknown.  Patient also experiencing chest pain and discomfort with minimal exertion that is improved with nitroglycerin.  Cardiology to evaluate patient and possibly pursue cardiac catheterization  -Patient to remain n.p.o. pending cardiology evaluation  -Start appropriate home medications once medication list is reconciled and reviewed  -As needed nitroglycerin available for chest discomfort  -Monitor electrolytes and renal function with BMP and magnesium level in the AM  -Monitor WBC and Hgb with CBC in the AM  -Clinical course will dictate further management     DVT Prophylaxis: Heparin  Diet: NPO  Dispo: Admit to telemetry bed  Code Status: Full code     Personally reviewed patients labs and imaging, discussed with patient and nurse at bedside. Discussed management with the ED physician and the following consultants: Cardiology.     Part of this note may be an electronic transcription/translation of spoken language to printed text using the Dragon dictation  system.      DVT prophylaxis:  Medical DVT prophylaxis orders are signed and held.          CODE STATUS:    Code Status (Patient has no pulse and is not breathing): CPR (Attempt to Resuscitate)  Medical Interventions (Patient has pulse or is breathing): Full Support      Admission Status: I believe this patient meets inpatient status.    Electronically signed by Vipul Elaine MD, 05/20/24, 1:59 PM EDT.    Electronically signed by Vipul Elaine MD at 05/20/24 1421          Emergency Department Notes        Oleksandr Ornelas RN at 05/20/24 1537          Called report to BLAYNE Frances.    Electronically signed by Oleksandr Ornelas RN at 05/20/24 1537       Jose De Los Santos MD at 05/20/24 1323          Time: 1:23 PM EDT  Date of encounter:  5/20/2024  Independent Historian/Clinical History and Information was obtained by:   Patient and Family    History is limited by: N/A    Chief Complaint: Chest pain      History of Present Illness:  Patient is a 54 y.o. year old male who presents to the emergency department for evaluation of chest pain relieved with nitroglycerin at PCP office.  Patient has recent diagnosis of acute systolic congestive heart failure with an ejection fraction of 35% was not started on diuretics.  He presents complaining of chest discomfort in the left chest.    HPI    Patient Care Team  Primary Care Provider: Ashley Cabezas APRN    Past Medical History:     Allergies   Allergen Reactions    Bee Venom Swelling    Meloxicam Confusion and Other (See Comments)     Past Medical History:   Diagnosis Date    Back pain     Chronic pain syndrome     CVA (cerebral vascular accident) 5/14/2024    Forgetfulness     Leg pain     Lumbago 2015    Lumbar spondylosis     Pain, generalized     Pars defect of lumbar spine 2015    Sleep disorder     Spondylolisthesis, lumbar region 2015    L4/5     Past Surgical History:   Procedure Laterality Date    BACK SURGERY      CHOLECYSTECTOMY      COLONOSCOPY N/A 11/16/2021     Procedure: COLONOSCOPY WITH POLYPECTOMIES, CLIP APPLICATION X1, CAUTERY;  Surgeon: Nolberto Onofre MD;  Location: [facility] ENDOSCOPY;  Service: General;  Laterality: N/A;  COLON POLYPS    GANGLION CYST EXCISION      LUMBAR EPIDURAL INJECTION      LUMBAR FUSION  2015    SHOULDER ARTHROSCOPY W/ ROTATOR CUFF REPAIR Left 05/12/2022    Procedure: LEFT SHOULDER ARTHROSCOPY WITH ROTATOR CUFF REPAIR, SUBACROMINAL DECOMPRESSION, DISTAL CLAVICULECTOMY, BICEPS TENODESIS;  Surgeon: Abdoulaye Minaya MD;  Location: [facility] OR [facility];  Service: Orthopedics;  Laterality: Left;    SPINAL FUSION       Family History   Problem Relation Age of Onset    Heart disease Mother     No Known Problems Father     Heart failure Sister     Gustavo Hyperthermia Neg Hx        Home Medications:  Prior to Admission medications    Medication Sig Start Date End Date Taking? Authorizing Provider   aspirin 81 MG chewable tablet Chew 1 tablet Daily for 30 days. 5/16/24 6/15/24  Amarjit Johnston MD   atorvastatin (Lipitor) 20 MG tablet Take 1 tablet by mouth Daily for 30 days. 5/15/24 6/14/24  Amarjit Johnston MD   carvedilol (Coreg) 3.125 MG tablet Take 1 tablet by mouth 2 (Two) Times a Day With Meals for 30 days. 5/15/24 6/14/24  Amarjit Johnston MD   empagliflozin (JARDIANCE) 10 MG tablet tablet Take 1 tablet by mouth Daily for 30 days. 5/15/24 6/14/24  Amarjit Johnston MD   losartan (COZAAR) 25 MG tablet Take 1 tablet by mouth Daily. 5/13/24   ProviderCaleb MD        Social History:   Social History     Tobacco Use    Smoking status: Former     Average packs/day: 1 pack/day for 39.6 years (39.6 ttl pk-yrs)     Types: Cigarettes     Start date: 4/13/1984    Smokeless tobacco: Never   Vaping Use    Vaping status: Never Used   Substance Use Topics    Alcohol use: Never    Drug use: Never         Review of Systems:  Review of Systems   Constitutional:  Negative for chills and fever.   HENT:  Negative for congestion, rhinorrhea and sore throat.    Eyes:   "Negative for pain and visual disturbance.   Respiratory:  Negative for apnea, cough, chest tightness and shortness of breath.    Cardiovascular:  Positive for chest pain. Negative for palpitations.   Gastrointestinal:  Negative for abdominal pain, diarrhea, nausea and vomiting.   Genitourinary:  Negative for difficulty urinating and dysuria.   Musculoskeletal:  Negative for joint swelling and myalgias.   Skin:  Negative for color change.   Neurological:  Negative for seizures and headaches.   Psychiatric/Behavioral: Negative.     All other systems reviewed and are negative.       Physical Exam:  /82 (BP Location: Right arm, Patient Position: Lying)   Pulse 80   Temp 97.5 °F (36.4 °C) (Oral)   Resp 18   Ht 188 cm (74.02\")   Wt 113 kg (248 lb 10.9 oz)   SpO2 95%   BMI 31.91 kg/m²     Physical Exam  Vitals and nursing note reviewed.   Constitutional:       General: He is not in acute distress.     Appearance: Normal appearance. He is not toxic-appearing.   HENT:      Head: Normocephalic and atraumatic.      Jaw: There is normal jaw occlusion.   Eyes:      General: Lids are normal.      Extraocular Movements: Extraocular movements intact.      Conjunctiva/sclera: Conjunctivae normal.      Pupils: Pupils are equal, round, and reactive to light.   Cardiovascular:      Rate and Rhythm: Normal rate and regular rhythm.      Pulses: Normal pulses.      Heart sounds: Normal heart sounds.   Pulmonary:      Effort: Pulmonary effort is normal. No respiratory distress.      Breath sounds: Normal breath sounds. No wheezing or rhonchi.   Abdominal:      General: Abdomen is flat.      Palpations: Abdomen is soft.      Tenderness: There is no abdominal tenderness. There is no guarding or rebound.   Musculoskeletal:         General: Normal range of motion.      Cervical back: Normal range of motion and neck supple.      Right lower leg: No edema.      Left lower leg: No edema.   Skin:     General: Skin is warm and dry. "   Neurological:      Mental Status: He is alert and oriented to person, place, and time. Mental status is at baseline.   Psychiatric:         Mood and Affect: Mood normal.                  Procedures:  Procedures      Medical Decision Making:      Comorbidities that affect care:    Hypertension, diabetes, hyperlipidemia    External Notes reviewed:    Hospital Discharge Summary: After admission for CVA and hypertension management       The following orders were placed and all results were independently analyzed by me:  Orders Placed This Encounter   Procedures    XR Chest 1 View    [place] Draw    High Sensitivity Troponin T    Comprehensive Metabolic Panel    Lipase    BNP    Magnesium    CBC Auto Differential    High Sensitivity Troponin T 2Hr    Basic Metabolic Panel    CBC (No Diff)    Magnesium    NPO Diet NPO Type: Strict NPO    Diet: Cardiac; Healthy Heart (2-3 Na+); Fluid Consistency: Thin (IDDSI 0)    Undress & Gown    Continuous Pulse Oximetry    Vital Signs    Notify Provider (With Default Parameters)    Ambulate Patient    Up in Chair    Up With Assistance    Intake & Output    Weigh Patient    Daily Weights    Oral Care    Saline Lock & Maintain IV Access    Obtain Informed Consent    Hold Apixaban, Rivaroxaban, Edoxaban, Dabigatran, Vorapaxar, Atopaxar 48 Hours Before Scheduled Cardiac Procedure    Please Call and Notify Pharmacy of Order to Hold Apixaban, Rivaroxaban, Edoxaban, Dabigatran, Vorapaxar, Atopaxar 48 Hours Before Scheduled Cardiac Procedure    Hold Metformin & Metformin Containing Products 48 Hours Prior to Scheduled Cardiac Procedure    Notify Pharmacy of Order to Hold Metformin & Metformin Containing Products 48 Hours Prior to Scheduled Cardiac Procedure    Notify MD    Code Status and Medical Interventions:    Inpatient Cardiology Consult    Oxygen Therapy- Nasal Cannula; Titrate 1-6 LPM Per SpO2; 90% or Greater    ECG 12 Lead ED Triage Standing Order; Chest Pain    ECG 12 Lead ED Triage  Standing Order; Chest Pain    Insert Peripheral IV    Insert Peripheral IV    Inpatient Admission    CBC & Differential    Green Top (Gel)    Lavender Top    Gold Top - SST    Light Blue Top       Medications Given in the Emergency Department:  Medications   sodium chloride 0.9 % flush 10 mL (has no administration in time range)   aspirin chewable tablet 81 mg (81 mg Oral Not Given 5/20/24 1634)   atorvastatin (LIPITOR) tablet 20 mg (has no administration in time range)   empagliflozin (JARDIANCE) tablet 10 mg (10 mg Oral Given 5/20/24 1634)   losartan (COZAAR) tablet 25 mg ( Oral Dose Auto Held 5/28/24 0900)   carvedilol (COREG) tablet 3.125 mg ( Oral Dose Auto Held 5/28/24 1800)   sodium chloride 0.9 % flush 10 mL (has no administration in time range)   sodium chloride 0.9 % flush 10 mL (has no administration in time range)   sodium chloride 0.9 % infusion 40 mL (has no administration in time range)   heparin (porcine) 5000 UNIT/ML injection 5,000 Units (5,000 Units Subcutaneous Given 5/20/24 1634)   sennosides-docusate (PERICOLACE) 8.6-50 MG per tablet 2 tablet (has no administration in time range)     And   polyethylene glycol (MIRALAX) packet 17 g (has no administration in time range)     And   bisacodyl (DULCOLAX) EC tablet 5 mg (has no administration in time range)     And   bisacodyl (DULCOLAX) suppository 10 mg (has no administration in time range)   nitroglycerin (NITROSTAT) SL tablet 0.4 mg (has no administration in time range)   nitroglycerin (NITROSTAT) ointment 1 inch (1 inch Topical Given 5/20/24 1309)        ED Course:    ED Course as of 05/20/24 1842   Mon May 20, 2024   1244 ECG 12 Lead ED Triage Standing Order; Chest Pain [TC]      ED Course User Index  [TC] Jose De Los Santos MD       Labs:    Lab Results (last 24 hours)       Procedure Component Value Units Date/Time    High Sensitivity Troponin T [[id number]]  (Normal) Collected: 05/20/24 1029    Specimen: Blood Updated: 05/20/24 1056     HS  Troponin T 11 ng/L     Narrative:      High Sensitive Troponin T Reference Range:  <14.0 ng/L- Negative Female for AMI  <22.0 ng/L- Negative Male for AMI  >=14 - Abnormal Female indicating possible myocardial injury.  >=22 - Abnormal Male indicating possible myocardial injury.   Clinicians would have to utilize clinical acumen, EKG, Troponin, and serial changes to determine if it is an Acute Myocardial Infarction or myocardial injury due to an underlying chronic condition.         CBC & Differential [[id number]]  (Abnormal) Collected: 05/20/24 1029    Specimen: Blood Updated: 05/20/24 1034    Narrative:      The following orders were created for panel order CBC & Differential.  Procedure                               Abnormality         Status                     ---------                               -----------         ------                     CBC Auto Differential[[id number]]        Abnormal            Final result                 Please view results for these tests on the individual orders.    Comprehensive Metabolic Panel [[id number]]  (Abnormal) Collected: 05/20/24 1029    Specimen: Blood Updated: 05/20/24 1056     Glucose 114 mg/dL      BUN 15 mg/dL      Creatinine 0.91 mg/dL      Sodium 137 mmol/L      Potassium 4.2 mmol/L      Comment: Slight hemolysis detected by analyzer. Result may be falsely elevated.        Chloride 103 mmol/L      CO2 24.3 mmol/L      Calcium 9.0 mg/dL      Total Protein 7.4 g/dL      Albumin 4.3 g/dL      ALT (SGPT) 32 U/L      AST (SGOT) 22 U/L      Alkaline Phosphatase 58 U/L      Total Bilirubin 0.7 mg/dL      Globulin 3.1 gm/dL      A/G Ratio 1.4 g/dL      BUN/Creatinine Ratio 16.5     Anion Gap 9.7 mmol/L      eGFR 100.8 mL/min/1.73     Narrative:      GFR Normal >60  Chronic Kidney Disease <60  Kidney Failure <15      Lipase [[id number]]  (Abnormal) Collected: 05/20/24 1029    Specimen: Blood Updated: 05/20/24 1056     Lipase 74 U/L     BNP [[id number]]  (Normal) Collected:  05/20/24 1029    Specimen: Blood Updated: 05/20/24 1054     proBNP 291.7 pg/mL     Narrative:      This assay is used as an aid in the diagnosis of individuals suspected of having heart failure. It can be used as an aid in the diagnosis of acute decompensated heart failure (ADHF) in patients presenting with signs and symptoms of ADHF to the emergency department (ED). In addition, NT-proBNP of <300 pg/mL indicates ADHF is not likely.    Age Range Result Interpretation  NT-proBNP Concentration (pg/mL:      <50             Positive            >450                   Gray                 300-450                    Negative             <300    50-75           Positive            >900                  Gray                300-900                  Negative            <300      >75             Positive            >1800                  Gray                300-1800                  Negative            <300    Magnesium [[id number]]  (Normal) Collected: 05/20/24 1029    Specimen: Blood Updated: 05/20/24 1056     Magnesium 2.0 mg/dL     CBC Auto Differential [[id number]]  (Abnormal) Collected: 05/20/24 1029    Specimen: Blood Updated: 05/20/24 1034     WBC 11.26 10*3/mm3      RBC 5.28 10*6/mm3      Hemoglobin 16.8 g/dL      Hematocrit 49.1 %      MCV 93.0 fL      MCH 31.8 pg      MCHC 34.2 g/dL      RDW 12.1 %      RDW-SD 41.5 fl      MPV 10.5 fL      Platelets 229 10*3/mm3      Neutrophil % 67.7 %      Lymphocyte % 17.7 %      Monocyte % 11.2 %      Eosinophil % 2.4 %      Basophil % 0.6 %      Immature Grans % 0.4 %      Neutrophils, Absolute 7.63 10*3/mm3      Lymphocytes, Absolute 1.99 10*3/mm3      Monocytes, Absolute 1.26 10*3/mm3      Eosinophils, Absolute 0.27 10*3/mm3      Basophils, Absolute 0.07 10*3/mm3      Immature Grans, Absolute 0.04 10*3/mm3      nRBC 0.0 /100 WBC     High Sensitivity Troponin T 2Hr [[id number]]  (Normal) Collected: 05/20/24 1249    Specimen: Blood Updated: 05/20/24 1321     HS Troponin T 11 ng/L       Troponin T Delta 0 ng/L     Narrative:      High Sensitive Troponin T Reference Range:  <14.0 ng/L- Negative Female for AMI  <22.0 ng/L- Negative Male for AMI  >=14 - Abnormal Female indicating possible myocardial injury.  >=22 - Abnormal Male indicating possible myocardial injury.   Clinicians would have to utilize clinical acumen, EKG, Troponin, and serial changes to determine if it is an Acute Myocardial Infarction or myocardial injury due to an underlying chronic condition.                  Imaging:    XR Chest 1 View    Result Date: 5/20/2024  XR CHEST 1 VW-  Date of Exam: 5/20/2024 11:03 AM  Indication: Chest Pain Triage Protocol  Comparison: August 17, 2023  Findings: The heart is not definitely enlarged. The lungs seem clear. There are no pleural effusions.      Impression: 1.  An acute pulmonary process is not apparent.   Electronically Signed By-Chalo Villafuerte MD On:5/20/2024 11:06 AM         Differential Diagnosis and Discussion:    Chest Pain:  Based on the patient's signs and symptoms, I considered aortic dissection, myocardial infaction, pulmonary embolism, cardiac tamponade, pericarditis, pneumothorax, musculoskeletal chest pain and other differential diagnosis as an etiology of the patient's chest pain.     All labs were reviewed and interpreted by me.  All X-rays impressions were independently interpreted by me.  EKG was interpreted by me.    MDM  Number of Diagnoses or Management Options  Chest pain, unspecified type  Diagnosis management comments: In summary this is a 54-year-old male patient presents to the emergency department for evaluation of chest pain.  He did receive nitroglycerin at the PCP office with improvement of his chest pain.  CBC independently reviewed by me and shows no critical abnormalities.  CMP independently reviewed by me and shows no critical abnormalities.  High-sensitivity troponin unremarkable, EKG shows no acute ischemia, chest x-ray unremarkable.  Given patient's  complaint he is a moderate risk heart score and nitroglycerin ointment was applied.  Cardiology consulted.  Patient case has been discussed with the hospitalist team who will admit to the hospital for further evaluation and continuation of treatment.                 Patient Care Considerations:    CT CHEST: I considered ordering a CT scan of the chest, however no shortness of breath      Consultants/Shared Management Plan:    Hospitalist: I have discussed the case with Dr. Elaine who agrees to accept the patient for admission.  Consultant: I have discussed the case with Dr. Gavino Bautista who agrees to consult on the patient.    Social Determinants of Health:    Patient is independent, reliable, and has access to care.       Disposition and Care Coordination:    Admit:   Through independent evaluation of the patient's history, physical, and imperical data, the patient meets criteria for inpatient admission to the hospital.        Final diagnoses:   Chest pain, unspecified type        ED Disposition       ED Disposition   Decision to Admit    Condition   --    Comment   Level of Care: Telemetry [5]   Diagnosis: Chest pain [[id number]]   Certification: I Certify That Inpatient Hospital Services Are Medically Necessary For Greater Than 2 Midnights                 This medical record created using voice recognition software.             Jose De Los Santos MD  05/20/24 1842      Electronically signed by Jose De Los Santos MD at 05/20/24 1842       Imaging Results (Last 48 Hours)       Procedure Component Value Units Date/Time    CT Angiogram Head w AI Analysis of LVO [[id number]] Collected: 05/23/24 0131     Updated: 05/23/24 0144    Narrative:      CT ANGIOGRAM NECK-, CT ANGIOGRAM HEAD W AI ANALYSIS OF LVO-     (COMBINED REPORT)     Date of Exam: 5/22/2024 9:55 PM     Indication: Stroke, follow-up; h/o an acute (to subacute) left  paramedian midbrain infarct; please correlate with possible Benedikt  syndrome or paramedian midbrain  syndrome.     Comparisons: 5/22/2024; 5/21/2024; 5/14/2024.     Technique:   CTA exams of the head and neck were performed before and after the  uneventful intravenous administration of 70 mL of Isovue-370 contrast  agent. Reconstructed 2D coronal and sagittal images were also obtained.  In addition, a 3-D volume rendered image was created for interpretation.  Automated exposure control and iterative reconstruction methods were  used. The intracranial and neck arterial contrast opacification is  limited. There is venous contamination on the study, especially  obscuring the left subclavian artery.     Findings:     HEAD CTA: Grossly, no emergent large vessel occlusion is seen. No  definite cerebral aneurysm. The left A1 segment is diffusely small in  caliber. It may be fenestrated. These findings are thought to be a  normal variant.     NECK CTA: No hemodynamically significant arterial stenoses are seen. The  vertebral arteries are codominant. No arterial dissection is identified.          Impression:      (COMBINED)     1. The studies are limited due to limitations in the contrast bolus  predominately.      2. Grossly, no emergent large vessel occlusion is appreciated,  especially involving the left P1 segment or elsewhere in the left  posterior cerebral artery (PCA) territory.      3. Grossly, no hemodynamically significant arterial stenoses are  appreciated.      4. No gross evidence of a cerebral saccular aneurysm.      5. No definite arterial dissection.      6. The vertebral arteries are patent and codominant.     7. Please see above comments for further detail.                 Please note that portions of this note were completed with a voice  recognition program.                          Electronically Signed By-Nick Parker MD On:5/23/2024 1:42 AM       CT Angiogram Neck [[id number]] Collected: 05/23/24 0131     Updated: 05/23/24 0144    Narrative:      CT ANGIOGRAM NECK-, CT ANGIOGRAM HEAD W AI ANALYSIS OF  LVO-     (COMBINED REPORT)     Date of Exam: 5/22/2024 9:55 PM     Indication: Stroke, follow-up; h/o an acute (to subacute) left  paramedian midbrain infarct; please correlate with possible Benedikt  syndrome or paramedian midbrain syndrome.     Comparisons: 5/22/2024; 5/21/2024; 5/14/2024.     Technique:   CTA exams of the head and neck were performed before and after the  uneventful intravenous administration of 70 mL of Isovue-370 contrast  agent. Reconstructed 2D coronal and sagittal images were also obtained.  In addition, a 3-D volume rendered image was created for interpretation.  Automated exposure control and iterative reconstruction methods were  used. The intracranial and neck arterial contrast opacification is  limited. There is venous contamination on the study, especially  obscuring the left subclavian artery.     Findings:     HEAD CTA: Grossly, no emergent large vessel occlusion is seen. No  definite cerebral aneurysm. The left A1 segment is diffusely small in  caliber. It may be fenestrated. These findings are thought to be a  normal variant.     NECK CTA: No hemodynamically significant arterial stenoses are seen. The  vertebral arteries are codominant. No arterial dissection is identified.          Impression:      (COMBINED)     1. The studies are limited due to limitations in the contrast bolus  predominately.      2. Grossly, no emergent large vessel occlusion is appreciated,  especially involving the left P1 segment or elsewhere in the left  posterior cerebral artery (PCA) territory.      3. Grossly, no hemodynamically significant arterial stenoses are  appreciated.      4. No gross evidence of a cerebral saccular aneurysm.      5. No definite arterial dissection.      6. The vertebral arteries are patent and codominant.     7. Please see above comments for further detail.                 Please note that portions of this note were completed with a voice  recognition program.                           Electronically Signed By-Nick Parker MD On:5/23/2024 1:42 AM       MRI Brain With & Without Contrast [[id number]] Collected: 05/22/24 1017     Updated: 05/22/24 1040    Narrative:      MRI BRAIN W WO CONTRAST-     Date of Exam: 5/22/2024 9:30 AM     Indication: Stroke rule out; R07.9-Chest pain, unspecified; I24.9-Acute  ischemic heart disease, unspecified.     Comparison: CT head from 5/21/2024 and prior including prior MRI of the  brain and MRAs from 5/14/2024     Technique: Multiplanar multisequence images of the brain were performed  prior to and after uneventful intravenous administration of 20 ml  MultiHance.     FINDINGS:  There is a small focus of increased signal on diffusion-weighted imaging  to the left midline in the flo/midbrain just prior to the cerebral  peduncles measuring approximately 7 x 4 mm with associated signal  dropout on the ADC map. There is corresponding signal abnormality on  FLAIR and T2 weighted imaging.     No abnormal areas of FLAIR or T2 signal otherwise appreciated. The major  intracranial flow voids appear to be patent. No acute intracranial  hemorrhage is noted.     Cerebral pontine angle structures and inner ear structures are grossly  unremarkable. Mastoid air cells are grossly clear. Orbits and paranasal  sinuses are grossly clear.     Postcontrast imaging is mildly motion limited without evidence of  enhancement.       Impression:         1. Acute diffusion restriction abnormality is noted within the  flo/midbrain just to the left of midline. This is a new finding from  the comparison. Findings compatible with a possible late acute infarct     No evidence of hemorrhage or abnormal enhancement noted.     The remainder of the MRI of the head appears stable without acute  abnormality     Findings were called to the patient's nurse at 10:35 a.m.            Electronically Signed By-Corby Mendoza On:5/22/2024 10:38 AM       CT Head Without Contrast [[id number]]  Collected: 24 0724     Updated: 24 0731    Narrative:         DATE OF EXAM:  2024 10:59 PM     PROCEDURE:   CT HEAD WO CONTRAST-     INDICATIONS:   double vision post heart cath; R07.9-Chest pain, unspecified;  I24.9-Acute ischemic heart disease, unspecified     COMPARISON:  May 13, 2024, MRI May 14, 2024     TECHNIQUE:   Routine transaxial cuts were obtained through the head without the  administration of contrast. Automated exposure control and iterative  reconstruction methods were used.      FINDINGS:  No midline shift. Basal cisterns appear patent.  Ventricles and sulci  appear within normal limits for patient age.     No findings of acute hemorrhage or extra-axial collection. No definite  mass or focal edema is identified.  No CT findings of acute infarction.     Paranasal sinuses appear clear.  Mastoid air cells appear clear.  No  acute calvarial abnormality is identified.       Impression:      No CT findings of acute intracranial abnormality.     Electronically Signed By-Cody Messina MD On:2024 7:29 AM                Physician Progress Notes (last 72 hours)        Lv Hunt MD at 24 1109           [facility]     Cardiology Progress Note    Patient Name: Nick Turk  : 1970  MRN: [id number]  Primary Care Physician:  Ashley Cabezas APRN  Date of admission: 2024    Subjective   Subjective     Chief Complaint: The patient has persistent diplopia.    Interval HPI:    Patient otherwise stable    Review of Systems   All systems were reviewed and negative except for: Double vision    Objective   Objective     Vitals:   Temp:  [97.3 °F (36.3 °C)-98.1 °F (36.7 °C)] 97.9 °F (36.6 °C)  Heart Rate:  [] 109  Resp:  [16-18] 18  BP: (125-150)/() 150/107  Physical Exam      Alert, oriented  Neck. No jvd, no bruits.  Lungs : no rales, no wheezing  Heart: regular, no gallops  Lungs No rales, no wheezing  Abdomen: soft, bs+  LE no edema  Neurologic: No  nystagmus, no motor deficits. Gait disturbance improves when he covers one eye.     Scheduled Meds:aspirin, 81 mg, Oral, Daily  atorvastatin, 80 mg, Oral, Nightly  clopidogrel, 75 mg, Oral, Daily  empagliflozin, 10 mg, Oral, Daily  losartan, 25 mg, Oral, Daily  sodium chloride, 10 mL, Intravenous, Q12H  spironolactone, 25 mg, Oral, Daily      Continuous Infusions:Pharmacy to Dose enoxaparin (LOVENOX),            Result Review    Result Review:  I have personally reviewed the results from the time of this admission to 5/23/2024 11:09 EDT and agree with these findings:  [x]  Laboratory  []  Microbiology  [x]  Radiology  [x]  EKG/Telemetry   [x]  Cardiology/Vascular   []  Pathology  []  Old records  []  Other:  Most notable findings include:     CBC          5/21/2024    04:39 5/22/2024    05:17 5/23/2024    04:55   CBC   WBC 9.18  9.76  9.99    RBC 4.92  5.33  5.32    Hemoglobin 15.5  17.0  16.9    Hematocrit 47.2  51.6  50.0    MCV 95.9  96.8  94.0    MCH 31.5  31.9  31.8    MCHC 32.8  32.9  33.8    RDW 12.0  12.1  12.2    Platelets 203  221  213      CMP          5/21/2024    04:39 5/21/2024    18:44 5/22/2024    05:17 5/23/2024    04:55   CMP   Glucose 97  90  92  110    BUN 19  19  19  14    Creatinine 0.88  0.86  1.00  0.86    EGFR 102.8  103.5  90.0  103.5    Sodium 139  140  138  138    Potassium 4.1  3.8  4.2  4.2    Chloride 106  104  105  107    Calcium 9.0  8.8  9.0  8.9    BUN/Creatinine Ratio 21.6  22.1  19.0  16.3    Anion Gap 5.9  8.8  7.2  7.9       CARDIAC LABS:      Lab 05/20/24  1249 05/20/24  1029   PROBNP  --  291.7   HSTROP T 11 11        Assessment & Plan   Assessment / Plan     Brief Patient Summary:  Nick Turk is a 54 y.o. male with  class II heart failure New [place] Heart [employer], with his EF of 35%.  He has mild coronary artery disease.  Patient now with double vision and steady gait no apparent motor deficit.     Active Hospital Problems:  Active Hospital Problems    Diagnosis      "**Chest pain     Acute coronary syndrome     Chronic systolic congestive heart failure, NYHA class 2     Essential hypertension     Left ventricular systolic dysfunction (LVSD)            Plan:     The patient have an abnormal brain MRI which showed diffusion restricted showed an abnormality in the flo and midbrain to the left of the midline suggestive of possible late acute small infarct.  His neurological symptoms are improving.  He denies cardiac symptoms.  I will continue with dual antiplatelet therapy.  Continue with statin therapy including Jardiance, angiotensin receptor blockers and  Aldactone as tolerated.  Patient will need a 14 days cardiac monitor as an outpatient.      Spent over 25 minutes of time during this evaluation with 50% of time coordination of care.       CODE STATUS:   Code Status (Patient has no pulse and is not breathing): CPR (Attempt to Resuscitate)  Medical Interventions (Patient has pulse or is breathing): Full Support      Electronically signed by Lv Bautista MD, 05/23/24, 11:09 AM EDT.             Electronically signed by Lv Hunt MD at 05/23/24 1113       Maciej Walden MD at 05/23/24 1104          [facility]  [facility]    Routine Consult Follow-Up    Patient Name:   Nick Turk  YOB: 1970  Identification Number:   MRN - [id number]  Date of Service:   05/23/2024 08:43:06    Diagnosis        I63.30 - Cerebrovascular accident (CVA) due to thrombosis of cerebral artery (HCCC)    Impression  54yoM with hx of HTN, CHF, bradycardia, came in for CP evaluation, and is now s/p cath. Having double vision since cath. EOM intact on exam. Per their description of \"discoordinate\" gaze and persistent double vision, suspect a small brainstem stroke. further imaging with MRI brain reveals and acute ischemic left midbrain stroke.  A1c 5.0 LDL 44  Echo 5/14 with EF 35%, mildly dilated LA,  continue antiplatelets ASA/Plavix. " For stroke prevention, can DC Plavix after 21 days (although I have no objection if he needs to stay on dual therapy for longer period for other medica indication)  statin for goal LDL <70  telemetry. holter at discharge to watch for afib    Recommended ophthalmology eval as OP (evaluation for prism glasses)     Diagnostic Studies :  Holter/Loop Recorder as outpatient with cardiology follow up    Antithrombotic Medication :  Statins for LDL goal less than 70    Nursing Recommendations :  IV Fluids, avoid dextrose containing fluids, Maintain euglycemiaNeuro checks q4 hrs x 24 hrs and then per shiftHead of bed 30 degreesContinue with Telemetry    Consultations :  Recommend Speech therapy if failed dysphagia screenPhysical therapy/Occupational therapy    Disposition :  No further recommendations  Will signoff please contact for any questions  Outpatient Neurology follow up in 3-6 weeks    Subjective  symptoms improving. double vision not as remarkable as before  reviewed MRI with him - midbrain stroke  already on DAPT - will continue  rec ophtho as OP    Imaging  CTA head neck -  1. The studies are limited due to limitations in the contrast bolus  predominately.  2. Grossly, no emergent large vessel occlusion is appreciated,  especially involving the left P1 segment or elsewhere in the left  posterior cerebral artery (PCA) territory.  3. Grossly, no hemodynamically significant arterial stenoses are  appreciated.  4. No gross evidence of a cerebral saccular aneurysm.  5. No definite arterial dissection.  6. The vertebral arteries are patent and codominant.       Examination  BP(150/107),    Neuro Exam:  General: Alert,Awake, Oriented to Time, Place, Person    Speech: Fluent:    Language: Intact:    Face: Symmetric:    Facial Sensation: Intact:    Visual Fields: Intact:    Extraocular Movements: Intact:  ductions and versions normal, no abnormalities perceived    Motor Exam: No Drift:    Sensation:  "Intact:    Coordination: Intact: RUE, RLE, LUE, LLE           Patient/Family was informed the Neurology Consult would happen via TeleHealth consult by way of interactive audio and video telecommunications and consented to receiving care in this manner.    Telehealth Neurology consultation was provided. I spent 14 minutes providing telehealth care. This includes time spent for face to face visit via telemedicine, review of medical records, imaging studies and discussion of findings with providers, the patient and/or family.      Dr Maciej Walden      [facility]  For Inpatient follow-up with [facility] physician please call [facility] [phone]. This is not an outpatient service. Post hospital discharge, please contact hospital directly.    Please do not communicate with [facility] physicians via secure chat. If you have any questions, Please contact [facility].  Please call or reconsult our service if there are any clinical or diagnostic changes.    Electronically signed by Maciej Walden MD at 05/23/24 1101       Maciej Walden MD at 05/22/24 1227          [facility] Tele[facility]urology Consult [facility]    Routine Consult New    Patient Name:   Nick Turk  YOB: 1970  Identification Number:   MRN - [id number]  Date of Service:   05/22/2024 07:54:16    Diagnosis        I63.30 - Cerebrovascular accident (CVA) due to thrombosis of cerebral artery ([facility])    Impression  54yoM with hx of HTN, CHF, bradycardia, came in for CP evaluation, and is now s/p cath. Having double vision since cath. EOM intact on exam. Per their description of \"discoordinate\" gaze and persistent double vision, suspect a small brainstem stroke. further imaging with MRI brain reveals and acute ischemic left midbrain stroke.  A1c 5.0 LDL 44  Echo 5/14 with EF 35%, mildly dilated LA,  continue antiplatelets ASA/Plavix. For stroke prevention, can DC Plavix after 21 days (although I have no objection if he " "needs to stay on dual therapy for longer period for other medica indication)  statin for goal LDL <70  telemetry. holter at discharge to watch for afib  -checking CTAs as part of stroke workup       Antithrombotic Medication :  Aspirin 81 mg PO dailyClopidogrel 75 mg dailyStatins for LDL goal less than 70    Nursing Recommendations :  IV Fluids, avoid dextrose containing fluids, Maintain euglycemiaNeuro checks q4 hrs x 24 hrs and then per shiftHead of bed 30 degreesContinue with Telemetry    Consultations :  Recommend Speech therapy if failed dysphagia screenPhysical therapy/Occupational therapy    Disposition :  Neurology will follow    ----------------------------------------------------------------------------------------------------    Chief Complaint:  blurry vision, family request    History of Present Illness:  54yoM with hx of HTN, CHF, bradycardia, came in for CP evaluation, and is now s/p cath.  [place] more disoriented after the procedure, back to baseline cognitive state now. But still c/o double vison.  Has double vision \"since he woke up from the procedure\". It is \"vertical and constant\". Denies focal weakness. No numbness or sensory loss.  no HA, no speech, no swallowing issues.  Feels balance is \"off\" since the procedure.  Wife at bedside describes his \"R eye yesterday was discoordinate as if deviating to the side\"         Past Medical History:       Hypertension    Medications:    No Anticoagulant use   Antiplatelet use: Yes [id number], [id number]  Reviewed EMR for current medications    Allergies:   Reviewed  Description: meloxicam    Social History:  Alcohol Use: No    Family History:    There is no family history of premature cerebrovascular disease pertinent to this consultation    ROS :  14 Points Review of Systems was performed and was negative except mentioned in HPI.    Past Surgical History:  There Is No Surgical History Contributory To Today’s Visit    Examination    Neuro Exam:  General: " Alert,Awake, Oriented to Time, Place, Person    Speech: Fluent:    Language: Intact:    Face: Symmetric:    Facial Sensation: Intact:    Visual Fields: Intact:    Extraocular Movements: Intact:  ductions and versions normal, no abnormalities perceived    Motor Exam: No Drift:    Sensation: Intact:    Coordination: Intact: RUE, RLE, LUE, LLE           Patient/Family was informed the Neurology Consult would happen via TeleHealth consult by way of interactive audio and video telecommunications and consented to receiving care in this manner.    Telehealth Neurology consultation was provided. I spent 15 minutes providing telehealth care. This includes time spent for face to face visit via telemedicine, review of medical records, imaging studies and discussion of findings with providers, the patient and/or family.      Dr Maciej Walden      [facility]  For Inpatient follow-up with [facility] physician please call [facility] [phone]. This is not an outpatient service. Post hospital discharge, please contact hospital directly.    Please do not communicate with [facility] physicians via secure chat. If you have any questions, Please contact [facility].  Please call or reconsult our service if there are any clinical or diagnostic changes.    Electronically signed by Maciej Walden MD at 24 1226       Vipul Elaine MD at 24 1051          [facility]  Date: 2024  Patient Name: Nick Turk  : 1970  MRN: [id number]  Date of admission: 2024  Consultants:   -Cardiology: Dr. Lv Bautista  -Neurology    Subjective   Subjective     Chief Complaint: Chest pain    Summary:   Nick Turk is a 54 y.o. male with recently diagnosed to systolic congestive heart failure, obstructive sleep apnea on CPAP at home, history of chronic tobacco abuse (previously smoked 1.5 packs/day for 39 years and quit smoking in 2023), obesity (BMI: 31.91) that presented to  the ED with complaints of chest pain and discomfort.  Cardiology consulted.  Patient taken for cardiac catheterization on 05/21/2024 which showed an LVEDP of 22 mmHg due to LV dysfunction, mild nonobstructive CAD.  Cardiology recommending optimization of medical therapy.    Interval Followup:   There was some delay in patient coming off of sedation.  Patient had some complaints of vision and unsteady gait.  No focal deficits noted.  Patient with continued double vision this morning, neurology consulted and MRI imaging of the brain ordered.  Patient denies any chest pain or shortness of breath.  Patient actually stated he is feeling a little bit better today than he was when he first came to the hospital.    Procedures:   -Left heart catheterization (05/21/2024)    Objective   Objective     Vitals:   Temp:  [97.3 °F (36.3 °C)-98.1 °F (36.7 °C)] 98.1 °F (36.7 °C)  Heart Rate:  [] 89  Resp:  [18] 18  BP: (120-158)/() 158/88  Physical Exam   Gen: No acute distress, sitting up in bed, pleasant, conversant  Resp: CTAB, No w/r/r, good aeration, equal chest rise bilaterally  Card: RRR, No m/r/g  Abd: Soft, Nontender, Nondistended, + bowel sounds    Result Review    Result Review:  I have personally reviewed the results as below and agree with these findings:  []  Laboratory:   CMP          5/20/2024    10:29 5/21/2024    04:39 5/21/2024    18:44 5/22/2024    05:17   CMP   Glucose 114  97  90  92    BUN 15  19  19  19    Creatinine 0.91  0.88  0.86  1.00    EGFR 100.8  102.8  103.5  90.0    Sodium 137  139  140  138    Potassium 4.2  4.1  3.8  4.2    Chloride 103  106  104  105    Calcium 9.0  9.0  8.8  9.0    Total Protein 7.4       Albumin 4.3       Globulin 3.1       Total Bilirubin 0.7       Alkaline Phosphatase 58       AST (SGOT) 22       ALT (SGPT) 32       Albumin/Globulin Ratio 1.4       BUN/Creatinine Ratio 16.5  21.6  22.1  19.0    Anion Gap 9.7  5.9  8.8  7.2      CBC          5/20/2024    10:29  5/21/2024    04:39 5/22/2024    05:17   CBC   WBC 11.26  9.18  9.76    RBC 5.28  4.92  5.33    Hemoglobin 16.8  15.5  17.0    Hematocrit 49.1  47.2  51.6    MCV 93.0  95.9  96.8    MCH 31.8  31.5  31.9    MCHC 34.2  32.8  32.9    RDW 12.1  12.0  12.1    Platelets 229  203  221    Magnesium within normal limits  []  Microbiology:   [x]  Radiology: MRI brain imaging personally reviewed.  Acute infarct within the flo/midbrain  [x]  EKG/Telemetry:    []  Cardiology/Vascular:    []  Pathology:  []  Old records:  []  Other:    Assessment & Plan   Assessment / Plan     Assessment:  Acute infarct with an flo/midbrain  Chronic systolic congestive heart failure, not acutely exacerbated  Essential hypertension  Transient bradycardia  History of chronic tobacco abuse with cigarettes  Obesity (BMI: 30.84)    Plan:  -Cardiology and Neurology consulted and following, appreciate assistance and recommendations in the care of this patient.  -Continue aspirin, atorvastatin, Jardiance, losartan and spironolactone  -Due to insurance Jardiance not covered as an outpatient so at discharge patient will discharge on Farxiga  -MRI brain imaging obtained and findings noted above.  Will start Plavix.  -CTA head and neck ordered  -Continue telemetry monitoring.  -Care discussed with cardiology.  Patient may benefit with a cardiac monitor upon discharge to look for paroxysmal atrial fibrillation.  -Monitor electrolytes and renal function with BMP and magnesium level in the AM  -Monitor WBC and Hgb with CBC in the AM  -Clinical course will dictate further management     DVT Prophylaxis: Lovenox  Diet:   Diet Order   Procedures    Diet: Cardiac; Healthy Heart (2-3 Na+); Fluid Consistency: Thin (IDDSI 0)     Dispo: Home when medically appropriate for discharge     Personally reviewed patients labs and imaging, discussed with patient and nurse at bedside. Discussed management with the following consultants: Cardiology and Neurology.     Part of  this note may be an electronic transcription/translation of spoken language to printed text using the Dragon dictation system.    DVT prophylaxis:  Medical and mechanical DVT prophylaxis orders are present.        CODE STATUS:   Code Status (Patient has no pulse and is not breathing): CPR (Attempt to Resuscitate)  Medical Interventions (Patient has pulse or is breathing): Full Support        Electronically signed by Vipul Elaine MD, 2024, 10:51 EDT.    Electronically signed by Vipul Elaine MD at 24 1057       Lv Hunt MD at 24 0837           [facility]     Cardiology Progress Note    Patient Name: Nick Turk  : 1970  MRN: [id number]  Primary Care Physician:  Ashley Cabezas APRN  Date of admission: 2024    Subjective   Subjective     Chief Complaint: Double vision    Interval HPI:    Patient cardiac wise stable.  He has known ischemic cardiomyopathy probably due to hypertension.  He had a coronary angiography yesterday which showed mild no significant coronary artery disease.  The LVEDP was elevated about 22 mmHg suggestive of LV dysfunction.  He was sedated and it took a while to get off sedation.  The patient reported double vision and some unsteady gait.  Denies muscle weakness tingling.  His speech is clear.  He had a non-contrast head CT which was which was unremarkable.    Review of Systems   All systems were reviewed and negative except for: diplopia    Objective   Objective     Vitals:   Temp:  [97.3 °F (36.3 °C)-98.1 °F (36.7 °C)] 98.1 °F (36.7 °C)  Heart Rate:  [] 77  Resp:  [18] 18  BP: (112-158)/() 158/88  Flow (L/min):  [2] 2  Physical Exam      Alert, oriented  Neck. No jvd, no bruits.  Lungs : no rales, no wheezing  Heart: regular, no gallops  Lungs No rales, no wheezing  Abdomen: soft, bs+  LE no edema  Neurologic: No nystagmus, no motor deficits. Gait disturbance improves when he covers one eye.       Scheduled Meds:aspirin, 81 mg, Oral,  Daily  atorvastatin, 20 mg, Oral, Nightly  empagliflozin, 10 mg, Oral, Daily  enoxaparin, 40 mg, Subcutaneous, Daily  losartan, 25 mg, Oral, Daily  sodium chloride, 10 mL, Intravenous, Q12H  spironolactone, 25 mg, Oral, Daily      Continuous Infusions:Pharmacy to Dose enoxaparin (LOVENOX),            Result Review    Result Review:  I have personally reviewed the results from the time of this admission to 5/22/2024 08:37 EDT and agree with these findings:  [x]  Laboratory  []  Microbiology  [x]  Radiology  [x]  EKG/Telemetry   [x]  Cardiology/Vascular   []  Pathology  []  Old records  []  Other:  Most notable findings include:     CBC          5/20/2024    10:29 5/21/2024    04:39 5/22/2024    05:17   CBC   WBC 11.26  9.18  9.76    RBC 5.28  4.92  5.33    Hemoglobin 16.8  15.5  17.0    Hematocrit 49.1  47.2  51.6    MCV 93.0  95.9  96.8    MCH 31.8  31.5  31.9    MCHC 34.2  32.8  32.9    RDW 12.1  12.0  12.1    Platelets 229  203  221      CMP          5/20/2024    10:29 5/21/2024    04:39 5/21/2024    18:44 5/22/2024    05:17   CMP   Glucose 114  97  90  92    BUN 15  19  19  19    Creatinine 0.91  0.88  0.86  1.00    EGFR 100.8  102.8  103.5  90.0    Sodium 137  139  140  138    Potassium 4.2  4.1  3.8  4.2    Chloride 103  106  104  105    Calcium 9.0  9.0  8.8  9.0    Total Protein 7.4       Albumin 4.3       Globulin 3.1       Total Bilirubin 0.7       Alkaline Phosphatase 58       AST (SGOT) 22       ALT (SGPT) 32       Albumin/Globulin Ratio 1.4       BUN/Creatinine Ratio 16.5  21.6  22.1  19.0    Anion Gap 9.7  5.9  8.8  7.2       CARDIAC LABS:      Lab 05/20/24  1249 05/20/24  1029   PROBNP  --  291.7   HSTROP T 11 11        Assessment & Plan   Assessment / Plan     Brief Patient Summary:  Nick Turk is a 54 y.o. male with class II heart failure New York Heart Association, with his EF of 35%.  He has mild coronary artery disease.  Patient now with double vision and steady gait no apparent motor deficit.   His noncontrast head CT was unremarkable.    Active Hospital Problems:  Active Hospital Problems    Diagnosis     **Chest pain     Acute coronary syndrome     Chronic systolic congestive heart failure, NYHA class 2     Essential hypertension     Left ventricular systolic dysfunction (LVSD)            Plan:     Neurological consult was requested for possible TIA stroke after cardiac catheterization, possible posterior circulation.  The patient scheduled to have a MRI of the brain today.  Will continue with blood pressure control and antiplatelet therapy.  Continue with statin therapy.  Initiate physical therapy.  Had a discussion with the patient and patient's wife in regards to chances of TIA stroke after cardiac catheterization is about 1%.  This did the fact that his neurological problem now may be related to the procedure.  Wait for the full for consultation and recommendations.  Also discussed the possibility of looking for paroxysmal atrial fibrillation with a cardiac monitor upon discharge.    I Spent over 25 minutes of time during this evaluation including 50% of time in coordination of care       CODE STATUS:   Code Status (Patient has no pulse and is not breathing): CPR (Attempt to Resuscitate)  Medical Interventions (Patient has pulse or is breathing): Full Support      Electronically signed by Lv Bautista MD, 24, 8:37 AM EDT.             Electronically signed by Lv Hunt MD at 24 0830       Vipul Elaine MD at 24 1356          [facility] Progress Note  Date: 2024  Patient Name: Nick Turk  : 1970  MRN: [id number]  Date of admission: 2024  Consultants:   -Cardiology: Dr. Lv Bautista    Subjective   Subjective     Chief Complaint: Chest pain    Summary:   Nick Turk is a 54 y.o. male with recently diagnosed to systolic congestive heart failure, obstructive sleep apnea on CPAP at home, history of chronic tobacco abuse (previously  smoked 1.5 packs/day for 39 years and quit smoking in November 2023), obesity (BMI: 31.91) that presented to the ED with complaints of chest pain and discomfort.  Cardiology consulted.  Patient taken for cardiac catheterization on 05/21/2024 which showed an LVEDP of 22 mmHg due to LV dysfunction, mild nonobstructive CAD.  Cardiology recommending optimization of medical therapy.    Interval Followup:   Patient seen after cardiac catheterization.  Still a little drowsy from procedure.  Denies any active chest pain.  No complaints of shortness of breath.  Nursing with no additional acute issues to report.    Procedures:   -Left heart catheterization (05/21/2024)    Objective   Objective     Vitals:   Temp:  [97.3 °F (36.3 °C)-97.9 °F (36.6 °C)] 97.7 °F (36.5 °C)  Heart Rate:  [57-97] 97  Resp:  [18] 18  BP: (112-152)/() 151/104  Flow (L/min):  [2] 2  Physical Exam   Gen: No acute distress, Conversant, Pleasant, sitting up in bed  Resp: CTAB, No w/r/r, No respiratory distress appreciated  Card: RRR, No m/r/g  Abd: Soft, Nontender, Nondistended, + bowel sounds    Result Review    Result Review:  I have personally reviewed the results as below and agree with these findings:  []  Laboratory:   CMP          5/15/2024    03:57 5/20/2024    10:29 5/21/2024    04:39   CMP   Glucose 109  114  97    BUN 17  15  19    Creatinine 0.81  0.91  0.88    EGFR 105.4  100.8  102.8    Sodium 140  137  139    Potassium 4.3  4.2  4.1    Chloride 107  103  106    Calcium 8.8  9.0  9.0    Total Protein 6.0  7.4     Albumin 3.6  4.3     Globulin 2.4  3.1     Total Bilirubin 0.2  0.7     Alkaline Phosphatase 45  58     AST (SGOT) 14  22     ALT (SGPT) 22  32     Albumin/Globulin Ratio 1.5  1.4     BUN/Creatinine Ratio 21.0  16.5  21.6    Anion Gap 7.9  9.7  5.9      CBC          5/15/2024    03:57 5/20/2024    10:29 5/21/2024    04:39   CBC   WBC 7.98  11.26  9.18    RBC 4.92  5.28  4.92    Hemoglobin 15.5  16.8  15.5    Hematocrit 47.2   49.1  47.2    MCV 95.9  93.0  95.9    MCH 31.5  31.8  31.5    MCHC 32.8  34.2  32.8    RDW 12.2  12.1  12.0    Platelets 199  229  203    Magnesium within normal limits  []  Microbiology:   []  Radiology:   [x]  EKG/Telemetry:    []  Cardiology/Vascular:    []  Pathology:  []  Old records:  []  Other:    Assessment & Plan   Assessment / Plan     Assessment:  Chest pain  Chronic systolic congestive heart failure, not acutely exacerbated  Essential hypertension  Transient bradycardia  History of chronic tobacco abuse with cigarettes  Obesity (BMI: 30.84)    Plan:  -Cardiology consulted and following, appreciate assistance and recommendations in the care of this patient.  -Continue aspirin, atorvastatin, Jardiance, losartan and spironolactone  -Due to insurance Jardiance not covered as an outpatient so at discharge patient will discharge on Farxiga  -Patient still a little disoriented after procedure.  Not safe for discharge at this time.  Suspect this to improve over time and likely patient can discharge on morning of 05/22/2024  -Management discussed with cardiology.  Recommending guideline directed medical therapy.  Patient also will have a cardiac monitor set up to evaluate for underlying arrhythmia per cardiology.  -Will monitor electrolytes and renal function with BMP and magnesium level in the AM  -Will monitor WBC and Hgb with CBC in the AM  -Clinical course will dictate further management     DVT Prophylaxis: Lovenox  Diet:   Diet Order   Procedures    Diet: Cardiac; Healthy Heart (2-3 Na+); Fluid Consistency: Thin (IDDSI 0)     Dispo: Home when medically appropriate for discharge     Personally reviewed patients labs and imaging, discussed with patient and nurse at bedside. Discussed management with the following consultants: Cardiology.     Part of this note may be an electronic transcription/translation of spoken language to printed text using the Dragon dictation system.    DVT prophylaxis:  Medical DVT  prophylaxis orders are present.        CODE STATUS:   Code Status (Patient has no pulse and is not breathing): CPR (Attempt to Resuscitate)  Medical Interventions (Patient has pulse or is breathing): Full Support        Electronically signed by Vipul Elaine MD, 2024, 14:00 EDT.    Electronically signed by Vipul Elaine MD at 24 1109       Lv Hunt MD at 24 [phone]           [facility]     Cardiology Progress Note    Patient Name: Nick Turk  : 1970  MRN: [id number]  Primary Care Physician:  Ashley Cabezas APRN  Date of admission: 2024    Subjective   Subjective     Chief Complaint: Chest discomfort    Interval HPI:    Patient with hypertension was recently discharged after ED visit with well-controlled blood pressure.  Developed chest discomfort and generalized weakness.  His EKG shows sinus rhythm and left bundle branch block.  The high second troponin was unremarkable.  He had a 2D echo which showed an EF of 35%.    Review of Systems   All systems were reviewed and negative except for: Chest discomfort    Objective   Objective     Vitals:   Temp:  [97.3 °F (36.3 °C)-98.2 °F (36.8 °C)] 97.3 °F (36.3 °C)  Heart Rate:  [65-81] 81  Resp:  [16-18] 18  BP: (116-138)/(72-88) 132/86  Flow (L/min):  [2] 2  Physical Exam      Alert, oriented  Neck. No bruit, JVD +  Heart. Regular, no gallops,  Lungs: clear to auscultation  Abdomen: soft, bs+  LE no edema  Neurologic. No motor deficits.     Scheduled Meds:aspirin, 81 mg, Oral, Daily  atorvastatin, 20 mg, Oral, Nightly  [Held by provider] carvedilol, 3.125 mg, Oral, BID With Meals  empagliflozin, 10 mg, Oral, Daily  heparin (porcine), 5,000 Units, Subcutaneous, Q8H  [Held by provider] losartan, 25 mg, Oral, Daily  sodium chloride, 10 mL, Intravenous, Q12H  spironolactone, 25 mg, Oral, Daily      Continuous Infusions:sodium chloride, 100 mL/hr           Result Review    Result Review:  I have personally reviewed the results from the  time of this admission to 5/21/2024 07:34 EDT and agree with these findings:  [x]  Laboratory  []  Microbiology  [x]  Radiology  [x]  EKG/Telemetry   [x]  Cardiology/Vascular   []  Pathology  []  Old records  []  Other:  Most notable findings include:     CBC          5/15/2024    03:57 5/20/2024    10:29 5/21/2024    04:39   CBC   WBC 7.98  11.26  9.18    RBC 4.92  5.28  4.92    Hemoglobin 15.5  16.8  15.5    Hematocrit 47.2  49.1  47.2    MCV 95.9  93.0  95.9    MCH 31.5  31.8  31.5    MCHC 32.8  34.2  32.8    RDW 12.2  12.1  12.0    Platelets 199  229  203      CMP          5/15/2024    03:57 5/20/2024    10:29 5/21/2024    04:39   CMP   Glucose 109  114  97    BUN 17  15  19    Creatinine 0.81  0.91  0.88    EGFR 105.4  100.8  102.8    Sodium 140  137  139    Potassium 4.3  4.2  4.1    Chloride 107  103  106    Calcium 8.8  9.0  9.0    Total Protein 6.0  7.4     Albumin 3.6  4.3     Globulin 2.4  3.1     Total Bilirubin 0.2  0.7     Alkaline Phosphatase 45  58     AST (SGOT) 14  22     ALT (SGPT) 22  32     Albumin/Globulin Ratio 1.5  1.4     BUN/Creatinine Ratio 21.0  16.5  21.6    Anion Gap 7.9  9.7  5.9       CARDIAC LABS:      Lab 05/20/24  1249 05/20/24  1029 05/15/24  0357   PROBNP  --  291.7 610.7   HSTROP T 11 11  --         Assessment & Plan   Assessment / Plan     Brief Patient Summary:  Ncik Turk is a 54 y.o. male with admitted episode of chest discomfort and uncontrolled hypertension.  Had  transient bradycardia.  His EF is 35%.    Active Hospital Problems:  Active Hospital Problems    Diagnosis     **Chest pain     Acute coronary syndrome     Essential hypertension     Left ventricular systolic dysfunction (LVSD)            Plan:     The patient had a left heart catheterization which showed an LVEDP of 22 mmHg due to LV dysfunction.  He has mild nonobstructive coronary artery disease.  Plan is to optimize medical therapy with Jardiance, angiotensin receptor blocker and Aldactone as tolerated.   The beta-blockers are being held due to the episode of bradycardia.  Patient should have a cardiac monitor for about 14 days upon discharge to evaluate underlying rhythm.  Patient class II heart failure New York Heart Association.    I spent over 25 minutes during this evaluation including 50% of the time in coordination of care       CODE STATUS:   Code Status (Patient has no pulse and is not breathing): CPR (Attempt to Resuscitate)  Medical Interventions (Patient has pulse or is breathing): Full Support      Electronically signed by Lv Bautista MD, 24, 7:34 AM EDT.             Electronically signed by Lv Hunt MD at 24 0740          Consult Notes (last 72 hours)        Lv Hunt MD at 24 [phone]            [facility]   Cardiology Consult Note    Patient Name: Nick Turk  : 1970  MRN: [id number]  Primary Care Physician:  Ashley Cabezas APRN  Referring Physician: No ref. provider found    Date of admission: 2024    Subjective   Subjective     Reason for Consultation : Chest discomfort and heart failure chest pain and shortness of breath    Chief Complaint : Chest pain    HPI:  Nick Turk is a 54 y.o. male The patient has past medical history of essential hypertension and hyperlipidemia.  He was recently in the emergency room due to elevated blood pressure and was sent home on medical therapy.  Earlier today the patient had a chest discomfort associated with shortness of breath.  EKG showed normal sinus rhythm with repolarization abnormalities.  Previous echo done recently showed an EF of 35%.  The patient is a active smoker about 1.5 pack/day for over 40 years.  He is high second troponin was negative chest discomfort    Review of Systems   All systems were reviewed and negative except for: Chest discomfort and diaphoresis    Personal History     Past Medical History:   Diagnosis Date    Back pain     Chronic pain syndrome     CVA (cerebral vascular accident)  5/14/2024    Essential hypertension 5/20/2024    Forgetfulness     Leg pain     Lumbago 2015    Lumbar spondylosis     Pain, generalized     Pars defect of lumbar spine 2015    Sleep disorder     Spondylolisthesis, lumbar region 2015    L4/5        Reviewed      Family History: family history includes Heart disease in his mother; Heart failure in his sister; No Known Problems in his father. Otherwise pertinent FHx was reviewed and not pertinent to current issue.    Social History:  reports that he has quit smoking. His smoking use included cigarettes. He started smoking about 40 years ago. He has a 39.6 pack-year smoking history. He has never used smokeless tobacco. He reports that he does not drink alcohol and does not use drugs.    Home Medications:  Testosterone Cypionate, aspirin, atorvastatin, carvedilol, empagliflozin, and losartan    Allergies:  Allergies   Allergen Reactions    Bee Venom Swelling    Meloxicam Confusion and Other (See Comments)       Objective    Objective     Vitals:   Temp:  [97.5 °F (36.4 °C)-98.2 °F (36.8 °C)] 97.7 °F (36.5 °C)  Heart Rate:  [65-80] 71  Resp:  [16-18] 18  BP: (116-125)/(72-88) 120/73      Physical Exam:   Constitutional: Awake, alert, No acute distress    Eyes: PERRLA, sclerae anicteric, no conjunctival injection   HENT: NCAT, mucous membranes moist   Neck: Supple, no thyromegaly, no lymphadenopathy, trachea midline   Respiratory: Clear to auscultation bilaterally, nonlabored respirations    Cardiovascular: RRR, no murmurs, rubs, or gallops, palpable pedal pulses bilaterally   Gastrointestinal: Positive bowel sounds, soft, nontender, nondistended   Musculoskeletal: No bilateral ankle edema, no clubbing or cyanosis to extremities   Psychiatric: Appropriate affect, cooperative   Neurologic: Oriented x 3, strength symmetric in all extremities, Cranial Nerves grossly intact to confrontation, speech clear   Skin: No rashes     Result Review    Result Review:  I have personally  reviewed the results from the time of this admission to 5/20/2024 23:24 EDT and agree with these findings:  [x]  Laboratory  []  Microbiology  [x]  Radiology  [x]  EKG/Telemetry   [x]  Cardiology/Vascular   []  Pathology  [x]  Old records  []  Other:  Most notable findings include:     CMP          5/13/2024    19:02 5/15/2024    03:57 5/20/2024    10:29   CMP   Glucose 113  109  114    BUN 13  17  15    Creatinine 0.87  0.81  0.91    EGFR 103.2  105.4  100.8    Sodium 138  140  137    Potassium 4.0  4.3  4.2    Chloride 107  107  103    Calcium 8.7  8.8  9.0    Total Protein 6.6  6.0  7.4    Albumin 3.8  3.6  4.3    Globulin 2.8  2.4  3.1    Total Bilirubin 0.2  0.2  0.7    Alkaline Phosphatase 49  45  58    AST (SGOT) 17  14  22    ALT (SGPT) 26  22  32    Albumin/Globulin Ratio 1.4  1.5  1.4    BUN/Creatinine Ratio 14.9  21.0  16.5    Anion Gap 9.3  7.9  9.7       CBC          5/13/2024    19:02 5/15/2024    03:57 5/20/2024    10:29   CBC   WBC 9.28  7.98  11.26    RBC 5.04  4.92  5.28    Hemoglobin 15.8  15.5  16.8    Hematocrit 47.8  47.2  49.1    MCV 94.8  95.9  93.0    MCH 31.3  31.5  31.8    MCHC 33.1  32.8  34.2    RDW 12.2  12.2  12.1    Platelets 223  199  229       Lab Results   Component Value Date    TROPONINT 11 05/20/2024         Assessment & Plan   Assessment / Plan     Brief Patient Summary:  Nick Turk is a 54 y.o. male who has major risk factor for coronary artery disease now with intermittent episodes of chest discomfort associated with diaphoresis and some shortness of breath.  His EKG showed normal sinus rhythm with repolarization abnormalities.  The high sensitive troponins was unremarkable.  His 2D echo showed EF of 35% with global hypokinesis.  I would optimize medical therapy with antiplatelet    Active Hospital Problems:  Active Hospital Problems    Diagnosis     **Chest pain     Acute coronary syndrome     Essential hypertension     Left ventricular systolic dysfunction (LVSD)           Plan: I would optimize medical therapy with antiplatelets, statin therapy and blood pressure control.  Consider the use of nitrates.  I would start Jardiance 10 mg oral daily and Aldactone as tolerated.  Consider the use of angiotensin receptor blockers as tolerated.  Will proceed with a left heart catheterization and possible revascularization if indicated.  Keep the patient n.p.o. past midnight.    I spent over 45 minutes of time during this evaluation including 50% of the time in coordination of care.    ,    Electronically signed by Lv Bautista MD, 05/20/24, 11:17 PM EDT.    Electronically signed by Lv Hunt MD at 05/20/24 [id number]

## 2024-05-23 NOTE — PLAN OF CARE
Problem: Adult Inpatient Plan of Care  Goal: Plan of Care Review  Outcome: Ongoing, Progressing  Flowsheets (Taken 5/23/2024 0514)  Progress: improving  Plan of Care Reviewed With:   patient   spouse  Outcome Evaluation: Patient slept throughout the night with no signs of distress. Will continue with plan of care.  Goal: Patient-Specific Goal (Individualized)  Outcome: Ongoing, Progressing  Goal: Absence of Hospital-Acquired Illness or Injury  Outcome: Ongoing, Progressing  Intervention: Identify and Manage Fall Risk  Recent Flowsheet Documentation  Taken 5/23/2024 0500 by Laura Resendez RN  Safety Promotion/Fall Prevention:   safety round/check completed   room organization consistent   nonskid shoes/slippers when out of bed   lighting adjusted   fall prevention program maintained   clutter free environment maintained   assistive device/personal items within reach   activity supervised  Taken 5/23/2024 0300 by Laura Resendez RN  Safety Promotion/Fall Prevention:   safety round/check completed   room organization consistent   nonskid shoes/slippers when out of bed   lighting adjusted   fall prevention program maintained   clutter free environment maintained   assistive device/personal items within reach   activity supervised  Taken 5/23/2024 0200 by Laura Resendez RN  Safety Promotion/Fall Prevention:   safety round/check completed   room organization consistent   nonskid shoes/slippers when out of bed   lighting adjusted   fall prevention program maintained   clutter free environment maintained   assistive device/personal items within reach   activity supervised  Taken 5/23/2024 0059 by Laura Resendez RN  Safety Promotion/Fall Prevention:   safety round/check completed   room organization consistent   nonskid shoes/slippers when out of bed   lighting adjusted   fall prevention program maintained   clutter free environment maintained   assistive device/personal items within reach   activity  supervised  Taken 5/23/2024 0000 by Laura Resendez RN  Safety Promotion/Fall Prevention:   safety round/check completed   room organization consistent   nonskid shoes/slippers when out of bed   lighting adjusted   fall prevention program maintained   clutter free environment maintained   assistive device/personal items within reach   activity supervised  Taken 5/22/2024 2300 by Laura Resendez RN  Safety Promotion/Fall Prevention:   safety round/check completed   room organization consistent   nonskid shoes/slippers when out of bed   lighting adjusted   fall prevention program maintained   clutter free environment maintained   assistive device/personal items within reach   activity supervised  Taken 5/22/2024 2200 by Laura Resendez RN  Safety Promotion/Fall Prevention: patient off unit  Taken 5/22/2024 2100 by Laura Resendez RN  Safety Promotion/Fall Prevention:   safety round/check completed   room organization consistent   nonskid shoes/slippers when out of bed   lighting adjusted   fall prevention program maintained   clutter free environment maintained   assistive device/personal items within reach   activity supervised  Taken 5/22/2024 2006 by Laura Resendez RN  Safety Promotion/Fall Prevention:   safety round/check completed   room organization consistent   nonskid shoes/slippers when out of bed   lighting adjusted   fall prevention program maintained   clutter free environment maintained   assistive device/personal items within reach   activity supervised  Taken 5/22/2024 1904 by Laura Resendez RN  Safety Promotion/Fall Prevention:   safety round/check completed   room organization consistent   nonskid shoes/slippers when out of bed   lighting adjusted   fall prevention program maintained   clutter free environment maintained   assistive device/personal items within reach   activity supervised  Intervention: Prevent Skin Injury  Recent Flowsheet Documentation  Taken 5/22/2024 1904 by  Laura Resendez RN  Body Position: position changed independently  Skin Protection: skin-to-device areas padded  Intervention: Prevent and Manage VTE (Venous Thromboembolism) Risk  Recent Flowsheet Documentation  Taken 5/23/2024 0219 by Laura Resendez RN  Activity Management: ambulated to bathroom  Taken 5/22/2024 1904 by Laura Resendez RN  Activity Management: ambulated to bathroom  Range of Motion: active ROM (range of motion) encouraged  Intervention: Prevent Infection  Recent Flowsheet Documentation  Taken 5/23/2024 0500 by Laura Resendez RN  Infection Prevention:   rest/sleep promoted   hand hygiene promoted   equipment surfaces disinfected   environmental surveillance performed  Taken 5/23/2024 0300 by Laura Resendez RN  Infection Prevention:   rest/sleep promoted   hand hygiene promoted   equipment surfaces disinfected   environmental surveillance performed  Taken 5/23/2024 0200 by Laura Resendez RN  Infection Prevention:   rest/sleep promoted   hand hygiene promoted   equipment surfaces disinfected   environmental surveillance performed  Taken 5/23/2024 0059 by Laura Resendez RN  Infection Prevention:   rest/sleep promoted   hand hygiene promoted   equipment surfaces disinfected   environmental surveillance performed  Taken 5/23/2024 0000 by Laura Resendez RN  Infection Prevention:   rest/sleep promoted   hand hygiene promoted   equipment surfaces disinfected   environmental surveillance performed  Taken 5/22/2024 2300 by Laura Resendez RN  Infection Prevention:   rest/sleep promoted   hand hygiene promoted   equipment surfaces disinfected   environmental surveillance performed  Taken 5/22/2024 2100 by Laura Resendez RN  Infection Prevention:   rest/sleep promoted   hand hygiene promoted   equipment surfaces disinfected   environmental surveillance performed  Taken 5/22/2024 2006 by Laura Resendez RN  Infection Prevention:   rest/sleep promoted   hand hygiene  promoted   equipment surfaces disinfected   environmental surveillance performed  Taken 5/22/2024 1904 by Laura Resendez RN  Infection Prevention:   rest/sleep promoted   hand hygiene promoted   equipment surfaces disinfected   environmental surveillance performed  Goal: Optimal Comfort and Wellbeing  Outcome: Ongoing, Progressing  Intervention: Provide Person-Centered Care  Recent Flowsheet Documentation  Taken 5/22/2024 1904 by Laura Resendez RN  Trust Relationship/Rapport:   care explained   choices provided   emotional support provided   empathic listening provided   questions answered   questions encouraged   reassurance provided   thoughts/feelings acknowledged  Goal: Readiness for Transition of Care  Outcome: Ongoing, Progressing     Problem: Heart Failure Comorbidity  Goal: Maintenance of Heart Failure Symptom Control  Outcome: Ongoing, Progressing  Intervention: Maintain Heart Failure-Management  Recent Flowsheet Documentation  Taken 5/22/2024 1904 by Laura Resendez RN  Medication Review/Management: medications reviewed   Goal Outcome Evaluation:  Plan of Care Reviewed With: patient, spouse        Progress: improving  Outcome Evaluation: Patient slept throughout the night with no signs of distress. Will continue with plan of care.

## 2024-05-23 NOTE — PLAN OF CARE
Goal Outcome Evaluation:                   FUNCTIONAL ASSESSMENT INSTRUMENT: Patient currently scored a level 7 of 7 on Functional Communication Measures for swallowing indicating a 0% limitation in function.     ASSESSMENT/ PLAN OF CARE:  Pt presents with functional oropharyngeal swallow.  No clinical signs or symptoms of aspiration noted.     REHAB POTENTIAL:  Pt has good rehab potential.  The following limitations may influence improvement/ length of tx medical status.     RECOMMENDATIONS:   1.   DIET: Regular diet-thin liquids     2.  POSITION: 90 degrees upright for all intake     3.  COMPENSATORY STRATEGIES: Slow rate, small bites/drinks     Pt/responsible party agrees with plan of care and has been informed of all alternatives, risks and benefits.

## 2024-05-23 NOTE — PLAN OF CARE
Goal Outcome Evaluation:                     Anticipated Discharge Disposition (PT): home with outpatient therapy services

## 2024-05-24 ENCOUNTER — READMISSION MANAGEMENT (OUTPATIENT)
Dept: CALL CENTER | Facility: HOSPITAL | Age: 54
End: 2024-05-24
Payer: COMMERCIAL

## 2024-05-24 ENCOUNTER — TELEPHONE (OUTPATIENT)
Dept: CARDIOLOGY | Facility: CLINIC | Age: 54
End: 2024-05-24
Payer: COMMERCIAL

## 2024-05-24 VITALS
WEIGHT: 241.18 LBS | OXYGEN SATURATION: 92 % | HEIGHT: 74 IN | DIASTOLIC BLOOD PRESSURE: 83 MMHG | SYSTOLIC BLOOD PRESSURE: 135 MMHG | BODY MASS INDEX: 30.95 KG/M2 | TEMPERATURE: 97.7 F | RESPIRATION RATE: 18 BRPM | HEART RATE: 108 BPM

## 2024-05-24 DIAGNOSIS — I63.439 CEREBROVASCULAR ACCIDENT (CVA) DUE TO EMBOLISM OF POSTERIOR CEREBRAL ARTERY, UNSPECIFIED BLOOD VESSEL LATERALITY: Primary | ICD-10-CM

## 2024-05-24 LAB
ANION GAP SERPL CALCULATED.3IONS-SCNC: 9.6 MMOL/L (ref 5–15)
BASOPHILS # BLD AUTO: 0.08 10*3/MM3 (ref 0–0.2)
BASOPHILS NFR BLD AUTO: 0.8 % (ref 0–1.5)
BUN SERPL-MCNC: 15 MG/DL (ref 6–20)
BUN/CREAT SERPL: 18.5 (ref 7–25)
CALCIUM SPEC-SCNC: 9 MG/DL (ref 8.6–10.5)
CHLORIDE SERPL-SCNC: 105 MMOL/L (ref 98–107)
CO2 SERPL-SCNC: 22.4 MMOL/L (ref 22–29)
CREAT SERPL-MCNC: 0.81 MG/DL (ref 0.76–1.27)
DEPRECATED RDW RBC AUTO: 41.7 FL (ref 37–54)
EGFRCR SERPLBLD CKD-EPI 2021: 105.4 ML/MIN/1.73
EOSINOPHIL # BLD AUTO: 0.32 10*3/MM3 (ref 0–0.4)
EOSINOPHIL NFR BLD AUTO: 3.2 % (ref 0.3–6.2)
ERYTHROCYTE [DISTWIDTH] IN BLOOD BY AUTOMATED COUNT: 12 % (ref 12.3–15.4)
GLUCOSE SERPL-MCNC: 98 MG/DL (ref 65–99)
HCT VFR BLD AUTO: 49 % (ref 37.5–51)
HGB BLD-MCNC: 16.7 G/DL (ref 13–17.7)
IMM GRANULOCYTES # BLD AUTO: 0.04 10*3/MM3 (ref 0–0.05)
IMM GRANULOCYTES NFR BLD AUTO: 0.4 % (ref 0–0.5)
LYMPHOCYTES # BLD AUTO: 2.47 10*3/MM3 (ref 0.7–3.1)
LYMPHOCYTES NFR BLD AUTO: 24.3 % (ref 19.6–45.3)
MAGNESIUM SERPL-MCNC: 2 MG/DL (ref 1.6–2.6)
MCH RBC QN AUTO: 32 PG (ref 26.6–33)
MCHC RBC AUTO-ENTMCNC: 34.1 G/DL (ref 31.5–35.7)
MCV RBC AUTO: 93.9 FL (ref 79–97)
MONOCYTES # BLD AUTO: 1.21 10*3/MM3 (ref 0.1–0.9)
MONOCYTES NFR BLD AUTO: 11.9 % (ref 5–12)
NEUTROPHILS NFR BLD AUTO: 59.4 % (ref 42.7–76)
NEUTROPHILS NFR BLD AUTO: 6.03 10*3/MM3 (ref 1.7–7)
NRBC BLD AUTO-RTO: 0 /100 WBC (ref 0–0.2)
PHOSPHATE SERPL-MCNC: 3.3 MG/DL (ref 2.5–4.5)
PLATELET # BLD AUTO: 212 10*3/MM3 (ref 140–450)
PMV BLD AUTO: 10.7 FL (ref 6–12)
POTASSIUM SERPL-SCNC: 4.3 MMOL/L (ref 3.5–5.2)
RBC # BLD AUTO: 5.22 10*6/MM3 (ref 4.14–5.8)
SODIUM SERPL-SCNC: 137 MMOL/L (ref 136–145)
WBC NRBC COR # BLD AUTO: 10.15 10*3/MM3 (ref 3.4–10.8)

## 2024-05-24 PROCEDURE — 99232 SBSQ HOSP IP/OBS MODERATE 35: CPT | Performed by: INTERNAL MEDICINE

## 2024-05-24 PROCEDURE — 97116 GAIT TRAINING THERAPY: CPT

## 2024-05-24 PROCEDURE — 80048 BASIC METABOLIC PNL TOTAL CA: CPT | Performed by: STUDENT IN AN ORGANIZED HEALTH CARE EDUCATION/TRAINING PROGRAM

## 2024-05-24 PROCEDURE — 84100 ASSAY OF PHOSPHORUS: CPT | Performed by: STUDENT IN AN ORGANIZED HEALTH CARE EDUCATION/TRAINING PROGRAM

## 2024-05-24 PROCEDURE — 85025 COMPLETE CBC W/AUTO DIFF WBC: CPT | Performed by: STUDENT IN AN ORGANIZED HEALTH CARE EDUCATION/TRAINING PROGRAM

## 2024-05-24 PROCEDURE — 83735 ASSAY OF MAGNESIUM: CPT | Performed by: STUDENT IN AN ORGANIZED HEALTH CARE EDUCATION/TRAINING PROGRAM

## 2024-05-24 RX ORDER — CLOPIDOGREL BISULFATE 75 MG/1
75 TABLET ORAL DAILY
Qty: 18 TABLET | Refills: 0 | Status: SHIPPED | OUTPATIENT
Start: 2024-05-25 | End: 2024-06-12

## 2024-05-24 RX ORDER — SPIRONOLACTONE 25 MG/1
25 TABLET ORAL DAILY
Qty: 30 TABLET | Refills: 0 | Status: SHIPPED | OUTPATIENT
Start: 2024-05-25 | End: 2024-06-24

## 2024-05-24 RX ORDER — ATORVASTATIN CALCIUM 80 MG/1
80 TABLET, FILM COATED ORAL NIGHTLY
Qty: 30 TABLET | Refills: 0 | Status: SHIPPED | OUTPATIENT
Start: 2024-05-24 | End: 2024-06-23

## 2024-05-24 RX ORDER — DAPAGLIFLOZIN 10 MG/1
10 TABLET, FILM COATED ORAL DAILY
Qty: 30 TABLET | Refills: 0 | Status: SHIPPED | OUTPATIENT
Start: 2024-05-24 | End: 2024-06-23

## 2024-05-24 RX ADMIN — EMPAGLIFLOZIN 10 MG: 10 TABLET, FILM COATED ORAL at 08:56

## 2024-05-24 RX ADMIN — CLOPIDOGREL BISULFATE 75 MG: 75 TABLET ORAL at 08:57

## 2024-05-24 RX ADMIN — LOSARTAN POTASSIUM 25 MG: 25 TABLET, FILM COATED ORAL at 08:56

## 2024-05-24 RX ADMIN — ASPIRIN 81 MG: 81 TABLET, CHEWABLE ORAL at 08:57

## 2024-05-24 RX ADMIN — SPIRONOLACTONE 25 MG: 25 TABLET ORAL at 08:57

## 2024-05-24 RX ADMIN — Medication 10 ML: at 08:57

## 2024-05-24 NOTE — DISCHARGE SUMMARY
[facility]         HOSPITALIST  DISCHARGE SUMMARY    Patient Name: Nick Turk  : 1970  MRN: [id number]    Date of Admission: 2024  Date of Discharge:  2024  Primary Care Physician: Ashley Cabezas APRN    Consults       Date and Time Order Name Status Description    2024  7:30 AM Inpatient Neurology Consult Stroke      2024  1:59 PM Inpatient Cardiology Consult              Active and Resolved Hospital Problems:  Active Hospital Problems    Diagnosis POA    **Chest pain [R07.9] Yes    Stroke [I63.9] Yes    Chronic systolic congestive heart failure, NYHA class 2 [I50.22] Unknown    Acute coronary syndrome [I24.9] Unknown    Essential hypertension [I10] Unknown    Left ventricular systolic dysfunction (LVSD) [I51.9] Unknown      Resolved Hospital Problems   No resolved problems to display.       Hospital Course     Hospital Course:  Nick Turk is a 54 y.o. male with recently diagnosed to systolic congestive heart failure, obstructive sleep apnea on CPAP at home, history of chronic tobacco abuse (previously smoked 1.5 packs/day for 39 years and quit smoking in 2023), obesity (BMI: 31.91) that presented to the ED with complaints of chest pain and discomfort. Cardiology consulted. Patient taken for cardiac catheterization on 2024 which showed an LVEDP of 22 mmHg due to LV dysfunction, mild nonobstructive CAD. Cardiology recommending optimization of medical therapy.  Hospital course complicated by double vision persistent after cardiac cath.  CT head without contrast was obtained which showed no acute intracranial abnormality.  MRI brain with and without contrast showed acute diffusion restriction abnormality within the flo/midbrain just to the left of midline; possible late acute infarct.  No evidence of MRAs.  Neurology was consulted.  Underwent CT angio head and neck which showed no emergent large vessel occlusion appreciated.  Neurology and cardiology  following the patient.  Patient's vision was improving.  PT/OT evaluated the patient and recommended home with outpatient therapy.  His Jardiance was switched to Farxiga due to insurance approval.  Patient was advised to continue dual antiplatelet therapy including aspirin and Plavix along with statin.  Cardiology also recommended Aldactone and angiotensin receptor blocker.    Patient is planned to be discharged home today.  He is stable at the time of discharge.  He will follow-up with PCP in 3-7 days, needs CBC and chemistries trended during follow-up.  Follow-up with cardiology in 2 weeks, will need cardiac monitoring to evaluate underlying arrhythmia.  Follow-up with neurology in 3-6 weeks.  Also recommended ophthalmology evaluation as outpatient.  Fall precautions.  Advised to be compliant with medications and diet.  Continue outpatient PT/OT therapy.      DISCHARGE Follow Up Recommendations for labs and diagnostics: As mentioned above.      Day of Discharge     Vital Signs:  Temp:  [97.3 °F (36.3 °C)-98.2 °F (36.8 °C)] 97.3 °F (36.3 °C)  Heart Rate:  [] 78  Resp:  [18] 18  BP: (109-140)/() 128/80  Physical Exam:   General: Awake, alert, NAD  Cardiovascular: RRR, no murmurs   Pulmonary: CTA bilaterally; no wheezes; no conversational dyspnea  Neuro: Alert, awake, oriented x 3.  No nystagmus or cranial nerve defect appreciated.  Intermittent double vision.  No facial droop.     Discharge Details        Discharge Medications        New Medications        Instructions Start Date   clopidogrel 75 MG tablet  Commonly known as: PLAVIX   75 mg, Oral, Daily   Start Date: May 25, 2024     dapagliflozin Propanediol 10 MG tablet  Commonly known as: Farxiga   10 mg, Oral, Daily      spironolactone 25 MG tablet  Commonly known as: ALDACTONE   25 mg, Oral, Daily   Start Date: May 25, 2024            Changes to Medications        Instructions Start Date   atorvastatin 80 MG tablet  Commonly known as: LIPITOR  What  changed:   medication strength  how much to take  when to take this   80 mg, Oral, Nightly             Continue These Medications        Instructions Start Date   aspirin 81 MG chewable tablet   81 mg, Oral, Daily      carvedilol 3.125 MG tablet  Commonly known as: Coreg   3.125 mg, Oral, 2 Times Daily With Meals      losartan 25 MG tablet  Commonly known as: COZAAR   Take 1 tablet by mouth Daily.      Testosterone Cypionate 200 MG/ML injection  Commonly known as: DEPOTESTOTERONE CYPIONATE   100 mg, Intramuscular, Every 7 Days             Stop These Medications      empagliflozin 10 MG tablet tablet  Commonly known as: JARDIANCE              Allergies   Allergen Reactions    Bee Venom Swelling    Meloxicam Confusion and Other (See Comments)       Discharge Disposition:  Home or Self Care    Diet:  Hospital:  Diet Order   Procedures    Diet: Cardiac; Healthy Heart (2-3 Na+); Fluid Consistency: Thin (IDDSI 0)       Discharge Activity:       CODE STATUS:  Code Status and Medical Interventions:   Ordered at: 05/20/24 1358     Code Status (Patient has no pulse and is not breathing):    CPR (Attempt to Resuscitate)     Medical Interventions (Patient has pulse or is breathing):    Full Support       Future Appointments   Date Time Provider Department Center   5/31/2024  2:30 PM Amisha Schulz APRN [facility] [place] HRTFL [facility] None   6/6/2024  8:30 AM Lv Hunt MD [facility] [place] [facility] CD [facility]   6/7/2024  8:30 AM Abdoulaye Minaya MD [facility] ORS [facility]   9/25/2024  7:00 AM [facility] Car [facility]       Additional Instructions for the Follow-ups that You Need to Schedule       Discharge Follow-up with PCP   As directed       Currently Documented PCP:    Ashley Cabezas APRN    PCP Phone Number:    [phone]     Follow Up Details: In 3-7 days; needs CBC and chemistries trended during follow up.        Referral to Occupational Therapy   As directed      Specialty needed: Evaluate and treat   Exercises: Stretching ROM Strengthening    Follow-up needed: Yes        Referral to Physical Therapy   As directed      Specialty needed: Evaluate and treat   Exercises: Stretching ROM Strengthening   Follow-up needed: Yes                Pertinent  and/or Most Recent Results     PROCEDURES:       LAB RESULTS:      Lab 05/24/24  0430 05/23/24 0455 05/22/24 0517 05/21/24  0439 05/20/24  1029   WBC 10.15 9.99 9.76 9.18 11.26*   HEMOGLOBIN 16.7 16.9 17.0 15.5 16.8   HEMATOCRIT 49.0 50.0 51.6* 47.2 49.1   PLATELETS 212 213 221 203 229   NEUTROS ABS 6.03  --   --   --  7.63*   IMMATURE GRANS (ABS) 0.04  --   --   --  0.04   LYMPHS ABS 2.47  --   --   --  1.99   MONOS ABS 1.21*  --   --   --  1.26*   EOS ABS 0.32  --   --   --  0.27   MCV 93.9 94.0 96.8 95.9 93.0         Lab 05/24/24  0430 05/23/24 0455 05/22/24 0517 05/21/24  1844 05/21/24 0439 05/20/24  1029   SODIUM 137 138 138 140 139 137   POTASSIUM 4.3 4.2 4.2 3.8 4.1 4.2   CHLORIDE 105 107 105 104 106 103   CO2 22.4 23.1 25.8 27.2 27.1 24.3   ANION GAP 9.6 7.9 7.2 8.8 5.9 9.7   BUN 15 14 19 19 19 15   CREATININE 0.81 0.86 1.00 0.86 0.88 0.91   EGFR 105.4 103.5 90.0 103.5 102.8 100.8   GLUCOSE 98 110* 92 90 97 114*   CALCIUM 9.0 8.9 9.0 8.8 9.0 9.0   MAGNESIUM 2.0 2.2 2.1  --  2.1 2.0   PHOSPHORUS 3.3 3.2  --   --   --   --    HEMOGLOBIN A1C  --  5.50  --   --   --   --          Lab 05/20/24  1029   TOTAL PROTEIN 7.4   ALBUMIN 4.3   GLOBULIN 3.1   ALT (SGPT) 32   AST (SGOT) 22   BILIRUBIN 0.7   ALK PHOS 58   LIPASE 74*         Lab 05/20/24  1249 05/20/24  1029   PROBNP  --  291.7   HSTROP T 11 11         Lab 05/23/24  0455   CHOLESTEROL 78   LDL CHOL 20   HDL CHOL 31*   TRIGLYCERIDES 166*             Brief Urine Lab Results  (Last result in the past 365 days)        Color   Clarity   Blood   Leuk Est   Nitrite   Protein   CREAT   Urine HCG        05/13/24 1902 Yellow   Clear   Negative   Negative   Negative   Negative                 Microbiology Results (last 10 days)       ** No results found for the  last 240 hours. **            CT Angiogram Head w AI Analysis of LVO    Result Date: 5/23/2024  (COMBINED)  1. The studies are limited due to limitations in the contrast bolus predominately.  2. Grossly, no emergent large vessel occlusion is appreciated, especially involving the left P1 segment or elsewhere in the left posterior cerebral artery (PCA) territory.  3. Grossly, no hemodynamically significant arterial stenoses are appreciated.  4. No gross evidence of a cerebral saccular aneurysm.  5. No definite arterial dissection.  6. The vertebral arteries are patent and codominant.  7. Please see above comments for further detail.      Please note that portions of this note were completed with a voice recognition program.         Electronically Signed By-Nick Parker MD On:5/23/2024 1:42 AM      CT Angiogram Neck    Result Date: 5/23/2024  (COMBINED)  1. The studies are limited due to limitations in the contrast bolus predominately.  2. Grossly, no emergent large vessel occlusion is appreciated, especially involving the left P1 segment or elsewhere in the left posterior cerebral artery (PCA) territory.  3. Grossly, no hemodynamically significant arterial stenoses are appreciated.  4. No gross evidence of a cerebral saccular aneurysm.  5. No definite arterial dissection.  6. The vertebral arteries are patent and codominant.  7. Please see above comments for further detail.      Please note that portions of this note were completed with a voice recognition program.         Electronically Signed By-Nick Parker MD On:5/23/2024 1:42 AM      MRI Brain With & Without Contrast    Result Date: 5/22/2024   1. Acute diffusion restriction abnormality is noted within the flo/midbrain just to the left of midline. This is a new finding from the comparison. Findings compatible with a possible late acute infarct  No evidence of hemorrhage or abnormal enhancement noted.  The remainder of the MRI of the head appears stable  without acute abnormality  Findings were called to the patient's nurse at 10:35 a.m.    Electronically Signed By-Corby Mendoza On:5/22/2024 10:38 AM      CT Head Without Contrast    Result Date: 5/22/2024  No CT findings of acute intracranial abnormality.  Electronically Signed By-Cody Messina MD On:5/22/2024 7:29 AM      XR Chest 1 View    Result Date: 5/20/2024  Impression: 1.  An acute pulmonary process is not apparent.   Electronically Signed By-Chalo Villafuerte MD On:5/20/2024 11:06 AM                Results for orders placed during the hospital encounter of 05/13/24    Adult Transthoracic Echo Complete W/ Cont if Necessary Per Protocol (With Agitated Saline)    Interpretation Summary    The study is technically difficult for diagnosis.    Left ventricular systolic function is moderately decreased. Estimated left ventricular EF = 35%    Left ventricular wall thickness is consistent with hypertrophy probable mild in nature difficult to say if his ventricular not as walls were not well-seen in multiple different views    The left atrial cavity is mildly dilated.    Global hypokinesis more severely affecting the septum with a dyssynchronous contraction pattern possibly secondary  Conduction abnormality      Labs Pending at Discharge:        Time spent on Discharge including face to face service:  35 minutes    Electronically signed by Osiris Lee MD, 05/24/24, 10:36 AM EDT.

## 2024-05-24 NOTE — TELEPHONE ENCOUNTER
Caller: ROSA PRECIADO    Relationship to patient: Emergency Contact    Best call back number: [phone]     New or established patient?  [] New  [x] Established    Date of discharge: 05.24.2024    Facility discharged from:  [facility]    Diagnosis/Symptoms: CHEST PAIN    Length of stay (If applicable): 4 DAYS    Specialty Only: Did you see a [facility] health provider?    [x] Yes  [] No  If so, who? DR. LAUREN

## 2024-05-24 NOTE — DISCHARGE INSTR - APPOINTMENTS
Patient will need to follow up with PCP on May 31st, at 1:30 pm.   Neurology will call to set up appointment.   Cardiology will call with appt., appt. Is already scheduled.

## 2024-05-24 NOTE — OUTREACH NOTE
Prep Survey      Flowsheet Row Responses   [facility] facility patient discharged from? [facility]   Is LACE score < 7 ? No   Eligibility Readm Mgmt   Discharge diagnosis Chest pain   Does the patient have one of the following disease processes/diagnoses(primary or secondary)? Other   Prep survey completed? Yes            Ama RAYMOND - Registered Nurse

## 2024-05-24 NOTE — PLAN OF CARE
Goal Outcome Evaluation:      Patient meeting with  regarding concerns.  Director also visited patient to address concerns. Patient advocate and Risk Management are involved with patient concerns as well. Sudha restrepo

## 2024-05-24 NOTE — THERAPY TREATMENT NOTE
Acute Care - Physical Therapy Treatment Note   Raghav     Patient Name: Nick Turk  : 1970  MRN: [id number]  Today's Date: 2024      Visit Dx:     ICD-10-CM ICD-9-CM   1. Chest pain, unspecified type  R07.9 786.50   2. Acute coronary syndrome  I24.9 411.1   3. Difficulty in walking  R26.2 719.7   4. Decreased activities of daily living (ADL)  Z78.9 V49.89   5. Acute HFrEF (heart failure with reduced ejection fraction)  I50.21 428.21   6. Cerebrovascular accident (CVA), unspecified mechanism  I63.9 434.91     Patient Active Problem List   Diagnosis    Congenital spondylolisthesis    Lumbosacral spondylosis without myelopathy    Disorder of vertebra    Knee pain    Ligamentous laxity of knee    Osteoarthritis of left shoulder    Pain in joint of left shoulder    Pain, generalized    Sleep disorder    Low back pain    Thoracic back pain    Thoracic spondylosis without myelopathy    Nontraumatic complete tear of left rotator cuff    Rotator cuff tear, left    Aftercare following mini open rotator cuff repair    Acute systolic congestive heart failure    Chest pain    Acute coronary syndrome    Essential hypertension    Left ventricular systolic dysfunction (LVSD)    Chronic systolic congestive heart failure, NYHA class 2    Stroke     Past Medical History:   Diagnosis Date    Back pain     Chronic pain syndrome     CVA (cerebral vascular accident) 2024    Essential hypertension 2024    Forgetfulness     Leg pain     Lumbago     Lumbar spondylosis     Pain, generalized     Pars defect of lumbar spine     Sleep disorder     Spondylolisthesis, lumbar region     L4/5     Past Surgical History:   Procedure Laterality Date    BACK SURGERY      CARDIAC CATHETERIZATION Left 2024    Procedure: Cardiac Catheterization/Vascular Study;  Surgeon: Lv Hunt MD;  Location: [facility] INVASIVE LOCATION;  Service: Cardiovascular;  Laterality: Left;    CHOLECYSTECTOMY      COLONOSCOPY  N/A 11/16/2021    Procedure: COLONOSCOPY WITH POLYPECTOMIES, CLIP APPLICATION X1, CAUTERY;  Surgeon: Nolberto Onofre MD;  Location: [facility] ENDOSCOPY;  Service: General;  Laterality: N/A;  COLON POLYPS    GANGLION CYST EXCISION      LUMBAR EPIDURAL INJECTION      LUMBAR FUSION  2015    SHOULDER ARTHROSCOPY W/ ROTATOR CUFF REPAIR Left 05/12/2022    Procedure: LEFT SHOULDER ARTHROSCOPY WITH ROTATOR CUFF REPAIR, SUBACROMINAL DECOMPRESSION, DISTAL CLAVICULECTOMY, BICEPS TENODESIS;  Surgeon: Abdoulaye Minaya MD;  Location: [facility];  Service: Orthopedics;  Laterality: Left;    SPINAL FUSION       PT Assessment (Last 12 Hours)       PT Evaluation and Treatment       Row Name 05/24/24 1000          Physical Therapy Time and Intention    Subjective Information no complaints (P)   -TR     Document Type therapy note (daily note) (P)   -TR     Mode of Treatment individual therapy;physical therapy (P)   -TR     Patient Effort good (P)   -TR     Symptoms Noted During/After Treatment none (P)   -TR       Row Name 05/24/24 1000          Sit-Stand Transfer    Sit-Stand [place] (Transfers) independent (P)   -TR       Row Name 05/24/24 1000          Stand-Sit Transfer    Stand-Sit [place] (Transfers) independent (P)   -TR       Row Name 05/24/24 1000          Gait/Stairs (Locomotion)    Gait/Stairs Locomotion gait/ambulation independence (P)   -TR     [place] Level (Gait) standby assist (P)   -TR     Patient was able to Ambulate yes (P)   -TR     Distance in Feet (Gait) 600 (P)   -TR     Comment, (Gait/Stairs) amb 200 ft b/w 2 tiles for referance. 100 ft lateral step to karoke, 100 ft tandem heel to toe walking (P)   -TR       Row Name 05/24/24 1000          Balance    Dynamic Standing Balance standby assist (P)   -TR       Row Name 05/24/24 1000          Positioning and Restraints    Pre-Treatment Position in bed (P)   -TR     Post Treatment Position bed (P)   -TR     In Bed call light within reach;sitting  EOB;encouraged to call for assist (P)   -TR       Row Name 05/24/24 1000          Progress Summary (PT)    Progress Toward Functional Goals (PT) progress toward functional goals as expected (P)   -TR     Daily Progress Summary (PT) Pt tolerated therpay session well and was able to show improved balance with narrow JOHN during ambulation in hallways. Pt tended to lean into his right side during reciprocal tandom walking. Static balance on bilat LE was adequate, but single leg static balance pt was unable to maintain. Patient is improving (P)   -TR               User Key  (r) = Recorded By, (t) = Taken By, (c) = Cosigned By      Initials Name Provider Type    Felice Eng, JOSE MIGUEL Student PT Student                    Physical Therapy Education       Title: PT OT SLP Therapies (Resolved)       Topic: Physical Therapy (Resolved)       Point: Mobility training (Resolved)       Learning Progress Summary             Patient Acceptance, E,TB, VU by  at 5/24/2024 0900    Acceptance, E,TB, VU by MARSHALL at 5/23/2024 1113                         Point: Precautions (Resolved)       Learning Progress Summary             Patient Acceptance, E,TB, VU by  at 5/24/2024 0900    Acceptance, E,TB, VU by MARSHALL at 5/23/2024 1113                                         User Key       Initials Effective Dates Name Provider Type Discipline     06/16/21 -  Daniela Omalley RN Registered Nurse Nurse    MARSHALL 06/03/21 -  Lowell Steiner PT Physical Therapist PT                  PT Recommendation and Plan     Progress Summary (PT)  Progress Toward Functional Goals (PT): (P) progress toward functional goals as expected  Daily Progress Summary (PT): (P) Pt tolerated therpay session well and was able to show improved balance with narrow JOHN during ambulation in hallways. Pt tended to lean into his right side during reciprocal tandom walking. Static balance on bilat LE was adequate, but single leg static balance pt was unable to maintain.  Patient is improving   Outcome Measures       Row Name 05/24/24 1000 05/23/24 1110          How much help from another person do you currently need...    Turning from your back to your side while in flat bed without using bedrails? 4 (P)   -TR 4  -MARSHALL     Moving from lying on back to sitting on the side of a flat bed without bedrails? 4 (P)   -TR 4  -MARSHALL     Moving to and from a bed to a chair (including a wheelchair)? 4 (P)   -TR 4  -MARSHALL     Standing up from a chair using your arms (e.g., wheelchair, bedside chair)? 4 (P)   -TR 4  -MARSHALL     Climbing 3-5 steps with a railing? 4 (P)   -TR 4  -MARSHALL     To walk in hospital room? 4 (P)   -TR 4  -MARSHALL     AM-PAC 6 Clicks Score (PT) 24 (P)   -TR 24  -MARSHALL     Highest Level of Mobility Goal 8 --> Walked 250 feet or more (P)   -TR 8 --> Walked 250 feet or more  -MARSHALL        Functional Assessment    Outcome Measure Options AM-PAC 6 Clicks Basic Mobility (PT) (P)   -TR AM-PAC 6 Clicks Basic Mobility (PT)  -MARSHALL               User Key  (r) = Recorded By, (t) = Taken By, (c) = Cosigned By      Initials Name Provider Type    MARSHALLLowell Khan, PT Physical Therapist    TR Felice Oliver, PT Student PT Student                     Time Calculation:    PT Charges       Row Name 05/24/24 1040             Time Calculation    PT Received On 05/24/24 (P)   -TR         Timed Charges    66572 - PT Therapeutic Exercise Minutes 4 (P)   -TR      84393 - Gait Training Minutes  23 (P)   -TR         Total Minutes    Timed Charges Total Minutes 27 (P)   -TR       Total Minutes 27 (P)   -TR                User Key  (r) = Recorded By, (t) = Taken By, (c) = Cosigned By      Initials Name Provider Type    Felice Eng, PT Student PT Student                  Therapy Charges for Today       Code Description Service Date Service Provider Modifiers Qty    88138957424 HC GAIT TRAINING EA 15 MIN 5/24/2024 Felice Oliver, PT Student GP 2            PT G-Codes  Outcome Measure  Options: (P) AM-PAC 6 Clicks Basic Mobility (PT)  AM-PAC 6 Clicks Score (PT): (P) 24  AM-PAC 6 Clicks Score (OT): 24    Felice Oliver, PT Student  5/24/2024

## 2024-05-24 NOTE — PROGRESS NOTES
[facility]     Cardiology Progress Note    Patient Name: Nick Turk  : 1970  MRN: [id number]  Primary Care Physician:  Ashley Cabezas APRN  Date of admission: 2024    Subjective   Subjective     Chief Complaint: Feels better today. No events overnight.     Interval HPI:    Patient is cardiac wise stable.     Review of Systems   All systems were reviewed and negative except for: diplopia    Objective   Objective     Vitals:   Temp:  [97.3 °F (36.3 °C)-98.2 °F (36.8 °C)] 97.3 °F (36.3 °C)  Heart Rate:  [] 78  Resp:  [18] 18  BP: (109-140)/() 128/80  Physical Exam      Alert, oriented  Neck. No jvd, no bruits.  Lungs : no rales, no wheezing  Heart: regular, no gallops  Lungs No rales, no wheezing  Abdomen: soft, bs+  LE no edema  Neurologic: No nystagmus, no motor deficits. .     Scheduled Meds:aspirin, 81 mg, Oral, Daily  atorvastatin, 80 mg, Oral, Nightly  clopidogrel, 75 mg, Oral, Daily  empagliflozin, 10 mg, Oral, Daily  losartan, 25 mg, Oral, Daily  sodium chloride, 10 mL, Intravenous, Q12H  spironolactone, 25 mg, Oral, Daily      Continuous Infusions:Pharmacy to Dose enoxaparin (LOVENOX),            Result Review    Result Review:  I have personally reviewed the results from the time of this admission to 2024 10:40 EDT and agree with these findings:  [x]  Laboratory  []  Microbiology  [x]  Radiology  [x]  EKG/Telemetry   [x]  Cardiology/Vascular   []  Pathology  []  Old records  []  Other:  Most notable findings include:     CBC          2024    05:17 2024    04:55 2024    04:30   CBC   WBC 9.76  9.99  10.15    RBC 5.33  5.32  5.22    Hemoglobin 17.0  16.9  16.7    Hematocrit 51.6  50.0  49.0    MCV 96.8  94.0  93.9    MCH 31.9  31.8  32.0    MCHC 32.9  33.8  34.1    RDW 12.1  12.2  12.0    Platelets 221  213  212      CMP          2024    05:17 2024    04:55 2024    04:30   CMP   Glucose 92  110  98    BUN 19  14  15    Creatinine 1.00  0.86   0.81    EGFR 90.0  103.5  105.4    Sodium 138  138  137    Potassium 4.2  4.2  4.3    Chloride 105  107  105    Calcium 9.0  8.9  9.0    BUN/Creatinine Ratio 19.0  16.3  18.5    Anion Gap 7.2  7.9  9.6       CARDIAC LABS:      Lab 05/20/24  1249 05/20/24  1029   PROBNP  --  291.7   HSTROP T 11 11        Assessment & Plan   Assessment / Plan     Brief Patient Summary:  Nick Turk is a 54 y.o. male with class II heart failure New York Heart Association, with his EF of 35%. He has mild coronary artery disease. Patient now with double vision and steady gait no apparent motor deficit.     Active Hospital Problems:  Active Hospital Problems    Diagnosis     **Chest pain     Acute coronary syndrome     Stroke     Chronic systolic congestive heart failure, NYHA class 2     Essential hypertension     Left ventricular systolic dysfunction (LVSD)            Plan:     The patient's double vision is improving.  Tolerating cardiac meds without any side effect.  To continue with dual antiplatelet therapy with aspirin and Plavix.  Continue with statin therapy, Jardiance, Aldactone and angiotensin receptor blockers.  Will obtain a cardiac monitor as an outpatient for 14 days to evaluate for underlying arrhythmias and to guide therapy with beta-blockers.  Will see him within 7 days upon discharge.  He will have follow-up with neurology.       I Spent over 25 minutes of time during this evaluation with 50% of time coordination of care.       CODE STATUS:   Code Status (Patient has no pulse and is not breathing): CPR (Attempt to Resuscitate)  Medical Interventions (Patient has pulse or is breathing): Full Support      Electronically signed by Lv Bautista MD, 05/24/24, 10:40 AM EDT.

## 2024-05-24 NOTE — PLAN OF CARE
Problem: Adult Inpatient Plan of Care  Goal: Plan of Care Review  Outcome: Ongoing, Progressing  Flowsheets  Taken 5/24/2024 0622 by Laura Resendez RN  Plan of Care Reviewed With: patient  Outcome Evaluation: Patient slept throughout the night with no signs of distress. Will continue with plan of care.  Taken 5/23/2024 1658 by Raisa Pena RN  Progress: improving  Goal: Patient-Specific Goal (Individualized)  Outcome: Ongoing, Progressing  Goal: Absence of Hospital-Acquired Illness or Injury  Outcome: Ongoing, Progressing  Intervention: Identify and Manage Fall Risk  Recent Flowsheet Documentation  Taken 5/24/2024 0300 by Laura Resendez RN  Safety Promotion/Fall Prevention:   safety round/check completed   room organization consistent   nonskid shoes/slippers when out of bed   lighting adjusted   fall prevention program maintained   clutter free environment maintained   assistive device/personal items within reach   activity supervised  Taken 5/24/2024 0100 by Laura Resendez RN  Safety Promotion/Fall Prevention:   safety round/check completed   room organization consistent   nonskid shoes/slippers when out of bed   lighting adjusted   fall prevention program maintained   clutter free environment maintained   assistive device/personal items within reach   activity supervised  Taken 5/23/2024 2358 by Laura Resendez RN  Safety Promotion/Fall Prevention:   safety round/check completed   room organization consistent   nonskid shoes/slippers when out of bed   lighting adjusted   fall prevention program maintained   clutter free environment maintained   assistive device/personal items within reach   activity supervised  Taken 5/23/2024 2301 by Laura Resendez RN  Safety Promotion/Fall Prevention:   safety round/check completed   room organization consistent   nonskid shoes/slippers when out of bed   lighting adjusted   fall prevention program maintained   clutter free environment maintained    assistive device/personal items within reach   activity supervised  Taken 5/23/2024 2206 by Laura Resendez RN  Safety Promotion/Fall Prevention:   safety round/check completed   room organization consistent   nonskid shoes/slippers when out of bed   lighting adjusted   fall prevention program maintained   clutter free environment maintained   assistive device/personal items within reach   activity supervised  Taken 5/23/2024 2100 by Laura Resendez RN  Safety Promotion/Fall Prevention:   safety round/check completed   room organization consistent   nonskid shoes/slippers when out of bed   lighting adjusted   fall prevention program maintained   clutter free environment maintained   assistive device/personal items within reach   activity supervised  Taken 5/23/2024 2010 by Laura Resendez RN  Safety Promotion/Fall Prevention:   safety round/check completed   room organization consistent   nonskid shoes/slippers when out of bed   lighting adjusted   fall prevention program maintained   clutter free environment maintained   assistive device/personal items within reach   activity supervised  Taken 5/23/2024 1905 by Laura Resendez RN  Safety Promotion/Fall Prevention:   safety round/check completed   room organization consistent   nonskid shoes/slippers when out of bed   lighting adjusted   fall prevention program maintained   clutter free environment maintained   assistive device/personal items within reach   activity supervised  Intervention: Prevent Skin Injury  Recent Flowsheet Documentation  Taken 5/23/2024 1905 by Laura Resendez RN  Body Position: position changed independently  Skin Protection: skin-to-device areas padded  Intervention: Prevent and Manage VTE (Venous Thromboembolism) Risk  Recent Flowsheet Documentation  Taken 5/23/2024 1905 by Laura Resendez RN  Activity Management: ambulated to bathroom  Range of Motion: active ROM (range of motion) encouraged  Intervention: Prevent  Infection  Recent Flowsheet Documentation  Taken 5/24/2024 0300 by Laura Resendez RN  Infection Prevention:   rest/sleep promoted   hand hygiene promoted   equipment surfaces disinfected   environmental surveillance performed  Taken 5/24/2024 0100 by Larua Resendez RN  Infection Prevention:   rest/sleep promoted   hand hygiene promoted   equipment surfaces disinfected   environmental surveillance performed  Taken 5/23/2024 2358 by Laura Resendez RN  Infection Prevention:   rest/sleep promoted   hand hygiene promoted   equipment surfaces disinfected   environmental surveillance performed  Taken 5/23/2024 2301 by Laura Resendez RN  Infection Prevention:   rest/sleep promoted   hand hygiene promoted   equipment surfaces disinfected   environmental surveillance performed  Taken 5/23/2024 2206 by Laura Resendez RN  Infection Prevention:   rest/sleep promoted   hand hygiene promoted   equipment surfaces disinfected   environmental surveillance performed  Taken 5/23/2024 2100 by Laura Resendez RN  Infection Prevention:   rest/sleep promoted   hand hygiene promoted   equipment surfaces disinfected   environmental surveillance performed  Taken 5/23/2024 2010 by Laura Resendez RN  Infection Prevention:   rest/sleep promoted   hand hygiene promoted   equipment surfaces disinfected   environmental surveillance performed  Taken 5/23/2024 1905 by Laura Resendez RN  Infection Prevention:   rest/sleep promoted   hand hygiene promoted   equipment surfaces disinfected   environmental surveillance performed  Goal: Optimal Comfort and Wellbeing  Outcome: Ongoing, Progressing  Intervention: Provide Person-Centered Care  Recent Flowsheet Documentation  Taken 5/23/2024 1905 by Laura Resendez RN  Trust Relationship/Rapport:   care explained   choices provided   emotional support provided   empathic listening provided   questions answered   questions encouraged   reassurance provided   thoughts/feelings  acknowledged  Goal: Readiness for Transition of Care  Outcome: Ongoing, Progressing     Problem: Heart Failure Comorbidity  Goal: Maintenance of Heart Failure Symptom Control  Outcome: Ongoing, Progressing  Intervention: Maintain Heart Failure-Management  Recent Flowsheet Documentation  Taken 5/23/2024 1905 by Laura Resendez RN  Medication Review/Management: medications reviewed   Goal Outcome Evaluation:  Plan of Care Reviewed With: patient        Progress: improving  Outcome Evaluation: Patient slept throughout the night with no signs of distress. Will continue with plan of care.

## 2024-05-29 ENCOUNTER — READMISSION MANAGEMENT (OUTPATIENT)
Dept: CALL CENTER | Facility: HOSPITAL | Age: 54
End: 2024-05-29
Payer: COMMERCIAL

## 2024-05-29 NOTE — OUTREACH NOTE
"Medical Week 1 Survey      Flowsheet Row Responses   [facility] [place] patient discharged from? [facility]   Does the patient have one of the following disease processes/diagnoses(primary or secondary)? Other   Week 1 attempt successful? Yes   Call start time 1534   Call end time 1552   Discharge diagnosis Chest pain   Person spoke with today (if not patient) and relationship nirav Perez   Medication alerts for this patient PLAVIX x 18 days   Meds reviewed with patient/caregiver? Yes   Is the patient having any side effects they believe may be caused by any medication additions or changes? No  [wife reports as far as she is aware--\"no rashes\"]   Does the patient have all medications ordered at discharge? Yes   Is the patient taking all medications as directed (includes completed medication regime)? Yes   Comments regarding appointments Cardiology 5/30/24   Does the patient have a primary care provider?  Yes   Comments Needs f/u with Dr. Martinez   Has home health visited the patient within 72 hours of discharge? N/A   Home health comments Noted OP referral for P.T.   Did the patient receive a copy of their discharge instructions? Yes   Nursing interventions Reviewed instructions with patient   What is the patient's perception of their health status since discharge? Same  [Wife reports ongoing balance issues and \"can't see\" r/t stroke that occurred during admission. Wife discusses event with hospitalization.  Pt is very tired and sleeping intermittently during day. Checking bp/pulse, advised to bring readings to f/u.]   Is the patient/caregiver able to teach back signs and symptoms related to disease process for when to call PCP? Yes   Is the patient/caregiver able to teach back signs and symptoms related to disease process for when to call 911? Yes  [reviewed stroke type s/s and need to seek care]   Additional teach back comments Noted that cardiac monitor visit completed on 5/28/24.   Week 1 call completed? Yes   Call " end time 1346            BARBIE RAUSCH - Registered Nurse

## 2024-05-30 ENCOUNTER — TELEPHONE (OUTPATIENT)
Dept: CARDIOLOGY | Facility: CLINIC | Age: 54
End: 2024-05-30

## 2024-05-30 ENCOUNTER — OFFICE VISIT (OUTPATIENT)
Dept: CARDIOLOGY | Facility: CLINIC | Age: 54
End: 2024-05-30
Payer: COMMERCIAL

## 2024-05-30 VITALS
BODY MASS INDEX: 30.54 KG/M2 | WEIGHT: 238 LBS | HEART RATE: 85 BPM | HEIGHT: 74 IN | SYSTOLIC BLOOD PRESSURE: 142 MMHG | DIASTOLIC BLOOD PRESSURE: 102 MMHG

## 2024-05-30 DIAGNOSIS — I25.810 CORONARY ARTERY DISEASE INVOLVING CORONARY BYPASS GRAFT OF NATIVE HEART WITHOUT ANGINA PECTORIS: ICD-10-CM

## 2024-05-30 DIAGNOSIS — I10 ESSENTIAL HYPERTENSION: ICD-10-CM

## 2024-05-30 DIAGNOSIS — G47.33 OSA ON CPAP: ICD-10-CM

## 2024-05-30 DIAGNOSIS — I63.439 CEREBROVASCULAR ACCIDENT (CVA) DUE TO EMBOLISM OF POSTERIOR CEREBRAL ARTERY, UNSPECIFIED BLOOD VESSEL LATERALITY: Primary | ICD-10-CM

## 2024-05-30 DIAGNOSIS — I50.22 CHRONIC SYSTOLIC CONGESTIVE HEART FAILURE, NYHA CLASS 2: ICD-10-CM

## 2024-05-30 RX ORDER — LOSARTAN POTASSIUM 50 MG/1
50 TABLET ORAL DAILY
Qty: 30 TABLET | Refills: 30 | Status: SHIPPED | OUTPATIENT
Start: 2024-05-30 | End: 2024-05-31

## 2024-05-30 RX ORDER — CARVEDILOL 12.5 MG/1
12.5 TABLET ORAL 2 TIMES DAILY
Qty: 180 TABLET | Refills: 3 | Status: SHIPPED | OUTPATIENT
Start: 2024-05-30

## 2024-05-30 NOTE — TELEPHONE ENCOUNTER
Caller: ROSA PRECIADO    Relationship: Emergency Contact    Best call back number: [phone]     What is the best time to reach you: ANYTIME    Who are you requesting to speak with (clinical staff, provider,  specific staff member): ANY    Do you know the name of the person who called: ROSA    What was the call regarding: PT'S NEUROLOGY REFERRAL WAS NOT MARKED URGENT AND ROSA STATED THAT DR. LAUREN EMPHASIZED THAT IT WAS URGENT. IF THE REFERRAL CAN BE UPDATED TO SAY URGENT, PLEASE LET PT KNOW, THANKS!    Is it okay if the provider responds through MyChart: PLEASE CALL       WDL WDL

## 2024-05-30 NOTE — PROGRESS NOTES
CARDIOLOGY INITIAL CONSULT       Chief Complaint  Follow-up (After cath SX blurry vision and fatigue)    Subjective     {Problem List  Visit Diagnosis   Encounters  Notes  Medications  Labs  Result Review Imaging  Media :23}       Nick Turk presents to [facility] CARDIOLOGY  History of Present Illness  ***       Past History:    Medical History:  Past Medical History:   Diagnosis Date    Back pain     Chronic pain syndrome     CVA (cerebral vascular accident) 5/14/2024    Essential hypertension 5/20/2024    Forgetfulness     Leg pain     Lumbago 2015    Lumbar spondylosis     Pain, generalized     Pars defect of lumbar spine 2015    Sleep disorder     Spondylolisthesis, lumbar region 2015    L4/5       Surgical History: has a past surgical history that includes Cholecystectomy; Ganglion cyst excision; Lumbar epidural injection; Lumbar fusion (2015); Colonoscopy (N/A, 11/16/2021); Shoulder arthroscopy w/ rotator cuff repair (Left, 05/12/2022); Back surgery; Spinal fusion; and Cardiac catheterization (Left, 5/21/2024).     Family History: family history includes Heart disease in his mother; Heart failure in his sister; No Known Problems in his father.     Social History: reports that he has quit smoking. His smoking use included cigarettes. He started smoking about 40 years ago. He has a 39.6 pack-year smoking history. He has never used smokeless tobacco. He reports that he does not drink alcohol and does not use drugs.    Allergies: Bee venom and Meloxicam    Current Outpatient Medications on File Prior to Visit   Medication Sig    aspirin 81 MG chewable tablet Chew 1 tablet Daily for 30 days.    atorvastatin (LIPITOR) 80 MG tablet Take 1 tablet by mouth Every Night for 30 days.    carvedilol (Coreg) 3.125 MG tablet Take 1 tablet by mouth 2 (Two) Times a Day With Meals for 30 days.    clopidogrel (PLAVIX) 75 MG tablet Take 1 tablet by mouth Daily for 18 days.    dapagliflozin  "Propanediol (Farxiga) 10 MG tablet Take 10 mg by mouth Daily for 30 days.    losartan (COZAAR) 25 MG tablet Take 1 tablet by mouth Daily.    spironolactone (ALDACTONE) 25 MG tablet Take 1 tablet by mouth Daily for 30 days.    Testosterone Cypionate (DEPOTESTOTERONE CYPIONATE) 200 MG/ML injection Inject 0.5 mL into the appropriate muscle as directed by prescriber Every 7 (Seven) Days.     No current facility-administered medications on file prior to visit.          Review of Systems     Objective     BP (!) 142/102 (BP Location: Left arm)   Pulse 85   Ht 188 cm (74\")   Wt 108 kg (238 lb)   BMI 30.56 kg/m²       Physical Exam      Result Review :{Labs  Result Review  Imaging  Med Tab  Media :23}     The following data was reviewed by: Lv Bautista MD on 05/30/2024:    CMP          5/22/2024    05:17 5/23/2024    04:55 5/24/2024    04:30   CMP   Glucose 92  110  98    BUN 19  14  15    Creatinine 1.00  0.86  0.81    EGFR 90.0  103.5  105.4    Sodium 138  138  137    Potassium 4.2  4.2  4.3    Chloride 105  107  105    Calcium 9.0  8.9  9.0    BUN/Creatinine Ratio 19.0  16.3  18.5    Anion Gap 7.2  7.9  9.6      CBC          5/22/2024    05:17 5/23/2024    04:55 5/24/2024    04:30   CBC   WBC 9.76  9.99  10.15    RBC 5.33  5.32  5.22    Hemoglobin 17.0  16.9  16.7    Hematocrit 51.6  50.0  49.0    MCV 96.8  94.0  93.9    MCH 31.9  31.8  32.0    MCHC 32.9  33.8  34.1    RDW 12.1  12.2  12.0    Platelets 221  213  212      TSH          8/16/2023    08:30 3/19/2024    08:46   TSH   TSH 1.300  1.620      Lipid Panel          3/19/2024    08:46 5/14/2024    03:51 5/23/2024    04:55   Lipid Panel   Total Cholesterol 114  94  78    Triglycerides 91  104  166    HDL Cholesterol 37  30  31    VLDL Cholesterol 18  20  27    LDL Cholesterol  59  44  20    LDL/HDL Ratio  1.44  0.45         Data reviewed: Cardiology studies    Results for orders placed during the hospital encounter of 05/13/24    Adult Transthoracic Echo " Complete W/ Cont if Necessary Per Protocol (With Agitated Saline)    Interpretation Summary    The study is technically difficult for diagnosis.    Left ventricular systolic function is moderately decreased. Estimated left ventricular EF = 35%    Left ventricular wall thickness is consistent with hypertrophy probable mild in nature difficult to say if his ventricular not as walls were not well-seen in multiple different views    The left atrial cavity is mildly dilated.    Global hypokinesis more severely affecting the septum with a dyssynchronous contraction pattern possibly secondary  Conduction abnormality                   Assessment and Plan {CC Problem List  Visit Diagnosis  ROS  Review (Popup)  [facility]  Quality  BestPractice  Medications  SmartSets  SnapShot Encounters  Media :23}       Diagnoses and all orders for this visit:    1. Cerebrovascular accident (CVA) due to embolism of posterior cerebral artery, unspecified blood vessel laterality (Primary)  -     Ambulatory Referral to Neurology    2. Essential hypertension    3. Chronic systolic congestive heart failure, NYHA class 2    4. Coronary artery disease involving coronary bypass graft of native heart without angina pectoris    5. DAQUAN on CPAP  -     Ambulatory Referral to Sleep Medicine    Other orders  -     carvedilol (COREG) 12.5 MG tablet; Take 1 tablet by mouth 2 (Two) Times a Day.  Dispense: 180 tablet; Refill: 3  -     losartan (Cozaar) 50 MG tablet; Take 1 tablet by mouth Daily.  Dispense: 30 tablet; Refill: 30        ***    I spent *** minutes caring for Nick on this date of service. This time includes time spent by me in the following activities:reviewing tests, obtaining and/or reviewing a separately obtained history, performing a medically appropriate examination and/or evaluation , ordering medications, tests, or procedures, and documenting information in the medical record    Follow Up {Instructions Charge Capture   Follow-up Communications :23}    No follow-ups on file.    Patient was given instructions and counseling regarding his condition or for health maintenance advice. Please see specific information pulled into the AVS if appropriate.

## 2024-05-30 NOTE — PROGRESS NOTES
CARDIOLOGY FOLLOW-UP PROGRESS NOTE        Chief Complaint  Follow-up (After cath SX blurry vision and fatigue)    Subjective            Nick Turk presents to [facility] CARDIOLOGY  History of Present Illness      The patient was recently discharged after episode of embolic stroke after cardiac catheterization. He had diplopia, numbness of jaw and ataxia. The ataxia improved. The double vision is significantly improved. He also has non ischemic CMP with mild non obstructive CAD. Denies dyspnea or angina. His BP is mildly elevated.      Past History:    Medical History:  Past Medical History:   Diagnosis Date    Back pain     Chronic pain syndrome     CVA (cerebral vascular accident) 5/14/2024    Essential hypertension 5/20/2024    Forgetfulness     Leg pain     Lumbago 2015    Lumbar spondylosis     Pain, generalized     Pars defect of lumbar spine 2015    Sleep disorder     Spondylolisthesis, lumbar region 2015    L4/5       Surgical History: has a past surgical history that includes Cholecystectomy; Ganglion cyst excision; Lumbar epidural injection; Lumbar fusion (2015); Colonoscopy (N/A, 11/16/2021); Shoulder arthroscopy w/ rotator cuff repair (Left, 05/12/2022); Back surgery; Spinal fusion; and Cardiac catheterization (Left, 5/21/2024).     Family History: family history includes Heart disease in his mother; Heart failure in his sister; No Known Problems in his father.     Social History: reports that he has quit smoking. His smoking use included cigarettes. He started smoking about 40 years ago. He has a 39.6 pack-year smoking history. He has never used smokeless tobacco. He reports that he does not drink alcohol and does not use drugs.    Allergies: Bee venom and Meloxicam    Current Outpatient Medications on File Prior to Visit   Medication Sig    aspirin 81 MG chewable tablet Chew 1 tablet Daily for 30 days.    atorvastatin (LIPITOR) 80 MG tablet Take 1 tablet by mouth Every Night for  "30 days.    carvedilol (Coreg) 3.125 MG tablet Take 1 tablet by mouth 2 (Two) Times a Day With Meals for 30 days.    clopidogrel (PLAVIX) 75 MG tablet Take 1 tablet by mouth Daily for 18 days.    dapagliflozin Propanediol (Farxiga) 10 MG tablet Take 10 mg by mouth Daily for 30 days.    losartan (COZAAR) 25 MG tablet Take 1 tablet by mouth Daily.    spironolactone (ALDACTONE) 25 MG tablet Take 1 tablet by mouth Daily for 30 days.    Testosterone Cypionate (DEPOTESTOTERONE CYPIONATE) 200 MG/ML injection Inject 0.5 mL into the appropriate muscle as directed by prescriber Every 7 (Seven) Days.     No current facility-administered medications on file prior to visit.          Review of Systems : all systems reviewed and denied except diplopia and mild numbness jaw.    Objective     BP (!) 142/102 (BP Location: Left arm)   Pulse 85   Ht 188 cm (74\")   Wt 108 kg (238 lb)   BMI 30.56 kg/m²       Physical Exam    General : Alert, awake, no acute distress  Neck : Supple, no carotid bruit, no jugular venous distention  CVS : Regular rate and rhythm, no murmur, rubs or gallops  Lungs: Clear to auscultation bilaterally, no crackles or rhonchi  Abdomen: Soft, nontender, bowel sounds heard in all 4 quadrants  Extremities: Warm, well-perfused, no pedal edema    Result Review :     The following data was reviewed by: vL Bautista MD on 05/30/2024:    CMP          5/22/2024    05:17 5/23/2024    04:55 5/24/2024    04:30   CMP   Glucose 92  110  98    BUN 19  14  15    Creatinine 1.00  0.86  0.81    EGFR 90.0  103.5  105.4    Sodium 138  138  137    Potassium 4.2  4.2  4.3    Chloride 105  107  105    Calcium 9.0  8.9  9.0    BUN/Creatinine Ratio 19.0  16.3  18.5    Anion Gap 7.2  7.9  9.6      CBC          5/22/2024    05:17 5/23/2024    04:55 5/24/2024    04:30   CBC   WBC 9.76  9.99  10.15    RBC 5.33  5.32  5.22    Hemoglobin 17.0  16.9  16.7    Hematocrit 51.6  50.0  49.0    MCV 96.8  94.0  93.9    MCH 31.9  31.8  32.0  "   MCHC 32.9  33.8  34.1    RDW 12.1  12.2  12.0    Platelets 221  213  212      TSH          8/16/2023    08:30 3/19/2024    08:46   TSH   TSH 1.300  1.620      Lipid Panel          3/19/2024    08:46 5/14/2024    03:51 5/23/2024    04:55   Lipid Panel   Total Cholesterol 114  94  78    Triglycerides 91  104  166    HDL Cholesterol 37  30  31    VLDL Cholesterol 18  20  27    LDL Cholesterol  59  44  20    LDL/HDL Ratio  1.44  0.45           Data reviewed: Cardiology studies        Results for orders placed during the hospital encounter of 05/13/24    Adult Transthoracic Echo Complete W/ Cont if Necessary Per Protocol (With Agitated Saline)    Interpretation Summary    The study is technically difficult for diagnosis.    Left ventricular systolic function is moderately decreased. Estimated left ventricular EF = 35%    Left ventricular wall thickness is consistent with hypertrophy probable mild in nature difficult to say if his ventricular not as walls were not well-seen in multiple different views    The left atrial cavity is mildly dilated.    Global hypokinesis more severely affecting the septum with a dyssynchronous contraction pattern possibly secondary  Conduction abnormality                   Assessment and Plan        Diagnoses and all orders for this visit:    1. Cerebrovascular accident (CVA) due to embolism of posterior cerebral artery, unspecified blood vessel laterality (Primary)  -     Ambulatory Referral to Neurology    2. Essential hypertension    3. Chronic systolic congestive heart failure, NYHA class 2    4. Coronary artery disease involving coronary bypass graft of native heart without angina pectoris    5. DAQUAN on CPAP  -     Ambulatory Referral to Sleep Medicine    Other orders  -     carvedilol (COREG) 12.5 MG tablet; Take 1 tablet by mouth 2 (Two) Times a Day.  Dispense: 180 tablet; Refill: 3  -     losartan (Cozaar) 50 MG tablet; Take 1 tablet by mouth Daily.  Dispense: 30 tablet; Refill:  30      I would increase the Carvedilol to 6.25 mg po bid and if BP is over 120 mmHg systolic, increase to 12.5 mg po bid after 1 week. The losartan was increase to 50 mg po daily. Continue with Aldactone and Farxiga. Continue with statin therapy. Continue with dual antiplatelet therapy. Will review the cardiac monitor when completed. He needs to be re-evaluated by Neurology. Will refer to initiate cardiac rehabilitation once Cleared by Neurology .Will re-evaluate in 6 weeks.          Follow Up     No follow-ups on file.    Patient was given instructions and counseling regarding his condition or for health maintenance advice. Please see specific information pulled into the AVS if appropriate.

## 2024-05-31 ENCOUNTER — OFFICE VISIT (OUTPATIENT)
Dept: CARDIOLOGY | Facility: CLINIC | Age: 54
End: 2024-05-31
Payer: COMMERCIAL

## 2024-05-31 VITALS
BODY MASS INDEX: 30.67 KG/M2 | HEIGHT: 74 IN | WEIGHT: 239 LBS | SYSTOLIC BLOOD PRESSURE: 110 MMHG | DIASTOLIC BLOOD PRESSURE: 80 MMHG | HEART RATE: 79 BPM

## 2024-05-31 DIAGNOSIS — I50.22 CHRONIC SYSTOLIC CONGESTIVE HEART FAILURE, NYHA CLASS 2: Primary | ICD-10-CM

## 2024-05-31 DIAGNOSIS — I10 ESSENTIAL HYPERTENSION: ICD-10-CM

## 2024-05-31 PROBLEM — I51.9 LEFT VENTRICULAR SYSTOLIC DYSFUNCTION (LVSD): Status: RESOLVED | Noted: 2024-05-20 | Resolved: 2024-05-31

## 2024-05-31 PROBLEM — I51.89 LEFT VENTRICULAR SYSTOLIC DYSFUNCTION (LVSD): Status: RESOLVED | Noted: 2024-05-20 | Resolved: 2024-05-31

## 2024-05-31 RX ORDER — SACUBITRIL AND VALSARTAN 24; 26 MG/1; MG/1
1 TABLET, FILM COATED ORAL 2 TIMES DAILY
Qty: 180 TABLET | Refills: 3 | Status: SHIPPED | OUTPATIENT
Start: 2024-05-31

## 2024-05-31 NOTE — PROGRESS NOTES
New Patient Office Visit    Chief Complaint  Chronic systolic CHF, NYHA class 2    Subjective            Nick Turk presents to [facility] CARDIOLOGY  History of Present Illness  Mr. Turk is a 54-year-old male that presented to the office today for his initial evaluation due to newly diagnosed heart failure with reduced ejection fraction, hypertension, hyperlipidemia with a recent embolic stroke.  He was recently admitted to the hospital due to hypotension that was accompanied by chest pain.  While in the hospital he underwent a cardiac cath and was found to have nonocclusive CAD and an EF of 35%.  Mr. Romero experienced a emboli like event post procedure.  He is starting to regain vision.  Patient does experience dyspnea on exertion as well as pedal edema at times.  He denies any recent chest pain, orthopnea or syncopal episodes.  Nick is a prior smoker but has not smoked since his hospitalization.      Past Medical History:   Diagnosis Date    Back pain     Chronic pain syndrome     CVA (cerebral vascular accident) 5/14/2024    Essential hypertension 5/20/2024    Forgetfulness     Leg pain     Lumbago 2015    Lumbar spondylosis     Pain, generalized     Pars defect of lumbar spine 2015    Sleep disorder     Spondylolisthesis, lumbar region 2015    L4/5       Allergies   Allergen Reactions    Bee Venom Swelling    Meloxicam Confusion and Other (See Comments)        Past Surgical History:   Procedure Laterality Date    BACK SURGERY      CARDIAC CATHETERIZATION Left 5/21/2024    Procedure: Cardiac Catheterization/Vascular Study;  Surgeon: Lv Hunt MD;  Location: [facility] CATH INVASIVE LOCATION;  Service: Cardiovascular;  Laterality: Left;    CHOLECYSTECTOMY      COLONOSCOPY N/A 11/16/2021    Procedure: COLONOSCOPY WITH POLYPECTOMIES, CLIP APPLICATION X1, CAUTERY;  Surgeon: Nolberto Onofre MD;  Location: [facility] ENDOSCOPY;  Service: General;  Laterality: N/A;  COLON POLYPS    GANGLION  CYST EXCISION      LUMBAR EPIDURAL INJECTION      LUMBAR FUSION  2015    SHOULDER ARTHROSCOPY W/ ROTATOR CUFF REPAIR Left 05/12/2022    Procedure: LEFT SHOULDER ARTHROSCOPY WITH ROTATOR CUFF REPAIR, SUBACROMINAL DECOMPRESSION, DISTAL CLAVICULECTOMY, BICEPS TENODESIS;  Surgeon: Abdoulaye Minaya MD;  Location: [facility];  Service: Orthopedics;  Laterality: Left;    SPINAL FUSION          Social History     Tobacco Use    Smoking status: Former     Average packs/day: 1 pack/day for 39.6 years (39.6 ttl pk-yrs)     Types: Cigarettes     Start date: 4/13/1984    Smokeless tobacco: Never   Vaping Use    Vaping status: Never Used   Substance Use Topics    Alcohol use: Never    Drug use: Never       Family History   Problem Relation Age of Onset    Heart failure Mother     Heart disease Mother     No Known Problems Father     Heart failure Sister     Maliraida Hyperthermia Neg Hx         Prior to Admission medications    Medication Sig Start Date End Date Taking? Authorizing Provider   aspirin 81 MG chewable tablet Chew 1 tablet Daily for 30 days. 5/16/24 6/15/24  Amarjit Johnston MD   atorvastatin (LIPITOR) 80 MG tablet Take 1 tablet by mouth Every Night for 30 days. 5/24/24 6/23/24  Osiris Lee MD   carvedilol (COREG) 12.5 MG tablet Take 1 tablet by mouth 2 (Two) Times a Day. 5/30/24   Lv Hunt MD   carvedilol (Coreg) 3.125 MG tablet Take 1 tablet by mouth 2 (Two) Times a Day With Meals for 30 days. 5/15/24 6/14/24  Amarjit Johnston MD   clopidogrel (PLAVIX) 75 MG tablet Take 1 tablet by mouth Daily for 18 days. 5/25/24 6/12/24  Osiris Lee MD   dapagliflozin Propanediol (Farxiga) 10 MG tablet Take 10 mg by mouth Daily for 30 days. 5/24/24 6/23/24  Osiris Lee MD   losartan (COZAAR) 25 MG tablet Take 1 tablet by mouth Daily. 5/13/24   Caleb Dumont MD   losartan (Cozaar) 50 MG tablet Take 1 tablet by mouth Daily. 5/30/24   Lv Hunt MD   spironolactone (ALDACTONE) 25 MG tablet Take 1  "tablet by mouth Daily for 30 days. 5/25/24 6/24/24  Osiris Lee MD   Testosterone Cypionate (DEPOTESTOTERONE CYPIONATE) 200 MG/ML injection Inject 0.5 mL into the appropriate muscle as directed by prescriber Every 7 (Seven) Days. 5/13/24   Provider, MD Caleb        Review of Systems   Constitutional:  Positive for fatigue. Negative for unexpected weight gain and unexpected weight loss.   HENT:  Negative for hearing loss, sinus pressure and swollen glands.    Eyes:  Positive for blurred vision. Negative for double vision.   Respiratory:  Positive for shortness of breath. Negative for cough and wheezing.    Cardiovascular:  Negative for chest pain, palpitations and leg swelling.   Gastrointestinal:  Negative for nausea and vomiting.   Endocrine: Negative for cold intolerance, heat intolerance, polydipsia and polyuria.   Musculoskeletal:  Negative for arthralgias and back pain.   Neurological:  Negative for dizziness, light-headedness and headache.   Hematological:  Bruises/bleeds easily.        Symptom Course: Improved    Weight Trend: Decreasing Steadily      Objective     /80   Pulse 79   Ht 188 cm (74\")   Wt 108 kg (239 lb)   BMI 30.69 kg/m²       Physical Exam  Constitutional:       General: He is awake.      Appearance: Normal appearance.   Neck:      Thyroid: No thyromegaly.      Vascular: No carotid bruit or JVD.   Cardiovascular:      Rate and Rhythm: Normal rate and regular rhythm.      Chest Wall: PMI is not displaced.      Pulses: Normal pulses.      Heart sounds: Normal heart sounds, S1 normal and S2 normal. No murmur heard.     No friction rub. No gallop. No S3 or S4 sounds.   Pulmonary:      Effort: Pulmonary effort is normal.      Breath sounds: Normal breath sounds and air entry. No wheezing, rhonchi or rales.   Abdominal:      General: Bowel sounds are normal.      Palpations: Abdomen is soft. There is no mass.      Tenderness: There is no abdominal tenderness.   Musculoskeletal: " "     Cervical back: Neck supple.      Right lower leg: No edema.      Left lower leg: No edema.   Neurological:      Mental Status: He is alert and oriented to person, place, and time.   Psychiatric:         Mood and Affect: Mood normal.         Behavior: Behavior is cooperative.           Result Review :                      Lab Results   Component Value Date    PROBNP 291.7 05/20/2024    PROBNP 610.7 05/15/2024     CMP          5/22/2024    05:17 5/23/2024    04:55 5/24/2024    04:30   CMP   Glucose 92  110  98    BUN 19  14  15    Creatinine 1.00  0.86  0.81    EGFR 90.0  103.5  105.4    Sodium 138  138  137    Potassium 4.2  4.2  4.3    Chloride 105  107  105    Calcium 9.0  8.9  9.0    BUN/Creatinine Ratio 19.0  16.3  18.5    Anion Gap 7.2  7.9  9.6      CBC w/diff          5/22/2024    05:17 5/23/2024    04:55 5/24/2024    04:30   CBC w/Diff   WBC 9.76  9.99  10.15    RBC 5.33  5.32  5.22    Hemoglobin 17.0  16.9  16.7    Hematocrit 51.6  50.0  49.0    MCV 96.8  94.0  93.9    MCH 31.9  31.8  32.0    MCHC 32.9  33.8  34.1    RDW 12.1  12.2  12.0    Platelets 221  213  212    Neutrophil Rel %   59.4    Immature Granulocyte Rel %   0.4    Lymphocyte Rel %   24.3    Monocyte Rel %   11.9    Eosinophil Rel %   3.2    Basophil Rel %   0.8       Lipid Panel          3/19/2024    08:46 5/14/2024    03:51 5/23/2024    04:55   Lipid Panel   Total Cholesterol 114  94  78    Triglycerides 91  104  166    HDL Cholesterol 37  30  31    VLDL Cholesterol 18  20  27    LDL Cholesterol  59  44  20    LDL/HDL Ratio  1.44  0.45       Lab Results   Component Value Date    TSH 1.620 03/19/2024    TSH 1.300 08/16/2023    TSH 1.220 10/08/2021      No results found for: \"FREET4\"   No results found for: \"DDIMERQUANT\"  Magnesium   Date Value Ref Range Status   05/24/2024 2.0 1.6 - 2.6 mg/dL Final      No results found for: \"DIGOXIN\"   A1C Last 3 Results          5/14/2024    03:51 5/23/2024    04:55   HGBA1C Last 3 Results   Hemoglobin " A1C 5.00  5.50           Results for orders placed during the hospital encounter of 05/13/24    Adult Transthoracic Echo Complete W/ Cont if Necessary Per Protocol (With Agitated Saline)    Interpretation Summary    The study is technically difficult for diagnosis.    Left ventricular systolic function is moderately decreased. Estimated left ventricular EF = 35%    Left ventricular wall thickness is consistent with hypertrophy probable mild in nature difficult to say if his ventricular not as walls were not well-seen in multiple different views    The left atrial cavity is mildly dilated.    Global hypokinesis more severely affecting the septum with a dyssynchronous contraction pattern possibly secondary  Conduction abnormality        Assessment and Plan        Diagnoses and all orders for this visit:    1. Chronic systolic congestive heart failure, NYHA class 2 (Primary)  Assessment & Plan:  Mr. Turk has new onset heart failure with reduced ejection fraction.  He appears to be doing well since his hospitalization.  He does not have any pedal edema but does experience dyspnea on exertion.  I will make the following changes to his heart failure medications:    1.  Discontinue losartan and start Entresto 24/26 mg take 1 tablet twice daily.  2.  Discontinue Jardiance and start Farxiga 10 mg daily  3.  Do a heart rate blood pressure and daily weight log and bring it to your next appointment.  4.  Do blood work 1 to 2 days prior to next visit.    Orders:  -     CBC & Differential; Future  -     Comprehensive Metabolic Panel; Future  -     proBNP; Future  -     Magnesium; Future    2. Essential hypertension  Assessment & Plan:  Patient does have a history of hypertension that has been well-controlled over the past few years.  Blood pressure is stable with the current dose of losartan.  Will discontinue losartan and start Entresto low-dose.  He will do a heart rate blood pressure and daily weight log and bring it to his  next appointment.      Other orders  -     sacubitril-valsartan (Entresto) 24-26 MG tablet; Take 1 tablet by mouth 2 (Two) Times a Day.  Dispense: 180 tablet; Refill: 3          Education  Nick Turk 1970 male  who presents for follow-up of congestive heart failure. Counseling was provided to Nick Turk for the following topics.   Daily log and monitoring of blood pressure, Nick Turk was instructed to keep a blood pressure log to monitor for trends of normal and possible abnormal blood pressure readings. Nick Turk was instructed to bring the blood pressure log to next appointment. Nick Turk was instructed to keep a daily weight log and to monitor for weight gain. Instructed to notify the physician office if patient experiences a weight gain of 2-3 pounds overnight, or if experiences a weight gain of 5 pounds in a week. Instruction provided to the patient to elevate bilateral lower extremities to help alleviate edema. Instruction provided to patient to decrease salt and fluid intake. Instruction provided on new medications and possible side effects, and when to call the physician office.   Follow Up     Return in about 2 weeks (around 6/14/2024).    Patient was given instructions and counseling regarding his condition or for health maintenance advice. Please see specific information pulled into the AVS if appropriate.     Electronically signed by OMID Valencia, 05/31/24, 4:20 PM EDT.

## 2024-05-31 NOTE — ASSESSMENT & PLAN NOTE
Mr. Turk has new onset heart failure with reduced ejection fraction.  He appears to be doing well since his hospitalization.  He does not have any pedal edema but does experience dyspnea on exertion.  I will make the following changes to his heart failure medications:    1.  Discontinue losartan and start Entresto 24/26 mg take 1 tablet twice daily.  2.  Discontinue Jardiance and start Farxiga 10 mg daily  3.  Do a heart rate blood pressure and daily weight log and bring it to your next appointment.  4.  Do blood work 1 to 2 days prior to next visit.

## 2024-05-31 NOTE — ASSESSMENT & PLAN NOTE
Patient does have a history of hypertension that has been well-controlled over the past few years.  Blood pressure is stable with the current dose of losartan.  Will discontinue losartan and start Entresto low-dose.  He will do a heart rate blood pressure and daily weight log and bring it to his next appointment.

## 2024-05-31 NOTE — PATIENT INSTRUCTIONS
1.  Discontinue losartan and start Entresto 24/26 mg take 1 tablet twice daily.  2.  Discontinue Jardiance and start Farxiga 10 mg daily  3.  Do a heart rate blood pressure and daily weight log and bring it to your next appointment.  4.  Do blood work 1 to 2 days prior to next visit.

## 2024-06-03 ENCOUNTER — TELEPHONE (OUTPATIENT)
Dept: CARDIOLOGY | Facility: CLINIC | Age: 54
End: 2024-06-03
Payer: COMMERCIAL

## 2024-06-03 ENCOUNTER — CLINICAL SUPPORT (OUTPATIENT)
Dept: CARDIOLOGY | Facility: CLINIC | Age: 54
End: 2024-06-03
Payer: COMMERCIAL

## 2024-06-03 VITALS — DIASTOLIC BLOOD PRESSURE: 76 MMHG | SYSTOLIC BLOOD PRESSURE: 110 MMHG

## 2024-06-03 DIAGNOSIS — I10 ESSENTIAL HYPERTENSION: ICD-10-CM

## 2024-06-03 DIAGNOSIS — I50.22 CHRONIC SYSTOLIC CONGESTIVE HEART FAILURE, NYHA CLASS 2: Primary | ICD-10-CM

## 2024-06-03 DIAGNOSIS — R00.2 PALPITATIONS: ICD-10-CM

## 2024-06-03 PROBLEM — I50.21 ACUTE SYSTOLIC CONGESTIVE HEART FAILURE: Status: RESOLVED | Noted: 2024-05-15 | Resolved: 2024-06-03

## 2024-06-03 PROCEDURE — 99214 OFFICE O/P EST MOD 30 MIN: CPT | Performed by: INTERNAL MEDICINE

## 2024-06-03 PROCEDURE — 93000 ELECTROCARDIOGRAM COMPLETE: CPT | Performed by: INTERNAL MEDICINE

## 2024-06-03 NOTE — TELEPHONE ENCOUNTER
"Caller: ROSA PRECIADO     Relationship: EMERGENCY CONTACT    Best call back number: [phone]     What is your medical concern? WHEN TAKING HIS BLOOD PRESSURE ON 06.02.24 AND 06.03.24, THE MACHINE STATES THAT PATIENT HAS AN \"IRREGULAR HEART BEAT\"    How long has this issue been going on? NOTICED THE IRREGULAR HEART BEAT ON 06.02.24 AND 06.03.24    Is your provider already aware of this issue? NO    Have you been treated for this issue? NO      " Cimetidine Pregnancy And Lactation Text: This medication is Pregnancy Category B and is considered safe during pregnancy. It is also excreted in breast milk and breast feeding isn't recommended.

## 2024-06-03 NOTE — ASSESSMENT & PLAN NOTE
He thinks his blood pressure was high this morning but does not remember.  I have asked him to continue a blood pressure log and bring it to us

## 2024-06-03 NOTE — ASSESSMENT & PLAN NOTE
Mr. Alicea has compensated congestive heart failure with reduced ejection fraction class II now.  I am going to increase his carvedilol to whole tablet at night and half tablet in the morning for 4 days followed by 12.5 mg 1 tablet twice daily.  He will maintain a heart rate and blood pressure log and bring it to us.

## 2024-06-03 NOTE — TELEPHONE ENCOUNTER
It looks like he has an appt this afternoon with Dr Mustafa at HF clinic.  Recommend having EKG completed then. If EKG is unremarkable, then can arrange to have him wear holter monitor to rule out ectopy or arrhythmia.

## 2024-06-03 NOTE — PATIENT INSTRUCTIONS
1.  Increase carvedilol to 12.5 mg whole tablet at night and half a tablet in the morning for 4 days followed by whole tablet twice a day.  2.  Monitor heart rate blood pressure and weight every day and bring us the log

## 2024-06-03 NOTE — TELEPHONE ENCOUNTER
SW patient's wife. Patient BP monitor has been reading an irregular rhythm. Patient does not have symptoms with the irregular rhythm.  But is very concerned about an irregular heart rate, with his previous stroke.     Patient BP:   06/02/24 BP//93-59 am- Irregular  06/02/24 BP//91-68 pm- Irregular  06/03/24 BP//92-75 am- Irregular    Patient is not on any anticoagulants, and has completed his plavix. Patient has not seen a neurologist.       Please advise.

## 2024-06-03 NOTE — PROGRESS NOTES
"Chief Complaint  Fluttery feeling and irregular heart rate    Subjective        Nick Turk presents to [facility] CARDIOLOGY  History of Present Illness  Mr. Alicea is a 54 years old gentleman with congestive heart failure with reduced ejection fraction who this morning started feeling jittery and fluttering in his chest with irregular heartbeat noted on his blood pressure monitor.  He therefore came to the office.  We have done an EKG and it shows sinus rhythm with a heart rate of 79 bpm.  He denies any chest pain dizziness or syncope.  He still is not quite right mentally after his stroke.  He is awaiting neurological consultation.      Objective   Vital Signs:  /76 (BP Location: Left arm)   Estimated body mass index is 30.69 kg/m² as calculated from the following:    Height as of 5/31/24: 188 cm (74\").    Weight as of 5/31/24: 108 kg (239 lb).               Physical Exam  Constitutional:       General: He is awake.      Appearance: Normal appearance.   Neck:      Thyroid: No thyromegaly.      Vascular: No carotid bruit or JVD.   Cardiovascular:      Rate and Rhythm: Normal rate and regular rhythm.      Chest Wall: PMI is not displaced.      Pulses: Normal pulses.      Heart sounds: Normal heart sounds, S1 normal and S2 normal. No murmur heard.     No friction rub. No gallop. No S3 or S4 sounds.   Pulmonary:      Effort: Pulmonary effort is normal.      Breath sounds: Normal breath sounds and air entry. No wheezing, rhonchi or rales.   Abdominal:      General: Bowel sounds are normal.      Palpations: Abdomen is soft. There is no mass.      Tenderness: There is no abdominal tenderness.   Musculoskeletal:      Cervical back: Neck supple.      Right lower leg: No edema.      Left lower leg: No edema.   Neurological:      Mental Status: He is alert and oriented to person, place, and time.   Psychiatric:         Mood and Affect: Mood normal.         Behavior: Behavior is cooperative.      " "  Result Review :        Lab Results   Component Value Date    PROBNP 291.7 05/20/2024    PROBNP 610.7 05/15/2024     CMP          5/22/2024    05:17 5/23/2024    04:55 5/24/2024    04:30   CMP   Glucose 92  110  98    BUN 19  14  15    Creatinine 1.00  0.86  0.81    EGFR 90.0  103.5  105.4    Sodium 138  138  137    Potassium 4.2  4.2  4.3    Chloride 105  107  105    Calcium 9.0  8.9  9.0    BUN/Creatinine Ratio 19.0  16.3  18.5    Anion Gap 7.2  7.9  9.6      CBC w/diff          5/22/2024    05:17 5/23/2024    04:55 5/24/2024    04:30   CBC w/Diff   WBC 9.76  9.99  10.15    RBC 5.33  5.32  5.22    Hemoglobin 17.0  16.9  16.7    Hematocrit 51.6  50.0  49.0    MCV 96.8  94.0  93.9    MCH 31.9  31.8  32.0    MCHC 32.9  33.8  34.1    RDW 12.1  12.2  12.0    Platelets 221  213  212    Neutrophil Rel %   59.4    Immature Granulocyte Rel %   0.4    Lymphocyte Rel %   24.3    Monocyte Rel %   11.9    Eosinophil Rel %   3.2    Basophil Rel %   0.8       Lipid Panel          3/19/2024    08:46 5/14/2024    03:51 5/23/2024    04:55   Lipid Panel   Total Cholesterol 114  94  78    Triglycerides 91  104  166    HDL Cholesterol 37  30  31    VLDL Cholesterol 18  20  27    LDL Cholesterol  59  44  20    LDL/HDL Ratio  1.44  0.45       Lab Results   Component Value Date    TSH 1.620 03/19/2024    TSH 1.300 08/16/2023    TSH 1.220 10/08/2021      No results found for: \"FREET4\"   No results found for: \"DDIMERQUANT\"  Magnesium   Date Value Ref Range Status   05/24/2024 2.0 1.6 - 2.6 mg/dL Final      No results found for: \"DIGOXIN\"   A1C Last 3 Results          5/14/2024    03:51 5/23/2024    04:55   HGBA1C Last 3 Results   Hemoglobin A1C 5.00  5.50           Results for orders placed during the hospital encounter of 05/13/24    Adult Transthoracic Echo Complete W/ Cont if Necessary Per Protocol (With Agitated Saline)    Interpretation Summary    The study is technically difficult for diagnosis.    Left ventricular systolic function " is moderately decreased. Estimated left ventricular EF = 35%    Left ventricular wall thickness is consistent with hypertrophy probable mild in nature difficult to say if his ventricular not as walls were not well-seen in multiple different views    The left atrial cavity is mildly dilated.    Global hypokinesis more severely affecting the septum with a dyssynchronous contraction pattern possibly secondary  Conduction abnormality           ECG 12 Lead    Date/Time: 6/3/2024 3:07 PM  Performed by: Phoebe Mustafa MD    Authorized by: Amisha Schulz APRN  Comments: Sinus rhythm with a mean heart rate of 80 bpm.  Left bundle branch block.  Compared to previous EKG no change            Assessment and Plan     Diagnoses and all orders for this visit:    1. Chronic systolic congestive heart failure, NYHA class 2 (Primary)  Assessment & Plan:  Mr. Alicea has compensated congestive heart failure with reduced ejection fraction class II now.  I am going to increase his carvedilol to whole tablet at night and half tablet in the morning for 4 days followed by 12.5 mg 1 tablet twice daily.  He will maintain a heart rate and blood pressure log and bring it to us.    Orders:  -     ECG 12 Lead    2. Essential hypertension  Assessment & Plan:  He thinks his blood pressure was high this morning but does not remember.  I have asked him to continue a blood pressure log and bring it to us      3. Palpitations  Assessment & Plan:  He may be having some PVCs since he had some cough this morning.  I have recommended to him to reduce his caffeine intake and increase the carvedilol per instructions.    Orders:  -     ECG 12 Lead             Follow Up     Return for Follow-up as scheduled on 6/18/2024.  Patient was given instructions and counseling regarding his condition or for health maintenance advice. Please see specific information pulled into the AVS if appropriate.

## 2024-06-03 NOTE — TELEPHONE ENCOUNTER
Patient currently wearing Holter monitor. Patient will see Heart failure office this afternoon.     Will look into neuro referral.    75 y/o p/w fever of unknown origin.    LUE venous duplex demonstrating no changes. SVT still present, no progression.    Plan:  - Stable from vasc surg standpoint  continue antiplt rx  will need outpt lue venous surveillance in 1 mo   -will follow

## 2024-06-03 NOTE — ASSESSMENT & PLAN NOTE
He may be having some PVCs since he had some cough this morning.  I have recommended to him to reduce his caffeine intake and increase the carvedilol per instructions.

## 2024-06-03 NOTE — TELEPHONE ENCOUNTER
"  Caller: ROSA PRECIADO     Relationship: WIFE    Best call back number: [phone]     What is your medical concern? PT'S BLOOD PRESSURE CUFF HAS BEEN STATING \"IRREGULAR HEART RATE\" WHEN THE BLOOD PRESSURE HAS BEEN TAKEN. IT HAS HAPPENED TODAY AND YESTERDAY. PT IS CURRENTLY WEARING A HEART MONITOR. ROSA STATED THIS STARTED AFTER PT STARTED TAKING ENTRESTO.     YESTERDAY BLOOD PRESSURE READINGS: 128/93 (AM) /91 (PM)  TODAY'S: 144/92 (AM)    How long has this issue been going on? SINCE 6.2.24    Is your provider already aware of this issue? NO    Have you been treated for this issue? NO      "

## 2024-06-04 ENCOUNTER — TELEPHONE (OUTPATIENT)
Dept: CARDIOLOGY | Facility: CLINIC | Age: 54
End: 2024-06-04
Payer: COMMERCIAL

## 2024-06-04 ENCOUNTER — OFFICE VISIT (OUTPATIENT)
Dept: SLEEP MEDICINE | Facility: HOSPITAL | Age: 54
End: 2024-06-04
Payer: COMMERCIAL

## 2024-06-04 VITALS — BODY MASS INDEX: 30.88 KG/M2 | WEIGHT: 233 LBS | OXYGEN SATURATION: 97 % | HEART RATE: 40 BPM | HEIGHT: 73 IN

## 2024-06-04 DIAGNOSIS — G47.33 OBSTRUCTIVE SLEEP APNEA, ADULT: Primary | ICD-10-CM

## 2024-06-04 DIAGNOSIS — E66.9 CLASS 1 OBESITY WITH SERIOUS COMORBIDITY AND BODY MASS INDEX (BMI) OF 30.0 TO 30.9 IN ADULT, UNSPECIFIED OBESITY TYPE: ICD-10-CM

## 2024-06-04 PROCEDURE — 99203 OFFICE O/P NEW LOW 30 MIN: CPT | Performed by: NURSE PRACTITIONER

## 2024-06-04 PROCEDURE — G0463 HOSPITAL OUTPT CLINIC VISIT: HCPCS

## 2024-06-04 NOTE — TELEPHONE ENCOUNTER
Patient and wife presented to office today after being seen at sleep medicine. Patient HR on oximetry was 40. APRN took manually 53. Patient asymptomatic. Patient currently wearing a 14 day holter. Monitor will be completed on 6/11. Patient EKG in HF Clinic yesterday showed NSR.  Per ILIA Garcia treatment plan the same unless patient becomes symptomatic. Patient to notify office if becomes symptomatic. Moved patient f/u with Dr Gavino Bautista to the following week after holter is completed. Wife voiced understanding and appreciation.

## 2024-06-04 NOTE — PROGRESS NOTES
[address] [address]  Phone: [phone]  Fax: [phone]      Nick Turk  [id number]   1970  54 y.o.  male      PCP:Ashley Cabezas APRN    Type of service: Initial New Patient Office Visit  Date of service: 6/4/2024          CHIEF COMPLAINT: Obstructive sleep apnea      HISTORY OF PRESENT ILLNESS:  Nick Turk 54 y.o.  presents to the clinic today as a new patient to me and was seen to establish care for treatment and management of obstructive sleep apnea.  His wife is with him in the office today, per patient preference.  He had a home sleep study 12/28/2021 showing AHI of 25.5/h.  He was started on auto CPAP.  He currently has a Anita 2 device that is programmed at 10 to 15 cm H2O.  Average pressure is 10 cm H2O with P90 5 pressure of 10.2 cm H2O.  At this level his residual AHI is well-controlled at 0.7/h.  He tolerates device and pressures well.  Unfortunately recently had a stroke.  Follows with neurology and cardiology.  He reports cardiology has been adjusting his medications for possible PVCs and high blood pressure.        PATIENT HISTORY:  Sleep schedule:  Bedtime: 10 PM  Wake time: 7 AM  Average hours of sleep: 7  Nighttime awakenings: 2-3 to use the restroom      Past medical history: (Relevant to sleep medicine)  Systolic CHF  Stroke  Hypertension      Social history:  Tobacco use:  No  Alcohol use:    Caffeinated drinks: 1/2 per day        Medications: reviewed     ALLERGIES: Bee venom and Meloxicam        REVIEW OF SYSTEMS:  Full review of systems available on the intake form which is scanned in the media tab.  The relevant positives are noted below:  [place] Sleepiness Scale: Total score: 12   DENIES S/S of nasal congestion, dry mouth/nose, dyspnea, wheezing, cough, heartburn, abdominal bloating, morning headache, anxiety, or depression.        PHYSICAL EXAM:  Vitals:    06/04/24 0900   Pulse: (!) 40   SpO2: 97%   Weight: 106  "kg (233 lb)   Height: 185.4 cm (72.99\")    Body mass index is 30.75 kg/m².    HR on recheck 53 bpm.  Asymptomatic    HEAD: atraumatic, normocephalic  NECK:  , trachea is in the midline  RESPIRATORY SYSTEM: Respirations even, unlabored, normal rate  CARDIOVASULAR SYSTEM: Normal rate  EXTREMITES: No cyanosis or clubbing  NEUROLOGICAL SYSTEM: Alert and oriented x 3    Office notes from care team reviewed. Office note(s) reviewed: 5/31/2024 cardiology note,      Labs/ Test Results Reviewed:  TSH          8/16/2023    08:30 3/19/2024    08:46   TSH   TSH 1.300  1.620       Most Recent A1C          5/23/2024    04:55   HGBA1C Most Recent   Hemoglobin A1C 5.50           DATA REVIEWED:   The PAP compliance summary downloaded on 6/2/2024 has been reviewed independently by me and discussed with the patient.   Compliance: 80%  More than 4 hr use: 63.33%  Average use of the device: 5 hours 43 minutes per night on days used  Residual AHI: 0.7 /hr (goal < 5.0 /hr)  Device: Anita 2 auto CPAP  DME: [employer]            ASSESSMENT AND PLAN:   Obstructive Sleep Apnea: Reiterated pathophysiology of obstructive sleep apnea as well as potential risks of untreated sleep apnea including but not limited to cerebrovascular risks and cardiac risks.  Sleep apnea has improved with the device and treatment.  I have personally reviewed the smart card download and discussed the download data with the patient and encouarged continued use of the device.  The residual AHI is acceptable. The device is benefiting the patient and the device is medically necessary.  Recommend patient get supplies from the DME company, change them on a regular basis, and clean as directed.  A prescription for supplies has been sent to the [facility].  Recommend increasing use of PAP.  Recommend trying to use all night every night if possible.  Do not drive or operate heavy machinery if feeling tired or drowsy.  Obesity: Body mass index is 30.75 kg/m².. Patients who are " overweight or obese are at increased risk of sleep apnea/ sleep disordered breathing. Weight reduction and healthy lifestyle are encouraged in overweight/ obese patients as part of a comprehensive approach to sleep apnea treatment.    History of stroke  Irregular heartbeat: Initial heart rate on pulse ox came up as 40 bpm.  I checked this manually and it was 53 bpm.  Patient denies any dizziness or lightheadedness.  A little fatigued this morning.  Denies cardiac symptoms.  Declines ER evaluation.  They report they have the phone number of the cardiologist's nurse and will call them as soon as they leave here for further recommendations.  They report that there was some suspicion that he may have been having PVCs.  He actually currently has heart monitor on.  He will discuss any further recommendations or need for EKG or further evaluation cardiologist, verbalized understanding of importance.    Patient will follow-up in 6 months or sooner for issues or concerns.  Patient's questions were answered.        Thank you for allowing me to participate in the care of this patient.    Zita Ospina DNP, APRN  [facility]

## 2024-06-07 ENCOUNTER — OFFICE VISIT (OUTPATIENT)
Dept: ORTHOPEDIC SURGERY | Facility: CLINIC | Age: 54
End: 2024-06-07
Payer: COMMERCIAL

## 2024-06-07 VITALS
HEART RATE: 67 BPM | WEIGHT: 233 LBS | DIASTOLIC BLOOD PRESSURE: 83 MMHG | SYSTOLIC BLOOD PRESSURE: 132 MMHG | HEIGHT: 73 IN | BODY MASS INDEX: 30.88 KG/M2 | OXYGEN SATURATION: 94 %

## 2024-06-07 DIAGNOSIS — M79.672 LEFT FOOT PAIN: Primary | ICD-10-CM

## 2024-06-07 DIAGNOSIS — S92.352D CLOSED DISPLACED FRACTURE OF FIFTH METATARSAL BONE OF LEFT FOOT WITH ROUTINE HEALING, SUBSEQUENT ENCOUNTER: ICD-10-CM

## 2024-06-07 PROCEDURE — 99213 OFFICE O/P EST LOW 20 MIN: CPT | Performed by: ORTHOPAEDIC SURGERY

## 2024-06-08 NOTE — PROGRESS NOTES
"Chief Complaint  Follow-up of the Left Foot     Subjective      Nick Turk presents to [facility] for follow up of the left foot. He went home from work and his foot was hurting. 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 He has no pain over the 5 th metatarsal now and has transitioned out of his CAM boot. . He has had a CVA since the last visit.      Allergies   Allergen Reactions    Bee Venom Swelling    Meloxicam Confusion and Other (See Comments)        Social History     Socioeconomic History    Marital status:    Tobacco Use    Smoking status: Former     Average packs/day: 1 pack/day for 39.6 years (39.6 ttl pk-yrs)     Types: Cigarettes     Start date: 4/13/1984    Smokeless tobacco: Never   Vaping Use    Vaping status: Never Used   Substance and Sexual Activity    Alcohol use: Never    Drug use: Never    Sexual activity: Yes     Partners: Female     Birth control/protection: Post-menopausal, Tubal ligation        I reviewed the patient's chief complaint, history of present illness, review of systems, past medical history, surgical history, family history, social history, medications, and allergy list.     Review of Systems     Constitutional: Denies fevers, chills, weight loss  Cardiovascular: Denies chest pain, shortness of breath  Skin: Denies rashes, acute skin changes  Neurologic: Denies headache, loss of consciousness      Vital Signs:   /83   Pulse 67   Ht 185.4 cm (72.99\")   Wt 106 kg (233 lb)   SpO2 94%   BMI 30.75 kg/m²          Physical Exam  General: Alert. No acute distress    Ortho Exam        LEFT FOOT Positive EHL, FHL, GS and TA. Sensation intact to all 5 nerves of the foot. Positive pulses. Neurovascularly intact. Calf soft, Non-tender. Full ROM of the ankle. Stable to stress. minimally Non tender to the 5 th metacarpal. Intact flexion and extension of toes.       Procedures        Imaging Results (Most Recent)  "      Procedure Component Value Units Date/Time    XR Foot 3+ View Left [[id number]] Resulted: 06/07/24 0849     Updated: 06/07/24 0854             Result Review :     X-Ray Report:  Left foot X-Ray  Indication: Evaluation of the left foot  AP/Lateral view(s)  Findings: Routine healing of the 5 th metacarpal fracture  Prior studies available for comparison: yes        Assessment and Plan     Diagnoses and all orders for this visit:    1. Left foot pain (Primary)  -     XR Foot 3+ View Left    2. Closed displaced fracture of fifth metatarsal bone of left foot with routine healing, subsequent encounter        Discussed the treatment plan with the patient. I reviewed the X-rays that were obtained today with the patient.     Modify activity as needed.    Call or return if worsening symptoms.    Follow Up     PRN      Patient was given instructions and counseling regarding his condition or for health maintenance advice. Please see specific information pulled into the AVS if appropriate.     Transcribed for Abdoulaye Minaya MD by Maria Eugenia Paredes MA.  06/08/24   12:57 EDT    I have personally performed the services described in this document as scribed by the above individual and it is both accurate and complete. Abdoulaye Minaya MD 06/11/24

## 2024-06-11 ENCOUNTER — TELEPHONE (OUTPATIENT)
Dept: CARDIOLOGY | Facility: CLINIC | Age: 54
End: 2024-06-11
Payer: COMMERCIAL

## 2024-06-11 NOTE — TELEPHONE ENCOUNTER
PT IS REQUESTING CLOPIDOGREL REFILL BE SENT TO [facility].  NOT PREVIOUS PRESCRIBER.  PT ADVISED HE IS OUT OF MEDICATION.

## 2024-06-12 ENCOUNTER — READMISSION MANAGEMENT (OUTPATIENT)
Dept: CALL CENTER | Facility: HOSPITAL | Age: 54
End: 2024-06-12
Payer: COMMERCIAL

## 2024-06-12 NOTE — OUTREACH NOTE
Medical Week 3 Survey      Flowsheet Row Responses   [facility] facility patient discharged from? [facility]   Does the patient have one of the following disease processes/diagnoses(primary or secondary)? Other   Week 3 attempt successful? No   Unsuccessful attempts Attempt 1            Serene BARKLEY - Registered Nurse  
WDL

## 2024-06-12 NOTE — TELEPHONE ENCOUNTER
Please let him know I did verify with Dr. Adrian Bautista, at this point he may switch to aspirin alone and not continue the clopidogrel.

## 2024-06-18 ENCOUNTER — OFFICE VISIT (OUTPATIENT)
Dept: CARDIOLOGY | Facility: CLINIC | Age: 54
End: 2024-06-18
Payer: COMMERCIAL

## 2024-06-18 ENCOUNTER — LAB (OUTPATIENT)
Dept: LAB | Facility: HOSPITAL | Age: 54
End: 2024-06-18
Payer: COMMERCIAL

## 2024-06-18 ENCOUNTER — READMISSION MANAGEMENT (OUTPATIENT)
Dept: CALL CENTER | Facility: HOSPITAL | Age: 54
End: 2024-06-18
Payer: COMMERCIAL

## 2024-06-18 VITALS
HEART RATE: 70 BPM | DIASTOLIC BLOOD PRESSURE: 70 MMHG | SYSTOLIC BLOOD PRESSURE: 114 MMHG | BODY MASS INDEX: 31.01 KG/M2 | HEIGHT: 73 IN | WEIGHT: 234 LBS

## 2024-06-18 DIAGNOSIS — I10 ESSENTIAL HYPERTENSION: ICD-10-CM

## 2024-06-18 DIAGNOSIS — I50.22 CHRONIC SYSTOLIC CONGESTIVE HEART FAILURE, NYHA CLASS 2: Primary | ICD-10-CM

## 2024-06-18 DIAGNOSIS — I50.22 CHRONIC SYSTOLIC CONGESTIVE HEART FAILURE, NYHA CLASS 2: ICD-10-CM

## 2024-06-18 LAB
ALBUMIN SERPL-MCNC: 4.3 G/DL (ref 3.5–5.2)
ALBUMIN/GLOB SERPL: 1.5 G/DL
ALP SERPL-CCNC: 48 U/L (ref 39–117)
ALT SERPL W P-5'-P-CCNC: 37 U/L (ref 1–41)
ANION GAP SERPL CALCULATED.3IONS-SCNC: 10.2 MMOL/L (ref 5–15)
AST SERPL-CCNC: 20 U/L (ref 1–40)
BASOPHILS # BLD AUTO: 0.06 10*3/MM3 (ref 0–0.2)
BASOPHILS NFR BLD AUTO: 0.6 % (ref 0–1.5)
BILIRUB SERPL-MCNC: 0.5 MG/DL (ref 0–1.2)
BUN SERPL-MCNC: 15 MG/DL (ref 6–20)
BUN/CREAT SERPL: 19.5 (ref 7–25)
CALCIUM SPEC-SCNC: 9.1 MG/DL (ref 8.6–10.5)
CHLORIDE SERPL-SCNC: 104 MMOL/L (ref 98–107)
CO2 SERPL-SCNC: 24.8 MMOL/L (ref 22–29)
CREAT SERPL-MCNC: 0.77 MG/DL (ref 0.76–1.27)
DEPRECATED RDW RBC AUTO: 40.7 FL (ref 37–54)
EGFRCR SERPLBLD CKD-EPI 2021: 107.1 ML/MIN/1.73
EOSINOPHIL # BLD AUTO: 0.27 10*3/MM3 (ref 0–0.4)
EOSINOPHIL NFR BLD AUTO: 2.8 % (ref 0.3–6.2)
ERYTHROCYTE [DISTWIDTH] IN BLOOD BY AUTOMATED COUNT: 11.8 % (ref 12.3–15.4)
GLOBULIN UR ELPH-MCNC: 2.8 GM/DL
GLUCOSE SERPL-MCNC: 100 MG/DL (ref 65–99)
HCT VFR BLD AUTO: 47.8 % (ref 37.5–51)
HGB BLD-MCNC: 16.3 G/DL (ref 13–17.7)
IMM GRANULOCYTES # BLD AUTO: 0.03 10*3/MM3 (ref 0–0.05)
IMM GRANULOCYTES NFR BLD AUTO: 0.3 % (ref 0–0.5)
LYMPHOCYTES # BLD AUTO: 2.17 10*3/MM3 (ref 0.7–3.1)
LYMPHOCYTES NFR BLD AUTO: 22.9 % (ref 19.6–45.3)
MAGNESIUM SERPL-MCNC: 2.2 MG/DL (ref 1.6–2.6)
MCH RBC QN AUTO: 32.1 PG (ref 26.6–33)
MCHC RBC AUTO-ENTMCNC: 34.1 G/DL (ref 31.5–35.7)
MCV RBC AUTO: 94.1 FL (ref 79–97)
MONOCYTES # BLD AUTO: 1.06 10*3/MM3 (ref 0.1–0.9)
MONOCYTES NFR BLD AUTO: 11.2 % (ref 5–12)
NEUTROPHILS NFR BLD AUTO: 5.9 10*3/MM3 (ref 1.7–7)
NEUTROPHILS NFR BLD AUTO: 62.2 % (ref 42.7–76)
NRBC BLD AUTO-RTO: 0 /100 WBC (ref 0–0.2)
NT-PROBNP SERPL-MCNC: 213.6 PG/ML (ref 0–900)
PLATELET # BLD AUTO: 228 10*3/MM3 (ref 140–450)
PMV BLD AUTO: 10.6 FL (ref 6–12)
POTASSIUM SERPL-SCNC: 4.3 MMOL/L (ref 3.5–5.2)
PROT SERPL-MCNC: 7.1 G/DL (ref 6–8.5)
RBC # BLD AUTO: 5.08 10*6/MM3 (ref 4.14–5.8)
SODIUM SERPL-SCNC: 139 MMOL/L (ref 136–145)
WBC NRBC COR # BLD AUTO: 9.49 10*3/MM3 (ref 3.4–10.8)

## 2024-06-18 PROCEDURE — 80053 COMPREHEN METABOLIC PANEL: CPT

## 2024-06-18 PROCEDURE — 85025 COMPLETE CBC W/AUTO DIFF WBC: CPT

## 2024-06-18 PROCEDURE — 99214 OFFICE O/P EST MOD 30 MIN: CPT

## 2024-06-18 PROCEDURE — 83735 ASSAY OF MAGNESIUM: CPT

## 2024-06-18 PROCEDURE — 36415 COLL VENOUS BLD VENIPUNCTURE: CPT

## 2024-06-18 PROCEDURE — 83880 ASSAY OF NATRIURETIC PEPTIDE: CPT

## 2024-06-18 RX ORDER — DAPAGLIFLOZIN 10 MG/1
10 TABLET, FILM COATED ORAL DAILY
Qty: 90 TABLET | Refills: 3 | Status: SHIPPED | OUTPATIENT
Start: 2024-06-18

## 2024-06-18 RX ORDER — SPIRONOLACTONE 25 MG/1
25 TABLET ORAL DAILY
Qty: 90 TABLET | Refills: 3 | Status: SHIPPED | OUTPATIENT
Start: 2024-06-18

## 2024-06-18 RX ORDER — ATORVASTATIN CALCIUM 80 MG/1
80 TABLET, FILM COATED ORAL NIGHTLY
Qty: 90 TABLET | Refills: 3 | Status: SHIPPED | OUTPATIENT
Start: 2024-06-18

## 2024-06-18 RX ORDER — ASPIRIN 81 MG/1
81 TABLET, CHEWABLE ORAL DAILY
COMMUNITY

## 2024-06-18 NOTE — OUTREACH NOTE
Medical Week 3 Survey      Flowsheet Row Responses   [facility] [place] [facility] discharged from? [facility]   Does the patient have one of the following disease processes/diagnoses(primary or secondary)? Other   Week 3 attempt successful? No   Unsuccessful attempts Attempt 2            BARBIE RAUSCH - Registered Nurse

## 2024-06-18 NOTE — ASSESSMENT & PLAN NOTE
Mr. Turk has heart failure with reduced ejection fraction who appears to be slowly improving.  His pedal edema has resolved although he does experience dyspnea with moderate exertion.  I will make the following changes to his heart failure medications:    1.  Increase carvedilol 12.5 mg take a full tablet twice daily.  2.  Continue with Farxiga, Entresto and spironolactone at the current doses.  3.  Do heart rate blood pressure and weight log and and bring it to next appointment.  4.  Do blood work 1 to 2 days prior to next visit.

## 2024-06-18 NOTE — PROGRESS NOTES
Office Visit    Chief Complaint  Chronic systolic congestive heart failure, NYHA class 2    Subjective            Nick Turk presents to [facility] CARDIOLOGY  History of Present Illness  Mr. Turk is a 54-year-old male who presents to the office today for follow-up due to heart failure with reduced ejection fraction, hypertension, hyperlipidemia and recent stroke.  He reports that he is doing a little better although continues to experience short of air with moderate activity.  He reports that his vision is starting to come back poststroke although he has difficulty with his peripheral vision.  His heart rate and blood pressure has been good per his home log although he has been reluctant to increase his carvedilol to 12.5 a full tablet twice a day he has been taking half a tablet in the morning and a full 1 at night.  He is going to increase his carvedilol 12.5 mg to a full tablet twice daily and continue to keep a heart rate blood pressure and weight log.  He did not get his labs before visit today and is going to have them done.  We will call him with the results.  He denies any chest pain, dizziness, orthopnea or syncopal episodes.      Past Medical History:   Diagnosis Date    Back pain     Chronic pain syndrome     CVA (cerebral vascular accident) 5/14/2024    Essential hypertension 5/20/2024    Forgetfulness     Leg pain     Lumbago 2015    Lumbar spondylosis     Pain, generalized     Pars defect of lumbar spine 2015    Sleep disorder     Spondylolisthesis, lumbar region 2015    L4/5       Allergies   Allergen Reactions    Bee Venom Swelling    Meloxicam Confusion and Other (See Comments)        Past Surgical History:   Procedure Laterality Date    BACK SURGERY      CARDIAC CATHETERIZATION Left 5/21/2024    Procedure: Cardiac Catheterization/Vascular Study;  Surgeon: Lv Hunt MD;  Location: [facility] INVASIVE LOCATION;  Service: Cardiovascular;  Laterality: Left;     CHOLECYSTECTOMY      COLONOSCOPY N/A 11/16/2021    Procedure: COLONOSCOPY WITH POLYPECTOMIES, CLIP APPLICATION X1, CAUTERY;  Surgeon: Nolberto Onofre MD;  Location: [facility] ENDOSCOPY;  Service: General;  Laterality: N/A;  COLON POLYPS    GANGLION CYST EXCISION      LUMBAR EPIDURAL INJECTION      LUMBAR FUSION  2015    SHOULDER ARTHROSCOPY W/ ROTATOR CUFF REPAIR Left 05/12/2022    Procedure: LEFT SHOULDER ARTHROSCOPY WITH ROTATOR CUFF REPAIR, SUBACROMINAL DECOMPRESSION, DISTAL CLAVICULECTOMY, BICEPS TENODESIS;  Surgeon: Abdoulaye Minaya MD;  Location: [facility] OR OSC;  Service: Orthopedics;  Laterality: Left;    SPINAL FUSION          Social History     Tobacco Use    Smoking status: Former     Average packs/day: 1 pack/day for 39.6 years (39.6 ttl pk-yrs)     Types: Cigarettes     Start date: 4/13/1984    Smokeless tobacco: Never   Vaping Use    Vaping status: Never Used   Substance Use Topics    Alcohol use: Never    Drug use: Never       Family History   Problem Relation Age of Onset    Heart failure Mother     Heart disease Mother     No Known Problems Father     Heart failure Sister     Malig Hyperthermia Neg Hx         Prior to Admission medications    Medication Sig Start Date End Date Taking? Authorizing Provider   atorvastatin (LIPITOR) 80 MG tablet Take 1 tablet by mouth Every Night for 30 days. 5/24/24 6/23/24 Yes Osiris Lee MD   carvedilol (COREG) 12.5 MG tablet Take 1 tablet by mouth 2 (Two) Times a Day.  Patient taking differently: Take 1 tablet by mouth 2 (Two) Times a Day. Taking half tablet in the morning full  tablet at night 5/30/24  Yes Lv Hunt MD   dapagliflozin Propanediol (Farxiga) 10 MG tablet Take 10 mg by mouth Daily for 30 days. 5/24/24 6/23/24 Yes Osiris Lee MD   sacubitril-valsartan (Entresto) 24-26 MG tablet Take 1 tablet by mouth 2 (Two) Times a Day. 5/31/24  Yes Amisha Schulz APRN   spironolactone (ALDACTONE) 25 MG tablet Take 1 tablet by mouth Daily for 30 days.  "5/25/24 6/24/24 Yes Osiris Lee MD   Testosterone Cypionate (DEPOTESTOTERONE CYPIONATE) 200 MG/ML injection Inject 0.5 mL into the appropriate muscle as directed by prescriber Every 7 (Seven) Days. 5/13/24  Yes Provider, MD Caleb        Review of Systems   Constitutional:  Negative for fatigue.   Respiratory:  Positive for cough and shortness of breath.    Cardiovascular:  Negative for chest pain, palpitations and leg swelling.   Neurological:  Negative for dizziness.        Symptom Course: Improved    Weight Trend: Stable     Objective     /70   Pulse 70   Ht 185.4 cm (73\")   Wt 106 kg (234 lb)   BMI 30.87 kg/m²       Physical Exam  Constitutional:       General: He is awake.      Appearance: Normal appearance.   Neck:      Thyroid: No thyromegaly.      Vascular: No carotid bruit or JVD.   Cardiovascular:      Rate and Rhythm: Normal rate and regular rhythm.      Chest Wall: PMI is not displaced.      Pulses: Normal pulses.      Heart sounds: Normal heart sounds, S1 normal and S2 normal. No murmur heard.     No friction rub. No gallop. No S3 or S4 sounds.   Pulmonary:      Effort: Pulmonary effort is normal.      Breath sounds: Normal breath sounds and air entry. No wheezing, rhonchi or rales.   Abdominal:      General: Bowel sounds are normal.      Palpations: Abdomen is soft. There is no mass.      Tenderness: There is no abdominal tenderness.   Musculoskeletal:      Cervical back: Neck supple.      Right lower leg: No edema.      Left lower leg: No edema.   Neurological:      Mental Status: He is alert and oriented to person, place, and time.   Psychiatric:         Mood and Affect: Mood normal.         Behavior: Behavior is cooperative.           Result Review :                      Lab Results   Component Value Date    PROBNP 291.7 05/20/2024    PROBNP 610.7 05/15/2024     CMP          5/22/2024    05:17 5/23/2024    04:55 5/24/2024    04:30   CMP   Glucose 92  110  98    BUN 19  14  15  " "  Creatinine 1.00  0.86  0.81    EGFR 90.0  103.5  105.4    Sodium 138  138  137    Potassium 4.2  4.2  4.3    Chloride 105  107  105    Calcium 9.0  8.9  9.0    BUN/Creatinine Ratio 19.0  16.3  18.5    Anion Gap 7.2  7.9  9.6      CBC w/diff          5/22/2024    05:17 5/23/2024    04:55 5/24/2024    04:30   CBC w/Diff   WBC 9.76  9.99  10.15    RBC 5.33  5.32  5.22    Hemoglobin 17.0  16.9  16.7    Hematocrit 51.6  50.0  49.0    MCV 96.8  94.0  93.9    MCH 31.9  31.8  32.0    MCHC 32.9  33.8  34.1    RDW 12.1  12.2  12.0    Platelets 221  213  212    Neutrophil Rel %   59.4    Immature Granulocyte Rel %   0.4    Lymphocyte Rel %   24.3    Monocyte Rel %   11.9    Eosinophil Rel %   3.2    Basophil Rel %   0.8       Lipid Panel          3/19/2024    08:46 5/14/2024    03:51 5/23/2024    04:55   Lipid Panel   Total Cholesterol 114  94  78    Triglycerides 91  104  166    HDL Cholesterol 37  30  31    VLDL Cholesterol 18  20  27    LDL Cholesterol  59  44  20    LDL/HDL Ratio  1.44  0.45       Lab Results   Component Value Date    TSH 1.620 03/19/2024    TSH 1.300 08/16/2023    TSH 1.220 10/08/2021      No results found for: \"FREET4\"   No results found for: \"DDIMERQUANT\"  Magnesium   Date Value Ref Range Status   05/24/2024 2.0 1.6 - 2.6 mg/dL Final      No results found for: \"DIGOXIN\"   A1C Last 3 Results          5/14/2024    03:51 5/23/2024    04:55   HGBA1C Last 3 Results   Hemoglobin A1C 5.00  5.50       Patient did not have new labs prior to this visit but will get labs drawn today or tomorrow.      Results for orders placed during the hospital encounter of 05/13/24    Adult Transthoracic Echo Complete W/ Cont if Necessary Per Protocol (With Agitated Saline)    Interpretation Summary    The study is technically difficult for diagnosis.    Left ventricular systolic function is moderately decreased. Estimated left ventricular EF = 35%    Left ventricular wall thickness is consistent with hypertrophy probable mild " in nature difficult to say if his ventricular not as walls were not well-seen in multiple different views    The left atrial cavity is mildly dilated.    Global hypokinesis more severely affecting the septum with a dyssynchronous contraction pattern possibly secondary  Conduction abnormality        Assessment and Plan        Diagnoses and all orders for this visit:    1. Chronic systolic congestive heart failure, NYHA class 2 (Primary)  Assessment & Plan:  Mr. Turk has heart failure with reduced ejection fraction who appears to be slowly improving.  His pedal edema has resolved although he does experience dyspnea with moderate exertion.  I will make the following changes to his heart failure medications:    1.  Increase carvedilol 12.5 mg take a full tablet twice daily.  2.  Continue with Farxiga, Entresto and spironolactone at the current doses.  3.  Do heart rate blood pressure and weight log and and bring it to next appointment.  4.  Do blood work 1 to 2 days prior to next visit.      2. Essential hypertension  Assessment & Plan:  Mr. Alicea has hypertension that is well-controlled with the current medication regimen.  At last visit he we increased the carvedilol 12.5 to a full tablet twice a day.  Patient has been taking a half a tablet in the morning and a full 1 at night because he was concerned about pressures being too low.  He is going to increase it to a full tablet twice a day and keep a heart rate blood pressure and daily weight log and bring it to his next appointment.      Other orders  -     dapagliflozin Propanediol (Farxiga) 10 MG tablet; Take 10 mg by mouth Daily.  Dispense: 90 tablet; Refill: 3  -     atorvastatin (LIPITOR) 80 MG tablet; Take 1 tablet by mouth Every Night.  Dispense: 90 tablet; Refill: 3  -     spironolactone (ALDACTONE) 25 MG tablet; Take 1 tablet by mouth Daily.  Dispense: 90 tablet; Refill: 3            Follow Up     Return for with Dr Mustafa.    Patient was given instructions  and counseling regarding his condition or for health maintenance advice. Please see specific information pulled into the AVS if appropriate.     Electronically signed by OMID Valencia, 06/18/24, 3:45 PM EDT.

## 2024-06-18 NOTE — PATIENT INSTRUCTIONS
1.  Increase carvedilol 12.5 mg take a full tablet twice daily.  2.  Continue with Farxiga, Entresto and spironolactone at the current doses.  3.  Do heart rate blood pressure and weight log and and bring it to next appointment.  4.  Do blood work 1 to 2 days prior to next visit.

## 2024-06-18 NOTE — ASSESSMENT & PLAN NOTE
Mr. Alicea has hypertension that is well-controlled with the current medication regimen.  At last visit he we increased the carvedilol 12.5 to a full tablet twice a day.  Patient has been taking a half a tablet in the morning and a full 1 at night because he was concerned about pressures being too low.  He is going to increase it to a full tablet twice a day and keep a heart rate blood pressure and daily weight log and bring it to his next appointment.

## 2024-06-19 ENCOUNTER — OFFICE VISIT (OUTPATIENT)
Dept: NEUROLOGY | Facility: CLINIC | Age: 54
End: 2024-06-19
Payer: COMMERCIAL

## 2024-06-19 ENCOUNTER — OFFICE VISIT (OUTPATIENT)
Dept: CARDIOLOGY | Facility: CLINIC | Age: 54
End: 2024-06-19
Payer: COMMERCIAL

## 2024-06-19 VITALS
BODY MASS INDEX: 31.28 KG/M2 | WEIGHT: 236 LBS | DIASTOLIC BLOOD PRESSURE: 66 MMHG | HEIGHT: 73 IN | SYSTOLIC BLOOD PRESSURE: 114 MMHG | HEART RATE: 53 BPM

## 2024-06-19 VITALS
WEIGHT: 237 LBS | BODY MASS INDEX: 31.41 KG/M2 | DIASTOLIC BLOOD PRESSURE: 68 MMHG | SYSTOLIC BLOOD PRESSURE: 110 MMHG | HEART RATE: 74 BPM | HEIGHT: 73 IN

## 2024-06-19 DIAGNOSIS — E78.2 MIXED HYPERLIPIDEMIA: ICD-10-CM

## 2024-06-19 DIAGNOSIS — I10 ESSENTIAL HYPERTENSION: ICD-10-CM

## 2024-06-19 DIAGNOSIS — I50.22 CHRONIC HFREF (HEART FAILURE WITH REDUCED EJECTION FRACTION): ICD-10-CM

## 2024-06-19 DIAGNOSIS — I69.30 SEQUELAE, POST-STROKE: Primary | ICD-10-CM

## 2024-06-19 DIAGNOSIS — I50.22 CHRONIC SYSTOLIC CONGESTIVE HEART FAILURE, NYHA CLASS 2: Primary | ICD-10-CM

## 2024-06-19 DIAGNOSIS — G47.33 OSA ON CPAP: ICD-10-CM

## 2024-06-19 NOTE — PROGRESS NOTES
Chief Complaint  Neurologic Problem ([facility] ED [place] CONSULT- EVAL FOR CVA/)    Subjective          Nick Turk is a 54 y.o. male who presents to [facility] NEUROLOGY & NEUROSURGERY  History of Present Illness  54-year-old man evaluated for stroke.  His wife is with him today.  According to chart review on 5/13/2024 he went to his PCPs office and his blood pressure was elevated and he was given antihypertensive medications when he did not improve his blood pressure.  He also had a headache as well as visual disturbance.  He had some blurry vision and tunnel vision and also had difficulty in performing an easy task that he would not have had a problem with according to chart review.  Patient had an MRI of the brain, MRA of the head and neck which was negative.  He was scheduled to follow-up with PCP, cardiology and neurology and was to have a loop recorder set up as outpatient.  He was discharged on aspirin 81 mg on 5/15/2024.  On 5/20/2024 he presented to the ER with complaints of chest pain and discomfort.  Cardiology was consulted and he underwent cardiac catheterization on 5/21/2024.  He was somnolent and difficult to arouse.  When he was awakened by his wife 2 hours after cardiac catheterization she noted that his eyes were skewed on looking at her.  He was complaining of skewed double vision.  He had no language abnormalities or gait abnormality. Nursing staff was informed she states that the patient did not evaluate him until the next day..  CT of the head was performed which did not show findings of acute intracranial abnormality. .  MRI of the brain showed acute diffusion restriction abnormality within the flo/midbrain he has to the left of midline.    He has been seen by cardiology today.  Diagnosis chronic systolic congestive heart failure NYHA class II.  Chronic heart failure with reduced ejection fraction.  The diagnosis is nonischemic cardiomyopathy with chronic heart failure with  "reduced ejection fraction of 35%.    According to his wife he has had some cognitive dysfunction and that he is slower and he is thinking and response time.  He also gets tired more easily.  He complains of persistent blurred vision and double vision especially looking to the right.  He gets slurred speech when he gets tired especially in the afternoon.  His cognitive dysfunction comes and goes.  It can last for 10 minutes that is difficult for him to think that he will get better again.  It is not related to posture.    He states that he had back pain and was given steroids as well as shoulder pain which improved after the stroke.  He lost 9 pounds.    Has history of sleep apnea and so he is the patient recently and is doing well.  He did not have the symptoms of mental clouding and fatigue until recently.      Objective   Vital Signs:   /68 (BP Location: Right arm, Patient Position: Sitting, Cuff Size: Adult)   Pulse 74   Ht 185.4 cm (72.99\")   Wt 108 kg (237 lb)   BMI 31.28 kg/m²     Physical Exam   He is alert, fluent, phasic, follows commands well.  His Mini-Mental status score is 30 out of 30.  Clock drawing is intact.  He states that he had a hard time remembering 3 words but he was able to produce this.  He is able to give me the history as noted above.  There was no language difficulty.  There was no dysarthria.  Optic disc are normal bilaterally vessels are normal and there is no significant hypertensive changes.  Visual fields are full to confrontation, EMs full directions gaze, pupils equal, facial strength is full, soft palate elevation and tongue are normal.  There is no weakness of the upper or lower extremities.  Fine finger movements are intact.  Reflexes symmetrically decreased.  Cerebellar testing is intact.  On station gait he is able to ambulate without difficulty.  Heart is regular and rhythm normal in rate.        Assessment and Plan  Diagnoses and all orders for this visit:    1. " Sequelae, post-stroke (Primary)  Assessment & Plan:  I discussed with him that he has a pontine stroke on the left which I showed him the MRI of the brain as well as explained the cerebral vasculature.  The etiology of the stroke is most likely related to the cardiac catheterization send if it occurred at the time of cardiac catheterization.  There is small vessel disease and he is to continue taking aspirin.  He continues to have blurred and double vision which should improve in time.    He has other symptoms of intermittent cognitive dysfunction, slurring of speech, fatigue, shortness of breath which I discussed with him his most likely related to decreased cardiac output associated with exertion.  He is being managed at this time by cardiology.    I will reevaluate him again in 3 months time for follow-up.  Thank you for letting me participate in his care.           Total time spent with the patient and coordinating patient care was 70 minutes.    Follow Up  No follow-ups on file.  Patient was given instructions and counseling regarding his condition or for health maintenance advice. Please see specific information pulled into the AVS if appropriate.

## 2024-06-19 NOTE — PROGRESS NOTES
CARDIOLOGY FOLLOW-UP PROGRESS NOTE        Chief Complaint  Follow-up (SOA, Drooping face, ) and Congestive Heart Failure    Subjective            Nick Turk presents to [facility] CARDIOLOGY  History of Present Illness      The patient is cardiac wise stable.  He is tolerating the adjustment in the dosages of medications.  He reports exertional dyspnea but denies palpitations.  The cardiac monitor showed episodes of supraventricular tachycardia and PVCs.  No episodes of V. tach or paroxysmal atrial fibrillation observed.  His neurological symptoms are improving.       Past History:    Medical History:  Past Medical History:   Diagnosis Date    Back pain     Chronic pain syndrome     CVA (cerebral vascular accident) 5/14/2024    Essential hypertension 5/20/2024    Forgetfulness     Leg pain     Lumbago 2015    Lumbar spondylosis     Pain, generalized     Pars defect of lumbar spine 2015    Sleep disorder     Spondylolisthesis, lumbar region 2015    L4/5       Surgical History: has a past surgical history that includes Cholecystectomy; Ganglion cyst excision; Lumbar epidural injection; Lumbar fusion (2015); Colonoscopy (N/A, 11/16/2021); Shoulder arthroscopy w/ rotator cuff repair (Left, 05/12/2022); Back surgery; Spinal fusion; and Cardiac catheterization (Left, 5/21/2024).     Family History: family history includes Heart disease in his mother; Heart failure in his mother and sister; No Known Problems in his father.     Social History: reports that he has quit smoking. His smoking use included cigarettes. He started smoking about 40 years ago. He has a 39.6 pack-year smoking history. He has never used smokeless tobacco. He reports that he does not drink alcohol and does not use drugs.    Allergies: Bee venom and Meloxicam    Current Outpatient Medications on File Prior to Visit   Medication Sig    aspirin 81 MG chewable tablet Chew 1 tablet Daily.    atorvastatin (LIPITOR) 80 MG tablet Take 1  "tablet by mouth Every Night.    carvedilol (COREG) 12.5 MG tablet Take 1 tablet by mouth 2 (Two) Times a Day. (Patient taking differently: Take 1 tablet by mouth 2 (Two) Times a Day. Taking half tablet in the morning full  tablet at night)    dapagliflozin Propanediol (Farxiga) 10 MG tablet Take 10 mg by mouth Daily.    sacubitril-valsartan (Entresto) 24-26 MG tablet Take 1 tablet by mouth 2 (Two) Times a Day.    spironolactone (ALDACTONE) 25 MG tablet Take 1 tablet by mouth Daily.     No current facility-administered medications on file prior to visit.          Review of Systems all systems were reviewed and negative for exertional dyspnea    Objective     /66 (BP Location: Left arm)   Pulse 53   Ht 185.4 cm (73\")   Wt 107 kg (236 lb)   BMI 31.14 kg/m²       Physical Exam    General : Alert, awake, no acute distress  Neck : Supple, no carotid bruit, no jugular venous distention  CVS : Regular rate and rhythm, no murmur, rubs or gallops  Lungs: Clear to auscultation bilaterally, no crackles or rhonchi  Abdomen: Soft, nontender, bowel sounds heard in all 4 quadrants  Extremities: Warm, well-perfused, no pedal edema    Result Review :     The following data was reviewed by: Lv Bautista MD on 06/19/2024:    CMP          5/23/2024    04:55 5/24/2024    04:30 6/18/2024    15:08   CMP   Glucose 110  98  100    BUN 14  15  15    Creatinine 0.86  0.81  0.77    EGFR 103.5  105.4  107.1    Sodium 138  137  139    Potassium 4.2  4.3  4.3    Chloride 107  105  104    Calcium 8.9  9.0  9.1    Total Protein   7.1    Albumin   4.3    Globulin   2.8    Total Bilirubin   0.5    Alkaline Phosphatase   48    AST (SGOT)   20    ALT (SGPT)   37    Albumin/Globulin Ratio   1.5    BUN/Creatinine Ratio 16.3  18.5  19.5    Anion Gap 7.9  9.6  10.2      CBC          5/23/2024    04:55 5/24/2024    04:30 6/18/2024    15:08   CBC   WBC 9.99  10.15  9.49    RBC 5.32  5.22  5.08    Hemoglobin 16.9  16.7  16.3    Hematocrit 50.0  " 49.0  47.8    MCV 94.0  93.9  94.1    MCH 31.8  32.0  32.1    MCHC 33.8  34.1  34.1    RDW 12.2  12.0  11.8    Platelets 213  212  228      TSH          8/16/2023    08:30 3/19/2024    08:46   TSH   TSH 1.300  1.620      Lipid Panel          3/19/2024    08:46 5/14/2024    03:51 5/23/2024    04:55   Lipid Panel   Total Cholesterol 114  94  78    Triglycerides 91  104  166    HDL Cholesterol 37  30  31    VLDL Cholesterol 18  20  27    LDL Cholesterol  59  44  20    LDL/HDL Ratio  1.44  0.45           Data reviewed: Cardiology studies        Results for orders placed during the hospital encounter of 05/13/24    Adult Transthoracic Echo Complete W/ Cont if Necessary Per Protocol (With Agitated Saline)    Interpretation Summary    The study is technically difficult for diagnosis.    Left ventricular systolic function is moderately decreased. Estimated left ventricular EF = 35%    Left ventricular wall thickness is consistent with hypertrophy probable mild in nature difficult to say if his ventricular not as walls were not well-seen in multiple different views    The left atrial cavity is mildly dilated.    Global hypokinesis more severely affecting the septum with a dyssynchronous contraction pattern possibly secondary  Conduction abnormality                   Assessment and Plan        Diagnoses and all orders for this visit:    1. Chronic systolic congestive heart failure, NYHA class 2 (Primary)    2. DAQUAN on CPAP    3. Essential hypertension    4. Mixed hyperlipidemia    5. Chronic HFrEF (heart failure with reduced ejection fraction)        The patient has nonischemic cardiomyopathy with chronic heart failure with reduced EF of 35%.  I will continue with guideline directed medical therapy.  He is now on Coreg 12.5 mg oral twice a day.  I would increase the Entresto to the middle dose 49/51 p.o. twice a day.  Advised to keep home blood pressure log to review at the next office visit.  Continue with Aldactone 25 mg  oral daily and Farxiga 10 mg oral daily.  Advised I instructed to watch for signs and symptoms of decompensated heart failure.  Will reevaluate in 4 weeks and schedule for 2D echo with strain.  Continue with lifestyle modification.  Advised to walk 45 minutes a day 5 times per week and do strain exercises in between.  He can return to work.      Follow Up     Return in about 4 weeks (around 7/17/2024).    Patient was given instructions and counseling regarding his condition or for health maintenance advice. Please see specific information pulled into the AVS if appropriate.

## 2024-06-19 NOTE — ASSESSMENT & PLAN NOTE
I discussed with him that he has a pontine stroke on the left which I showed him the MRI of the brain as well as explained the cerebral vasculature.  The etiology of the stroke is most likely related to the cardiac catheterization send if it occurred at the time of cardiac catheterization.  There is small vessel disease and he is to continue taking aspirin.  He continues to have blurred and double vision which should improve in time.    He has other symptoms of intermittent cognitive dysfunction, slurring of speech, fatigue, shortness of breath which I discussed with him his most likely related to decreased cardiac output associated with exertion.  He is being managed at this time by cardiology.    I will reevaluate him again in 3 months time for follow-up.  Thank you for letting me participate in his care.

## 2024-06-28 ENCOUNTER — TELEPHONE (OUTPATIENT)
Dept: CARDIOLOGY | Facility: CLINIC | Age: 54
End: 2024-06-28
Payer: COMMERCIAL

## 2024-06-28 DIAGNOSIS — I50.22 CHRONIC SYSTOLIC CONGESTIVE HEART FAILURE, NYHA CLASS 2: Primary | ICD-10-CM

## 2024-06-28 NOTE — TELEPHONE ENCOUNTER
Caller: Nick Turk    Relationship: Self    Best call back number: [phone]    What is the medical concern/diagnosis: EJECTION FRACTION OF 35%    What specialty or service is being requested: CARDIAC REHAB    What is the provider, practice or medical service name: CARDIAC REHAB     What is the office location: [facility]    What is the office phone number: UNSURE     Any additional details: PLEASE CALL PATIENT ONCE REFERRAL HAS BEEN SENT TO CARDIAC REHAB.

## 2024-07-10 ENCOUNTER — LAB (OUTPATIENT)
Dept: LAB | Facility: HOSPITAL | Age: 54
End: 2024-07-10
Payer: COMMERCIAL

## 2024-07-10 DIAGNOSIS — I50.22 CHRONIC SYSTOLIC CONGESTIVE HEART FAILURE, NYHA CLASS 2: Primary | ICD-10-CM

## 2024-07-10 DIAGNOSIS — I50.22 CHRONIC SYSTOLIC CONGESTIVE HEART FAILURE, NYHA CLASS 2: ICD-10-CM

## 2024-07-10 LAB
ALBUMIN SERPL-MCNC: 4.1 G/DL (ref 3.5–5.2)
ALBUMIN/GLOB SERPL: 1.8 G/DL
ALP SERPL-CCNC: 48 U/L (ref 39–117)
ALT SERPL W P-5'-P-CCNC: 32 U/L (ref 1–41)
ANION GAP SERPL CALCULATED.3IONS-SCNC: 10.3 MMOL/L (ref 5–15)
AST SERPL-CCNC: 23 U/L (ref 1–40)
BASOPHILS # BLD AUTO: 0.05 10*3/MM3 (ref 0–0.2)
BASOPHILS NFR BLD AUTO: 0.6 % (ref 0–1.5)
BILIRUB SERPL-MCNC: 0.4 MG/DL (ref 0–1.2)
BUN SERPL-MCNC: 17 MG/DL (ref 6–20)
BUN/CREAT SERPL: 20 (ref 7–25)
CALCIUM SPEC-SCNC: 9.3 MG/DL (ref 8.6–10.5)
CHLORIDE SERPL-SCNC: 104 MMOL/L (ref 98–107)
CO2 SERPL-SCNC: 23.7 MMOL/L (ref 22–29)
CREAT SERPL-MCNC: 0.85 MG/DL (ref 0.76–1.27)
DEPRECATED RDW RBC AUTO: 42 FL (ref 37–54)
EGFRCR SERPLBLD CKD-EPI 2021: 103.9 ML/MIN/1.73
EOSINOPHIL # BLD AUTO: 0.27 10*3/MM3 (ref 0–0.4)
EOSINOPHIL NFR BLD AUTO: 3.2 % (ref 0.3–6.2)
ERYTHROCYTE [DISTWIDTH] IN BLOOD BY AUTOMATED COUNT: 12 % (ref 12.3–15.4)
GLOBULIN UR ELPH-MCNC: 2.3 GM/DL
GLUCOSE SERPL-MCNC: 121 MG/DL (ref 65–99)
HCT VFR BLD AUTO: 48.8 % (ref 37.5–51)
HGB BLD-MCNC: 16.2 G/DL (ref 13–17.7)
IMM GRANULOCYTES # BLD AUTO: 0.02 10*3/MM3 (ref 0–0.05)
IMM GRANULOCYTES NFR BLD AUTO: 0.2 % (ref 0–0.5)
LYMPHOCYTES # BLD AUTO: 2.16 10*3/MM3 (ref 0.7–3.1)
LYMPHOCYTES NFR BLD AUTO: 25.4 % (ref 19.6–45.3)
MAGNESIUM SERPL-MCNC: 1.9 MG/DL (ref 1.6–2.6)
MCH RBC QN AUTO: 31.6 PG (ref 26.6–33)
MCHC RBC AUTO-ENTMCNC: 33.2 G/DL (ref 31.5–35.7)
MCV RBC AUTO: 95.1 FL (ref 79–97)
MONOCYTES # BLD AUTO: 0.82 10*3/MM3 (ref 0.1–0.9)
MONOCYTES NFR BLD AUTO: 9.6 % (ref 5–12)
NEUTROPHILS NFR BLD AUTO: 5.18 10*3/MM3 (ref 1.7–7)
NEUTROPHILS NFR BLD AUTO: 61 % (ref 42.7–76)
NRBC BLD AUTO-RTO: 0 /100 WBC (ref 0–0.2)
PLATELET # BLD AUTO: 213 10*3/MM3 (ref 140–450)
PMV BLD AUTO: 11.1 FL (ref 6–12)
POTASSIUM SERPL-SCNC: 4.4 MMOL/L (ref 3.5–5.2)
PROT SERPL-MCNC: 6.4 G/DL (ref 6–8.5)
RBC # BLD AUTO: 5.13 10*6/MM3 (ref 4.14–5.8)
SODIUM SERPL-SCNC: 138 MMOL/L (ref 136–145)
WBC NRBC COR # BLD AUTO: 8.5 10*3/MM3 (ref 3.4–10.8)

## 2024-07-10 PROCEDURE — 36415 COLL VENOUS BLD VENIPUNCTURE: CPT

## 2024-07-10 PROCEDURE — 85025 COMPLETE CBC W/AUTO DIFF WBC: CPT

## 2024-07-10 PROCEDURE — 83735 ASSAY OF MAGNESIUM: CPT

## 2024-07-10 PROCEDURE — 80053 COMPREHEN METABOLIC PANEL: CPT

## 2024-07-11 ENCOUNTER — OFFICE VISIT (OUTPATIENT)
Dept: CARDIOLOGY | Facility: CLINIC | Age: 54
End: 2024-07-11
Payer: COMMERCIAL

## 2024-07-11 VITALS
BODY MASS INDEX: 31.83 KG/M2 | HEIGHT: 72 IN | DIASTOLIC BLOOD PRESSURE: 60 MMHG | WEIGHT: 235 LBS | HEART RATE: 35 BPM | SYSTOLIC BLOOD PRESSURE: 103 MMHG

## 2024-07-11 DIAGNOSIS — I10 ESSENTIAL HYPERTENSION: ICD-10-CM

## 2024-07-11 DIAGNOSIS — G47.33 OSA ON CPAP: ICD-10-CM

## 2024-07-11 DIAGNOSIS — I50.22 CHRONIC HFREF (HEART FAILURE WITH REDUCED EJECTION FRACTION): Primary | ICD-10-CM

## 2024-07-11 DIAGNOSIS — E78.2 MIXED HYPERLIPIDEMIA: ICD-10-CM

## 2024-07-11 NOTE — PROGRESS NOTES
CARDIOLOGY FOLLOW-UP PROGRESS NOTE        Chief Complaint  Congestive Heart Failure and Follow-up (Fatigue, SOA)    Subjective            Nick Turk presents to [facility] CARDIOLOGY  History of Present Illness      The patient is doing well. The Pulse is down to 40-45 bpm. His BP is good. Denies dyspnea or palpitations. The diplopia is improving.        Past History:    Medical History:  Past Medical History:   Diagnosis Date    Back pain     Chronic pain syndrome     CVA (cerebral vascular accident) 5/14/2024    Essential hypertension 5/20/2024    Forgetfulness     Leg pain     Lumbago 2015    Lumbar spondylosis     Pain, generalized     Pars defect of lumbar spine 2015    Sleep disorder     Spondylolisthesis, lumbar region 2015    L4/5       Surgical History: has a past surgical history that includes Cholecystectomy; Ganglion cyst excision; Lumbar epidural injection; Lumbar fusion (2015); Colonoscopy (N/A, 11/16/2021); Shoulder arthroscopy w/ rotator cuff repair (Left, 05/12/2022); Back surgery; Spinal fusion; and Cardiac catheterization (Left, 5/21/2024).     Family History: family history includes Heart disease in his mother; Heart failure in his mother and sister; No Known Problems in his father.     Social History: reports that he has quit smoking. His smoking use included cigarettes. He started smoking about 40 years ago. He has a 39.6 pack-year smoking history. He has been exposed to tobacco smoke. He has never used smokeless tobacco. He reports that he does not drink alcohol and does not use drugs.    Allergies: Bee venom and Meloxicam    Current Outpatient Medications on File Prior to Visit   Medication Sig    aspirin 81 MG chewable tablet Chew 1 tablet Daily.    atorvastatin (LIPITOR) 80 MG tablet Take 1 tablet by mouth Every Night.    carvedilol (COREG) 12.5 MG tablet Take 1 tablet by mouth 2 (Two) Times a Day. (Patient taking differently: Take 1 tablet by mouth 2 (Two) Times a  "Day. Taking half tablet in the morning full  tablet at night)    dapagliflozin Propanediol (Farxiga) 10 MG tablet Take 10 mg by mouth Daily.    sacubitril-valsartan (Entresto) 24-26 MG tablet Take 1 tablet by mouth 2 (Two) Times a Day.    spironolactone (ALDACTONE) 25 MG tablet Take 1 tablet by mouth Daily.     No current facility-administered medications on file prior to visit.          Review of Systems : all systems were reviewed and negative except for weakness.     Objective     /60 (BP Location: Left arm)   Pulse (!) 35   Ht 182.9 cm (72\")   Wt 107 kg (235 lb)   BMI 31.87 kg/m²       Physical Exam    General : Alert, awake, no acute distress  Neck : Supple, no carotid bruit, no jugular venous distention  CVS : Regular rate and rhythm, no murmur, rubs or gallops  Lungs: Clear to auscultation bilaterally, no crackles or rhonchi  Abdomen: Soft, nontender, bowel sounds heard in all 4 quadrants  Extremities: Warm, well-perfused, no pedal edema    Result Review :     The following data was reviewed by: Lv Bautista MD on 07/11/2024:    CMP          5/24/2024    04:30 6/18/2024    15:08 7/10/2024    15:22   CMP   Glucose 98  100  121    BUN 15  15  17    Creatinine 0.81  0.77  0.85    EGFR 105.4  107.1  103.9    Sodium 137  139  138    Potassium 4.3  4.3  4.4    Chloride 105  104  104    Calcium 9.0  9.1  9.3    Total Protein  7.1  6.4    Albumin  4.3  4.1    Globulin  2.8  2.3    Total Bilirubin  0.5  0.4    Alkaline Phosphatase  48  48    AST (SGOT)  20  23    ALT (SGPT)  37  32    Albumin/Globulin Ratio  1.5  1.8    BUN/Creatinine Ratio 18.5  19.5  20.0    Anion Gap 9.6  10.2  10.3      CBC          5/24/2024    04:30 6/18/2024    15:08 7/10/2024    15:22   CBC   WBC 10.15  9.49  8.50    RBC 5.22  5.08  5.13    Hemoglobin 16.7  16.3  16.2    Hematocrit 49.0  47.8  48.8    MCV 93.9  94.1  95.1    MCH 32.0  32.1  31.6    MCHC 34.1  34.1  33.2    RDW 12.0  11.8  12.0    Platelets 212  228  213      TSH  "         8/16/2023    08:30 3/19/2024    08:46   TSH   TSH 1.300  1.620      Lipid Panel          3/19/2024    08:46 5/14/2024    03:51 5/23/2024    04:55   Lipid Panel   Total Cholesterol 114  94  78    Triglycerides 91  104  166    HDL Cholesterol 37  30  31    VLDL Cholesterol 18  20  27    LDL Cholesterol  59  44  20    LDL/HDL Ratio  1.44  0.45           Data reviewed: Cardiology studies        Results for orders placed during the hospital encounter of 05/13/24    Adult Transthoracic Echo Complete W/ Cont if Necessary Per Protocol (With Agitated Saline)    Interpretation Summary    The study is technically difficult for diagnosis.    Left ventricular systolic function is moderately decreased. Estimated left ventricular EF = 35%    Left ventricular wall thickness is consistent with hypertrophy probable mild in nature difficult to say if his ventricular not as walls were not well-seen in multiple different views    The left atrial cavity is mildly dilated.    Global hypokinesis more severely affecting the septum with a dyssynchronous contraction pattern possibly secondary  Conduction abnormality                   Assessment and Plan        Diagnoses and all orders for this visit:    1. Chronic HFrEF (heart failure with reduced ejection fraction) (Primary)  -     Adult Transthoracic Echo Complete W/ Cont if Necessary Per Protocol; Future    2. Essential hypertension    3. DAQUAN on CPAP    4. Mixed hyperlipidemia          I would reduce the carvedilol dose to 6.25 mg po bid. Continue with Entresto at the mid level dose twice a day. Continue with Aldactone and Jardiance. Will obtain a 2 DECHO with Strain to evaluate for possible improvement in cardiac function. If EF persist < 35 %, will consider AICD placement and would obtain a cardiac MRI. Continue with lifestyle modification. Advised to continue with regular exercise.      Follow Up     Return in about 6 weeks (around 8/22/2024).    Patient was given instructions  and counseling regarding his condition or for health maintenance advice. Please see specific information pulled into the AVS if appropriate.

## 2024-07-15 ENCOUNTER — OFFICE VISIT (OUTPATIENT)
Dept: CARDIOLOGY | Facility: CLINIC | Age: 54
End: 2024-07-15
Payer: COMMERCIAL

## 2024-07-15 ENCOUNTER — TELEPHONE (OUTPATIENT)
Dept: CARDIOLOGY | Facility: CLINIC | Age: 54
End: 2024-07-15

## 2024-07-15 VITALS
WEIGHT: 239 LBS | HEIGHT: 72 IN | BODY MASS INDEX: 32.37 KG/M2 | SYSTOLIC BLOOD PRESSURE: 90 MMHG | HEART RATE: 69 BPM | DIASTOLIC BLOOD PRESSURE: 60 MMHG

## 2024-07-15 DIAGNOSIS — I10 ESSENTIAL HYPERTENSION: ICD-10-CM

## 2024-07-15 DIAGNOSIS — E78.5 HYPERLIPIDEMIA LDL GOAL <70: ICD-10-CM

## 2024-07-15 DIAGNOSIS — I50.22 CHRONIC HFREF (HEART FAILURE WITH REDUCED EJECTION FRACTION): Primary | ICD-10-CM

## 2024-07-15 PROCEDURE — 99214 OFFICE O/P EST MOD 30 MIN: CPT | Performed by: INTERNAL MEDICINE

## 2024-07-15 RX ORDER — SACUBITRIL AND VALSARTAN 49; 51 MG/1; MG/1
1 TABLET, FILM COATED ORAL 2 TIMES DAILY
Qty: 180 TABLET | Refills: 3 | Status: SHIPPED | OUTPATIENT
Start: 2024-07-15

## 2024-07-15 NOTE — PROGRESS NOTES
Office Visit    Chief Complaint  Chronic HFrEF     Subjective            Nick Turk presents to [facility] CARDIOLOGY  History of Present Illness  Mr. Alicea is a 54 years old gentleman with heart failure with reduced ejection fraction hypertension hyperlipidemia was doing very.  His fatigue is still present but much improved.  His swelling in his feet has completely gone away and his shortness of breath is much improved.  He is on a slightly lower dose of carvedilol and that is working well with him.  He denies chest pain palpitations dizziness syncope.      Past Medical History:   Diagnosis Date    Back pain     Chronic pain syndrome     CVA (cerebral vascular accident) 5/14/2024    Essential hypertension 5/20/2024    Forgetfulness     Hyperlipidemia LDL goal <70 7/15/2024    Leg pain     Lumbago 2015    Lumbar spondylosis     Pain, generalized     Pars defect of lumbar spine 2015    Sleep disorder     Spondylolisthesis, lumbar region 2015    L4/5       Allergies   Allergen Reactions    Bee Venom Swelling    Meloxicam Confusion and Other (See Comments)        Past Surgical History:   Procedure Laterality Date    BACK SURGERY      CARDIAC CATHETERIZATION Left 5/21/2024    Procedure: Cardiac Catheterization/Vascular Study;  Surgeon: Lv Hunt MD;  Location: [facility] CATH INVASIVE LOCATION;  Service: Cardiovascular;  Laterality: Left;    CHOLECYSTECTOMY      COLONOSCOPY N/A 11/16/2021    Procedure: COLONOSCOPY WITH POLYPECTOMIES, CLIP APPLICATION X1, CAUTERY;  Surgeon: Nolberto Onofre MD;  Location: [facility] ENDOSCOPY;  Service: General;  Laterality: N/A;  COLON POLYPS    GANGLION CYST EXCISION      LUMBAR EPIDURAL INJECTION      LUMBAR FUSION  2015    SHOULDER ARTHROSCOPY W/ ROTATOR CUFF REPAIR Left 05/12/2022    Procedure: LEFT SHOULDER ARTHROSCOPY WITH ROTATOR CUFF REPAIR, SUBACROMINAL DECOMPRESSION, DISTAL CLAVICULECTOMY, BICEPS TENODESIS;  Surgeon: Abdoulaye Minaya MD;  Location: [facility]  "OR [facility];  Service: Orthopedics;  Laterality: Left;    SPINAL FUSION          Social History     Tobacco Use    Smoking status: Former     Average packs/day: 1 pack/day for 39.6 years (39.6 ttl pk-yrs)     Types: Cigarettes     Start date: 4/13/1984     Passive exposure: Past    Smokeless tobacco: Never   Vaping Use    Vaping status: Never Used   Substance Use Topics    Alcohol use: Never    Drug use: Never       Family History   Problem Relation Age of Onset    Heart failure Mother     Heart disease Mother     No Known Problems Father     Heart failure Sister     Malig Hyperthermia Neg Hx         Prior to Admission medications    Medication Sig Start Date End Date Taking? Authorizing Provider   aspirin 81 MG chewable tablet Chew 1 tablet Daily.   Yes Provider, MD Caleb   atorvastatin (LIPITOR) 80 MG tablet Take 1 tablet by mouth Every Night. 6/18/24  Yes Amisha Schulz APRN   carvedilol (COREG) 12.5 MG tablet Take 1 tablet by mouth 2 (Two) Times a Day. 5/30/24  Yes Lv Hunt MD   dapagliflozin Propanediol (Farxiga) 10 MG tablet Take 10 mg by mouth Daily. 6/18/24  Yes Amisha Schulz APRN   sacubitril-valsartan (Entresto) 24-26 MG tablet Take 1 tablet by mouth 2 (Two) Times a Day.  Patient taking differently: Take 2 tablets by mouth 2 (Two) Times a Day. 5/31/24  Yes Amisha Schulz APRN   spironolactone (ALDACTONE) 25 MG tablet Take 1 tablet by mouth Daily. 6/18/24  Yes Amisha Schulz APRN        Review of Systems   Constitutional:  Negative for fatigue.   Respiratory:  Positive for cough and shortness of breath.    Cardiovascular:  Negative for chest pain, palpitations and leg swelling.   Neurological:  Negative for dizziness.        Symptom Course: Improved    Weight Trend: Stable     Objective     BP 90/60   Pulse 69   Ht 182.9 cm (72\")   Wt 108 kg (239 lb)   BMI 32.41 kg/m²       Physical Exam  Constitutional:       General: He is awake.      Appearance: Normal appearance.   Neck:      " Thyroid: No thyromegaly.      Vascular: No carotid bruit or JVD.   Cardiovascular:      Rate and Rhythm: Normal rate and regular rhythm.      Chest Wall: PMI is not displaced.      Pulses: Normal pulses.      Heart sounds: Normal heart sounds, S1 normal and S2 normal. No murmur heard.     No friction rub. No gallop. No S3 or S4 sounds.   Pulmonary:      Effort: Pulmonary effort is normal.      Breath sounds: Normal breath sounds and air entry. No wheezing, rhonchi or rales.   Abdominal:      General: Bowel sounds are normal.      Palpations: Abdomen is soft. There is no mass.      Tenderness: There is no abdominal tenderness.   Musculoskeletal:      Cervical back: Neck supple.      Right lower leg: No edema.      Left lower leg: No edema.   Neurological:      Mental Status: He is alert and oriented to person, place, and time.   Psychiatric:         Mood and Affect: Mood normal.         Behavior: Behavior is cooperative.           Result Review :                      Lab Results   Component Value Date    PROBNP 213.6 06/18/2024    PROBNP 291.7 05/20/2024    PROBNP 610.7 05/15/2024     CMP          5/24/2024    04:30 6/18/2024    15:08 7/10/2024    15:22   CMP   Glucose 98  100  121    BUN 15  15  17    Creatinine 0.81  0.77  0.85    EGFR 105.4  107.1  103.9    Sodium 137  139  138    Potassium 4.3  4.3  4.4    Chloride 105  104  104    Calcium 9.0  9.1  9.3    Total Protein  7.1  6.4    Albumin  4.3  4.1    Globulin  2.8  2.3    Total Bilirubin  0.5  0.4    Alkaline Phosphatase  48  48    AST (SGOT)  20  23    ALT (SGPT)  37  32    Albumin/Globulin Ratio  1.5  1.8    BUN/Creatinine Ratio 18.5  19.5  20.0    Anion Gap 9.6  10.2  10.3      CBC w/diff          5/24/2024    04:30 6/18/2024    15:08 7/10/2024    15:22   CBC w/Diff   WBC 10.15  9.49  8.50    RBC 5.22  5.08  5.13    Hemoglobin 16.7  16.3  16.2    Hematocrit 49.0  47.8  48.8    MCV 93.9  94.1  95.1    MCH 32.0  32.1  31.6    MCHC 34.1  34.1  33.2    RDW 12.0  " 11.8  12.0    Platelets 212  228  213    Neutrophil Rel % 59.4  62.2  61.0    Immature Granulocyte Rel % 0.4  0.3  0.2    Lymphocyte Rel % 24.3  22.9  25.4    Monocyte Rel % 11.9  11.2  9.6    Eosinophil Rel % 3.2  2.8  3.2    Basophil Rel % 0.8  0.6  0.6       Lipid Panel          3/19/2024    08:46 5/14/2024    03:51 5/23/2024    04:55   Lipid Panel   Total Cholesterol 114  94  78    Triglycerides 91  104  166    HDL Cholesterol 37  30  31    VLDL Cholesterol 18  20  27    LDL Cholesterol  59  44  20    LDL/HDL Ratio  1.44  0.45       Lab Results   Component Value Date    TSH 1.620 03/19/2024    TSH 1.300 08/16/2023    TSH 1.220 10/08/2021      No results found for: \"FREET4\"   No results found for: \"DDIMERQUANT\"  Magnesium   Date Value Ref Range Status   07/10/2024 1.9 1.6 - 2.6 mg/dL Final      No results found for: \"DIGOXIN\"   A1C Last 3 Results          5/14/2024    03:51 5/23/2024    04:55   HGBA1C Last 3 Results   Hemoglobin A1C 5.00  5.50           Results for orders placed during the hospital encounter of 05/13/24    Adult Transthoracic Echo Complete W/ Cont if Necessary Per Protocol (With Agitated Saline)    Interpretation Summary    The study is technically difficult for diagnosis.    Left ventricular systolic function is moderately decreased. Estimated left ventricular EF = 35%    Left ventricular wall thickness is consistent with hypertrophy probable mild in nature difficult to say if his ventricular not as walls were not well-seen in multiple different views    The left atrial cavity is mildly dilated.    Global hypokinesis more severely affecting the septum with a dyssynchronous contraction pattern possibly secondary  Conduction abnormality        Assessment and Plan        Diagnoses and all orders for this visit:    1. Chronic HFrEF (heart failure with reduced ejection fraction) (Primary)  Assessment & Plan:  Mr. Romero has heart failure with reduced ejection fraction who appears to be well " compensated.  His short of air and pedal edema have resolved.  His exercise capacity has improved.  He was recently seen by Dr. Bautista in which the carvedilol was decreased back to 12.5 due to bradycardia and fatigue.  He is doing much better today.  Will continue carvedilol, Farxiga, Entresto and spironolactone at the current doses.    Renewed his Entresto for his current prescription 24-26 mg Entresto 2 tablets twice a day due to the new prescription of Entresto 49-51 mg 1 tablet twice a day.  2.  Continue heart rate blood pressure and weight log and bring it to us.  3.  Do blood work 1 or 2 days before your next appointment    Orders:  -     Magnesium; Future  -     Comprehensive Metabolic Panel; Future  -     CBC & Differential; Future  -     proBNP; Future  -     Adult Transthoracic Echo Complete W/ Cont if Necessary Per Protocol; Future    2. Essential hypertension  Assessment & Plan:  Mr. Turk has a history of hypertension.  Blood pressure tends to run a little on the low side with the current dose of Entresto.  Will continue Entresto and carvedilol at the current doses.    Orders:  -     Magnesium; Future  -     Comprehensive Metabolic Panel; Future  -     CBC & Differential; Future  -     proBNP; Future  -     Adult Transthoracic Echo Complete W/ Cont if Necessary Per Protocol; Future    3. Hyperlipidemia LDL goal <70  Assessment & Plan:  Mr. Turk lipid panel is at goal.  Will continue atorvastatin at the current dose.      Other orders  -     sacubitril-valsartan (Entresto) 49-51 MG tablet; Take 1 tablet by mouth 2 (Two) Times a Day.  Dispense: 180 tablet; Refill: 3            Follow Up     Return for FU 6 WEEKS..    Patient was given instructions and counseling regarding his condition or for health maintenance advice. Please see specific information pulled into the AVS if appropriate.     Electronically signed by Phoebe Mustafa MD, 07/15/24, 4:52 PM EDT.

## 2024-07-15 NOTE — ASSESSMENT & PLAN NOTE
Mr. Turk has a history of hypertension.  Blood pressure tends to run a little on the low side with the current dose of Entresto.  Will continue Entresto and carvedilol at the current doses.

## 2024-07-15 NOTE — ASSESSMENT & PLAN NOTE
Mr. Romero has heart failure with reduced ejection fraction who appears to be well compensated.  His short of air and pedal edema have resolved.  His exercise capacity has improved.  He was recently seen by Dr. Bautista in which the carvedilol was decreased back to 12.5 due to bradycardia and fatigue.  He is doing much better today.  Will continue carvedilol, Farxiga, Entresto and spironolactone at the current doses.    Renewed his Entresto for his current prescription 24-26 mg Entresto 2 tablets twice a day due to the new prescription of Entresto 49-51 mg 1 tablet twice a day.  2.  Continue heart rate blood pressure and weight log and bring it to us.  3.  Do blood work 1 or 2 days before your next appointment

## 2024-07-15 NOTE — PATIENT INSTRUCTIONS
Renewed his Entresto for her current prescription 24-26 mg Entresto 2 tablets twice a day due to the new prescription of Entresto 49-51 mg 1 tablet twice a day.  2.  Continue heart rate blood pressure and weight log and bring it to us.  3.  Do blood work 1 or 2 days before your next appointment

## 2024-07-15 NOTE — TELEPHONE ENCOUNTER
[facility] received a fax that requires your attention. The document has been indexed to the patient’s chart for your review.      Reason for sending: EXTERNAL MEDICAL RECORD NOTIFICATION     Documents Description: ENTRESTO [id number]    Name of Sender: LESLIE     Date Indexed: 7.15.24

## 2024-08-02 DIAGNOSIS — Z12.2 SCREENING FOR LUNG CANCER: Primary | ICD-10-CM

## 2024-08-09 ENCOUNTER — OFFICE VISIT (OUTPATIENT)
Dept: CARDIAC REHAB | Facility: HOSPITAL | Age: 54
End: 2024-08-09
Payer: COMMERCIAL

## 2024-08-09 VITALS
BODY MASS INDEX: 31.51 KG/M2 | DIASTOLIC BLOOD PRESSURE: 60 MMHG | HEART RATE: 80 BPM | SYSTOLIC BLOOD PRESSURE: 100 MMHG | OXYGEN SATURATION: 94 % | WEIGHT: 232.37 LBS

## 2024-08-09 DIAGNOSIS — I50.22 CHRONIC SYSTOLIC CHF (CONGESTIVE HEART FAILURE), NYHA CLASS 2: Primary | ICD-10-CM

## 2024-08-09 PROCEDURE — 93798 PHYS/QHP OP CAR RHAB W/ECG: CPT

## 2024-08-09 NOTE — PROGRESS NOTES
Phase II and III Education    Discipline Teaching:  Cardiac Rehab Phase II [x]      Cardiac Rehab Phase III []      Pulmonary Rehab II []      Pulmonary Rehab III []      Peripheral Artery Disease []        Class Given:  Asthma Management []      Beginners Cardiac Rehab []      Beginners Pulmonary Rehab []      CAD I []      CAD II []      CHF []      Diabetes Mellitus []     Diet & Cholesterol []     Diet Consult []      Energy Conservation []     Exercise []     Grocery Store Tour []     Harmonica Therapy []     High Blood Pressure []     Living with COPD []     Medication Safety []     Peripheral Artery Disease []     S & S of Infection []      Stress []        Education Topics:  Angina []      Arrhythmias []      Asthma Management []      Beginning Cardiac Rehab [x]      Blood Pressure [x]     Blood Sugar [x]     Breathing Retrainment []     CHF [x]     Cooking []     Daily Weight [x]     Diabetes Mellitus []      Diet [x]     Energy Conservation []     Exercise [x]      Foot Care []      Grocery Store Tour []      Heart Disease []      Heart Function []      Incision Care []     Living with COPD []     Medication Safety []     PAD Symptoms/Treatment []     Reconditioning Exercises []     S & S Infection []     Smoking Consult []     Stress Management []     Test Results []       Teaching Aids:  Video [x]      PAD Handout []      Pulmonary Workbook []      CAD Teaching Packet [x]      Stress Teaching Packet []     Exercise Teaching Packet [x]      Diet Teaching Packet [x]      CHF Teaching Packet [x]      Blood Pressure Teaching Packet [x]      Smoking Cessation Packet []      Medication Safety Handout []      Pflex Training []      Diabetes Teaching Packet []      Harmonica Therapy Packet []        Teaching Method:  Discussion [x]      Written Information [x]      Audio Visual [x]      Demonstration []        Teaching Recipient:  Patient [x]      Family []      Friend []      Legal Guardian []      Primary Care  Giver []      Significant Other [x]        Barriers To Learning:  None [x]      Auditory []      Cultural []      Emotional []      Financial []      Language []    Mental []      Motivation []      Physical []      Reading Skills []      Rastafarian []      Verbal/Cognitive []      Visual []      Written/Cognitive []        Teaching Response:  Verified Via Teach back [x]      Return Demo Via Teach Back []      Reinforcement Needed []      Unable to Return Demo []      Unable to Comprehend []      Declines Teaching  []        Additional Education Topics:  Discussed Lipid Panel, A1C, Meds, daily weight, low sodium, b/p checks  Discussed and verbalized understanding to notify staff of any chest pain, soa , dizziness  Follow Up Instruction Comment:  To notify staff of any need for  or counseling

## 2024-08-09 NOTE — PROGRESS NOTES
Chief Complaint  Cardiac Rehab Phase II    Nick Turk presents to [facility] CARDIOPULMONARY REHABILITATION    Physical Exam  Cardiovascular:      Rate and Rhythm: Normal rate and regular rhythm.      Pulses: Normal pulses.      Heart sounds: Normal heart sounds.   Pulmonary:      Effort: Pulmonary effort is normal.      Breath sounds: Normal breath sounds.      Comments: Bilaterally clear  Musculoskeletal:         General: Normal range of motion.   Skin:     General: Skin is warm and dry.   Neurological:      Mental Status: He is oriented to person, place, and time.   Psychiatric:      Comments: PHQ-9 at at 10 declined  at this time  [facility] [place] at 18      Result Review :          Assessment and Plan    Diagnoses and all orders for this visit:    1. Chronic systolic CHF (congestive heart failure), NYHA class 2 (Primary)        Ashley Victoria RN     See scanned notes attached.

## 2024-08-12 ENCOUNTER — TREATMENT (OUTPATIENT)
Dept: CARDIAC REHAB | Facility: HOSPITAL | Age: 54
End: 2024-08-12
Payer: COMMERCIAL

## 2024-08-12 DIAGNOSIS — I50.22 CHRONIC SYSTOLIC CHF (CONGESTIVE HEART FAILURE), NYHA CLASS 2: Primary | ICD-10-CM

## 2024-08-12 PROCEDURE — 93798 PHYS/QHP OP CAR RHAB W/ECG: CPT

## 2024-08-14 ENCOUNTER — TREATMENT (OUTPATIENT)
Dept: CARDIAC REHAB | Facility: HOSPITAL | Age: 54
End: 2024-08-14
Payer: COMMERCIAL

## 2024-08-14 DIAGNOSIS — I50.22 CHRONIC SYSTOLIC CHF (CONGESTIVE HEART FAILURE), NYHA CLASS 2: Primary | ICD-10-CM

## 2024-08-14 PROCEDURE — 93798 PHYS/QHP OP CAR RHAB W/ECG: CPT

## 2024-08-19 ENCOUNTER — TREATMENT (OUTPATIENT)
Dept: CARDIAC REHAB | Facility: HOSPITAL | Age: 54
End: 2024-08-19
Payer: COMMERCIAL

## 2024-08-19 DIAGNOSIS — I50.22 CHRONIC SYSTOLIC CHF (CONGESTIVE HEART FAILURE), NYHA CLASS 2: Primary | ICD-10-CM

## 2024-08-19 PROCEDURE — 93798 PHYS/QHP OP CAR RHAB W/ECG: CPT

## 2024-08-20 ENCOUNTER — HOSPITAL ENCOUNTER (OUTPATIENT)
Dept: CARDIOLOGY | Facility: HOSPITAL | Age: 54
Discharge: HOME | End: 2024-08-20
Admitting: INTERNAL MEDICINE
Payer: COMMERCIAL

## 2024-08-20 DIAGNOSIS — I50.22 CHRONIC HFREF (HEART FAILURE WITH REDUCED EJECTION FRACTION): ICD-10-CM

## 2024-08-20 DIAGNOSIS — I10 ESSENTIAL HYPERTENSION: ICD-10-CM

## 2024-08-20 LAB
ASCENDING AORTA: 3.2 CM
BH CV ECHO MEAS - AO MAX PG: 6.6 MMHG
BH CV ECHO MEAS - AO MEAN PG: 3.6 MMHG
BH CV ECHO MEAS - AO ROOT DIAM: 3.5 CM
BH CV ECHO MEAS - AO V2 MAX: 128.6 CM/SEC
BH CV ECHO MEAS - AO V2 VTI: 28.2 CM
BH CV ECHO MEAS - AVA(I,D): 2.22 CM2
BH CV ECHO MEAS - EDV(CUBED): 105.7 ML
BH CV ECHO MEAS - EDV(MOD-SP2): 111 ML
BH CV ECHO MEAS - EDV(MOD-SP4): 102 ML
BH CV ECHO MEAS - EF(MOD-BP): 45.3 %
BH CV ECHO MEAS - EF(MOD-SP2): 48.1 %
BH CV ECHO MEAS - EF(MOD-SP4): 39.2 %
BH CV ECHO MEAS - ESV(CUBED): 44.7 ML
BH CV ECHO MEAS - ESV(MOD-SP2): 57.6 ML
BH CV ECHO MEAS - ESV(MOD-SP4): 62 ML
BH CV ECHO MEAS - FS: 24.9 %
BH CV ECHO MEAS - IVS/LVPW: 1.12 CM
BH CV ECHO MEAS - IVSD: 1.52 CM
BH CV ECHO MEAS - LA DIMENSION: 4.2 CM
BH CV ECHO MEAS - LAT PEAK E' VEL: 6.3 CM/SEC
BH CV ECHO MEAS - LV DIASTOLIC VOL/BSA (35-75): 44.9 CM2
BH CV ECHO MEAS - LV MASS(C)D: 278.5 GRAMS
BH CV ECHO MEAS - LV MAX PG: 4.3 MMHG
BH CV ECHO MEAS - LV MEAN PG: 2.14 MMHG
BH CV ECHO MEAS - LV SYSTOLIC VOL/BSA (12-30): 27.3 CM2
BH CV ECHO MEAS - LV V1 MAX: 103.4 CM/SEC
BH CV ECHO MEAS - LV V1 VTI: 19.7 CM
BH CV ECHO MEAS - LVIDD: 4.7 CM
BH CV ECHO MEAS - LVIDS: 3.5 CM
BH CV ECHO MEAS - LVOT AREA: 3.2 CM2
BH CV ECHO MEAS - LVOT DIAM: 2.01 CM
BH CV ECHO MEAS - LVPWD: 1.36 CM
BH CV ECHO MEAS - MED PEAK E' VEL: 3.9 CM/SEC
BH CV ECHO MEAS - MV A MAX VEL: 64.7 CM/SEC
BH CV ECHO MEAS - MV DEC SLOPE: 295.9 CM/SEC2
BH CV ECHO MEAS - MV DEC TIME: 0.25 SEC
BH CV ECHO MEAS - MV E MAX VEL: 69 CM/SEC
BH CV ECHO MEAS - MV E/A: 1.07
BH CV ECHO MEAS - MV MEAN PG: 1.02 MMHG
BH CV ECHO MEAS - MV P1/2T: 76 MSEC
BH CV ECHO MEAS - MV V2 VTI: 29.5 CM
BH CV ECHO MEAS - MVA(P1/2T): 2.9 CM2
BH CV ECHO MEAS - MVA(VTI): 2.12 CM2
BH CV ECHO MEAS - RVDD: 2.7 CM
BH CV ECHO MEAS - SV(LVOT): 62.6 ML
BH CV ECHO MEAS - SV(MOD-SP2): 53.4 ML
BH CV ECHO MEAS - SV(MOD-SP4): 40 ML
BH CV ECHO MEAS - SVI(LVOT): 27.5 ML/M2
BH CV ECHO MEAS - SVI(MOD-SP2): 23.5 ML/M2
BH CV ECHO MEAS - SVI(MOD-SP4): 17.6 ML/M2
BH CV ECHO MEASUREMENTS AVERAGE E/E' RATIO: 13.53
LEFT ATRIUM VOLUME INDEX: 31.7 ML/M2

## 2024-08-20 PROCEDURE — 93306 TTE W/DOPPLER COMPLETE: CPT | Performed by: INTERNAL MEDICINE

## 2024-08-20 PROCEDURE — 93306 TTE W/DOPPLER COMPLETE: CPT

## 2024-08-21 ENCOUNTER — TREATMENT (OUTPATIENT)
Dept: CARDIAC REHAB | Facility: HOSPITAL | Age: 54
End: 2024-08-21
Payer: COMMERCIAL

## 2024-08-21 DIAGNOSIS — I50.22 CHRONIC SYSTOLIC CHF (CONGESTIVE HEART FAILURE), NYHA CLASS 2: Primary | ICD-10-CM

## 2024-08-21 PROCEDURE — 93798 PHYS/QHP OP CAR RHAB W/ECG: CPT

## 2024-08-26 ENCOUNTER — TREATMENT (OUTPATIENT)
Dept: CARDIAC REHAB | Facility: HOSPITAL | Age: 54
End: 2024-08-26
Payer: COMMERCIAL

## 2024-08-26 ENCOUNTER — LAB (OUTPATIENT)
Dept: LAB | Facility: HOSPITAL | Age: 54
End: 2024-08-26
Payer: COMMERCIAL

## 2024-08-26 ENCOUNTER — OFFICE VISIT (OUTPATIENT)
Dept: CARDIOLOGY | Facility: CLINIC | Age: 54
End: 2024-08-26
Payer: COMMERCIAL

## 2024-08-26 VITALS
HEIGHT: 72 IN | BODY MASS INDEX: 31.29 KG/M2 | HEART RATE: 58 BPM | WEIGHT: 231 LBS | SYSTOLIC BLOOD PRESSURE: 100 MMHG | DIASTOLIC BLOOD PRESSURE: 68 MMHG

## 2024-08-26 DIAGNOSIS — I50.22 CHRONIC SYSTOLIC CHF (CONGESTIVE HEART FAILURE), NYHA CLASS 2: Primary | ICD-10-CM

## 2024-08-26 DIAGNOSIS — I10 ESSENTIAL HYPERTENSION: ICD-10-CM

## 2024-08-26 DIAGNOSIS — I50.22 CHRONIC HFREF (HEART FAILURE WITH REDUCED EJECTION FRACTION): Primary | ICD-10-CM

## 2024-08-26 DIAGNOSIS — E78.5 HYPERLIPIDEMIA LDL GOAL <70: ICD-10-CM

## 2024-08-26 DIAGNOSIS — I50.22 CHRONIC HFREF (HEART FAILURE WITH REDUCED EJECTION FRACTION): ICD-10-CM

## 2024-08-26 LAB
ALBUMIN SERPL-MCNC: 4.3 G/DL (ref 3.5–5.2)
ALBUMIN/GLOB SERPL: 1.6 G/DL
ALP SERPL-CCNC: 52 U/L (ref 39–117)
ALT SERPL W P-5'-P-CCNC: 31 U/L (ref 1–41)
ANION GAP SERPL CALCULATED.3IONS-SCNC: 9.7 MMOL/L (ref 5–15)
AST SERPL-CCNC: 20 U/L (ref 1–40)
BASOPHILS # BLD AUTO: 0.04 10*3/MM3 (ref 0–0.2)
BASOPHILS NFR BLD AUTO: 0.5 % (ref 0–1.5)
BILIRUB SERPL-MCNC: 0.6 MG/DL (ref 0–1.2)
BUN SERPL-MCNC: 15 MG/DL (ref 6–20)
BUN/CREAT SERPL: 15 (ref 7–25)
CALCIUM SPEC-SCNC: 9.6 MG/DL (ref 8.6–10.5)
CHLORIDE SERPL-SCNC: 105 MMOL/L (ref 98–107)
CO2 SERPL-SCNC: 22.3 MMOL/L (ref 22–29)
CREAT SERPL-MCNC: 1 MG/DL (ref 0.76–1.27)
DEPRECATED RDW RBC AUTO: 42.2 FL (ref 37–54)
EGFRCR SERPLBLD CKD-EPI 2021: 90 ML/MIN/1.73
EOSINOPHIL # BLD AUTO: 0.26 10*3/MM3 (ref 0–0.4)
EOSINOPHIL NFR BLD AUTO: 3.1 % (ref 0.3–6.2)
ERYTHROCYTE [DISTWIDTH] IN BLOOD BY AUTOMATED COUNT: 12.2 % (ref 12.3–15.4)
GLOBULIN UR ELPH-MCNC: 2.7 GM/DL
GLUCOSE SERPL-MCNC: 111 MG/DL (ref 65–99)
HCT VFR BLD AUTO: 49.7 % (ref 37.5–51)
HGB BLD-MCNC: 16.4 G/DL (ref 13–17.7)
IMM GRANULOCYTES # BLD AUTO: 0.03 10*3/MM3 (ref 0–0.05)
IMM GRANULOCYTES NFR BLD AUTO: 0.4 % (ref 0–0.5)
LYMPHOCYTES # BLD AUTO: 1.81 10*3/MM3 (ref 0.7–3.1)
LYMPHOCYTES NFR BLD AUTO: 21.5 % (ref 19.6–45.3)
MAGNESIUM SERPL-MCNC: 2 MG/DL (ref 1.6–2.6)
MCH RBC QN AUTO: 31.5 PG (ref 26.6–33)
MCHC RBC AUTO-ENTMCNC: 33 G/DL (ref 31.5–35.7)
MCV RBC AUTO: 95.4 FL (ref 79–97)
MONOCYTES # BLD AUTO: 0.84 10*3/MM3 (ref 0.1–0.9)
MONOCYTES NFR BLD AUTO: 10 % (ref 5–12)
NEUTROPHILS NFR BLD AUTO: 5.42 10*3/MM3 (ref 1.7–7)
NEUTROPHILS NFR BLD AUTO: 64.5 % (ref 42.7–76)
NRBC BLD AUTO-RTO: 0 /100 WBC (ref 0–0.2)
NT-PROBNP SERPL-MCNC: 76 PG/ML (ref 0–900)
PLATELET # BLD AUTO: 224 10*3/MM3 (ref 140–450)
PMV BLD AUTO: 11.4 FL (ref 6–12)
POTASSIUM SERPL-SCNC: 4.5 MMOL/L (ref 3.5–5.2)
PROT SERPL-MCNC: 7 G/DL (ref 6–8.5)
RBC # BLD AUTO: 5.21 10*6/MM3 (ref 4.14–5.8)
SODIUM SERPL-SCNC: 137 MMOL/L (ref 136–145)
WBC NRBC COR # BLD AUTO: 8.4 10*3/MM3 (ref 3.4–10.8)

## 2024-08-26 PROCEDURE — 99214 OFFICE O/P EST MOD 30 MIN: CPT

## 2024-08-26 PROCEDURE — 83880 ASSAY OF NATRIURETIC PEPTIDE: CPT

## 2024-08-26 PROCEDURE — 36415 COLL VENOUS BLD VENIPUNCTURE: CPT

## 2024-08-26 PROCEDURE — 83735 ASSAY OF MAGNESIUM: CPT

## 2024-08-26 PROCEDURE — 80053 COMPREHEN METABOLIC PANEL: CPT

## 2024-08-26 PROCEDURE — 93798 PHYS/QHP OP CAR RHAB W/ECG: CPT

## 2024-08-26 PROCEDURE — 85025 COMPLETE CBC W/AUTO DIFF WBC: CPT

## 2024-08-26 NOTE — PROGRESS NOTES
Office Visit    Chief Complaint  Chronic systolic congestive heart failure, NYHA class 2    Subjective            Nick Turk presents to [facility] CARDIOLOGY  History of Present Illness  Mr. Turk is a 54-year-old male that presented to the office today for follow-up due to heart failure with reduced ejection fraction, hypertension, and hyperlipidemia who is doing very well.  His short of air and edema have both resolved.  He continues to feel fatigued.  Mr. Turk has been walking approximately an hour a day as well as participating in cardiac rehab.  Heart rate and blood pressure are both on the lower side of normal today.  He recently underwent a echocardiogram with an improvement of his ejection fraction from 35% up to 46 to 50%.  He denies any chest pain, palpitations, dizziness, orthopnea or syncopal episodes.      Past Medical History:   Diagnosis Date    Back pain     CHF (congestive heart failure)     Chronic pain syndrome     CVA (cerebral vascular accident) 05/14/2024    Essential hypertension 05/20/2024    Forgetfulness     Hyperlipidemia LDL goal <70 07/15/2024    Leg pain     Lumbago 2015    Lumbar spondylosis     Pain, generalized     Pars defect of lumbar spine 2015    Sleep apnea     Sleep disorder     Spondylolisthesis, lumbar region 2015    L4/5       Allergies   Allergen Reactions    Bee Venom Swelling    Meloxicam Confusion and Other (See Comments)        Past Surgical History:   Procedure Laterality Date    BACK SURGERY      CARDIAC CATHETERIZATION Left 05/21/2024    Procedure: Cardiac Catheterization/Vascular Study;  Surgeon: Lv Hunt MD;  Location: [facility] CATH INVASIVE LOCATION;  Service: Cardiovascular;  Laterality: Left;    CHOLECYSTECTOMY      COLONOSCOPY N/A 11/16/2021    Procedure: COLONOSCOPY WITH POLYPECTOMIES, CLIP APPLICATION X1, CAUTERY;  Surgeon: Nolberto Onofre MD;  Location: [facility] ENDOSCOPY;  Service: General;  Laterality: N/A;  COLON POLYPS     GANGLION CYST EXCISION      LUMBAR EPIDURAL INJECTION      LUMBAR FUSION  2015    SHOULDER ARTHROSCOPY W/ ROTATOR CUFF REPAIR Left 05/12/2022    Procedure: LEFT SHOULDER ARTHROSCOPY WITH ROTATOR CUFF REPAIR, SUBACROMINAL DECOMPRESSION, DISTAL CLAVICULECTOMY, BICEPS TENODESIS;  Surgeon: Abdoulaye Minaya MD;  Location: [facility] OR [facility];  Service: Orthopedics;  Laterality: Left;    SPINAL FUSION          Social History     Tobacco Use    Smoking status: Former     Average packs/day: 1 pack/day for 39.6 years (39.6 ttl pk-yrs)     Types: Cigarettes     Start date: 4/13/1984     Passive exposure: Past    Smokeless tobacco: Never   Vaping Use    Vaping status: Never Used   Substance Use Topics    Alcohol use: Never    Drug use: Never       Family History   Problem Relation Age of Onset    Heart failure Mother     Heart disease Mother     No Known Problems Father     Heart failure Sister     Malig Hyperthermia Neg Hx         Prior to Admission medications    Medication Sig Start Date End Date Taking? Authorizing Provider   aspirin 81 MG chewable tablet Chew 1 tablet Daily.   Yes Provider, MD Caleb   atorvastatin (LIPITOR) 80 MG tablet Take 1 tablet by mouth Every Night. 6/18/24  Yes Amisha Schulz APRN   carvedilol (COREG) 12.5 MG tablet Take 1 tablet by mouth 2 (Two) Times a Day. 5/30/24  Yes Lv Hunt MD   dapagliflozin Propanediol (Farxiga) 10 MG tablet Take 10 mg by mouth Daily. 6/18/24  Yes Amisha Schulz APRN   sacubitril-valsartan (Entresto) 49-51 MG tablet Take 1 tablet by mouth 2 (Two) Times a Day. 7/15/24  Yes Phoebe Mustafa MD   spironolactone (ALDACTONE) 25 MG tablet Take 1 tablet by mouth Daily. 6/18/24  Yes Amisha Schulz APRN        Review of Systems   Constitutional:  Positive for fatigue.   Respiratory:  Negative for cough and shortness of breath.    Cardiovascular:  Negative for chest pain, palpitations and leg swelling.   Neurological:  Negative for dizziness.        Symptom Course:  "Improved    Weight Trend: Decreasing Steadily      Objective     /68   Pulse 58   Ht 182.9 cm (72\")   Wt 105 kg (231 lb)   BMI 31.33 kg/m²       Physical Exam  Constitutional:       General: He is awake.      Appearance: Normal appearance.   Neck:      Thyroid: No thyromegaly.      Vascular: No carotid bruit or JVD.   Cardiovascular:      Rate and Rhythm: Normal rate and regular rhythm.      Chest Wall: PMI is not displaced.      Pulses: Normal pulses.      Heart sounds: Normal heart sounds, S1 normal and S2 normal. No murmur heard.     No friction rub. No gallop. No S3 or S4 sounds.   Pulmonary:      Effort: Pulmonary effort is normal.      Breath sounds: Normal breath sounds and air entry. No wheezing, rhonchi or rales.   Abdominal:      General: Bowel sounds are normal.      Palpations: Abdomen is soft. There is no mass.      Tenderness: There is no abdominal tenderness.   Musculoskeletal:      Cervical back: Neck supple.      Right lower leg: No edema.      Left lower leg: No edema.   Neurological:      Mental Status: He is alert and oriented to person, place, and time.   Psychiatric:         Mood and Affect: Mood normal.         Behavior: Behavior is cooperative.           Result Review :                      Lab Results   Component Value Date    PROBNP 213.6 06/18/2024    PROBNP 291.7 05/20/2024    PROBNP 610.7 05/15/2024     CMP          5/24/2024    04:30 6/18/2024    15:08 7/10/2024    15:22   CMP   Glucose 98  100  121    BUN 15  15  17    Creatinine 0.81  0.77  0.85    EGFR 105.4  107.1  103.9    Sodium 137  139  138    Potassium 4.3  4.3  4.4    Chloride 105  104  104    Calcium 9.0  9.1  9.3    Total Protein  7.1  6.4    Albumin  4.3  4.1    Globulin  2.8  2.3    Total Bilirubin  0.5  0.4    Alkaline Phosphatase  48  48    AST (SGOT)  20  23    ALT (SGPT)  37  32    Albumin/Globulin Ratio  1.5  1.8    BUN/Creatinine Ratio 18.5  19.5  20.0    Anion Gap 9.6  10.2  10.3      CBC w/diff          " "5/24/2024    04:30 6/18/2024    15:08 7/10/2024    15:22   CBC w/Diff   WBC 10.15  9.49  8.50    RBC 5.22  5.08  5.13    Hemoglobin 16.7  16.3  16.2    Hematocrit 49.0  47.8  48.8    MCV 93.9  94.1  95.1    MCH 32.0  32.1  31.6    MCHC 34.1  34.1  33.2    RDW 12.0  11.8  12.0    Platelets 212  228  213    Neutrophil Rel % 59.4  62.2  61.0    Immature Granulocyte Rel % 0.4  0.3  0.2    Lymphocyte Rel % 24.3  22.9  25.4    Monocyte Rel % 11.9  11.2  9.6    Eosinophil Rel % 3.2  2.8  3.2    Basophil Rel % 0.8  0.6  0.6       Lipid Panel          3/19/2024    08:46 5/14/2024    03:51 5/23/2024    04:55   Lipid Panel   Total Cholesterol 114  94  78    Triglycerides 91  104  166    HDL Cholesterol 37  30  31    VLDL Cholesterol 18  20  27    LDL Cholesterol  59  44  20    LDL/HDL Ratio  1.44  0.45       Lab Results   Component Value Date    TSH 1.620 03/19/2024    TSH 1.300 08/16/2023    TSH 1.220 10/08/2021      No results found for: \"FREET4\"   No results found for: \"DDIMERQUANT\"  Magnesium   Date Value Ref Range Status   07/10/2024 1.9 1.6 - 2.6 mg/dL Final      No results found for: \"DIGOXIN\"   A1C Last 3 Results          5/14/2024    03:51 5/23/2024    04:55   HGBA1C Last 3 Results   Hemoglobin A1C 5.00  5.50           Results for orders placed during the hospital encounter of 08/20/24    Adult Transthoracic Echo Complete W/ Cont if Necessary Per Protocol    Interpretation Summary    Left ventricular ejection fraction appears to be 46 - 50%.    Left ventricular wall thickness is consistent with mild to moderate concentric hypertrophy.    Left ventricular diastolic function is consistent with (grade I) impaired relaxation.    The left atrial cavity is mildly dilated.        Assessment and Plan        Diagnoses and all orders for this visit:    1. Chronic HFrEF (heart failure with reduced ejection fraction) (Primary)  Assessment & Plan:  Mr. Turk has heart failure with reduced ejection fraction that has significantly " improved over the past few months.  He is tolerating his current dose of Entresto, Farxiga, spironolactone and carvedilol.  Will continue the same.  He has an appointment with Dr. Xiao tomorrow.  Follow-up as needed.    1.  Continue medication as prescribed.  2.  Do a heart rate blood pressure and weight log.  3.  Do blood work today.  We will call with results and possible changes as needed.      2. Hyperlipidemia LDL goal <70  Assessment & Plan:  Patient's lipids are at goal.  Will continue atorvastatin at the current dose.      3. Essential hypertension  Assessment & Plan:  Mr. Turk blood pressure is on the lower side of normal today.  He does report fatigue.  I am unable to increase his Entresto.  I have asked him to continue to monitor his heart rate blood pressure and weight.              Follow Up     Return for Follow-up with Dr. Bautista tomorrow..    Patient was given instructions and counseling regarding his condition or for health maintenance advice. Please see specific information pulled into the AVS if appropriate.     Electronically signed by OMID Valencia, 08/26/24, 10:59 AM EDT.

## 2024-08-26 NOTE — PATIENT INSTRUCTIONS
1.  Continue medication as prescribed.  2.  Do a heart rate blood pressure and weight log.  3.  Do blood work today

## 2024-08-26 NOTE — ASSESSMENT & PLAN NOTE
Mr. Turk blood pressure is on the lower side of normal today.  He does report fatigue.  I am unable to increase his Entresto.  I have asked him to continue to monitor his heart rate blood pressure and weight.  
Mr. Turk has heart failure with reduced ejection fraction that has significantly improved over the past few months.  He is tolerating his current dose of Entresto, Farxiga, spironolactone and carvedilol.  Will continue the same.  He has an appointment with Dr. Xiao tomorrow.  Follow-up as needed.    1.  Continue medication as prescribed.  2.  Do a heart rate blood pressure and weight log.  3.  Do blood work today.  We will call with results and possible changes as needed.  
Patient's lipids are at goal.  Will continue atorvastatin at the current dose.  
Home

## 2024-08-27 ENCOUNTER — OFFICE VISIT (OUTPATIENT)
Dept: CARDIOLOGY | Facility: CLINIC | Age: 54
End: 2024-08-27
Payer: COMMERCIAL

## 2024-08-27 VITALS
BODY MASS INDEX: 31.15 KG/M2 | WEIGHT: 230 LBS | HEIGHT: 72 IN | DIASTOLIC BLOOD PRESSURE: 78 MMHG | SYSTOLIC BLOOD PRESSURE: 112 MMHG | HEART RATE: 65 BPM

## 2024-08-27 DIAGNOSIS — I63.439 CEREBROVASCULAR ACCIDENT (CVA) DUE TO EMBOLISM OF POSTERIOR CEREBRAL ARTERY, UNSPECIFIED BLOOD VESSEL LATERALITY: Primary | ICD-10-CM

## 2024-08-27 DIAGNOSIS — I10 ESSENTIAL HYPERTENSION: ICD-10-CM

## 2024-08-27 DIAGNOSIS — I42.8 NON-ISCHEMIC CARDIOMYOPATHY: ICD-10-CM

## 2024-08-27 NOTE — PROGRESS NOTES
CARDIOLOGY FOLLOW-UP PROGRESS NOTE        Chief Complaint  Follow-up, Hypertension, and Congestive Heart Failure    Subjective            Nick Turk presents to [facility] CARDIOLOGY  History of Present Illness      The patient is cardiac wise stable. Denies angina, dyspnea or palpitations. He is able to continue with cardiac rehabilitation and to exercise at home. The ECHO showed EF is up to 45-50 %. He continues with some gait disturbances and visual problems but not diplopia.       Past History:    Medical History:  Past Medical History:   Diagnosis Date    Back pain     CHF (congestive heart failure)     Chronic pain syndrome     CVA (cerebral vascular accident) 05/14/2024    Essential hypertension 05/20/2024    Forgetfulness     Hyperlipidemia LDL goal <70 07/15/2024    Leg pain     Lumbago 2015    Lumbar spondylosis     Pain, generalized     Pars defect of lumbar spine 2015    Sleep apnea     Sleep disorder     Spondylolisthesis, lumbar region 2015    L4/5       Surgical History: has a past surgical history that includes Cholecystectomy; Ganglion cyst excision; Lumbar epidural injection; Lumbar fusion (2015); Colonoscopy (N/A, 11/16/2021); Shoulder arthroscopy w/ rotator cuff repair (Left, 05/12/2022); Back surgery; Spinal fusion; and Cardiac catheterization (Left, 05/21/2024).     Family History: family history includes Heart disease in his mother; Heart failure in his mother and sister; No Known Problems in his father.     Social History: reports that he has quit smoking. His smoking use included cigarettes. He started smoking about 40 years ago. He has a 39.6 pack-year smoking history. He has been exposed to tobacco smoke. He has never used smokeless tobacco. He reports that he does not drink alcohol and does not use drugs.    Allergies: Bee venom and Meloxicam    Current Outpatient Medications on File Prior to Visit   Medication Sig    aspirin 81 MG chewable tablet Chew 1 tablet  "Daily.    atorvastatin (LIPITOR) 80 MG tablet Take 1 tablet by mouth Every Night.    carvedilol (COREG) 12.5 MG tablet Take 1 tablet by mouth 2 (Two) Times a Day.    dapagliflozin Propanediol (Farxiga) 10 MG tablet Take 10 mg by mouth Daily.    sacubitril-valsartan (Entresto) 49-51 MG tablet Take 1 tablet by mouth 2 (Two) Times a Day.    spironolactone (ALDACTONE) 25 MG tablet Take 1 tablet by mouth Daily.     No current facility-administered medications on file prior to visit.          Review of Systems : All systems were reviewed and negative except gait disturbance and blurry vision.    Objective     /78 (BP Location: Right arm)   Pulse 65   Ht 182.9 cm (72\")   Wt 104 kg (230 lb)   BMI 31.19 kg/m²       Physical Exam    General : Alert, awake, no acute distress  Neck : Supple, no carotid bruit, no jugular venous distention  CVS : Regular rate and rhythm, no murmur, rubs or gallops  Lungs: Clear to auscultation bilaterally, no crackles or rhonchi  Abdomen: Soft, nontender, bowel sounds heard in all 4 quadrants  Extremities: Warm, well-perfused, no pedal edema    Result Review :     The following data was reviewed by: Lv Bautista MD on 08/27/2024:    CMP          6/18/2024    15:08 7/10/2024    15:22 8/26/2024    10:54   CMP   Glucose 100  121  111    BUN 15  17  15    Creatinine 0.77  0.85  1.00    EGFR 107.1  103.9  90.0    Sodium 139  138  137    Potassium 4.3  4.4  4.5    Chloride 104  104  105    Calcium 9.1  9.3  9.6    Total Protein 7.1  6.4  7.0    Albumin 4.3  4.1  4.3    Globulin 2.8  2.3  2.7    Total Bilirubin 0.5  0.4  0.6    Alkaline Phosphatase 48  48  52    AST (SGOT) 20  23  20    ALT (SGPT) 37  32  31    Albumin/Globulin Ratio 1.5  1.8  1.6    BUN/Creatinine Ratio 19.5  20.0  15.0    Anion Gap 10.2  10.3  9.7      CBC          6/18/2024    15:08 7/10/2024    15:22 8/26/2024    10:54   CBC   WBC 9.49  8.50  8.40    RBC 5.08  5.13  5.21    Hemoglobin 16.3  16.2  16.4    Hematocrit 47.8 "  48.8  49.7    MCV 94.1  95.1  95.4    MCH 32.1  31.6  31.5    MCHC 34.1  33.2  33.0    RDW 11.8  12.0  12.2    Platelets 228  213  224      TSH          3/19/2024    08:46   TSH   TSH 1.620      Lipid Panel          3/19/2024    08:46 5/14/2024    03:51 5/23/2024    04:55   Lipid Panel   Total Cholesterol 114  94  78    Triglycerides 91  104  166    HDL Cholesterol 37  30  31    VLDL Cholesterol 18  20  27    LDL Cholesterol  59  44  20    LDL/HDL Ratio  1.44  0.45           Data reviewed: Cardiology studies        Results for orders placed during the hospital encounter of 08/20/24    Adult Transthoracic Echo Complete W/ Cont if Necessary Per Protocol    Interpretation Summary    Left ventricular ejection fraction appears to be 46 - 50%.    Left ventricular wall thickness is consistent with mild to moderate concentric hypertrophy.    Left ventricular diastolic function is consistent with (grade I) impaired relaxation.    The left atrial cavity is mildly dilated.                   Assessment and Plan        Diagnoses and all orders for this visit:    1. Cerebrovascular accident (CVA) due to embolism of posterior cerebral artery, unspecified blood vessel laterality (Primary)  -     Ambulatory Referral to Physical Therapy for Evaluation & Treatment    2. Essential hypertension    3. Non-ischemic cardiomyopathy          I would continue with optimal medical therapy including Entresto, Aldactone, Coreg and statins for goal LDL < 70.He has no longer signs or symptoms of heart failure, asymptomatic.  Continue with aspirin at 81 mg po daily. He is referred to Physical Therapy for sequelae of stroke symptoms mostly gait and visual problems. Continue with lifestyle modification. Continue with exercise program and cardiac rehabilitation. His proBNP is normal. The CBC is within normal range. The glucose was in the borderline high level. Advised to lose wt for goal BMI < 28. Will re-evaluate in 3 months.     Follow Up      Return in about 3 months (around 11/27/2024).    Patient was given instructions and counseling regarding his condition or for health maintenance advice. Please see specific information pulled into the AVS if appropriate.

## 2024-08-28 ENCOUNTER — TREATMENT (OUTPATIENT)
Dept: CARDIAC REHAB | Facility: HOSPITAL | Age: 54
End: 2024-08-28
Payer: COMMERCIAL

## 2024-08-28 DIAGNOSIS — I50.22 CHRONIC SYSTOLIC CHF (CONGESTIVE HEART FAILURE), NYHA CLASS 2: Primary | ICD-10-CM

## 2024-08-28 PROCEDURE — 93798 PHYS/QHP OP CAR RHAB W/ECG: CPT

## 2024-09-04 ENCOUNTER — TREATMENT (OUTPATIENT)
Dept: CARDIAC REHAB | Facility: HOSPITAL | Age: 54
End: 2024-09-04
Payer: COMMERCIAL

## 2024-09-04 DIAGNOSIS — I50.22 CHRONIC SYSTOLIC CHF (CONGESTIVE HEART FAILURE), NYHA CLASS 2: Primary | ICD-10-CM

## 2024-09-04 PROCEDURE — 93798 PHYS/QHP OP CAR RHAB W/ECG: CPT

## 2024-09-09 ENCOUNTER — TREATMENT (OUTPATIENT)
Dept: CARDIAC REHAB | Facility: HOSPITAL | Age: 54
End: 2024-09-09
Payer: COMMERCIAL

## 2024-09-09 DIAGNOSIS — I50.22 CHRONIC SYSTOLIC CHF (CONGESTIVE HEART FAILURE), NYHA CLASS 2: Primary | ICD-10-CM

## 2024-09-09 PROCEDURE — 93798 PHYS/QHP OP CAR RHAB W/ECG: CPT

## 2024-09-11 ENCOUNTER — TREATMENT (OUTPATIENT)
Dept: CARDIAC REHAB | Facility: HOSPITAL | Age: 54
End: 2024-09-11
Payer: COMMERCIAL

## 2024-09-11 DIAGNOSIS — I50.22 CHRONIC SYSTOLIC CHF (CONGESTIVE HEART FAILURE), NYHA CLASS 2: Primary | ICD-10-CM

## 2024-09-11 PROCEDURE — 93798 PHYS/QHP OP CAR RHAB W/ECG: CPT

## 2024-09-16 ENCOUNTER — TREATMENT (OUTPATIENT)
Dept: CARDIAC REHAB | Facility: HOSPITAL | Age: 54
End: 2024-09-16
Payer: COMMERCIAL

## 2024-09-16 ENCOUNTER — TELEPHONE (OUTPATIENT)
Dept: CARDIOLOGY | Facility: CLINIC | Age: 54
End: 2024-09-16
Payer: COMMERCIAL

## 2024-09-16 DIAGNOSIS — I50.22 CHRONIC SYSTOLIC CHF (CONGESTIVE HEART FAILURE), NYHA CLASS 2: Primary | ICD-10-CM

## 2024-09-16 PROCEDURE — 93798 PHYS/QHP OP CAR RHAB W/ECG: CPT

## 2024-09-16 RX ORDER — DAPAGLIFLOZIN 10 MG/1
10 TABLET, FILM COATED ORAL DAILY
Qty: 90 TABLET | Refills: 3 | Status: SHIPPED | OUTPATIENT
Start: 2024-09-16

## 2024-09-18 ENCOUNTER — TREATMENT (OUTPATIENT)
Dept: CARDIAC REHAB | Facility: HOSPITAL | Age: 54
End: 2024-09-18
Payer: COMMERCIAL

## 2024-09-18 DIAGNOSIS — I50.22 CHRONIC SYSTOLIC CHF (CONGESTIVE HEART FAILURE), NYHA CLASS 2: Primary | ICD-10-CM

## 2024-09-18 PROCEDURE — 93798 PHYS/QHP OP CAR RHAB W/ECG: CPT

## 2024-09-19 ENCOUNTER — OFFICE VISIT (OUTPATIENT)
Dept: NEUROLOGY | Facility: CLINIC | Age: 54
End: 2024-09-19
Payer: COMMERCIAL

## 2024-09-19 VITALS
WEIGHT: 229.4 LBS | HEIGHT: 72 IN | SYSTOLIC BLOOD PRESSURE: 112 MMHG | BODY MASS INDEX: 31.07 KG/M2 | HEART RATE: 67 BPM | DIASTOLIC BLOOD PRESSURE: 66 MMHG

## 2024-09-19 DIAGNOSIS — I69.30 SEQUELAE, POST-STROKE: Primary | ICD-10-CM

## 2024-09-23 ENCOUNTER — TREATMENT (OUTPATIENT)
Dept: CARDIAC REHAB | Facility: HOSPITAL | Age: 54
End: 2024-09-23
Payer: COMMERCIAL

## 2024-09-23 DIAGNOSIS — I50.22 CHRONIC SYSTOLIC CHF (CONGESTIVE HEART FAILURE), NYHA CLASS 2: Primary | ICD-10-CM

## 2024-09-23 PROCEDURE — 93798 PHYS/QHP OP CAR RHAB W/ECG: CPT

## 2024-09-25 ENCOUNTER — TREATMENT (OUTPATIENT)
Dept: CARDIAC REHAB | Facility: HOSPITAL | Age: 54
End: 2024-09-25
Payer: COMMERCIAL

## 2024-09-25 DIAGNOSIS — I50.22 CHRONIC SYSTOLIC CHF (CONGESTIVE HEART FAILURE), NYHA CLASS 2: Primary | ICD-10-CM

## 2024-09-25 PROCEDURE — 93798 PHYS/QHP OP CAR RHAB W/ECG: CPT

## 2024-09-30 ENCOUNTER — TREATMENT (OUTPATIENT)
Dept: CARDIAC REHAB | Facility: HOSPITAL | Age: 54
End: 2024-09-30
Payer: COMMERCIAL

## 2024-09-30 DIAGNOSIS — I50.22 CHRONIC SYSTOLIC CHF (CONGESTIVE HEART FAILURE), NYHA CLASS 2: Primary | ICD-10-CM

## 2024-09-30 PROCEDURE — 93798 PHYS/QHP OP CAR RHAB W/ECG: CPT

## 2024-10-02 ENCOUNTER — TREATMENT (OUTPATIENT)
Dept: CARDIAC REHAB | Facility: HOSPITAL | Age: 54
End: 2024-10-02
Payer: COMMERCIAL

## 2024-10-02 DIAGNOSIS — I50.22 CHRONIC SYSTOLIC CHF (CONGESTIVE HEART FAILURE), NYHA CLASS 2: Primary | ICD-10-CM

## 2024-10-02 PROCEDURE — 93798 PHYS/QHP OP CAR RHAB W/ECG: CPT

## 2024-10-04 ENCOUNTER — TREATMENT (OUTPATIENT)
Dept: PHYSICAL THERAPY | Facility: CLINIC | Age: 54
End: 2024-10-04
Payer: COMMERCIAL

## 2024-10-04 DIAGNOSIS — R26.9 GAIT DISTURBANCE: ICD-10-CM

## 2024-10-04 DIAGNOSIS — I63.89 CEREBROVASCULAR ACCIDENT (CVA) DUE TO OTHER MECHANISM: Primary | ICD-10-CM

## 2024-10-04 DIAGNOSIS — R26.89 IMBALANCE: ICD-10-CM

## 2024-10-04 NOTE — PROGRESS NOTES
Physical Therapy Initial Evaluation and Plan of Care     [address] [place] [id number]    Patient: Nick Turk   : 1970  Diagnosis/ICD-10 Code:  Cerebrovascular accident (CVA) due to other mechanism [I63.89]  Referring practitioner: Lv Bautista MD  Date of Initial Visit: 10/4/2024  Today's Date: 10/4/2024  Patient seen for 1 sessions           Subjective Questionnaire: FGA:       Subjective  : Pt presents to physical therapy following a CVA that occurred in May 2024, underwent a heart cath in May, then suffered a stroke during his recovery. Had double vision initially. Difficulty walking, goes to his right. Has not had therapy at any point. 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 Drives very short distances.     Pt occupation/Living environment: lives with his wife, stairs to a basement, Construction (builds houses)    Patient Goals: Improve his balance, vision, and coordination for feeling comfortable with work tasks.       Past Medical Hx:    Past Medical History:   Diagnosis Date    Back pain     CHF (congestive heart failure)     Chronic pain syndrome     CVA (cerebral vascular accident) 2024    Essential hypertension 2024    Forgetfulness     Hyperlipidemia LDL goal <70 07/15/2024    Leg pain     Lumbago     Lumbar spondylosis     Pain, generalized     Pars defect of lumbar spine     Sleep apnea     Sleep disorder     Spondylolisthesis, lumbar region 2015    L4/5      Past Surgical History:   Procedure Laterality Date    BACK SURGERY      CARDIAC CATHETERIZATION Left 2024    Procedure: Cardiac Catheterization/Vascular Study;  Surgeon: Lv Hunt MD;  Location: [facility] INVASIVE LOCATION;  Service: Cardiovascular;  Laterality: Left;    CHOLECYSTECTOMY      COLONOSCOPY N/A 2021    Procedure: COLONOSCOPY WITH POLYPECTOMIES, CLIP APPLICATION X1, CAUTERY;  Surgeon: Nolberto Onofre MD;  Location:  [facility] ENDOSCOPY;  Service: General;  Laterality: N/A;  COLON POLYPS    GANGLION CYST EXCISION      LUMBAR EPIDURAL INJECTION      LUMBAR FUSION  2015    SHOULDER ARTHROSCOPY W/ ROTATOR CUFF REPAIR Left 05/12/2022    Procedure: LEFT SHOULDER ARTHROSCOPY WITH ROTATOR CUFF REPAIR, SUBACROMINAL DECOMPRESSION, DISTAL CLAVICULECTOMY, BICEPS TENODESIS;  Surgeon: Abdoulaye Minaya MD;  Location: [facility] OR [facility];  Service: Orthopedics;  Laterality: Left;    SPINAL FUSION               Objective          Neurological Testing     Sensation   Cervical/Thoracic   Left   Intact: light touch    Right   Intact: light touch    Lumbar   Left   Intact: light touch    Right   Intact: light touch    Active Range of Motion   Left Shoulder   Normal active range of motion    Right Shoulder   Normal active range of motion    Strength/Myotome Testing     Left Shoulder     Planes of Motion   Flexion: 4   Extension: 5   Abduction: 4   External rotation at 0°: 4     Right Shoulder     Planes of Motion   Flexion: 5   Extension: 5   Abduction: 5   External rotation at 0°: 5     Additional Strength Details  History of RTC repair on Left shoulder    Ambulation     Comments   Gait is slowed, few steps taken out of stride laterally.    Functional Assessment     Comments  Coordination:   - Finger nose finger: R side 7/10, L side 10/10   - AILEEN hands: good speed, in sync   - AILEEN feet: good speed, gets out of sync   - Heel knee shin: equal bilaterally        Vestibular Assessment:    Oculomotor Exam  Gaze stabilization: WNL  Convergence: WNL  Smooth pursuits horizontal: WNL  Smooth pursuits vertical: WNL  VOR horizontal: WNL  VOR vertical: WNL  Saccades horizontal: undershoots, then corrects both eyes, both directions, good speed  Saccades vertical: undershoots, then corrects both eyes, both directions, good speed        Functional Gait Assessment    1. Gait level surface ___2__  Instructions: Walk at your normal speed from here to the next haseeb  (20’)      2. Change in gait speed __1___  Instructions: Begin walking at your normal pace (for 5’), when I tell you “go,” walk as fast as you can (for 5’). When I tell you “slow,” walk as slowly as  you can (for 5’).      3. Gait with horizontal head turns __1___  Instructions: Begin walking at your normal pace. When I tell you to “look right,” keep walking straight, but turn your head to the right. Keep looking to  the right until I tell you, “look left,” then keep walking straight and turn your head to the left. Keep your head to the left until I tell you “look straight,“   then keep walking straight, but return your head to the center.      4. Gait with vertical head turns __2___  Instructions: Begin walking at your normal pace. When I tell you to “look up,” keep walking straight, but tip your head up. Keep looking up until I tell  you, “look down,” then keep walking straight and tip your head down. Keep your head down until I tell you “look straight,“ then keep walking straight,  but return your head to the center.      5. Gait and pivot turn __1___  Instructions: Begin walking at your normal pace. When I tell you, “turn and stop,” turn as quickly as you can to face the opposite direction and stop.      6. Step over obstacle __2__  Instructions: Begin walking at your normal speed. When you come to the shoebox, step over it, not around it, and keep walking.      7. Gait with narrow base of support __2___  Instructions: Walk forward with your arms folded across your chest with your feet aligned heel to toe in tandem (12 ft or up to 10 steps in straight line).      8. Steps __2___  Instructions: Walk up these stairs as you would at home, i.e., using the railing if necessary. At the top, turn around and walk down.      9. Ambulating backwards __2___  Instructions: walk backwards until I tell you to stop.      10. Gait with eyes closed __1___  Instructions: Walk forward at normal speed with your eyes closed (20  ft).        TOTAL SCORE: _16__ / 30      See Exercise, Manual, and Modality Logs for complete treatment.     Assessment & Plan       Assessment  Impairments: abnormal gait, abnormal muscle firing, abnormal or restricted ROM, activity intolerance, impaired balance, impaired physical strength, pain with function, safety issue and weight-bearing intolerance   Functional limitations: walking, standing and stooping   Assessment details: The patient presents to physical therapy for imbalance and difficulty walking secondary to diagnosis of CVA suffered in May 2024. Overall, he is high functioning with disturbances in balance and coordination. The patient presents with associated upper and lower extremity weakness, imbalance, and functional deficits (FGA). The patient would benefit from skilled PT intervention to address the above mentioned functional limitations.  Prognosis: good    Goals  Plan Goals:     1. The patient has difficulty with work related tasks.   LTG 3: 12 weeks:  The patient will demonstrate climbing up/down 6 foot ladder 10x without imbalance for improved functional mobility.     STATUS:  New   STG 3a: The patient will demonstrate 10/10 hits with the Finger Nose Finger test for improved coordination.    STATUS:  New       2. The patient has difficulty with balance.    LTG 2: 12 weeks: The patient will hold the sharpened romberg position on foam with eyes closed for 30 seconds in order to improve balance and decrease risk of falling.     STATUS: New    STG 2: 6 weeks: The patient will hold the romberg position on foam with eyes closed for 30 seconds in order to improve balance and decrease risk of falling.     STATUS: New       3. The patient has gait dysfunction.   LTG 3: 12 weeks:  The patient will demonstrate > 24 / 30 on the Functional Gait Assessment for improved functional mobility.     STATUS:  New   STG 3a: The patient will demonstrate > 20 / 30 on the Functional Gait Assessment for improved  functional mobility.     STATUS:  New         4. The patient has difficulty with eye movements.   STG 4a: The patient will demonstrate hitting targets and good speed with horizontal and vertical saccades for improved eye movement tasks.     STATUS:  New        Plan  Therapy options: will be seen for skilled therapy services  Planned modality interventions: TENS, cryotherapy, thermotherapy (hydrocollator packs) and dry needling  Planned therapy interventions: functional ROM exercises, gait training, home exercise program, manual therapy, strengthening, stretching, therapeutic activities, soft tissue mobilization, joint mobilization, neuromuscular re-education and balance/weight-bearing training  Frequency: 3x week  Duration in weeks: 12  Treatment plan discussed with: patient        Visit Diagnoses:    ICD-10-CM ICD-9-CM   1. Cerebrovascular accident (CVA) due to other mechanism  I63.89 434.91   2. Imbalance  R26.89 781.2   3. Gait disturbance  R26.9 781.2       History # of Personal Factors and/or Comorbidities: HIGH (3+)  Examination of Body System(s): # of elements: HIGH (4+)  Clinical Presentation: EVOLVING  Clinical Decision Making: MODERATE      Timed:         Manual Therapy:    0     mins  42972;     Therapeutic Exercise:    0     mins  87928;     Neuromuscular Tracie:    10    mins  89644;    Therapeutic Activity:     0     mins  93109;     Gait Trainin     mins  56153;     Ultrasound:     0     mins  04463;    Ionto                               0    mins   65474  Self Care                       8     mins   18691        Un-Timed:  Electrical Stimulation:    0     mins  24685 ( );  Dry Needling     0     mins self-pay  Canalith [employer]    0     mins 31998  Traction     0     mins 64152      Timed Treatment:   18   mins   Total Treatment:     52   mins    PT SIGNATURE: Perez Maya PT     Electronically signed 10/4/2024    [place] License: PT - [id number]     Initial Certification  Certification  Period: 10/4/2024 thru 1/1/2025  I certify that the therapy services are furnished while this patient is under my care.  The services outlined above are required by this patient, and will be reviewed every 90 days.     PHYSICIAN: Lv Hunt MD   NPI: [id number]                                        DATE:     Please sign and return via fax to [phone]. Thank you, [facility].

## 2024-10-07 ENCOUNTER — TREATMENT (OUTPATIENT)
Dept: CARDIAC REHAB | Facility: HOSPITAL | Age: 54
End: 2024-10-07
Payer: COMMERCIAL

## 2024-10-07 DIAGNOSIS — I50.22 CHRONIC SYSTOLIC CHF (CONGESTIVE HEART FAILURE), NYHA CLASS 2: Primary | ICD-10-CM

## 2024-10-07 PROCEDURE — 93798 PHYS/QHP OP CAR RHAB W/ECG: CPT

## 2024-10-09 ENCOUNTER — TREATMENT (OUTPATIENT)
Dept: PHYSICAL THERAPY | Facility: CLINIC | Age: 54
End: 2024-10-09
Payer: COMMERCIAL

## 2024-10-09 ENCOUNTER — TREATMENT (OUTPATIENT)
Dept: CARDIAC REHAB | Facility: HOSPITAL | Age: 54
End: 2024-10-09
Payer: COMMERCIAL

## 2024-10-09 DIAGNOSIS — I50.22 CHRONIC SYSTOLIC CHF (CONGESTIVE HEART FAILURE), NYHA CLASS 2: Primary | ICD-10-CM

## 2024-10-09 DIAGNOSIS — R26.89 IMBALANCE: ICD-10-CM

## 2024-10-09 DIAGNOSIS — R26.9 GAIT DISTURBANCE: ICD-10-CM

## 2024-10-09 DIAGNOSIS — I63.89 CEREBROVASCULAR ACCIDENT (CVA) DUE TO OTHER MECHANISM: Primary | ICD-10-CM

## 2024-10-09 PROCEDURE — 93798 PHYS/QHP OP CAR RHAB W/ECG: CPT

## 2024-10-09 NOTE — PROGRESS NOTES
Outpatient Physical Therapy                   Physical Therapy Daily Treatment Note    Patient: Nick Turk   : 1970  Diagnosis/ICD-10 Code:  Cerebrovascular accident (CVA) due to other mechanism [I63.89]  Referring practitioner: Lv Bautista MD  Date of Initial Visit: Type: THERAPY  Noted: 10/4/2024  Today's Date: 10/9/2024  Patient seen for 2 sessions             Subjective   Nick Turk reports: his vision is blurred like it always is at this time of day.     Objective     See Exercise, Manual, and Modality Logs for complete treatment.     Assessment/Plan  Nick is just beginning care to attend to deficits outlined in evaluation, requiring further therapist directed strengthening. Assess how patient tolerated treatment next session.    Progress per Plan of Care      Timed:  Manual Therapy:    0     mins  71190;  Therapeutic Exercise:    15     mins  14126;     Neuromuscular Tracie:    8    mins  24240;    Therapeutic Activity:     10     mins  06701;     Gait Trainin     mins  72275;    Aquatic Therapy:     0     mins  18634;       Untimed:  Electrical Stimulation:    0     mins  24791 ( );  Mechanical Traction:    0     mins  27391;       Timed Treatment:   33   mins   Total Treatment:     33   mins      Electronically signed:   Mayda Ascencio PTA  Physical Therapist Assistant  [place] License #: [id number]

## 2024-10-11 ENCOUNTER — TREATMENT (OUTPATIENT)
Dept: PHYSICAL THERAPY | Facility: CLINIC | Age: 54
End: 2024-10-11
Payer: COMMERCIAL

## 2024-10-11 DIAGNOSIS — R26.9 GAIT DISTURBANCE: ICD-10-CM

## 2024-10-11 DIAGNOSIS — R26.89 IMBALANCE: ICD-10-CM

## 2024-10-11 DIAGNOSIS — I63.89 CEREBROVASCULAR ACCIDENT (CVA) DUE TO OTHER MECHANISM: Primary | ICD-10-CM

## 2024-10-11 NOTE — PROGRESS NOTES
Physical Therapy Daily Treatment Note  [address] [place] [address]    Patient: Nick Turk   : 1970  Diagnosis/ICD-10 Code:  Cerebrovascular accident (CVA) due to other mechanism [I63.89]  Referring practitioner: Lv Bautista MD  Date of Initial Visit: Type: THERAPY  Noted: 10/4/2024  Today's Date: 10/11/2024  Patient seen for 3 sessions           Subjective : Patient reports he is doing ok with work, felt like the visual activities with the BITS screen were difficult last visit.    Objective   See Exercise, Manual, and Modality Logs for complete treatment.       Assessment/Plan : Pt tolerated today's session well. Continued to work on progression of balance and visual challenges with good progress noted today. Pt would benefit from continued skilled therapy to address deficits. Progress per plan of care.             Timed:  Manual Therapy:    0     mins  65926;  Therapeutic Exercise:    8     mins  79889;     Neuromuscular Tracie:    16    mins  83117;    Therapeutic Activity:     10     mins  74072;     Gait Trainin     mins  07887;     Ultrasound:     0     mins  81826;    Aquatic Therapy    0     mins  70739;        Timed Treatment:   34   mins   Total Treatment:     34   mins    Perez Maya PT  Physical Therapist    Electronically signed 10/11/2024    [place] License: PT - [id number]

## 2024-10-14 ENCOUNTER — TREATMENT (OUTPATIENT)
Dept: CARDIAC REHAB | Facility: HOSPITAL | Age: 54
End: 2024-10-14
Payer: COMMERCIAL

## 2024-10-14 DIAGNOSIS — I50.22 CHRONIC SYSTOLIC CHF (CONGESTIVE HEART FAILURE), NYHA CLASS 2: Primary | ICD-10-CM

## 2024-10-14 PROCEDURE — 93798 PHYS/QHP OP CAR RHAB W/ECG: CPT

## 2024-10-16 ENCOUNTER — TREATMENT (OUTPATIENT)
Dept: CARDIAC REHAB | Facility: HOSPITAL | Age: 54
End: 2024-10-16
Payer: COMMERCIAL

## 2024-10-16 ENCOUNTER — TREATMENT (OUTPATIENT)
Dept: PHYSICAL THERAPY | Facility: CLINIC | Age: 54
End: 2024-10-16
Payer: COMMERCIAL

## 2024-10-16 DIAGNOSIS — R26.89 IMBALANCE: ICD-10-CM

## 2024-10-16 DIAGNOSIS — I63.89 CEREBROVASCULAR ACCIDENT (CVA) DUE TO OTHER MECHANISM: Primary | ICD-10-CM

## 2024-10-16 DIAGNOSIS — R26.9 GAIT DISTURBANCE: ICD-10-CM

## 2024-10-16 DIAGNOSIS — I50.22 CHRONIC SYSTOLIC CHF (CONGESTIVE HEART FAILURE), NYHA CLASS 2: Primary | ICD-10-CM

## 2024-10-16 PROCEDURE — 93798 PHYS/QHP OP CAR RHAB W/ECG: CPT

## 2024-10-16 NOTE — PROGRESS NOTES
Outpatient Physical Therapy                   Physical Therapy Daily Treatment Note    Patient: Nick Turk   : 1970  Diagnosis/ICD-10 Code:  Cerebrovascular accident (CVA) due to other mechanism [I63.89]  Referring practitioner: Lv Bautista MD  Date of Initial Visit: Type: THERAPY  Noted: 10/4/2024  Today's Date: 10/16/2024  Patient seen for 4 sessions             Subjective   Nick Turk reports: he is tired, he just came from cardiac therapy.     Objective     See Exercise, Manual, and Modality Logs for complete treatment.     Assessment/Plan  Patient was challenged by progressed exercises; especially with cone taps with the left LE. Pt would benefit from skilled PT to address strength and endurance deficits, transfer and gait training and any concerns with ADLs.       Progress per Plan of Care      Timed:  Manual Therapy:    0     mins  96162;  Therapeutic Exercise:    12     mins  79687;     Neuromuscular Tracie:    10    mins  14704;    Therapeutic Activity:     10     mins  63916;     Gait Trainin     mins  18053;    Aquatic Therapy:     0     mins  30565;       Untimed:  Electrical Stimulation:    0     mins  61998 ( );  Mechanical Traction:    0     mins  43506;       Timed Treatment:   32   mins   Total Treatment:     32   mins      Electronically signed:   Madya Ascencio PTA  Physical Therapist Assistant  [place] License #: [id number]

## 2024-10-18 ENCOUNTER — TREATMENT (OUTPATIENT)
Dept: PHYSICAL THERAPY | Facility: CLINIC | Age: 54
End: 2024-10-18
Payer: COMMERCIAL

## 2024-10-18 DIAGNOSIS — R26.89 IMBALANCE: ICD-10-CM

## 2024-10-18 DIAGNOSIS — R26.9 GAIT DISTURBANCE: ICD-10-CM

## 2024-10-18 DIAGNOSIS — I63.89 CEREBROVASCULAR ACCIDENT (CVA) DUE TO OTHER MECHANISM: Primary | ICD-10-CM

## 2024-10-18 NOTE — PROGRESS NOTES
Physical Therapy Daily Treatment Note  [address] [place] [address]    Patient: Nick Turk   : 1970  Diagnosis/ICD-10 Code:  Cerebrovascular accident (CVA) due to other mechanism [I63.89]  Referring practitioner: Lv Bautista MD  Date of Initial Visit: Type: THERAPY  Noted: 10/4/2024  Today's Date: 10/18/2024  Patient seen for 5 sessions           Subjective : Patient reports he is tired today, he has been to cardiac rehab and watching the [employer] today.     Objective   See Exercise, Manual, and Modality Logs for complete treatment.       Assessment/Plan : Pt tolerated today's session well. Continued to work on progression of balance, strength/endurance, and coordination. He is doing very well in therapy thus far and expected to continue to make gains. Pt would benefit from continued skilled therapy to address deficits. Progress per plan of care.             Timed:  Manual Therapy:    0     mins  31030;  Therapeutic Exercise:    8     mins  49906;     Neuromuscular Tracie:    14    mins  19372;    Therapeutic Activity:     10     mins  23025;     Gait Trainin     mins  51584;     Ultrasound:     0     mins  32150;    Aquatic Therapy    0     mins  02940;      Timed Treatment:   32   mins   Total Treatment:     32   mins    Perez Maya PT  Physical Therapist    Electronically signed 10/18/2024    [place] License: PT - [id number]

## 2024-10-21 ENCOUNTER — TREATMENT (OUTPATIENT)
Dept: CARDIAC REHAB | Facility: HOSPITAL | Age: 54
End: 2024-10-21
Payer: COMMERCIAL

## 2024-10-21 DIAGNOSIS — I50.22 CHRONIC SYSTOLIC CHF (CONGESTIVE HEART FAILURE), NYHA CLASS 2: Primary | ICD-10-CM

## 2024-10-21 PROCEDURE — 93798 PHYS/QHP OP CAR RHAB W/ECG: CPT

## 2024-10-22 ENCOUNTER — TREATMENT (OUTPATIENT)
Dept: PHYSICAL THERAPY | Facility: CLINIC | Age: 54
End: 2024-10-22
Payer: COMMERCIAL

## 2024-10-22 DIAGNOSIS — R26.9 GAIT DISTURBANCE: ICD-10-CM

## 2024-10-22 DIAGNOSIS — R26.89 IMBALANCE: ICD-10-CM

## 2024-10-22 DIAGNOSIS — I63.89 CEREBROVASCULAR ACCIDENT (CVA) DUE TO OTHER MECHANISM: Primary | ICD-10-CM

## 2024-10-22 PROCEDURE — 97110 THERAPEUTIC EXERCISES: CPT

## 2024-10-22 PROCEDURE — 97112 NEUROMUSCULAR REEDUCATION: CPT

## 2024-10-22 PROCEDURE — 97530 THERAPEUTIC ACTIVITIES: CPT

## 2024-10-22 NOTE — PROGRESS NOTES
Physical Therapy Daily Treatment Note                      [place] [facility] [address] [facility]   [place] [address]    Patient: Nick Turk   : 1970  Diagnosis/ICD-10 Code:  Cerebrovascular accident (CVA) due to other mechanism [I63.89]  Referring practitioner: Lv Bautista MD  Date of Initial Visit: Type: THERAPY  Noted: 10/4/2024  Today's Date: 10/22/2024  Patient seen for 6 sessions           Subjective   The patient reported that he did not have cardiac rehab today. Pt reports that he feels like his balance is still about the same. Pt reports that he maybe needs to make an appt with his cardiologist because he has just been tired since last week.    Objective   See Exercise, Manual, and Modality Logs for complete treatment.     Assessment/Plan  Pt tolerated today's session well. Pt was able to try the leg press today, which he tolerated well. Pt continues to progress well with balance and coordination. Pt would benefit from continued skilled therapy to address deficits, such as R LE weakness and R side veering during gait.. Progress per plan of care.        Timed:  Manual Therapy:    0     mins  01017;  Therapeutic Exercise:    8     mins  73716;     Neuromuscular Tracie:   10    mins  12213;    Therapeutic Activity:     13     mins  96221;     Gait Trainin     mins  34889;     Aquatics                         0      mins  65485    Un-timed:  Mechanical Traction      0     mins  01655  Dry Needling     0     mins self-pay  Electrical Stimulation:    0     mins  48955 ( );      Timed Treatment:   31   mins   Total Treatment:     31   mins    Maria Eddy PT    Electronically signed 10/22/2024    [place] License: PT - [id number]

## 2024-10-23 ENCOUNTER — TREATMENT (OUTPATIENT)
Dept: CARDIAC REHAB | Facility: HOSPITAL | Age: 54
End: 2024-10-23
Payer: COMMERCIAL

## 2024-10-23 DIAGNOSIS — I50.22 CHRONIC SYSTOLIC CHF (CONGESTIVE HEART FAILURE), NYHA CLASS 2: Primary | ICD-10-CM

## 2024-10-23 PROCEDURE — 93798 PHYS/QHP OP CAR RHAB W/ECG: CPT

## 2024-10-25 ENCOUNTER — TELEPHONE (OUTPATIENT)
Dept: CARDIOLOGY | Facility: CLINIC | Age: 54
End: 2024-10-25
Payer: COMMERCIAL

## 2024-10-25 ENCOUNTER — LAB (OUTPATIENT)
Dept: LAB | Facility: HOSPITAL | Age: 54
End: 2024-10-25
Payer: COMMERCIAL

## 2024-10-25 ENCOUNTER — TREATMENT (OUTPATIENT)
Dept: PHYSICAL THERAPY | Facility: CLINIC | Age: 54
End: 2024-10-25
Payer: COMMERCIAL

## 2024-10-25 DIAGNOSIS — R26.89 IMBALANCE: ICD-10-CM

## 2024-10-25 DIAGNOSIS — I10 ESSENTIAL HYPERTENSION: Primary | ICD-10-CM

## 2024-10-25 DIAGNOSIS — R26.9 GAIT DISTURBANCE: ICD-10-CM

## 2024-10-25 DIAGNOSIS — I63.89 CEREBROVASCULAR ACCIDENT (CVA) DUE TO OTHER MECHANISM: Primary | ICD-10-CM

## 2024-10-25 DIAGNOSIS — I10 ESSENTIAL HYPERTENSION: ICD-10-CM

## 2024-10-25 LAB
ANION GAP SERPL CALCULATED.3IONS-SCNC: 12.2 MMOL/L (ref 5–15)
BUN SERPL-MCNC: 13 MG/DL (ref 6–20)
BUN/CREAT SERPL: 14.3 (ref 7–25)
CALCIUM SPEC-SCNC: 9.4 MG/DL (ref 8.6–10.5)
CHLORIDE SERPL-SCNC: 108 MMOL/L (ref 98–107)
CO2 SERPL-SCNC: 21.8 MMOL/L (ref 22–29)
CREAT SERPL-MCNC: 0.91 MG/DL (ref 0.76–1.27)
DEPRECATED RDW RBC AUTO: 40.6 FL (ref 37–54)
EGFRCR SERPLBLD CKD-EPI 2021: 100.8 ML/MIN/1.73
ERYTHROCYTE [DISTWIDTH] IN BLOOD BY AUTOMATED COUNT: 11.8 % (ref 12.3–15.4)
GLUCOSE SERPL-MCNC: 96 MG/DL (ref 65–99)
HCT VFR BLD AUTO: 50.7 % (ref 37.5–51)
HGB BLD-MCNC: 16.3 G/DL (ref 13–17.7)
MCH RBC QN AUTO: 30.5 PG (ref 26.6–33)
MCHC RBC AUTO-ENTMCNC: 32.1 G/DL (ref 31.5–35.7)
MCV RBC AUTO: 94.8 FL (ref 79–97)
PLATELET # BLD AUTO: 221 10*3/MM3 (ref 140–450)
PMV BLD AUTO: 11.2 FL (ref 6–12)
POTASSIUM SERPL-SCNC: 4.2 MMOL/L (ref 3.5–5.2)
RBC # BLD AUTO: 5.35 10*6/MM3 (ref 4.14–5.8)
SODIUM SERPL-SCNC: 142 MMOL/L (ref 136–145)
WBC NRBC COR # BLD AUTO: 7.95 10*3/MM3 (ref 3.4–10.8)

## 2024-10-25 PROCEDURE — 97530 THERAPEUTIC ACTIVITIES: CPT

## 2024-10-25 PROCEDURE — 97110 THERAPEUTIC EXERCISES: CPT

## 2024-10-25 PROCEDURE — 36415 COLL VENOUS BLD VENIPUNCTURE: CPT

## 2024-10-25 PROCEDURE — 85027 COMPLETE CBC AUTOMATED: CPT

## 2024-10-25 PROCEDURE — 80048 BASIC METABOLIC PNL TOTAL CA: CPT

## 2024-10-25 PROCEDURE — 97112 NEUROMUSCULAR REEDUCATION: CPT

## 2024-10-25 NOTE — PROGRESS NOTES
Physical Therapy Daily Treatment Note                      [place] [facility] [address] [facility] [place] [address]    Patient: Nick Turk   : 1970  Diagnosis/ICD-10 Code:  Cerebrovascular accident (CVA) due to other mechanism [I63.89]  Referring practitioner: Lv Bautista MD  Date of Initial Visit: Type: THERAPY  Noted: 10/4/2024  Today's Date: 10/25/2024  Patient seen for 7 sessions           Subjective   The patient reported that he did not have cardiac rehab today. Pt got blood work done today but does not yet have the results from it. Pt has an appt with his cardiologist this coming Wednesday.     Objective   See Exercise, Manual, and Modality Logs for complete treatment.     Assessment/Plan  Pt tolerated today's session well. Pt progressing well with LE strengthening. Pt had some difficulty with squats on the back of the bosu, indicating further need for balance training. Pt would benefit from continued skilled therapy to address deficits, such as R LE weakness and R side veering during gait. Continue POC.       Timed:  Manual Therapy:    0     mins  82785;  Therapeutic Exercise:    8     mins  94185;     Neuromuscular Tracie:   10    mins  76886;    Therapeutic Activity:     13     mins  03963;     Gait Trainin     mins  52967;     Aquatics                         0      mins  75250    Un-timed:  Mechanical Traction      0     mins  08901  Dry Needling     0     mins self-pay  Electrical Stimulation:    0     mins  83664 ( );      Timed Treatment:   31   mins   Total Treatment:     31   mins    Maria [place] PT    Electronically signed 10/25/2024    [place] License: PT - [id number]

## 2024-10-25 NOTE — TELEPHONE ENCOUNTER
Have a CBC and BMP drawn, and move his upcoming appointment to next week for reevaluation.  Recommend bp log to bring as well.   Per [facility] Barnhart:    SW patient. Went over recommendations, set up f/u with [facility] Barnhart and sent lab orders to be drawn today.

## 2024-10-28 ENCOUNTER — TREATMENT (OUTPATIENT)
Dept: CARDIAC REHAB | Facility: HOSPITAL | Age: 54
End: 2024-10-28
Payer: COMMERCIAL

## 2024-10-28 DIAGNOSIS — I50.22 CHRONIC SYSTOLIC CHF (CONGESTIVE HEART FAILURE), NYHA CLASS 2: Primary | ICD-10-CM

## 2024-10-28 PROCEDURE — 93798 PHYS/QHP OP CAR RHAB W/ECG: CPT

## 2024-10-30 ENCOUNTER — TREATMENT (OUTPATIENT)
Dept: PHYSICAL THERAPY | Facility: CLINIC | Age: 54
End: 2024-10-30
Payer: COMMERCIAL

## 2024-10-30 ENCOUNTER — TREATMENT (OUTPATIENT)
Dept: CARDIAC REHAB | Facility: HOSPITAL | Age: 54
End: 2024-10-30
Payer: COMMERCIAL

## 2024-10-30 ENCOUNTER — OFFICE VISIT (OUTPATIENT)
Dept: CARDIOLOGY | Facility: CLINIC | Age: 54
End: 2024-10-30
Payer: COMMERCIAL

## 2024-10-30 VITALS
HEIGHT: 72 IN | BODY MASS INDEX: 31.18 KG/M2 | HEART RATE: 66 BPM | SYSTOLIC BLOOD PRESSURE: 111 MMHG | DIASTOLIC BLOOD PRESSURE: 48 MMHG | WEIGHT: 230.2 LBS

## 2024-10-30 DIAGNOSIS — I63.89 CEREBROVASCULAR ACCIDENT (CVA) DUE TO OTHER MECHANISM: Primary | ICD-10-CM

## 2024-10-30 DIAGNOSIS — I42.8 NON-ISCHEMIC CARDIOMYOPATHY: Primary | ICD-10-CM

## 2024-10-30 DIAGNOSIS — R26.9 GAIT DISTURBANCE: ICD-10-CM

## 2024-10-30 DIAGNOSIS — I10 ESSENTIAL HYPERTENSION: ICD-10-CM

## 2024-10-30 DIAGNOSIS — E78.5 HYPERLIPIDEMIA LDL GOAL <70: ICD-10-CM

## 2024-10-30 DIAGNOSIS — I50.22 CHRONIC SYSTOLIC CHF (CONGESTIVE HEART FAILURE), NYHA CLASS 2: Primary | ICD-10-CM

## 2024-10-30 DIAGNOSIS — I50.22 CHRONIC HFREF (HEART FAILURE WITH REDUCED EJECTION FRACTION): ICD-10-CM

## 2024-10-30 DIAGNOSIS — R26.89 IMBALANCE: ICD-10-CM

## 2024-10-30 PROCEDURE — 97530 THERAPEUTIC ACTIVITIES: CPT

## 2024-10-30 PROCEDURE — 97112 NEUROMUSCULAR REEDUCATION: CPT

## 2024-10-30 PROCEDURE — 93798 PHYS/QHP OP CAR RHAB W/ECG: CPT

## 2024-10-30 NOTE — LETTER
"    OMID Pascual  [address] [place] [address]    Patient: Nick Turk   YOB: 1970   Date of Visit: 10/30/2024       Dear OMID Pascual    Nick Turk was in my office today. Below is a copy of my note.    If you have questions, please do not hesitate to call me. I look forward to following Nick along with you.         Sincerely,        OMID Madden        CC: No Recipients    Chief Complaint  Chronic HFrEF (heart failure with reduced ejection fraction) (Follow Up) and Fatigue    Subjective       History of Present Illness  Nick Turk presents to [facility] CARDIOLOGY   Mr. Turk is a 54-year-old male coming in today for cardiac follow-up.  He is concerned that he is he is feeling very fatigued, describes as \" very drained.\"  No correlating symptoms such as chest pain/pressure, significant shortness of breath, weight gain, swelling or fluid retention.  No significant palpitations.  Reports good compliance with medications, and has been going to therapy     .  Routinely.  Past History   CHF- EF 35% on echo 5/2024, improved to 46-50% echo 8/2024  CVA  Mild non obstructive CAD      Past Medical History:   Diagnosis Date   • Back pain    • CHF (congestive heart failure)    • Chronic pain syndrome    • CVA (cerebral vascular accident) 05/14/2024   • Essential hypertension 05/20/2024   • Forgetfulness    • Hyperlipidemia LDL goal <70 07/15/2024   • Leg pain    • Lumbago 2015   • Lumbar spondylosis    • Pain, generalized    • Pars defect of lumbar spine 2015   • Sleep apnea    • Sleep disorder    • Spondylolisthesis, lumbar region 2015       Allergies   Allergen Reactions   • Bee Venom Swelling   • Meloxicam Confusion and Other (See Comments)        Past Surgical History:   Procedure Laterality Date   • BACK SURGERY     • CARDIAC CATHETERIZATION Left 05/21/2024    Procedure: Cardiac Catheterization/Vascular Study;  Surgeon: Lv Hunt MD;  Location: [facility]" "[facility] CATH INVASIVE LOCATION;  Service: Cardiovascular;  Laterality: Left;   • CHOLECYSTECTOMY     • COLONOSCOPY N/A 11/16/2021    Procedure: COLONOSCOPY WITH POLYPECTOMIES, CLIP APPLICATION X1, CAUTERY;  Surgeon: Nolberto Onofre MD;  Location: [facility] ENDOSCOPY;  Service: General;  Laterality: N/A;  COLON POLYPS   • GANGLION CYST EXCISION     • LUMBAR EPIDURAL INJECTION     • LUMBAR FUSION  2015   • SHOULDER ARTHROSCOPY W/ ROTATOR CUFF REPAIR Left 05/12/2022    Procedure: LEFT SHOULDER ARTHROSCOPY WITH ROTATOR CUFF REPAIR, SUBACROMINAL DECOMPRESSION, DISTAL CLAVICULECTOMY, BICEPS TENODESIS;  Surgeon: Abdoulaye Minaya MD;  Location: [facility] OR [facility];  Service: Orthopedics;  Laterality: Left;   • SPINAL FUSION          Social History  He  reports that he has quit smoking. His smoking use included cigarettes. He started smoking about 40 years ago. He has a 39.6 pack-year smoking history. He has been exposed to tobacco smoke. He has never used smokeless tobacco. He reports that he does not drink alcohol and does not use drugs.    Family History  His family history includes Heart disease in his mother; Heart failure in his mother and sister; No Known Problems in his father.       Current Outpatient Medications on File Prior to Visit   Medication Sig   • aspirin 81 MG chewable tablet Chew 1 tablet Daily.   • atorvastatin (LIPITOR) 80 MG tablet Take 1 tablet by mouth Every Night.   • carvedilol (COREG) 12.5 MG tablet Take 1 tablet by mouth 2 (Two) Times a Day.   • dapagliflozin Propanediol (Farxiga) 10 MG tablet Take 10 mg by mouth Daily.   • sacubitril-valsartan (Entresto) 49-51 MG tablet Take 1 tablet by mouth 2 (Two) Times a Day.   • spironolactone (ALDACTONE) 25 MG tablet Take 1 tablet by mouth Daily.     No current facility-administered medications on file prior to visit.         Review of Systems     Objective  Vitals:    10/30/24 0830   BP: 111/48   Pulse: 66   Weight: 104 kg (230 lb 3.2 oz)   Height: 182.9 cm (72\") " "        Physical Exam  General : Alert, awake, no acute distress  Neck : Supple, no carotid bruit, no jugular venous distention  CVS : Regular rate and rhythm, no murmur, no rubs or gallops  Lungs: Clear to auscultation bilaterally, no crackles or rhonchi  Abdomen: Soft, nontender, bowel sounds active  Extremities: Warm, well-perfused, no pedal edema      Result Review    The following data was reviewed by OMID Madden  proBNP   Date Value Ref Range Status   08/26/2024 76.0 0.0 - 900.0 pg/mL Final     CMP          7/10/2024    15:22 8/26/2024    10:54 10/25/2024    14:25   CMP   Glucose 121  111  96    BUN 17  15  13    Creatinine 0.85  1.00  0.91    EGFR 103.9  90.0  100.8    Sodium 138  137  142    Potassium 4.4  4.5  4.2    Chloride 104  105  108    Calcium 9.3  9.6  9.4    Total Protein 6.4  7.0     Albumin 4.1  4.3     Globulin 2.3  2.7     Total Bilirubin 0.4  0.6     Alkaline Phosphatase 48  52     AST (SGOT) 23  20     ALT (SGPT) 32  31     Albumin/Globulin Ratio 1.8  1.6     BUN/Creatinine Ratio 20.0  15.0  14.3    Anion Gap 10.3  9.7  12.2      CBC w/diff          7/10/2024    15:22 8/26/2024    10:54 10/25/2024    14:25   CBC w/Diff   WBC 8.50  8.40  7.95    RBC 5.13  5.21  5.35    Hemoglobin 16.2  16.4  16.3    Hematocrit 48.8  49.7  50.7    MCV 95.1  95.4  94.8    MCH 31.6  31.5  30.5    MCHC 33.2  33.0  32.1    RDW 12.0  12.2  11.8    Platelets 213  224  221    Neutrophil Rel % 61.0  64.5     Immature Granulocyte Rel % 0.2  0.4     Lymphocyte Rel % 25.4  21.5     Monocyte Rel % 9.6  10.0     Eosinophil Rel % 3.2  3.1     Basophil Rel % 0.6  0.5        Lab Results   Component Value Date    TSH 1.620 03/19/2024      No results found for: \"FREET4\"   No results found for: \"DDIMERQUANT\"  Magnesium   Date Value Ref Range Status   08/26/2024 2.0 1.6 - 2.6 mg/dL Final      No results found for: \"DIGOXIN\"   Lab Results   Component Value Date    TROPONINT 11 05/20/2024           Lipid Panel          " 3/19/2024    08:46 5/14/2024    03:51 5/23/2024    04:55   Lipid Panel   Total Cholesterol 114  94  78    Triglycerides 91  104  166    HDL Cholesterol 37  30  31    VLDL Cholesterol 18  20  27    LDL Cholesterol  59  44  20    LDL/HDL Ratio  1.44  0.45        Results for orders placed during the hospital encounter of 08/20/24    Adult Transthoracic Echo Complete W/ Cont if Necessary Per Protocol    Interpretation Summary  •  Left ventricular ejection fraction appears to be 46 - 50%.  •  Left ventricular wall thickness is consistent with mild to moderate concentric hypertrophy.  •  Left ventricular diastolic function is consistent with (grade I) impaired relaxation.  •  The left atrial cavity is mildly dilated.    Holter Monitor   5/28/2024  •  1. Normal Sinus Rhythm. 2. Episodes of Supraventricular Tachycardia at rate of 140-150 bpm  and lasting over 8 seconds.  3. Occasional PVCs. 4. No episodes of advanced AV Block, sinus arrest or paroxysmal Atrial Fibrillation observed.       ECG 12 Lead    Date/Time: 10/30/2024 8:34 AM  Performed by: Geri Garcia APRN    Authorized by: Geri Garcia APRN  Comparison: compared with previous ECG from 6/3/2024  Similar to previous ECG  Rhythm: sinus rhythm  Ectopy: atrial premature contractions  Rate: normal  Conduction: left bundle branch block  ST Segments: ST segments normal  T Waves: T waves normal  QRS axis: normal    Clinical impression: abnormal EKG            Assessment and Plan   Diagnoses and all orders for this visit:    1. Non-ischemic cardiomyopathy (Primary)    2. Chronic HFrEF (heart failure with reduced ejection fraction)  -     Cancel: ECG 12 Lead; Future  -     ECG 12 Lead    3. Essential hypertension    4. Hyperlipidemia LDL goal <70      HFrEF/cardiomyopathy-his only complaint is fatigue, clinically does not appear to have any volume overload no concerning findings on EKG, would recommend to continue current medication regiment, monitor blood pressure  closely at home, and notify us for any symptoms of shortness of breath, chest pain, or weight gain.  Hypertension-blood pressure is well-controlled cold, continue current medications.  Hyperlipidemia-LDL is at goal, continue current dose atorvastatin, he is not actually complaints of myalgias, just fatigue so think is reasonable to continue this dose          Follow Up   Return in about 4 months (around 2/28/2025) for With Dr. Bautista  (cancel Nov appt).    Patient was given instructions and counseling regarding his condition or for health maintenance advice. Please see specific information pulled into the AVS if appropriate.     Signed,  Geri Garcia, APRN  10/30/2024     Dictated Utilizing Dragon Dictation: Please note that portions of this note were completed with a voice recognition program.  Part of this note may be an electronic transcription/translation of spoken language to printed text using the Dragon Dictation System.

## 2024-10-30 NOTE — PROGRESS NOTES
"Chief Complaint  Chronic HFrEF (heart failure with reduced ejection fraction) (Follow Up) and Fatigue    Subjective        History of Present Illness  Nick Turk presents to [facility] CARDIOLOGY   Mr. Turk is a 54-year-old male coming in today for cardiac follow-up.  He is concerned that he is he is feeling very fatigued, describes as \" very drained.\"  No correlating symptoms such as chest pain/pressure, significant shortness of breath, weight gain, swelling or fluid retention.  No significant palpitations.  Reports good compliance with medications, and has been going to therapy     .  Routinely.  Past History   CHF- EF 35% on echo 5/2024, improved to 46-50% echo 8/2024  CVA  Mild non obstructive CAD      Past Medical History:   Diagnosis Date    Back pain     CHF (congestive heart failure)     Chronic pain syndrome     CVA (cerebral vascular accident) 05/14/2024    Essential hypertension 05/20/2024    Forgetfulness     Hyperlipidemia LDL goal <70 07/15/2024    Leg pain     Lumbago 2015    Lumbar spondylosis     Pain, generalized     Pars defect of lumbar spine 2015    Sleep apnea     Sleep disorder     Spondylolisthesis, lumbar region 2015       Allergies   Allergen Reactions    Bee Venom Swelling    Meloxicam Confusion and Other (See Comments)        Past Surgical History:   Procedure Laterality Date    BACK SURGERY      CARDIAC CATHETERIZATION Left 05/21/2024    Procedure: Cardiac Catheterization/Vascular Study;  Surgeon: Lv Hunt MD;  Location: [facility] CATH INVASIVE LOCATION;  Service: Cardiovascular;  Laterality: Left;    CHOLECYSTECTOMY      COLONOSCOPY N/A 11/16/2021    Procedure: COLONOSCOPY WITH POLYPECTOMIES, CLIP APPLICATION X1, CAUTERY;  Surgeon: Nolberto Onofre MD;  Location: [facility] ENDOSCOPY;  Service: General;  Laterality: N/A;  COLON POLYPS    GANGLION CYST EXCISION      LUMBAR EPIDURAL INJECTION      LUMBAR FUSION  2015    SHOULDER ARTHROSCOPY W/ ROTATOR CUFF REPAIR " "Left 05/12/2022    Procedure: LEFT SHOULDER ARTHROSCOPY WITH ROTATOR CUFF REPAIR, SUBACROMINAL DECOMPRESSION, DISTAL CLAVICULECTOMY, BICEPS TENODESIS;  Surgeon: Abdoulaye Minaya MD;  [place] [facility] [place] [facility];  Service: Orthopedics;  Laterality: Left;    SPINAL FUSION          Social History  He  reports that he has quit smoking. His smoking use included cigarettes. He started smoking about 40 years ago. He has a 39.6 pack-year smoking history. He has been exposed to tobacco smoke. He has never used smokeless tobacco. He reports that he does not drink alcohol and does not use drugs.    Family History  His family history includes Heart disease in his mother; Heart failure in his mother and sister; No Known Problems in his father.       Current Outpatient Medications on File Prior to Visit   Medication Sig    aspirin 81 MG chewable tablet Chew 1 tablet Daily.    atorvastatin (LIPITOR) 80 MG tablet Take 1 tablet by mouth Every Night.    carvedilol (COREG) 12.5 MG tablet Take 1 tablet by mouth 2 (Two) Times a Day.    dapagliflozin Propanediol (Farxiga) 10 MG tablet Take 10 mg by mouth Daily.    sacubitril-valsartan (Entresto) 49-51 MG tablet Take 1 tablet by mouth 2 (Two) Times a Day.    spironolactone (ALDACTONE) 25 MG tablet Take 1 tablet by mouth Daily.     No current facility-administered medications on file prior to visit.         Review of Systems     Objective   Vitals:    10/30/24 0830   BP: 111/48   Pulse: 66   Weight: 104 kg (230 lb 3.2 oz)   Height: 182.9 cm (72\")         Physical Exam  General : Alert, awake, no acute distress  Neck : Supple, no carotid bruit, no jugular venous distention  CVS : Regular rate and rhythm, no murmur, no rubs or gallops  Lungs: Clear to auscultation bilaterally, no crackles or rhonchi  Abdomen: Soft, nontender, bowel sounds active  Extremities: Warm, well-perfused, no pedal edema      Result Review     The following data was reviewed by OMID Madden  proBNP   Date Value " "Ref Range Status   08/26/2024 76.0 0.0 - 900.0 pg/mL Final     CMP          7/10/2024    15:22 8/26/2024    10:54 10/25/2024    14:25   CMP   Glucose 121  111  96    BUN 17  15  13    Creatinine 0.85  1.00  0.91    EGFR 103.9  90.0  100.8    Sodium 138  137  142    Potassium 4.4  4.5  4.2    Chloride 104  105  108    Calcium 9.3  9.6  9.4    Total Protein 6.4  7.0     Albumin 4.1  4.3     Globulin 2.3  2.7     Total Bilirubin 0.4  0.6     Alkaline Phosphatase 48  52     AST (SGOT) 23  20     ALT (SGPT) 32  31     Albumin/Globulin Ratio 1.8  1.6     BUN/Creatinine Ratio 20.0  15.0  14.3    Anion Gap 10.3  9.7  12.2      CBC w/diff          7/10/2024    15:22 8/26/2024    10:54 10/25/2024    14:25   CBC w/Diff   WBC 8.50  8.40  7.95    RBC 5.13  5.21  5.35    Hemoglobin 16.2  16.4  16.3    Hematocrit 48.8  49.7  50.7    MCV 95.1  95.4  94.8    MCH 31.6  31.5  30.5    MCHC 33.2  33.0  32.1    RDW 12.0  12.2  11.8    Platelets 213  224  221    Neutrophil Rel % 61.0  64.5     Immature Granulocyte Rel % 0.2  0.4     Lymphocyte Rel % 25.4  21.5     Monocyte Rel % 9.6  10.0     Eosinophil Rel % 3.2  3.1     Basophil Rel % 0.6  0.5        Lab Results   Component Value Date    TSH 1.620 03/19/2024      No results found for: \"FREET4\"   No results found for: \"DDIMERQUANT\"  Magnesium   Date Value Ref Range Status   08/26/2024 2.0 1.6 - 2.6 mg/dL Final      No results found for: \"DIGOXIN\"   Lab Results   Component Value Date    TROPONINT 11 05/20/2024           Lipid Panel          3/19/2024    08:46 5/14/2024    03:51 5/23/2024    04:55   Lipid Panel   Total Cholesterol 114  94  78    Triglycerides 91  104  166    HDL Cholesterol 37  30  31    VLDL Cholesterol 18  20  27    LDL Cholesterol  59  44  20    LDL/HDL Ratio  1.44  0.45        Results for orders placed during the hospital encounter of 08/20/24    Adult Transthoracic Echo Complete W/ Cont if Necessary Per Protocol    Interpretation Summary    Left ventricular ejection " fraction appears to be 46 - 50%.    Left ventricular wall thickness is consistent with mild to moderate concentric hypertrophy.    Left ventricular diastolic function is consistent with (grade I) impaired relaxation.    The left atrial cavity is mildly dilated.    Holter Monitor   5/28/2024    1. Normal Sinus Rhythm. 2. Episodes of Supraventricular Tachycardia at rate of 140-150 bpm  and lasting over 8 seconds.  3. Occasional PVCs. 4. No episodes of advanced AV Block, sinus arrest or paroxysmal Atrial Fibrillation observed.       ECG 12 Lead    Date/Time: 10/30/2024 8:34 AM  Performed by: Geri Garcia APRN    Authorized by: Geri Garcia APRN  Comparison: compared with previous ECG from 6/3/2024  Similar to previous ECG  Rhythm: sinus rhythm  Ectopy: atrial premature contractions  Rate: normal  Conduction: left bundle branch block  ST Segments: ST segments normal  T Waves: T waves normal  QRS axis: normal    Clinical impression: abnormal EKG            Assessment and Plan   Diagnoses and all orders for this visit:    1. Non-ischemic cardiomyopathy (Primary)    2. Chronic HFrEF (heart failure with reduced ejection fraction)  -     Cancel: ECG 12 Lead; Future  -     ECG 12 Lead    3. Essential hypertension    4. Hyperlipidemia LDL goal <70      HFrEF/cardiomyopathy-his only complaint is fatigue, clinically does not appear to have any volume overload no concerning findings on EKG, would recommend to continue current medication regiment, monitor blood pressure closely at home, and notify us for any symptoms of shortness of breath, chest pain, or weight gain.  Hypertension-blood pressure is well-controlled cold, continue current medications.  Hyperlipidemia-LDL is at goal, continue current dose atorvastatin, he is not actually complaints of myalgias, just fatigue so think is reasonable to continue this dose          Follow Up   Return in about 4 months (around 2/28/2025) for With Dr. Bautista  (cancel Nov  appt).    Patient was given instructions and counseling regarding his condition or for health maintenance advice. Please see specific information pulled into the AVS if appropriate.     Signed,  Geri Garcia, OMID  10/30/2024     Dictated Utilizing Dragon Dictation: Please note that portions of this note were completed with a voice recognition program.  Part of this note may be an electronic transcription/translation of spoken language to printed text using the Dragon Dictation System.

## 2024-10-30 NOTE — PROGRESS NOTES
"Physical Therapy Daily Treatment Note  [facility] [address] [place] [address]    Patient: Nick Turk   : 1970  Referring practitioner: Lv Bautista MD  Date of Initial Visit: Type: THERAPY  Noted: 10/4/2024  Today's Date: 10/30/2024  Patient seen for 8 sessions           Subjective  iNck Turk reports:  he is fatigued, \"I just left cardiac rehab.\" Nico advised he had used the bike and other activities there. Nico advised that his shoulders have been bothering him and the cheerleaders really aggravated them.    Objective   See Exercise, Manual, and Modality Logs for complete treatment.     Assessment/Plan  Continued need for therapist directed intervention to address remaining deficits.    Visit Diagnoses:    ICD-10-CM ICD-9-CM   1. Cerebrovascular accident (CVA) due to other mechanism  I63.89 434.91   2. Imbalance  R26.89 781.2   3. Gait disturbance  R26.9 781.2       Progress per Plan of Care and Progress strengthening /stabilization /functional activity       Timed:  Manual Therapy:         mins  25600;  Therapeutic Exercise:         mins  49121;     Neuromuscular Tracie:    13    mins  00518;    Therapeutic Activity:     12     mins  89043;     Gait Training:           mins  26555;     Ultrasound:          mins  44759;    Electrical Stimulation:         mins  80890 ( );  Aquatics  __   mins   67612    Untimed:  Electrical Stimulation:         mins  29041 ( );  Mechanical Traction:         mins  66752;     Timed Treatment:   25   mins   Total Treatment:     25   mins    Electronically Signed:  Janine Don PTA  Physical Therapist Assistant    [place] PTA license [id number]            "

## 2024-10-31 ENCOUNTER — HOSPITAL ENCOUNTER (OUTPATIENT)
Dept: MRI IMAGING | Facility: HOSPITAL | Age: 54
Discharge: HOME | End: 2024-10-31
Admitting: PSYCHIATRY & NEUROLOGY
Payer: COMMERCIAL

## 2024-10-31 DIAGNOSIS — I69.30 SEQUELAE, POST-STROKE: ICD-10-CM

## 2024-10-31 PROCEDURE — 70551 MRI BRAIN STEM W/O DYE: CPT

## 2024-11-04 ENCOUNTER — APPOINTMENT (OUTPATIENT)
Dept: CARDIAC REHAB | Facility: HOSPITAL | Age: 54
End: 2024-11-04
Payer: COMMERCIAL

## 2024-11-06 ENCOUNTER — APPOINTMENT (OUTPATIENT)
Dept: CARDIAC REHAB | Facility: HOSPITAL | Age: 54
End: 2024-11-06
Payer: COMMERCIAL

## 2024-11-08 ENCOUNTER — TELEPHONE (OUTPATIENT)
Dept: ORTHOPEDICS | Facility: OTHER | Age: 54
End: 2024-11-08
Payer: COMMERCIAL

## 2024-11-11 ENCOUNTER — TREATMENT (OUTPATIENT)
Dept: PHYSICAL THERAPY | Facility: CLINIC | Age: 54
End: 2024-11-11
Payer: COMMERCIAL

## 2024-11-11 ENCOUNTER — TREATMENT (OUTPATIENT)
Dept: CARDIAC REHAB | Facility: HOSPITAL | Age: 54
End: 2024-11-11
Payer: COMMERCIAL

## 2024-11-11 DIAGNOSIS — R26.89 IMBALANCE: ICD-10-CM

## 2024-11-11 DIAGNOSIS — I50.22 CHRONIC SYSTOLIC CHF (CONGESTIVE HEART FAILURE), NYHA CLASS 2: Primary | ICD-10-CM

## 2024-11-11 DIAGNOSIS — I63.89 CEREBROVASCULAR ACCIDENT (CVA) DUE TO OTHER MECHANISM: Primary | ICD-10-CM

## 2024-11-11 DIAGNOSIS — R26.9 GAIT DISTURBANCE: ICD-10-CM

## 2024-11-11 PROCEDURE — 97112 NEUROMUSCULAR REEDUCATION: CPT | Performed by: PHYSICAL THERAPIST

## 2024-11-11 PROCEDURE — 97530 THERAPEUTIC ACTIVITIES: CPT | Performed by: PHYSICAL THERAPIST

## 2024-11-11 PROCEDURE — 93798 PHYS/QHP OP CAR RHAB W/ECG: CPT

## 2024-11-11 NOTE — PROGRESS NOTES
Progress Note  [facility] [address] [place] [address]      Patient: Nick Turk   : 1970  Diagnosis/ICD-10 Code:  Cerebrovascular accident (CVA) due to other mechanism [I63.89]  Referring practitioner: Lv Bautista MD  Date of Initial Visit: Type: THERAPY  Noted: 10/4/2024  Today's Date: 2024  Patient seen for 9 sessions      Subjective:   Functional Outcome: FGA:   Clinical Progress: improved  Home Program Compliance: Yes  Treatment has included: therapeutic exercise, neuromuscular re-education, therapeutic activity, and gait training    Subjective   The patient reported that he has noticed significant improvement in balance and strength since starting PT. He can walk a straight line with less deviation. He feels more confident moving his head to look at things while walking, but can tell that he still has some difficulty with looking right while walking. He can also tell that his balance isn't all the way back to normal. 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 He would like to continue with PT at this time.    Objective   Neurological Testing      Sensation   Cervical/Thoracic   Left   Intact: light touch     Right   Intact: light touch     Lumbar   Left   Intact: light touch     Right   Intact: light touch     Active Range of Motion   Left Shoulder   Normal active range of motion     Right Shoulder   Normal active range of motion     Strength/Myotome Testing      Left Shoulder      Planes of Motion   Flexion: 4   Extension: 5   Abduction: 4   External rotation at 0°: 4      Right Shoulder      Planes of Motion   Flexion: 5   Extension: 5   Abduction: 5   External rotation at 0°: 5      Additional Strength Details  History of RTC repair on Left shoulder     Ambulation      The patient ambulates with normal biomechanics of gait.     Functional Assessment      Comments  Coordination:   - Finger nose finger: R side  10/10, L side 10/10   - AILEEN hands: good speed, in sync   - AILEEN feet: good speed, in sync   - Heel knee shin: equal bilaterally         Vestibular Assessment:     Oculomotor Exam  Gaze stabilization: WNL  Convergence: WNL  Smooth pursuits horizontal: WNL  Smooth pursuits vertical: WNL  VOR horizontal: WNL  VOR vertical: WNL  Saccades horizontal: WNL  Saccades vertical: WNL           Functional Gait Assessment     1. Gait level surface ___3__  Instructions: Walk at your normal speed from here to the next haseeb (20’)        2. Change in gait speed __3___  Instructions: Begin walking at your normal pace (for 5’), when I tell you “go,” walk as fast as you can (for 5’). When I tell you “slow,” walk as slowly as  you can (for 5’).        3. Gait with horizontal head turns __2___  Instructions: Begin walking at your normal pace. When I tell you to “look right,” keep walking straight, but turn your head to the right. Keep looking to  the right until I tell you, “look left,” then keep walking straight and turn your head to the left. Keep your head to the left until I tell you “look straight,“   then keep walking straight, but return your head to the center.        4. Gait with vertical head turns __2___  Instructions: Begin walking at your normal pace. When I tell you to “look up,” keep walking straight, but tip your head up. Keep looking up until I tell  you, “look down,” then keep walking straight and tip your head down. Keep your head down until I tell you “look straight,“ then keep walking straight,  but return your head to the center.        5. Gait and pivot turn __3___  Instructions: Begin walking at your normal pace. When I tell you, “turn and stop,” turn as quickly as you can to face the opposite direction and stop.        6. Step over obstacle __3__  Instructions: Begin walking at your normal speed. When you come to the shoebox, step over it, not around it, and keep walking.        7. Gait with narrow base of support  __3___  Instructions: Walk forward with your arms folded across your chest with your feet aligned heel to toe in tandem (12 ft or up to 10 steps in straight line).        8. Steps __3___  Instructions: Walk up these stairs as you would at home, i.e., using the railing if necessary. At the top, turn around and walk down.        9. Ambulating backwards __3___  Instructions: walk backwards until I tell you to stop.        10. Gait with eyes closed __2___  Instructions: Walk forward at normal speed with your eyes closed (20 ft).           TOTAL SCORE: _27__ / 30    See Exercise, Manual, and Modality Logs for complete treatment.     Assessment/Plan  The patient was re-evaluated today and presents with improvements in balance (FGA), gait with associated tasks, visual tracking, rapid alternating movement of his hands, and ability to navigate stairs. Although improved, he continues to present with a balance deficits and has been unable to return to work at his prior level of function. He would benefit from continued skilled physical therapy to address remaining functional deficits and to allow the patient to return to his prior level of function.     Goals  Plan Goals:      1. The patient has difficulty with work related tasks.              LTG 3: 12 weeks:  The patient will demonstrate climbing up/down 6 foot ladder 10x without imbalance for improved functional mobility.                           STATUS:  Progressing              STG 3a: The patient will demonstrate 10/10 hits with the Finger Nose Finger test for improved coordination.                          STATUS:  Met         2. The patient has difficulty with balance.               LTG 2: 12 weeks: The patient will hold the sharpened romberg position on foam with eyes closed for 30 seconds in order to improve balance and decrease risk of falling.                           STATUS: Progressing              STG 2: 6 weeks: The patient will hold the romberg position on  foam with eyes closed for 30 seconds in order to improve balance and decrease risk of falling.                           STATUS: Met        3. The patient has gait dysfunction.              LTG 3: 12 weeks:  The patient will demonstrate > 24 / 30 on the Functional Gait Assessment for improved functional mobility.                           STATUS:  Met              STG 3a: The patient will demonstrate > 20 / 30 on the Functional Gait Assessment for improved functional mobility.                           STATUS:  Met                  4. The patient has difficulty with eye movements.              STG 4a: The patient will demonstrate hitting targets and good speed with horizontal and vertical saccades for improved eye movement tasks.                           STATUS:  Met    Progress toward previous goals: Partially Met      Recommendations: Continue as planned  Timeframe: 1 month  Prognosis to achieve goals: good    PT Signature: Tyshawn Bravo PT DPT    Electronically signed 2024    [place] License: PT - [id number]       Timed:  Manual Therapy:    0     mins  88971;  Therapeutic Exercise:    0     mins  74876;     Neuromuscular Tracie:    15    mins  47681;    Therapeutic Activity:     0     mins  08727;     Gait Trainin     mins  31678;     Aquatics                         0      mins  83342    Un-timed:  Mechanical Traction      0     mins  30391  Dry Needling     0     mins self-pay  Electrical Stimulation:    0     mins  42745 ( )  Re-evaluation:               0     mins 97870;    Timed Treatment:   15   mins   Total Treatment:     15   mins

## 2024-11-11 NOTE — PROGRESS NOTES
Physical Therapy Daily Treatment Note  [facility] [address] [place] [address]    Patient: Nick Turk   : 1970  Referring practitioner: Lv Bautista MD  Date of Initial Visit: Type: THERAPY  Noted: 10/4/2024  Today's Date: 2024  Patient seen for 9 sessions           Subjective  Nick Turk reports: explains that he was ill last week and unable to attend PT or cardiac rehab. He arrived today for session following cardiac rehab appt.    Objective   See Exercise, Manual, and Modality Logs for complete treatment.     Assessment/Plan  Please refer to PN this date for gains made through visits thus far and updated POC.    Visit Diagnoses:    ICD-10-CM ICD-9-CM   1. Cerebrovascular accident (CVA) due to other mechanism  I63.89 434.91   2. Gait disturbance  R26.9 781.2   3. Imbalance  R26.89 781.2       Progress per Plan of Care and Progress strengthening /stabilization /functional activity       Timed:  Manual Therapy:         mins  38024;  Therapeutic Exercise:         mins  36699;     Neuromuscular Tracie:    14    mins  96017;    Therapeutic Activity:     16     mins  25719;     Gait Training:           mins  97208;     Ultrasound:          mins  91099;    Electrical Stimulation:         mins  35278 ( );  Aquatics  __   mins   23336    Untimed:  Electrical Stimulation:         mins  28376 ( );  Mechanical Traction:         mins  09484;     Timed Treatment:   30   mins   Total Treatment:     30   mins    Electronically Signed:  Janine Don PTA  Physical Therapist Assistant    [place] PTA license [id number]

## 2024-11-13 ENCOUNTER — TREATMENT (OUTPATIENT)
Dept: PHYSICAL THERAPY | Facility: CLINIC | Age: 54
End: 2024-11-13
Payer: COMMERCIAL

## 2024-11-13 ENCOUNTER — TREATMENT (OUTPATIENT)
Dept: CARDIAC REHAB | Facility: HOSPITAL | Age: 54
End: 2024-11-13
Payer: COMMERCIAL

## 2024-11-13 DIAGNOSIS — R26.89 IMBALANCE: ICD-10-CM

## 2024-11-13 DIAGNOSIS — I63.89 CEREBROVASCULAR ACCIDENT (CVA) DUE TO OTHER MECHANISM: Primary | ICD-10-CM

## 2024-11-13 DIAGNOSIS — I50.22 CHRONIC SYSTOLIC CHF (CONGESTIVE HEART FAILURE), NYHA CLASS 2: Primary | ICD-10-CM

## 2024-11-13 DIAGNOSIS — R26.9 GAIT DISTURBANCE: ICD-10-CM

## 2024-11-13 PROCEDURE — 93798 PHYS/QHP OP CAR RHAB W/ECG: CPT

## 2024-11-13 NOTE — PROGRESS NOTES
Physical Therapy Daily Treatment Note                      [facility] [address] [facility] [place] [address]    Patient: Nick Turk   : 1970  Diagnosis/ICD-10 Code:  Cerebrovascular accident (CVA) due to other mechanism [I63.89]  Referring practitioner: Lv Bautista MD  Date of Initial Visit: Type: THERAPY  Noted: 10/4/2024  Today's Date: 2024  Patient seen for 10 sessions           Subjective   The patient reported that he is fatigued from cardiac rehab today. Otherwise, he has no new complaints.    Objective   See Exercise, Manual, and Modality Logs for complete treatment.     Assessment/Plan    The patient demonstrated good tolerance to all functional lower extremity strengthening and balance training exercise. Continue to progress with current PT plan of care per patient tolerance.         Timed:  Manual Therapy:    0     mins  82416;  Therapeutic Exercise:    8     mins  07525;     Neuromuscular Tracie:   10    mins  31488;    Therapeutic Activity:     24     mins  32978;     Gait Trainin     mins  78492;     Aquatics                         0      mins  28900    Un-timed:  Mechanical Traction      0     mins  69998  Dry Needling     0     mins self-pay  Electrical Stimulation:    0     mins  00278 ( );      Timed Treatment:   42   mins   Total Treatment:     42   mins    Tyshawn Bravo PT  Physical Therapist    Electronically signed 2024    [place] License: PT - [id number]

## 2024-11-18 ENCOUNTER — TREATMENT (OUTPATIENT)
Dept: CARDIAC REHAB | Facility: HOSPITAL | Age: 54
End: 2024-11-18
Payer: COMMERCIAL

## 2024-11-18 ENCOUNTER — TREATMENT (OUTPATIENT)
Dept: PHYSICAL THERAPY | Facility: CLINIC | Age: 54
End: 2024-11-18
Payer: COMMERCIAL

## 2024-11-18 DIAGNOSIS — R26.9 GAIT DISTURBANCE: ICD-10-CM

## 2024-11-18 DIAGNOSIS — I50.22 CHRONIC SYSTOLIC CHF (CONGESTIVE HEART FAILURE), NYHA CLASS 2: Primary | ICD-10-CM

## 2024-11-18 DIAGNOSIS — R26.89 IMBALANCE: ICD-10-CM

## 2024-11-18 DIAGNOSIS — I63.89 CEREBROVASCULAR ACCIDENT (CVA) DUE TO OTHER MECHANISM: Primary | ICD-10-CM

## 2024-11-18 PROCEDURE — 97530 THERAPEUTIC ACTIVITIES: CPT | Performed by: PHYSICAL THERAPIST

## 2024-11-18 PROCEDURE — 97110 THERAPEUTIC EXERCISES: CPT | Performed by: PHYSICAL THERAPIST

## 2024-11-18 PROCEDURE — 93798 PHYS/QHP OP CAR RHAB W/ECG: CPT

## 2024-11-18 PROCEDURE — 97112 NEUROMUSCULAR REEDUCATION: CPT | Performed by: PHYSICAL THERAPIST

## 2024-11-18 NOTE — PROGRESS NOTES
Physical Therapy Daily Treatment Note  [facility] [address] [place] [address]    Patient: Nick Turk   : 1970  Referring practitioner: Lv Bautista MD  Date of Initial Visit: Type: THERAPY  Noted: 10/4/2024  Today's Date: 2024  Patient seen for 11 sessions           Subjective  Nick Turk reports: no c/o. He attended cardiac rehab prior to therapy.    Objective   See Exercise, Manual, and Modality Logs for complete treatment.     Assessment/Plan  Nico works hard while here, explaining that he is driven to recover. He tolerated additional strengthening with no c/o. Continue per POC.    Visit Diagnoses:    ICD-10-CM ICD-9-CM   1. Cerebrovascular accident (CVA) due to other mechanism  I63.89 434.91   2. Gait disturbance  R26.9 781.2   3. Imbalance  R26.89 781.2       Progress per Plan of Care and Progress strengthening /stabilization /functional activity       Timed:  Manual Therapy:         mins  24900;  Therapeutic Exercise:    13     mins  67576;     Neuromuscular Tracie:    10    mins  63417;    Therapeutic Activity:     23     mins  76515;     Gait Training:           mins  05072;     Ultrasound:          mins  89119;    Electrical Stimulation:         mins  13567 ( );  Aquatics  __   mins   80624    Untimed:  Electrical Stimulation:         mins  12991 ( );  Mechanical Traction:         mins  70565;     Timed Treatment:   46   mins   Total Treatment:     46   mins    Electronically Signed:  Janine Don PTA  Physical Therapist Assistant    [place] PTA license [id number]

## 2024-11-20 ENCOUNTER — TREATMENT (OUTPATIENT)
Dept: PHYSICAL THERAPY | Facility: CLINIC | Age: 54
End: 2024-11-20
Payer: COMMERCIAL

## 2024-11-20 ENCOUNTER — TREATMENT (OUTPATIENT)
Dept: CARDIAC REHAB | Facility: HOSPITAL | Age: 54
End: 2024-11-20
Payer: COMMERCIAL

## 2024-11-20 ENCOUNTER — OFFICE VISIT (OUTPATIENT)
Dept: ORTHOPEDIC SURGERY | Facility: CLINIC | Age: 54
End: 2024-11-20
Payer: COMMERCIAL

## 2024-11-20 VITALS
BODY MASS INDEX: 30.48 KG/M2 | SYSTOLIC BLOOD PRESSURE: 108 MMHG | OXYGEN SATURATION: 94 % | HEART RATE: 69 BPM | DIASTOLIC BLOOD PRESSURE: 71 MMHG | WEIGHT: 225 LBS | HEIGHT: 72 IN

## 2024-11-20 DIAGNOSIS — I50.22 CHRONIC SYSTOLIC CHF (CONGESTIVE HEART FAILURE), NYHA CLASS 2: Primary | ICD-10-CM

## 2024-11-20 DIAGNOSIS — I63.89 CEREBROVASCULAR ACCIDENT (CVA) DUE TO OTHER MECHANISM: Primary | ICD-10-CM

## 2024-11-20 DIAGNOSIS — R26.9 GAIT DISTURBANCE: ICD-10-CM

## 2024-11-20 DIAGNOSIS — M25.512 LEFT SHOULDER PAIN, UNSPECIFIED CHRONICITY: Primary | ICD-10-CM

## 2024-11-20 DIAGNOSIS — R26.89 IMBALANCE: ICD-10-CM

## 2024-11-20 DIAGNOSIS — M75.82 ROTATOR CUFF TENDONITIS, LEFT: ICD-10-CM

## 2024-11-20 PROCEDURE — 93798 PHYS/QHP OP CAR RHAB W/ECG: CPT

## 2024-11-20 RX ORDER — TRIAMCINOLONE ACETONIDE 40 MG/ML
40 INJECTION, SUSPENSION INTRA-ARTICULAR; INTRAMUSCULAR
Status: COMPLETED | OUTPATIENT
Start: 2024-11-20 | End: 2024-11-20

## 2024-11-20 RX ORDER — LIDOCAINE HYDROCHLORIDE 10 MG/ML
5 INJECTION, SOLUTION INFILTRATION; PERINEURAL
Status: COMPLETED | OUTPATIENT
Start: 2024-11-20 | End: 2024-11-20

## 2024-11-20 RX ADMIN — LIDOCAINE HYDROCHLORIDE 5 ML: 10 INJECTION, SOLUTION INFILTRATION; PERINEURAL at 08:44

## 2024-11-20 RX ADMIN — TRIAMCINOLONE ACETONIDE 40 MG: 40 INJECTION, SUSPENSION INTRA-ARTICULAR; INTRAMUSCULAR at 08:44

## 2024-11-20 NOTE — PROGRESS NOTES
"Chief Complaint  Follow-up of the Left Shoulder     Subjective      Nick Turk presents to [facility] for follow up of the left shoulder.  He had a left shoulder arthroscopy with mini open rotator cuff repair, subacromial decompression and distal clavicle resection with subpectoral biceps tenodesis performed on 5/12/2022 He does cardio and stroke rehab.  He has had injections in the shoulder in the past that gives relief.  The pain is in the anterior aspect of the shoulder and down the arm.     Allergies   Allergen Reactions    Bee Venom Swelling    Meloxicam Confusion and Other (See Comments)        Social History     Socioeconomic History    Marital status:     Number of children: 3    Highest education level: 12th grade   Tobacco Use    Smoking status: Former     Average packs/day: 1 pack/day for 39.6 years (39.6 ttl pk-yrs)     Types: Cigarettes     Start date: 4/13/1984     Passive exposure: Past    Smokeless tobacco: Never   Vaping Use    Vaping status: Never Used   Substance and Sexual Activity    Alcohol use: Never    Drug use: Never    Sexual activity: Defer     Partners: Male        I reviewed the patient's chief complaint, history of present illness, review of systems, past medical history, surgical history, family history, social history, medications, and allergy list.     Review of Systems     Constitutional: Denies fevers, chills, weight loss  Cardiovascular: Denies chest pain, shortness of breath  Skin: Denies rashes, acute skin changes  Neurologic: Denies headache, loss of consciousness        Vital Signs:   /71   Pulse 69   Ht 182.9 cm (72.01\")   Wt 102 kg (225 lb)   SpO2 94%   BMI 30.51 kg/m²          Physical Exam  General: Alert. No acute distress    Ortho Exam        LEFT SHOULDER Full ROM of the shoulder.  Just pain and aching daily.  Positive Cross body adduction. Supraspinatus strength 5/5. Infraspinatus Strength 5/5. Infrared subscap 5/5.  " Sensation intact to light touch, median, radial, ulnar nerve. Positive AIN, PIN, ulnar nerve motor. Positive pulses.. Good strength in triceps, biceps, deltoid, wrist extensors and wrist flexors. Tender to palpation to the anterior aspect of the shoulder and down the arm.  Pain with empty can testing.        Large Joint: L subacromial bursa  Date/Time: 11/20/2024 8:44 AM  Consent given by: patient  Site marked: site marked  Timeout: Immediately prior to procedure a time out was called to verify the correct patient, procedure, equipment, support staff and site/side marked as required   Supporting Documentation  Indications: pain   Procedure Details  Location: shoulder - L subacromial bursa  Preparation: Patient was prepped and draped in the usual sterile fashion  Needle gauge: 21 G.  Medications administered: 5 mL lidocaine 1 %; 40 mg triamcinolone acetonide 40 MG/ML  Patient tolerance: patient tolerated the procedure well with no immediate complications    This injection documentation was Scribed for Abdoulaye Minaya MD by Rachel Hernandez MA.  11/20/24   08:45 EST        Imaging Results (Most Recent)       None             Result Review :               Assessment and Plan     Diagnoses and all orders for this visit:    1. Left shoulder pain, unspecified chronicity (Primary)    2. Rotator cuff tendonitis, left        Discussed the treatment plan with the patient.     Discussed the risks and benefits of conservative measures. The patient expressed understanding and wished to proceed with a left shoulder steroid injection.  He tolerated the injection well.        Call or return if worsening symptoms.    Follow Up     PRN      Patient was given instructions and counseling regarding his condition or for health maintenance advice. Please see specific information pulled into the AVS if appropriate.     Scribed for Abdoulaye Minaya MD by Maria Eugenia Paredes MA.  11/20/24   08:18 EST    I have personally performed the services  described in this document as scribed by the above individual and it is both accurate and complete. Abdoulaye Minaya MD 11/20/24

## 2024-11-20 NOTE — PROGRESS NOTES
[facility]                              Physical Therapy Daily Treatment Note    Patient: Nick Turk   : 1970  Diagnosis/ICD-10 Code:  Cerebrovascular accident (CVA) due to other mechanism [I63.89]  Referring practitioner: Lv Bautista MD  Date of Initial Visit: Type: THERAPY  Noted: 10/4/2024  Today's Date: 2024  Patient seen for 12 sessions           Subjective   Nick Turk reports: he is doing well today. No complaints at time of therapy.      Objective     See Exercise, Manual, and Modality Logs for complete treatment.     Assessment/Plan  Patient demonstrated good tolerance to exercises and activities this session with little rest breaks. Will continue to progress patient per plan of care and per patient tolerance to improve upon functional daily activities.     Progress per Plan of Care         Timed:  Manual Therapy:         mins  49263;  Therapeutic Exercise:    15     mins  56951;     Neuromuscular Tracie:    10    mins  11024;    Therapeutic Activity:     15     mins  46153;     Gait Training:           mins  46673;        Untimed:  Electrical Stimulation:         mins  59255 ( );  Mechanical Traction:         mins  65619;       Timed Treatment:   40   mins   Total Treatment:     40   mins      Electronically signed:     Lisa Jacobo PTA  Physical Therapist Assistant  [facility] License #: [id number]

## 2024-11-25 ENCOUNTER — TREATMENT (OUTPATIENT)
Dept: CARDIAC REHAB | Facility: HOSPITAL | Age: 54
End: 2024-11-25
Payer: COMMERCIAL

## 2024-11-25 DIAGNOSIS — I50.22 CHRONIC SYSTOLIC CHF (CONGESTIVE HEART FAILURE), NYHA CLASS 2: Primary | ICD-10-CM

## 2024-11-25 PROCEDURE — 93798 PHYS/QHP OP CAR RHAB W/ECG: CPT

## 2024-11-26 ENCOUNTER — TREATMENT (OUTPATIENT)
Dept: PHYSICAL THERAPY | Facility: CLINIC | Age: 54
End: 2024-11-26
Payer: COMMERCIAL

## 2024-11-26 DIAGNOSIS — R26.9 GAIT DISTURBANCE: ICD-10-CM

## 2024-11-26 DIAGNOSIS — I63.89 CEREBROVASCULAR ACCIDENT (CVA) DUE TO OTHER MECHANISM: Primary | ICD-10-CM

## 2024-11-26 DIAGNOSIS — R26.89 IMBALANCE: ICD-10-CM

## 2024-11-26 NOTE — PROGRESS NOTES
Outpatient Physical Therapy                   Physical Therapy Daily Treatment Note    Patient: Nick Turk   : 1970  Diagnosis/ICD-10 Code:  Cerebrovascular accident (CVA) due to other mechanism [I63.89]  Referring practitioner: Lv Bautista MD  Date of Initial Visit: Type: THERAPY  Noted: 10/4/2024  Today's Date: 2024  Patient seen for 13 sessions             Subjective   Nick Turk reports: he is tired today.     Objective     See Exercise, Manual, and Modality Logs for complete treatment.     Assessment/Plan  Nick demonstrated good tolerance to all interventions today. Patient continues to demonstrate improvements in gait and balance.     Progress per Plan of Care      Timed:  Manual Therapy:    0     mins  91391;  Therapeutic Exercise:    23     mins  94957;     Neuromuscular Traice:    10    mins  93930;    Therapeutic Activity:     12     mins  28858;     Gait Trainin     mins  78980;       Ultrasound:                    0     mins  32834;      Untimed:  Mechanical Traction:    0     mins  01187;   Electrical Stimulation:    0     mins  16288 ( );      Timed Treatment:   45   mins   Total Treatment:     45   mins      Electronically signed:   Mayda Ascencio PTA  Physical Therapist Assistant  [facility] License #: [id number]

## 2024-11-27 ENCOUNTER — TREATMENT (OUTPATIENT)
Dept: CARDIAC REHAB | Facility: HOSPITAL | Age: 54
End: 2024-11-27
Payer: COMMERCIAL

## 2024-11-27 DIAGNOSIS — I50.22 CHRONIC SYSTOLIC CHF (CONGESTIVE HEART FAILURE), NYHA CLASS 2: Primary | ICD-10-CM

## 2024-11-27 PROCEDURE — 93798 PHYS/QHP OP CAR RHAB W/ECG: CPT

## 2024-12-02 ENCOUNTER — TREATMENT (OUTPATIENT)
Dept: CARDIAC REHAB | Facility: HOSPITAL | Age: 54
End: 2024-12-02
Payer: COMMERCIAL

## 2024-12-02 ENCOUNTER — TREATMENT (OUTPATIENT)
Dept: PHYSICAL THERAPY | Facility: CLINIC | Age: 54
End: 2024-12-02
Payer: COMMERCIAL

## 2024-12-02 DIAGNOSIS — I50.22 CHRONIC SYSTOLIC CHF (CONGESTIVE HEART FAILURE), NYHA CLASS 2: Primary | ICD-10-CM

## 2024-12-02 DIAGNOSIS — R26.89 IMBALANCE: ICD-10-CM

## 2024-12-02 DIAGNOSIS — R26.9 GAIT DISTURBANCE: ICD-10-CM

## 2024-12-02 DIAGNOSIS — I63.89 CEREBROVASCULAR ACCIDENT (CVA) DUE TO OTHER MECHANISM: Primary | ICD-10-CM

## 2024-12-02 PROCEDURE — 97530 THERAPEUTIC ACTIVITIES: CPT

## 2024-12-02 PROCEDURE — 97110 THERAPEUTIC EXERCISES: CPT

## 2024-12-02 PROCEDURE — 97112 NEUROMUSCULAR REEDUCATION: CPT

## 2024-12-02 PROCEDURE — 93798 PHYS/QHP OP CAR RHAB W/ECG: CPT

## 2024-12-02 NOTE — PROGRESS NOTES
[facility]                              Physical Therapy Daily Treatment Note    Patient: Nick Turk   : 1970  Diagnosis/ICD-10 Code:  Cerebrovascular accident (CVA) due to other mechanism [I63.89]  Referring practitioner: Lv Bautista MD  Date of Initial Visit: Type: THERAPY  Noted: 10/4/2024  Today's Date: 2024  Patient seen for 14 sessions           Subjective   Nick Turk reports: no new complaints at time of therapy.    Objective       See Exercise, Manual, and Modality Logs for complete treatment.     Assessment/Plan  Patient progressed with activity endurance this session. Pt did not require any rest breaks. Will continue to progress patient per plan of care and per patient tolerance to improve upon functional daily activities.            Timed:  Manual Therapy:         mins  33537;  Therapeutic Exercise:    20     mins  09354;     Neuromuscular Tracie:    10    mins  52807;    Therapeutic Activity:     10     mins  34951;     Gait Training:           mins  10246;        Untimed:  Electrical Stimulation:         mins  59031 ( );  Mechanical Traction:         mins  26511;       Timed Treatment:   40   mins   Total Treatment:     40   mins      Electronically signed:     Lisa Jacobo PTA  Physical Therapist Assistant  [facility] License #: [id number]

## 2024-12-04 ENCOUNTER — TREATMENT (OUTPATIENT)
Dept: CARDIAC REHAB | Facility: HOSPITAL | Age: 54
End: 2024-12-04
Payer: COMMERCIAL

## 2024-12-04 DIAGNOSIS — I50.22 CHRONIC SYSTOLIC CHF (CONGESTIVE HEART FAILURE), NYHA CLASS 2: Primary | ICD-10-CM

## 2024-12-04 PROCEDURE — 93798 PHYS/QHP OP CAR RHAB W/ECG: CPT

## 2024-12-09 ENCOUNTER — TREATMENT (OUTPATIENT)
Dept: CARDIAC REHAB | Facility: HOSPITAL | Age: 54
End: 2024-12-09
Payer: COMMERCIAL

## 2024-12-09 ENCOUNTER — OFFICE VISIT (OUTPATIENT)
Dept: SLEEP MEDICINE | Facility: HOSPITAL | Age: 54
End: 2024-12-09
Payer: COMMERCIAL

## 2024-12-09 VITALS — HEIGHT: 73 IN | BODY MASS INDEX: 31.3 KG/M2 | WEIGHT: 236.2 LBS | OXYGEN SATURATION: 97 % | HEART RATE: 66 BPM

## 2024-12-09 DIAGNOSIS — R53.83 FATIGUE, UNSPECIFIED TYPE: ICD-10-CM

## 2024-12-09 DIAGNOSIS — E66.811 CLASS 1 OBESITY WITH BODY MASS INDEX (BMI) OF 31.0 TO 31.9 IN ADULT, UNSPECIFIED OBESITY TYPE, UNSPECIFIED WHETHER SERIOUS COMORBIDITY PRESENT: ICD-10-CM

## 2024-12-09 DIAGNOSIS — I50.22 CHRONIC SYSTOLIC CHF (CONGESTIVE HEART FAILURE), NYHA CLASS 2: Primary | ICD-10-CM

## 2024-12-09 DIAGNOSIS — G47.33 OBSTRUCTIVE SLEEP APNEA, ADULT: Primary | ICD-10-CM

## 2024-12-09 PROCEDURE — 93798 PHYS/QHP OP CAR RHAB W/ECG: CPT

## 2024-12-09 PROCEDURE — G0463 HOSPITAL OUTPT CLINIC VISIT: HCPCS

## 2024-12-09 PROCEDURE — 99214 OFFICE O/P EST MOD 30 MIN: CPT | Performed by: NURSE PRACTITIONER

## 2024-12-09 NOTE — PROGRESS NOTES
[address] [address]  [place]   [place] [address]  Phone: [phone]  Fax: [phone]        SLEEP CLINIC FOLLOW-UP PROGRESS NOTE    Nick Turk  [id number]   1970  54 y.o.  male      PCP: Ashley Cabezas APRN    DATE OF VISIT: 12/9/2024          CHIEF COMPLAINT: Obstructive sleep apnea    HPI:  This is a 54 y.o. year old patient who presents to the clinic today for the management of obstructive sleep apnea.  Patient had a(n) home sleep study 12/28/2021 showing AHI of 25.5/hr using 4% desaturation scoring for hypopneas.  Lowest SPO2 was 82% with approximately 14 minutes spent with O2 sats <88% on study.  He was started on auto CPAP.  He currently has a Aniat 2 device that is programmed at 10 to 15 cm H2O. He now presents for 6-month follow-up.  Unfortunately we do not have updated data from device to review today, last data available for review was 6/2024, patient significant other reports that the SD card did not seem to be in the machine all the way.  We discussed reinserting this when they get home and bring the card back by the office 1 day next week so we can get updated data, reach out to DME company if they have further issues getting a card inserted.    Getting in bed around 10:30pm, no TV in bed, takes about 10 minutes to fall asleep.  Urinating 2-3 times per night, usually back to sleep quickly after.  Not putting PAP back on after using restroom.  He thinks he may be using PAP about 4 hours per night on average.  Reports he wakes feeling well-rested, most days.  He does report he has been more tired since he has had his stroke though overall feels better.  Taking 1 nap per day usually around 3-4 pm, lasting about 1.5-2 hours or sometimes up to 3 hr.  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  Doing cardiac rehab and stroke rehab, 4 appointments total per week.  Significant other within the office today and reports that he is having mask leak.  They are  "wanting to get mask fitting through [employer].          MEDICATIONS: reviewed     ALLERGIES:  Bee venom and Meloxicam    MEDICAL HISTORY (managed by outside providers):  Heart failure reduced ejection fraction/nonischemic cardiomyopathy: Cardiology managing.  EF was 35% on 5/2024 echo, improved to 46 to 50% on 8/2024 echo  Mild nonobstructive CAD  PVCs, paroxysmal SVT  Hypertension  Hyperlipidemia  CVA    SOCIAL HISTORY (habits pertaining to sleep medicine):  Tobacco use: No   Alcohol use: 0 per week  Caffeine use: 1     REVIEW OF SYSTEMS:   Pertinent positive symptoms are:  Ohlman Sleepiness Scale :Total score: 5   Dry mouth/nose        PHYSICAL EXAMINATION:  CONSTITUTIONAL:  Vitals:    12/09/24 1100   Pulse: 66   SpO2: 97%   Weight: 107 kg (236 lb 3.2 oz)   Height: 185.4 cm (72.99\")    Body mass index is 31.17 kg/m².   HEAD: atraumatic, normocephalic  RESP SYSTEM: not in respiratory distress, breathing unlabored  CARDIOVASULAR: normal rate, no edema noted   NEURO: Alert and oriented x 3, mood and affect appeared appropriate          ASSESSMENT AND PLAN:  Obstructive Sleep Apnea: Patient reports he does not put CPAP back on after using the restroom, thinks he may be using it about 4 hours per night on average, however we do not have updated device download to review.  We discussed the importance of trying to use the device all night every night and with naps/whenever sleeping for optimum benefit especially with cardiac history.  Patient denies issues with pressures, occasionally feels that he could have the air pressure be a little higher, but also having issues with mask seal/mask leakage which I would recommend addressing.  They are going to reach out to DME company to schedule mask fitting.  Once this has been addressed we will proceed with overnight oximetry study on room air with PAP to ensure no significant residual nocturnal hypoxia with use of device.  We discussed that most people need 7 to 9 hours of " sleep per night.  Do not drive or operate heavy machinery if feeling tired or drowsy.  They are going to bring the SD card by the office next week for review, will make addendum to report once reviewed.  They would like to do a 2-month follow-up as well but will call sooner for issues or concerns.  Obesity: Body mass index is 31.17 kg/m².. Patients who are overweight or obese are at increased risk of sleep apnea/ sleep disordered breathing. Weight reduction and healthy lifestyle are encouraged in overweight/ obese patients as part of a comprehensive approach to sleep apnea treatment.       Patient will follow-up in 2 MONTHS or follow-up sooner for any issues or concerns.  Patient's questions were answered.        Thank you for allowing me to participate in the care of this patient.     Zita Ospina DNP, APRN  [facility]

## 2024-12-11 ENCOUNTER — TREATMENT (OUTPATIENT)
Dept: CARDIAC REHAB | Facility: HOSPITAL | Age: 54
End: 2024-12-11
Payer: COMMERCIAL

## 2024-12-11 DIAGNOSIS — I50.22 CHRONIC SYSTOLIC CHF (CONGESTIVE HEART FAILURE), NYHA CLASS 2: Primary | ICD-10-CM

## 2024-12-11 PROCEDURE — 93798 PHYS/QHP OP CAR RHAB W/ECG: CPT

## 2024-12-12 ENCOUNTER — TREATMENT (OUTPATIENT)
Dept: PHYSICAL THERAPY | Facility: CLINIC | Age: 54
End: 2024-12-12
Payer: COMMERCIAL

## 2024-12-12 DIAGNOSIS — I63.89 CEREBROVASCULAR ACCIDENT (CVA) DUE TO OTHER MECHANISM: Primary | ICD-10-CM

## 2024-12-12 DIAGNOSIS — R26.9 GAIT DISTURBANCE: ICD-10-CM

## 2024-12-12 DIAGNOSIS — R26.89 IMBALANCE: ICD-10-CM

## 2024-12-12 PROCEDURE — 97530 THERAPEUTIC ACTIVITIES: CPT

## 2024-12-12 PROCEDURE — 97164 PT RE-EVAL EST PLAN CARE: CPT

## 2024-12-12 PROCEDURE — 97110 THERAPEUTIC EXERCISES: CPT

## 2024-12-12 NOTE — PROGRESS NOTES
"Progress Note / Discharge   [facility] [address] [place] [address]      Patient: Nick Turk   : 1970  Diagnosis/ICD-10 Code:  Cerebrovascular accident (CVA) due to other mechanism [I63.89]  Referring practitioner: Lv Bautista MD  Date of Initial Visit: Type: THERAPY  Noted: 10/4/2024  Today's Date: 2024  Patient seen for 15 sessions      Subjective:   Subjective Questionnaire: Pt has met goal for FGA   Clinical Progress: improved  Home Program Compliance: Yes  Treatment has included: therapeutic exercise, neuromuscular re-education, manual therapy, therapeutic activity, and gait training    Subjective     Pt reports that, towards the afternoon, his vision still gets kind of blurry in the R eye. He takes a nap every single day because he gets tired. If he does not take a nap, he is \"beat.\" Vision only gets blurry when he is tired in the afternoon. 95% of the time pt will not run into anything on his R side anymore.     Pt is done with cardiac rehab after next week.     Pt has an appt with the neurologist on 24.    Objective     Balance:   Romberg on foam with eyes closed: 30 seconds      Pt able to climb up/down 6 foot ladder 10x without imbalance for improved functional mobility       See Exercise, Manual, and Modality Logs for complete treatment.     Assessment/Plan  Pt tolerated today's session well. Pt has made great improvements since starting therapy. He still has some problems with fatigue and R eye vision. However, therapy is unlikely to assist with the vision issue, and endurance is something that he can build over time on his own. Pt has met all of his goals. Pt and therapist agree that pt can discharge from therapy today. Pt will be discharged at this time.      Goals  Plan Goals:      1. The patient has difficulty with work related tasks.              LTG 3: 12 weeks:  The patient will demonstrate climbing up/down 6 foot ladder 10x without imbalance for " improved functional mobility.                           STATUS: Met              STG 3a: The patient will demonstrate 10/10 hits with the Finger Nose Finger test for improved coordination.                          STATUS:  Met         2. The patient has difficulty with balance.               LTG 2: 12 weeks: The patient will hold the sharpened romberg position on foam with eyes closed for 30 seconds in order to improve balance and decrease risk of falling.                           STATUS: Met              STG 2: 6 weeks: The patient will hold the romberg position on foam with eyes closed for 30 seconds in order to improve balance and decrease risk of falling.                           STATUS: Met        3. The patient has gait dysfunction.              LTG 3: 12 weeks:  The patient will demonstrate > 24 / 30 on the Functional Gait Assessment for improved functional mobility.                           STATUS:  Met              STG 3a: The patient will demonstrate > 20 / 30 on the Functional Gait Assessment for improved functional mobility.                           STATUS:  Met                  4. The patient has difficulty with eye movements.              STG 4a: The patient will demonstrate hitting targets and good speed with horizontal and vertical saccades for improved eye movement tasks.                           STATUS:  Met    Progress toward previous goals: All Met      Recommendations: Discharge (earlier than originally planned)      Signature: Maria Eddy PT    Electronically signed 2024    [place] License: PT - [id number]      Timed:  Manual Therapy:    0     mins  84310;  Therapeutic Exercise:    17     mins  08008;     Neuromuscular Tracie:    0    mins  65648;    Therapeutic Activity:     8     mins  35952;     Gait Trainin     mins  18187;     Aquatics                         0      mins  25421    Un-timed:  Mechanical Traction      0     mins  84008  Dry Needling     0     mins  self-pay  Electrical Stimulation:    0     mins  24294 ( );  Re-Evaluation:              6      mins  62748    Timed Treatment:   25   mins   Total Treatment:     31   mins

## 2024-12-16 ENCOUNTER — TREATMENT (OUTPATIENT)
Dept: CARDIAC REHAB | Facility: HOSPITAL | Age: 54
End: 2024-12-16
Payer: COMMERCIAL

## 2024-12-16 DIAGNOSIS — I50.22 CHRONIC SYSTOLIC CHF (CONGESTIVE HEART FAILURE), NYHA CLASS 2: Primary | ICD-10-CM

## 2024-12-16 PROCEDURE — 93798 PHYS/QHP OP CAR RHAB W/ECG: CPT

## 2024-12-18 ENCOUNTER — TREATMENT (OUTPATIENT)
Dept: CARDIAC REHAB | Facility: HOSPITAL | Age: 54
End: 2024-12-18
Payer: COMMERCIAL

## 2024-12-18 DIAGNOSIS — I50.22 CHRONIC SYSTOLIC CHF (CONGESTIVE HEART FAILURE), NYHA CLASS 2: Primary | ICD-10-CM

## 2024-12-18 PROCEDURE — 93798 PHYS/QHP OP CAR RHAB W/ECG: CPT

## 2024-12-19 ENCOUNTER — OFFICE VISIT (OUTPATIENT)
Dept: NEUROLOGY | Facility: CLINIC | Age: 54
End: 2024-12-19
Payer: COMMERCIAL

## 2024-12-19 VITALS
DIASTOLIC BLOOD PRESSURE: 63 MMHG | HEART RATE: 70 BPM | HEIGHT: 73 IN | BODY MASS INDEX: 31.01 KG/M2 | SYSTOLIC BLOOD PRESSURE: 105 MMHG | WEIGHT: 234 LBS

## 2024-12-19 DIAGNOSIS — G47.9 SLEEP DISORDER: ICD-10-CM

## 2024-12-19 DIAGNOSIS — I69.30 SEQUELAE, POST-STROKE: ICD-10-CM

## 2024-12-19 DIAGNOSIS — F41.9 ANXIETY AND DEPRESSION: Primary | ICD-10-CM

## 2024-12-19 DIAGNOSIS — F32.A ANXIETY AND DEPRESSION: Primary | ICD-10-CM

## 2024-12-19 NOTE — PROGRESS NOTES
"Chief Complaint  Results (MRI results)    Subjective          Nick Turk is a 54 y.o. male who presents to [facility]  History of Present Illness  54-year-old man here for follow-up of his stroke.  He states that he has problems with concentration, fatigue, memory loss since he had a stroke.  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  He gets dizzy, speech gets slurred, vision gets blurry needs weaving to the right lasting for 2 minutes if he gets upset.  He is usually upset with his wife.  He has at least 2 spells a week.  States that he is not depressed.  He does get upset when he is wife is mad at him for things that he does.  This symptoms did not start until after the stroke.    I reviewed with him the MRI of the brain which did not show any brain damage in the flo.  There is no other masses noted in the cerebrum and no other strokes.    He saw sleep physician and there was something wrong with the SD card and they are supposed to check again next week.  Objective   Vital Signs:   /63   Pulse 70   Ht 185.4 cm (72.99\")   Wt 106 kg (234 lb)   BMI 30.88 kg/m²     Physical Exam   Alert, fluent, phasic, follows commands well.  He is fully oriented to place and time.  He is able to ask questions regarding what is noted above.  He states that he disagrees with me that his memory problem is not from the stroke.  And I showed him a video on the neuroanatomy and function of the brainstem.        Assessment and Plan  Diagnoses and all orders for this visit:    1. Anxiety and depression (Primary)  Assessment & Plan:  I will refer him to psychiatry to evaluate him for anxiety and depression which occurred after he had a stroke.  I discussed with him that depression and anxiety can be treated which can improve his concentration and memory.  I discussed with him that the stroke did not do any physical injury to his brain that can " cause him to have memory problems.  I also discussed with him that I do not think he has another neurologic disorder such as neurodegenerative disease that is causing him to have the memory problems.  I will see him again in 3 months time for follow-up.    Orders:  -     Ambulatory Referral to Psychiatry    2. Sequelae, post-stroke  Assessment & Plan:  I again discussed with him the neuroanatomy and function of the brainstem and there is nothing wrong with his brainstem that causes him to have memory problems and have remittent dizziness, speech, vision blurring and staggering.      3. Sleep disorder  Assessment & Plan:  He is to follow-up with his sleep physician regarding his sleep apnea syndrome since they are unable to read the ST card.           Total time spent with the patient and coordinating patient care was 45 minutes.    Follow Up  No follow-ups on file.  Patient was given instructions and counseling regarding his condition or for health maintenance advice. Please see specific information pulled into the AVS if appropriate.

## 2024-12-19 NOTE — ASSESSMENT & PLAN NOTE
I again discussed with him the neuroanatomy and function of the brainstem and there is nothing wrong with his brainstem that causes him to have memory problems and have remittent dizziness, speech, vision blurring and staggering.

## 2024-12-19 NOTE — ASSESSMENT & PLAN NOTE
He is to follow-up with his sleep physician regarding his sleep apnea syndrome since they are unable to read the ST card.

## 2024-12-19 NOTE — ASSESSMENT & PLAN NOTE
I will refer him to psychiatry to evaluate him for anxiety and depression which occurred after he had a stroke.  I discussed with him that depression and anxiety can be treated which can improve his concentration and memory.  I discussed with him that the stroke did not do any physical injury to his brain that can cause him to have memory problems.  I also discussed with him that I do not think he has another neurologic disorder such as neurodegenerative disease that is causing him to have the memory problems.  I will see him again in 3 months time for follow-up.

## 2024-12-30 ENCOUNTER — TELEPHONE (OUTPATIENT)
Dept: CARDIOLOGY | Facility: CLINIC | Age: 54
End: 2024-12-30
Payer: COMMERCIAL

## 2024-12-30 NOTE — TELEPHONE ENCOUNTER
Patient walked into office today stating his PCP told him that his ALDACTONE was turning his Testosterone into Estrogen, and recommended patient discuss with cardiology.     Please advise

## 2024-12-31 ENCOUNTER — TELEPHONE (OUTPATIENT)
Dept: CARDIOLOGY | Facility: CLINIC | Age: 54
End: 2024-12-31
Payer: COMMERCIAL

## 2024-12-31 NOTE — TELEPHONE ENCOUNTER
Patient called and is questioning if he can come off spironolactone and other medications. Patient states he has been doing physical therapy and working out since heart problem. Patient went to pcp over pectoral muscle pain and soreness. Pcp told patient that Spironolactone can act as estrogen in a man, but to stay taking the medication until patient can talk to cardiologist to get the okay to stop taking.

## 2024-12-31 NOTE — TELEPHONE ENCOUNTER
I would not anticipate these visual changes to be correlated with his medication, but please inquire if he has any other symptoms occurring at the same time i.e. lightheadedness, dizziness, low blood pressure, low blood sugar neurological changes?  Recommend he have a complete eye exam.

## 2024-12-31 NOTE — TELEPHONE ENCOUNTER
KINZA patient. Went over medication changes and recommendation to monitor BP elevation, report any symptoms of SOA, swelling or BP starts to climb. Patient verbalized understanding and appreciation.     Patient asked if there is an explanation for vision changes every day around 3 o'clock, if patient does not lay down and rest, his vision does not get better. He states it is if his eyes cross, causes his vision to be off. Patient also notes he feels hungry all of the time.     Patient asked if this could be related to his medications? Patient has completed cardiac rehab and stroke rehab but this still occurs.     Please advise

## 2024-12-31 NOTE — TELEPHONE ENCOUNTER
Breast tenderness can be correlated with aldactone, recommend he stop it.  He should continue other cardiac medications.   Recommend monitoring bp after holding, and notify us if he has any worsening soa, swelling or other concerns.

## 2025-01-02 NOTE — TELEPHONE ENCOUNTER
KINZA patient. Went over recommendations. Patient states he has been to see the ophthalmologist, and recommended to look at medications. Patient denies Low BP or Glucose at the time of the events and does not have any other symptoms other than vision changes.     Patient has been to PCP as well.     Please advise

## 2025-01-06 NOTE — TELEPHONE ENCOUNTER
Outside of medications causing low BP, I would not anticipate this being a side effect on his current medication regiment.  Is he checking his blood pressure and blood sugar at the time of visual changes?   We can see if there is any changes after stopping spironolactone.

## 2025-01-07 NOTE — TELEPHONE ENCOUNTER
Patient states no changes after stopping spironolactone. Patient states he hasn't noted any changes in BP or Glucose. Patient states he chocking it up to having a stroke.     Patient continues to have issues at 3:00 pm- he just adjusts his work schedule around this.

## 2025-01-08 NOTE — TELEPHONE ENCOUNTER
From a cardiac standpoint, if is not related low blood pressures, I am uncertain of the etiology.  Would recommend he follow-up with neurology team regarding the symptoms.

## 2025-01-13 ENCOUNTER — APPOINTMENT (OUTPATIENT)
Dept: GENERAL RADIOLOGY | Facility: HOSPITAL | Age: 55
End: 2025-01-13
Payer: COMMERCIAL

## 2025-01-13 ENCOUNTER — TELEPHONE (OUTPATIENT)
Dept: CARDIOLOGY | Facility: CLINIC | Age: 55
End: 2025-01-13
Payer: COMMERCIAL

## 2025-01-13 ENCOUNTER — APPOINTMENT (OUTPATIENT)
Dept: CT IMAGING | Facility: HOSPITAL | Age: 55
End: 2025-01-13
Payer: COMMERCIAL

## 2025-01-13 ENCOUNTER — HOSPITAL ENCOUNTER (EMERGENCY)
Facility: HOSPITAL | Age: 55
Discharge: HOME | End: 2025-01-13
Attending: EMERGENCY MEDICINE
Payer: COMMERCIAL

## 2025-01-13 VITALS
WEIGHT: 240.96 LBS | DIASTOLIC BLOOD PRESSURE: 68 MMHG | HEIGHT: 73 IN | BODY MASS INDEX: 31.94 KG/M2 | OXYGEN SATURATION: 96 % | SYSTOLIC BLOOD PRESSURE: 116 MMHG | HEART RATE: 57 BPM | RESPIRATION RATE: 15 BRPM | TEMPERATURE: 98.1 F

## 2025-01-13 DIAGNOSIS — R07.9 CHEST PAIN, UNSPECIFIED TYPE: Primary | ICD-10-CM

## 2025-01-13 LAB
ALBUMIN SERPL-MCNC: 4.1 G/DL (ref 3.5–5.2)
ALBUMIN/GLOB SERPL: 1.3 G/DL
ALP SERPL-CCNC: 61 U/L (ref 39–117)
ALT SERPL W P-5'-P-CCNC: 26 U/L (ref 1–41)
ANION GAP SERPL CALCULATED.3IONS-SCNC: 7.7 MMOL/L (ref 5–15)
AST SERPL-CCNC: 19 U/L (ref 1–40)
BASOPHILS # BLD AUTO: 0.04 10*3/MM3 (ref 0–0.2)
BASOPHILS NFR BLD AUTO: 0.4 % (ref 0–1.5)
BILIRUB SERPL-MCNC: 0.7 MG/DL (ref 0–1.2)
BUN SERPL-MCNC: 11 MG/DL (ref 6–20)
BUN/CREAT SERPL: 11.8 (ref 7–25)
CALCIUM SPEC-SCNC: 9.3 MG/DL (ref 8.6–10.5)
CHLORIDE SERPL-SCNC: 101 MMOL/L (ref 98–107)
CO2 SERPL-SCNC: 28.3 MMOL/L (ref 22–29)
CREAT SERPL-MCNC: 0.93 MG/DL (ref 0.76–1.27)
DEPRECATED RDW RBC AUTO: 45.1 FL (ref 37–54)
EGFRCR SERPLBLD CKD-EPI 2021: 98.2 ML/MIN/1.73
EOSINOPHIL # BLD AUTO: 0.22 10*3/MM3 (ref 0–0.4)
EOSINOPHIL NFR BLD AUTO: 2.3 % (ref 0.3–6.2)
ERYTHROCYTE [DISTWIDTH] IN BLOOD BY AUTOMATED COUNT: 12.8 % (ref 12.3–15.4)
GEN 5 1HR TROPONIN T REFLEX: 6 NG/L
GLOBULIN UR ELPH-MCNC: 3.1 GM/DL
GLUCOSE SERPL-MCNC: 107 MG/DL (ref 65–99)
HCT VFR BLD AUTO: 49.2 % (ref 37.5–51)
HGB BLD-MCNC: 16.1 G/DL (ref 13–17.7)
HOLD SPECIMEN: NORMAL
HOLD SPECIMEN: NORMAL
IMM GRANULOCYTES # BLD AUTO: 0.04 10*3/MM3 (ref 0–0.05)
IMM GRANULOCYTES NFR BLD AUTO: 0.4 % (ref 0–0.5)
LIPASE SERPL-CCNC: 31 U/L (ref 13–60)
LYMPHOCYTES # BLD AUTO: 1.98 10*3/MM3 (ref 0.7–3.1)
LYMPHOCYTES NFR BLD AUTO: 20.9 % (ref 19.6–45.3)
MAGNESIUM SERPL-MCNC: 2 MG/DL (ref 1.6–2.6)
MCH RBC QN AUTO: 31.1 PG (ref 26.6–33)
MCHC RBC AUTO-ENTMCNC: 32.7 G/DL (ref 31.5–35.7)
MCV RBC AUTO: 95.2 FL (ref 79–97)
MONOCYTES # BLD AUTO: 0.95 10*3/MM3 (ref 0.1–0.9)
MONOCYTES NFR BLD AUTO: 10 % (ref 5–12)
NEUTROPHILS NFR BLD AUTO: 6.26 10*3/MM3 (ref 1.7–7)
NEUTROPHILS NFR BLD AUTO: 66 % (ref 42.7–76)
NRBC BLD AUTO-RTO: 0 /100 WBC (ref 0–0.2)
NT-PROBNP SERPL-MCNC: 89.3 PG/ML (ref 0–900)
PLATELET # BLD AUTO: 206 10*3/MM3 (ref 140–450)
PMV BLD AUTO: 10.6 FL (ref 6–12)
POTASSIUM SERPL-SCNC: 4.1 MMOL/L (ref 3.5–5.2)
PROT SERPL-MCNC: 7.2 G/DL (ref 6–8.5)
QT INTERVAL: 450 MS
QTC INTERVAL: 470 MS
RBC # BLD AUTO: 5.17 10*6/MM3 (ref 4.14–5.8)
SODIUM SERPL-SCNC: 137 MMOL/L (ref 136–145)
TROPONIN T NUMERIC DELTA: -3 NG/L
TROPONIN T SERPL HS-MCNC: 9 NG/L
WBC NRBC COR # BLD AUTO: 9.49 10*3/MM3 (ref 3.4–10.8)
WHOLE BLOOD HOLD COAG: NORMAL
WHOLE BLOOD HOLD SPECIMEN: NORMAL

## 2025-01-13 PROCEDURE — 36415 COLL VENOUS BLD VENIPUNCTURE: CPT

## 2025-01-13 PROCEDURE — 96374 THER/PROPH/DIAG INJ IV PUSH: CPT

## 2025-01-13 PROCEDURE — 25510000001 IOPAMIDOL PER 1 ML: Performed by: EMERGENCY MEDICINE

## 2025-01-13 PROCEDURE — 71275 CT ANGIOGRAPHY CHEST: CPT

## 2025-01-13 PROCEDURE — 85025 COMPLETE CBC W/AUTO DIFF WBC: CPT

## 2025-01-13 PROCEDURE — 93005 ELECTROCARDIOGRAM TRACING: CPT

## 2025-01-13 PROCEDURE — 99285 EMERGENCY DEPT VISIT HI MDM: CPT

## 2025-01-13 PROCEDURE — 80053 COMPREHEN METABOLIC PANEL: CPT

## 2025-01-13 PROCEDURE — 83735 ASSAY OF MAGNESIUM: CPT

## 2025-01-13 PROCEDURE — 93005 ELECTROCARDIOGRAM TRACING: CPT | Performed by: EMERGENCY MEDICINE

## 2025-01-13 PROCEDURE — 84484 ASSAY OF TROPONIN QUANT: CPT

## 2025-01-13 PROCEDURE — 83690 ASSAY OF LIPASE: CPT

## 2025-01-13 PROCEDURE — 83880 ASSAY OF NATRIURETIC PEPTIDE: CPT

## 2025-01-13 PROCEDURE — 84484 ASSAY OF TROPONIN QUANT: CPT | Performed by: EMERGENCY MEDICINE

## 2025-01-13 PROCEDURE — 71045 X-RAY EXAM CHEST 1 VIEW: CPT

## 2025-01-13 RX ORDER — ASPIRIN 81 MG/1
324 TABLET, CHEWABLE ORAL ONCE
Status: DISCONTINUED | OUTPATIENT
Start: 2025-01-13 | End: 2025-01-13

## 2025-01-13 RX ORDER — SODIUM CHLORIDE 0.9 % (FLUSH) 0.9 %
10 SYRINGE (ML) INJECTION AS NEEDED
Status: DISCONTINUED | OUTPATIENT
Start: 2025-01-13 | End: 2025-01-13 | Stop reason: HOSPADM

## 2025-01-13 RX ORDER — PANTOPRAZOLE SODIUM 40 MG/10ML
40 INJECTION, POWDER, LYOPHILIZED, FOR SOLUTION INTRAVENOUS ONCE
Status: COMPLETED | OUTPATIENT
Start: 2025-01-13 | End: 2025-01-13

## 2025-01-13 RX ORDER — IOPAMIDOL 755 MG/ML
100 INJECTION, SOLUTION INTRAVASCULAR
Status: COMPLETED | OUTPATIENT
Start: 2025-01-13 | End: 2025-01-13

## 2025-01-13 RX ADMIN — PANTOPRAZOLE SODIUM 40 MG: 40 INJECTION, POWDER, FOR SOLUTION INTRAVENOUS at 17:29

## 2025-01-13 RX ADMIN — IOPAMIDOL 100 ML: 755 INJECTION, SOLUTION INTRAVENOUS at 20:16

## 2025-01-13 NOTE — ED PROVIDER NOTES
"Time: 4:59 PM EST  Date of encounter:  1/13/2025  Independent Historian/Clinical History and Information was obtained by:   Patient    History is limited by: N/A    Chief Complaint: chest pain       History of Present Illness:  Patient is a 54 y.o. year old male who presents to the emergency department for evaluation of chest pain that started around 3 pm, today. CP radiated to his jaw. States its in the middle area. States it feels like \"heartburn that wont quit.\" No cardiac hx. patient denies fever and chills.  Patient has no nausea or vomiting.  Patient has no diaphoresis.  Patient denies cough hemoptysis.  Patient denies leg pain and swelling.      Patient Care Team  Primary Care Provider: Ashley Cabezas APRN    Past Medical History:     Allergies   Allergen Reactions    Bee Venom Swelling    Meloxicam Confusion and Other (See Comments)     Past Medical History:   Diagnosis Date    Back pain     CHF (congestive heart failure)     Chronic pain syndrome     CVA (cerebral vascular accident) 05/14/2024    Essential hypertension 05/20/2024    Forgetfulness     Hyperlipidemia LDL goal <70 07/15/2024    Leg pain     Lumbago 2015    Lumbar spondylosis     Pain, generalized     Pars defect of lumbar spine 2015    Sleep apnea     Sleep disorder     Spondylolisthesis, lumbar region 2015    L4/5     Past Surgical History:   Procedure Laterality Date    BACK SURGERY      CARDIAC CATHETERIZATION Left 05/21/2024    Procedure: Cardiac Catheterization/Vascular Study;  Surgeon: Lv Hunt MD;  Location: [facility] CATH INVASIVE LOCATION;  Service: Cardiovascular;  Laterality: Left;    CHOLECYSTECTOMY      COLONOSCOPY N/A 11/16/2021    Procedure: COLONOSCOPY WITH POLYPECTOMIES, CLIP APPLICATION X1, CAUTERY;  Surgeon: Nolberto Onofre MD;  Location: [facility] ENDOSCOPY;  Service: General;  Laterality: N/A;  COLON POLYPS    GANGLION CYST EXCISION      LUMBAR EPIDURAL INJECTION      LUMBAR FUSION  2015    SHOULDER ARTHROSCOPY W/ ROTATOR " CUFF REPAIR Left 05/12/2022    Procedure: LEFT SHOULDER ARTHROSCOPY WITH ROTATOR CUFF REPAIR, SUBACROMINAL DECOMPRESSION, DISTAL CLAVICULECTOMY, BICEPS TENODESIS;  Surgeon: Abdoulaye Minaya MD;  Location: [facility] OR [facility];  Service: Orthopedics;  Laterality: Left;    SPINAL FUSION       Family History   Problem Relation Age of Onset    Heart failure Mother     Heart disease Mother     No Known Problems Father     Heart failure Sister     Gustavo Hyperthermia Neg Hx        Home Medications:  Prior to Admission medications    Medication Sig Start Date End Date Taking? Authorizing Provider   aspirin 81 MG chewable tablet Chew 1 tablet Daily.    Provider, MD Caleb   atorvastatin (LIPITOR) 80 MG tablet Take 1 tablet by mouth Every Night. 6/18/24   Amisha Schulz APRN   carvedilol (COREG) 12.5 MG tablet Take 1 tablet by mouth 2 (Two) Times a Day. 5/30/24   Lv Hunt MD   dapagliflozin Propanediol (Farxiga) 10 MG tablet Take 10 mg by mouth Daily. 9/16/24   Geri Garcia APRN   ipratropium (ATROVENT) 0.03 % nasal spray Administer 2 sprays into the nostril(s) as directed by provider 3 (Three) Times a Day. 11/5/24   Omar Berumen DO   sacubitril-valsartan (Entresto) 49-51 MG tablet Take 1 tablet by mouth 2 (Two) Times a Day. 7/15/24   Phoebe Mustafa MD   spironolactone (ALDACTONE) 25 MG tablet Take 1 tablet by mouth Daily. 6/18/24   Amisha Schulz APRN        Social History:   Social History     Tobacco Use    Smoking status: Former     Average packs/day: 1 pack/day for 39.6 years (39.6 ttl pk-yrs)     Types: Cigarettes     Start date: 4/13/1984     Passive exposure: Past    Smokeless tobacco: Never   Vaping Use    Vaping status: Never Used   Substance Use Topics    Alcohol use: Never    Drug use: Never         Review of Systems:  Review of Systems   Constitutional:  Negative for chills and fever.   HENT:  Negative for congestion, rhinorrhea and sore throat.    Eyes:  Negative for pain and visual  "disturbance.   Respiratory:  Positive for cough. Negative for apnea, chest tightness and shortness of breath.    Cardiovascular:  Positive for chest pain. Negative for palpitations.   Gastrointestinal:  Negative for abdominal pain, diarrhea, nausea and vomiting.   Genitourinary:  Negative for difficulty urinating and dysuria.   Musculoskeletal:  Negative for joint swelling and myalgias.   Skin:  Negative for color change.   Neurological:  Negative for seizures and headaches.   Psychiatric/Behavioral: Negative.     All other systems reviewed and are negative.       Physical Exam:  /68 (BP Location: Right arm, Patient Position: Lying)   Pulse 56   Temp 98.1 °F (36.7 °C) (Oral)   Resp 16   Ht 185.4 cm (73\")   Wt 109 kg (240 lb 15.4 oz)   SpO2 94%   BMI 31.79 kg/m²     Physical Exam  Vitals and nursing note reviewed.   Constitutional:       General: He is not in acute distress.     Appearance: Normal appearance. He is not toxic-appearing.   HENT:      Head: Normocephalic and atraumatic.      Jaw: There is normal jaw occlusion.   Eyes:      General: Lids are normal.      Extraocular Movements: Extraocular movements intact.      Conjunctiva/sclera: Conjunctivae normal.      Pupils: Pupils are equal, round, and reactive to light.   Cardiovascular:      Rate and Rhythm: Normal rate and regular rhythm.      Pulses: Normal pulses.      Heart sounds: Normal heart sounds.   Pulmonary:      Effort: Pulmonary effort is normal. No respiratory distress.      Breath sounds: Normal breath sounds. No wheezing or rhonchi.   Abdominal:      General: Abdomen is flat. There is no distension.      Palpations: Abdomen is soft.      Tenderness: There is no abdominal tenderness. There is no guarding or rebound.   Musculoskeletal:         General: Normal range of motion.      Cervical back: Normal range of motion and neck supple.      Right lower leg: No edema.      Left lower leg: No edema.   Skin:     General: Skin is warm and " dry.      Coloration: Skin is not cyanotic.   Neurological:      Mental Status: He is alert and oriented to person, place, and time. Mental status is at baseline.   Psychiatric:         Attention and Perception: Attention and perception normal.         Mood and Affect: Mood normal.                    Medical Decision Making:      Comorbidities that affect care:    Hypertension    External Notes reviewed:    Patient had a cardiac catheterization last year and had no occlusive disease found.      The following orders were placed and all results were independently analyzed by me:  Orders Placed This Encounter   Procedures    XR Chest 1 View    CT Angiogram Chest Pulmonary Embolism    Dudley Draw    High Sensitivity Troponin T    Comprehensive Metabolic Panel    Lipase    BNP    Magnesium    CBC Auto Differential    High Sensitivity Troponin T 1Hr    NPO Diet NPO Type: Strict NPO    Undress & Gown    Continuous Pulse Oximetry    Oxygen Therapy- Nasal Cannula; Titrate 1-6 LPM Per SpO2; 90 - 95%    ECG 12 Lead ED Triage Standing Order; Chest Pain    ECG 12 Lead ED Triage Standing Order; Chest Pain    Insert Peripheral IV    CBC & Differential    Green Top (Gel)    Lavender Top    Gold Top - SST    Light Blue Top       Medications Given in the Emergency Department:  Medications   sodium chloride 0.9 % flush 10 mL (has no administration in time range)   pantoprazole (PROTONIX) injection 40 mg (40 mg Intravenous Given 1/13/25 1729)   iopamidol (ISOVUE-370) 76 % injection 100 mL (100 mL Intravenous Given 1/13/25 2016)        ED Course:         Labs:    Lab Results (last 24 hours)       Procedure Component Value Units Date/Time    High Sensitivity Troponin T [[id number]]  (Normal) Collected: 01/13/25 1617    Specimen: Blood Updated: 01/13/25 1646     HS Troponin T 9 ng/L     Narrative:      High Sensitive Troponin T Reference Range:  <14.0 ng/L- Negative Female for AMI  <22.0 ng/L- Negative Male for AMI  >=14 - Abnormal Female  indicating possible myocardial injury.  >=22 - Abnormal Male indicating possible myocardial injury.   Clinicians would have to utilize clinical acumen, EKG, Troponin, and serial changes to determine if it is an Acute Myocardial Infarction or myocardial injury due to an underlying chronic condition.         CBC & Differential [[id number]]  (Abnormal) Collected: 01/13/25 1617    Specimen: Blood Updated: 01/13/25 1626    Narrative:      The following orders were created for panel order CBC & Differential.  Procedure                               Abnormality         Status                     ---------                               -----------         ------                     CBC Auto Differential[[id number]]        Abnormal            Final result                 Please view results for these tests on the individual orders.    Comprehensive Metabolic Panel [[id number]]  (Abnormal) Collected: 01/13/25 1617    Specimen: Blood Updated: 01/13/25 1646     Glucose 107 mg/dL      BUN 11 mg/dL      Creatinine 0.93 mg/dL      Sodium 137 mmol/L      Potassium 4.1 mmol/L      Chloride 101 mmol/L      CO2 28.3 mmol/L      Calcium 9.3 mg/dL      Total Protein 7.2 g/dL      Albumin 4.1 g/dL      ALT (SGPT) 26 U/L      AST (SGOT) 19 U/L      Alkaline Phosphatase 61 U/L      Total Bilirubin 0.7 mg/dL      Globulin 3.1 gm/dL      A/G Ratio 1.3 g/dL      BUN/Creatinine Ratio 11.8     Anion Gap 7.7 mmol/L      eGFR 98.2 mL/min/1.73     Narrative:      GFR Categories in Chronic Kidney Disease (CKD)      GFR Category          GFR (mL/min/1.73)    Interpretation  G1                     90 or greater         Normal or high (1)  G2                      60-89                Mild decrease (1)  G3a                   45-59                Mild to moderate decrease  G3b                   30-44                Moderate to severe decrease  G4                    15-29                Severe decrease  G5                    14 or less           Kidney  failure          (1)In the absence of evidence of kidney disease, neither GFR category G1 or G2 fulfill the criteria for CKD.    eGFR calculation 2021 CKD-EPI creatinine equation, which does not include race as a factor    Lipase [[id number]]  (Normal) Collected: 01/13/25 1617    Specimen: Blood Updated: 01/13/25 1646     Lipase 31 U/L     BNP [[id number]]  (Normal) Collected: 01/13/25 1617    Specimen: Blood Updated: 01/13/25 1643     proBNP 89.3 pg/mL     Narrative:      This assay is used as an aid in the diagnosis of individuals suspected of having heart failure. It can be used as an aid in the diagnosis of acute decompensated heart failure (ADHF) in patients presenting with signs and symptoms of ADHF to the emergency department (ED). In addition, NT-proBNP of <300 pg/mL indicates ADHF is not likely.    Age Range Result Interpretation  NT-proBNP Concentration (pg/mL:      <50             Positive            >450                   Gray                 300-450                    Negative             <300    50-75           Positive            >900                  Gray                300-900                  Negative            <300      >75             Positive            >1800                  Gray                300-1800                  Negative            <300    Magnesium [[id number]]  (Normal) Collected: 01/13/25 1617    Specimen: Blood Updated: 01/13/25 1646     Magnesium 2.0 mg/dL     CBC Auto Differential [[id number]]  (Abnormal) Collected: 01/13/25 1617    Specimen: Blood Updated: 01/13/25 1626     WBC 9.49 10*3/mm3      RBC 5.17 10*6/mm3      Hemoglobin 16.1 g/dL      Hematocrit 49.2 %      MCV 95.2 fL      MCH 31.1 pg      MCHC 32.7 g/dL      RDW 12.8 %      RDW-SD 45.1 fl      MPV 10.6 fL      Platelets 206 10*3/mm3      Neutrophil % 66.0 %      Lymphocyte % 20.9 %      Monocyte % 10.0 %      Eosinophil % 2.3 %      Basophil % 0.4 %      Immature Grans % 0.4 %      Neutrophils, Absolute 6.26 10*3/mm3       Lymphocytes, Absolute 1.98 10*3/mm3      Monocytes, Absolute 0.95 10*3/mm3      Eosinophils, Absolute 0.22 10*3/mm3      Basophils, Absolute 0.04 10*3/mm3      Immature Grans, Absolute 0.04 10*3/mm3      nRBC 0.0 /100 WBC     High Sensitivity Troponin T 1Hr [[id number]]  (Normal) Collected: 01/13/25 1729    Specimen: Blood Updated: 01/13/25 1801     HS Troponin T 6 ng/L      Troponin T Numeric Delta -3 ng/L     Narrative:      High Sensitive Troponin T Reference Range:  <14.0 ng/L- Negative Female for AMI  <22.0 ng/L- Negative Male for AMI  >=14 - Abnormal Female indicating possible myocardial injury.  >=22 - Abnormal Male indicating possible myocardial injury.   Clinicians would have to utilize clinical acumen, EKG, Troponin, and serial changes to determine if it is an Acute Myocardial Infarction or myocardial injury due to an underlying chronic condition.                  Imaging:    CT Angiogram Chest Pulmonary Embolism    Result Date: 1/13/2025  CT ANGIOGRAM CHEST PULMONARY EMBOLISM Date of Exam: 1/13/2025 8:02 PM EST Indication: Shortness of breath shortness of breath. Comparison: CT chest 10/2/2023 Technique: Axial CT images were obtained of the chest after the uneventful intravenous administration of iodinated contrast utilizing pulmonary embolism protocol.  Reconstructed coronal and sagittal images were also obtained. Automated exposure control and iterative construction methods were used. Findings: Pulmonary arteries:Adequate opacification of the pulmonary arteries. No evidence of acute pulmonary embolism. Aorta: Unremarkable. Lungs and Pleura: Scattered small pulmonary nodules, grossly unchanged from prior chest CT. Otherwise the lungs are clear. The central airways are patent. No pleural effusion or pneumothorax Mediastinum/Maribel: No lymphadenopathy. No suspicious mass. Heart: Normal in size. No pericardial effusion. Normal RV/LV ratio. Soft Tissue: Unremarkable. Upper Abdomen: Unremarkable. Bones: No  acute osseous abnormality.     Impression: 1.No evidence of acute pulmonary embolism. 2.No acute intrathoracic abnormality. 3.Scattered small pulmonary nodules, grossly unchanged from prior chest CT. Electronically Signed: Drew Garcia DO  1/13/2025 8:31 PM EST  Workstation ID: [id number]    XR Chest 1 View    Result Date: 1/13/2025  XR CHEST 1 VW Date of Exam: 1/13/2025 4:45 PM EST Indication: Chest Pain Triage Protocol Comparison: 5/20/2024 radiographs Findings: Unremarkable cardiomediastinal silhouette. No focal airspace opacity, pleural effusion, or pneumothorax. No acute osseous abnormality.     Impression: No acute cardiopulmonary abnormality. Electronically Signed: Rafa Galan MD  1/13/2025 4:55 PM EST  Workstation ID: [id number]       Differential Diagnosis and Discussion:    Chest Pain:  Based on the patient's signs and symptoms, I considered aortic dissection, myocardial infaction, pulmonary embolism, cardiac tamponade, pericarditis, pneumothorax, musculoskeletal chest pain and other differential diagnosis as an etiology of the patient's chest pain.     PROCEDURES:    Labs were collected in the emergency department and all labs were reviewed and interpreted by me.  X-ray were performed in the emergency department and all X-ray impressions were independently interpreted by me.  An EKG was performed and the EKG was interpreted by me.  CT scan was performed in the emergency department and the CT scan radiology impression was interpreted by me.    ECG 12 Lead ED Triage Standing Order; Chest Pain   Preliminary Result   HEART RATE=66  bpm   RR Jxwhccgd=190  ms   MD Gurfyapk=625  ms   P Horizontal Axis=18  deg   P Front Axis=-8  deg   QRSD Vyiuvgcp=526  ms   QT Ysawpetg=519  ms   CDyL=663  ms   QRS Axis=5  deg   T Wave Axis=76  deg   - ABNORMAL ECG -   Unknown rhythm, irregular rate   Left bundle branch block   ST elevation secondary to IVCD   Date and Time of Study:2025-01-13 16:16:32           Procedures    MDM     The patient had an EKG that shows no acute changes. Specifically, there are no ST elevations, t-wave changes of concern, delta waves, or rhythm abnormalities warranting admission. The patient was placed on the cardiac monitor and observed with continuous telemetry. The patient has a chest x-ray interpreted by me that is negative for pneumothorax, pneumonia, and is essentially unremarkable. The patient has had unelevated troponins on blood draw.      The patient is resting comfortably and feels better, is alert and in no distress.  The patient´s CBC that was reviewed and interpreted by me shows no abnormalities of critical concern. Of note, there is no anemia requiring a blood transfusion and the platelet count is acceptable.  The patient´s CMP that was reviewed and interpretted by me shows no abnormalities of critical concern. Of note, the patient´s sodium and potassium are acceptable. The patient´s liver enzymes are unremarkable. The patient´s renal function (creatinine) is preserved. The patient has a normal anion gap.  Troponin x 2 is negative.  CT chest is negative for aortic dissection and pulmonary embolism.  Patient reports that he shovel snow this weekend and did not get chest pain while shoveling snow.  EKGs are unchanged from previous.  The repeat examination is unremarkable and benign. Electrocardiogram shows no signs of acute ischemia and the history, exam, diagnostic testing and current condition did not suggest that this patient is having an acute myocardial infarction, significant arrhythmia, unstable angina, esophageal perforation, pulmonary embolism, aortic dissection, severe pneumonia, sepsis for other significant pathology that would warrant further testing, continued ED treatment, admission, cardiology or other specialist consultation at this point. The vital signs have been stable. The patient's condition is stable and appropriate for discharge. The patient will  pursue further outpatient evaluation with the primary care physician, or designated physician or cardiologist. The patient has expressed a clear and thorough understanding and agreed to follow-up as instructed.                Patient Care Considerations:    None      Consultants/Shared Management Plan:    None    Social Determinants of Health:    Patient is independent, reliable, and has access to care.       Disposition and Care Coordination:    Discharged: I considered escalation of care by admitting this patient to the hospital, however cardiac workup is negative.    I have explained the patient´s condition, diagnoses and treatment plan based on the information available to me at this time. I have answered questions and addressed any concerns. The patient has a good  understanding of the patient´s diagnosis, condition, and treatment plan as can be expected at this point. The vital signs have been stable. The patient´s condition is stable and appropriate for discharge from the emergency department.      The patient will pursue further outpatient evaluation with the primary care physician or other designated or consulting physician as outlined in the discharge instructions. They are agreeable to this plan of care and follow-up instructions have been explained in detail. The patient has received these instructions in written format and has expressed an understanding of the discharge instructions. The patient is aware that any significant change in condition or worsening of symptoms should prompt an immediate return to this or the closest emergency department or call to 911.  I have explained discharge medications and the need for follow up with the patient/caretakers. This was also printed in the discharge instructions. Patient was discharged with the following medications and follow up:      Medication List      No changes were made to your prescriptions during this visit.      Ashley Cabezas, APRN  [id number]  [place] [address]  [phone]    In 2 days         Final diagnoses:   Chest pain, unspecified type        ED Disposition       ED Disposition   Discharge    Condition   Stable    Comment   --               This medical record created using voice recognition software.             Surya Stanley MD  01/13/25 2162

## 2025-01-13 NOTE — TELEPHONE ENCOUNTER
Caller: ROSA PRECIADO    Relationship: Emergency Contact    Best call back number: [phone]     What is the best time to reach you: ANYTIME    Who are you requesting to speak with (clinical staff, provider,  specific staff member): ANYONE    Do you know the name of the person who called:     What was the call regarding: ROSA CALLED IN STATING PATIENT WAS HAVING CHEST PAIN, NITRO DID HELP.  PATIENT IS BEING TRANSPORTED TO [facility] ER BY AMBULANCE.    Is it okay if the provider responds through [employer]: NO, PLEASE CALL.

## 2025-01-16 NOTE — TELEPHONE ENCOUNTER
ER notes reviewed, it does not look like there was any acute cardiac findings, if he has any further concerns we will be happy to set him up for an office follow-up, but if he is doing well we can see him for his routine follow-up in April.

## 2025-01-17 NOTE — TELEPHONE ENCOUNTER
KINZA patient. Patient states he is doing fine. Patient will call the office if he has any issues prior to f/u in April.

## 2025-01-21 LAB
QT INTERVAL: 450 MS
QTC INTERVAL: 470 MS

## 2025-02-11 ENCOUNTER — TELEPHONE (OUTPATIENT)
Dept: SLEEP MEDICINE | Facility: HOSPITAL | Age: 55
End: 2025-02-11

## 2025-02-11 ENCOUNTER — TELEMEDICINE (OUTPATIENT)
Dept: SLEEP MEDICINE | Facility: HOSPITAL | Age: 55
End: 2025-02-11
Payer: COMMERCIAL

## 2025-02-11 DIAGNOSIS — E66.3 OVERWEIGHT (BMI 25.0-29.9): ICD-10-CM

## 2025-02-11 DIAGNOSIS — G47.33 OBSTRUCTIVE SLEEP APNEA, ADULT: Primary | ICD-10-CM

## 2025-02-11 NOTE — PROGRESS NOTES
[address] [address]  [place]   [place] [address]  Phone: [phone]  Fax: [phone]        SLEEP CLINIC FOLLOW-UP PROGRESS NOTE    Nick Turk  [id number]   1970  54 y.o.  male      PCP: Ashley Cabezas APRN    DATE OF VISIT: 2/11/2025          CHIEF COMPLAINT: Obstructive sleep apnea    HPI:  This is a 54 y.o. year old patient who [facility] for follow-up on obstructive sleep apnea.      Patient had a(n) home sleep study 12/28/2021 showing AHI of 25.5/hr using 4% desaturation scoring for hypopneas.  Lowest SPO2 was 82% with approximately 14 minutes spent with O2 sats <88% on study.  He was started on auto CPAP.  He currently has a Anita 2 device that is programmed at 10 to 15 cm H2O. Patient's sleep apnea has improved with this therapy with residual AHI 0.5/hr.   Average usage is 5 hr 23 min per night on nights used.   Patient tolerating device well and improved with treatment.  Getting about 7.5 hr of sleep per night.  Does still nap in the afternoon around 3 PM.  Discussed that most people need 7 to 9 hours of sleep per night, increasing nighttime sleep to closer to 9 hours may be beneficial in some cases.  Patient advised not to drive or operate heavy machinery if feeling tired or drowsy.  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      Mode of Visit: Video  Location of patient: -HOME-  Location of provider: +[facility]+  You have chosen to receive care through a telehealth visit.  The patient has signed the video visit consent form.  The visit included audio and video interaction. No technical issues occurred during this visit.      Reported height: 6'2  Reported weight: 232lb  Calculated BMI: 29.8        MEDICATIONS: reviewed     ALLERGIES:  Bee venom and Meloxicam    SOCIAL HISTORY (habits pertaining to sleep medicine):  Tobacco use: No   Caffeine use: 1 soda in the morning    REVIEW OF SYSTEMS:   Pertinent positive  symptoms are:  Denies chest pain, dyspnea, or morning headaches        PHYSICAL EXAMINATION:  CONSTITUTIONAL: Resting comfortably, no acute distress  RESP SYSTEM: Respirations appear even and unlabored  NEURO: Alert and oriented x 3, mood and affect appeared appropriate        DATA REVIEWED:  The 30-day PAP compliance summary downloaded on 2/8/2025 has been reviewed independently by me and discussed with the patient.   Compliance: 96.67%  More than 4 hr use: 90%  Average use of the device: 5 hours 26 minutes per night on days used  Residual AHI: 0.8/hr (goal < 5.0 /hr)  Device: Anita 2 auto CPAP, set at 10 to 15 cm H2O  Mask type: Full face   DME: [employer]          ASSESSMENT AND PLAN:  Obstructive Sleep Apnea: sleep apnea has improved with the device and treatment.  Patient has excellent compliance with the device for treatment of sleep apnea.  I have personally reviewed the smart card download and discussed the download data with the patient and encouarged continued use of the device.  The residual AHI is acceptable. The device is benefiting the patient and the device is medically necessary.  Recommend patient get supplies from the DME company, change them on a regular basis, and clean as directed.  A prescription for supplies has been sent to the DME company.  Recommend continued usage of PAP device, most insurances require minimum usage of 4 hours per night at least 70% of the time, would recommend using all night every night if possible for optimum health.  Recommend prioritizing sleep to aid in overall health.  Do not drive or operate heavy machinery or do activities that require high concentration if feeling tired or drowsy.  Overweight: Patients who are overweight or obese are at increased risk of sleep apnea/ sleep disordered breathing. Weight reduction and healthy lifestyle are encouraged in overweight/ obese patients as part of a comprehensive approach to sleep apnea treatment.       Patient will follow-up  in 6 months or follow-up sooner for any issues or concerns.  Patient's questions were answered.        Thank you for allowing me to participate in the care of this patient.     Zita Ospina DNP, [facility]

## 2025-02-24 ENCOUNTER — TELEPHONE (OUTPATIENT)
Dept: CARDIOLOGY | Facility: CLINIC | Age: 55
End: 2025-02-24
Payer: COMMERCIAL

## 2025-02-24 NOTE — TELEPHONE ENCOUNTER
Patient called asking for assistance in getting f/u scheduled with Dr Gavino Bautista, stating he is still having swelling in his breast and his left leg. Patient is also asking if he is able to repeat ECHO to possibly get rid of some medications. Patient has been scheduled with Dr Gavino Bautista on 03/13/25.

## 2025-03-13 ENCOUNTER — OFFICE VISIT (OUTPATIENT)
Dept: CARDIOLOGY | Facility: CLINIC | Age: 55
End: 2025-03-13
Payer: COMMERCIAL

## 2025-03-13 VITALS
HEIGHT: 73 IN | DIASTOLIC BLOOD PRESSURE: 83 MMHG | BODY MASS INDEX: 31.68 KG/M2 | HEART RATE: 59 BPM | WEIGHT: 239 LBS | SYSTOLIC BLOOD PRESSURE: 123 MMHG

## 2025-03-13 DIAGNOSIS — E78.5 HYPERLIPIDEMIA LDL GOAL <70: ICD-10-CM

## 2025-03-13 DIAGNOSIS — I63.439 CEREBROVASCULAR ACCIDENT (CVA) DUE TO EMBOLISM OF POSTERIOR CEREBRAL ARTERY, UNSPECIFIED BLOOD VESSEL LATERALITY: ICD-10-CM

## 2025-03-13 DIAGNOSIS — M79.89 LEG SWELLING: ICD-10-CM

## 2025-03-13 DIAGNOSIS — I50.22 CHRONIC HFREF (HEART FAILURE WITH REDUCED EJECTION FRACTION): Primary | ICD-10-CM

## 2025-03-13 DIAGNOSIS — E78.2 MIXED HYPERLIPIDEMIA: ICD-10-CM

## 2025-03-13 RX ORDER — CHLORAL HYDRATE 500 MG
1000 CAPSULE ORAL
COMMUNITY

## 2025-03-13 RX ORDER — OMEGA-3S/DHA/EPA/FISH OIL/D3 300MG-1000
400 CAPSULE ORAL DAILY
COMMUNITY

## 2025-03-13 RX ORDER — EZETIMIBE 10 MG/1
10 TABLET ORAL DAILY
Qty: 30 TABLET | Refills: 11 | Status: SHIPPED | OUTPATIENT
Start: 2025-03-13

## 2025-03-13 RX ORDER — LANOLIN ALCOHOL/MO/W.PET/CERES
1000 CREAM (GRAM) TOPICAL DAILY
COMMUNITY

## 2025-03-13 NOTE — PROGRESS NOTES
CARDIOLOGY FOLLOW-UP PROGRESS NOTE        Chief Complaint  Congestive Heart Failure, Follow-up (Discuss medications to be removed Doesn't know what meds are cause breast area pain ), and Hypertension    Subjective            Nick Turk presents to [facility] CARDIOLOGY  History of Present Illness      The patient is cardiac wise stable.  Denies angina or exertional dyspnea.  He reports muscle pain on statins.  He was unable to tolerate the spironolactone.       Past History:    Medical History:  Past Medical History:   Diagnosis Date    Back pain     CHF (congestive heart failure)     Chronic pain syndrome     CVA (cerebral vascular accident) 05/14/2024    Essential hypertension 05/20/2024    Forgetfulness     Hyperlipidemia LDL goal <70 07/15/2024    Leg pain     Lumbago 2015    Lumbar spondylosis     Pain, generalized     Pars defect of lumbar spine 2015    Sleep apnea     Sleep disorder     Spondylolisthesis, lumbar region 2015    L4/5       Surgical History: has a past surgical history that includes Cholecystectomy; Ganglion cyst excision; Lumbar epidural injection; Lumbar fusion (2015); Colonoscopy (N/A, 11/16/2021); Shoulder arthroscopy w/ rotator cuff repair (Left, 05/12/2022); Back surgery; Spinal fusion; and Cardiac catheterization (Left, 05/21/2024).     Family History: family history includes Heart disease in his mother; Heart failure in his mother and sister; No Known Problems in his father.     Social History: reports that he has quit smoking. His smoking use included cigarettes. He started smoking about 40 years ago. He has a 39.6 pack-year smoking history. He has been exposed to tobacco smoke. He has never used smokeless tobacco. He reports that he does not drink alcohol and does not use drugs.    Allergies: Bee venom and Meloxicam    Current Outpatient Medications on File Prior to Visit   Medication Sig    aspirin 81 MG chewable tablet Chew 1 tablet Daily.    atorvastatin  "(LIPITOR) 80 MG tablet Take 1 tablet by mouth Every Night. (Patient taking differently: Take 0.5 tablets by mouth Every Night.)    carvedilol (COREG) 12.5 MG tablet Take 1 tablet by mouth 2 (Two) Times a Day.    Cholecalciferol 10 MCG (400 UNIT) tablet Take 1 tablet by mouth Daily.    dapagliflozin Propanediol (Farxiga) 10 MG tablet Take 10 mg by mouth Daily.    ipratropium (ATROVENT) 0.03 % nasal spray Administer 2 sprays into the nostril(s) as directed by provider 3 (Three) Times a Day.    Omega-3 Fatty Acids (fish oil) 1000 MG capsule capsule Take 1 capsule by mouth Daily With Breakfast.    sacubitril-valsartan (Entresto) 49-51 MG tablet Take 1 tablet by mouth 2 (Two) Times a Day.    vitamin B-12 (CYANOCOBALAMIN) 1000 MCG tablet Take 1 tablet by mouth Daily.    spironolactone (ALDACTONE) 25 MG tablet Take 1 tablet by mouth Daily. (Patient not taking: Reported on 3/13/2025)     No current facility-administered medications on file prior to visit.          Review of Systems : All systems were reviewed and negative for muscle pain    Objective     /83 (BP Location: Left arm)   Pulse 59   Ht 185.4 cm (73\")   Wt 108 kg (239 lb)   BMI 31.53 kg/m²       Physical Exam    General : Alert, awake, no acute distress  Neck : Supple, no carotid bruit, no jugular venous distention  CVS : Regular rate and rhythm, no murmur, rubs or gallops  Lungs: Clear to auscultation bilaterally, no crackles or rhonchi  Abdomen: Soft, nontender, bowel sounds heard in all 4 quadrants  Extremities: Warm, well-perfused, left leg edema  Neurologic: No apparent motor deficit    Result Review :     The following data was reviewed by: Lv Bautista MD on 03/13/2025:    CMP          8/26/2024    10:54 10/25/2024    14:25 1/13/2025    16:17   CMP   Glucose 111  96  107    BUN 15  13  11    Creatinine 1.00  0.91  0.93    EGFR 90.0  100.8  98.2    Sodium 137  142  137    Potassium 4.5  4.2  4.1    Chloride 105  108  101    Calcium 9.6  9.4  " 9.3    Total Protein 7.0   7.2    Albumin 4.3   4.1    Globulin 2.7   3.1    Total Bilirubin 0.6   0.7    Alkaline Phosphatase 52   61    AST (SGOT) 20   19    ALT (SGPT) 31   26    Albumin/Globulin Ratio 1.6   1.3    BUN/Creatinine Ratio 15.0  14.3  11.8    Anion Gap 9.7  12.2  7.7      CBC          8/26/2024    10:54 10/25/2024    14:25 1/13/2025    16:17   CBC   WBC 8.40  7.95  9.49    RBC 5.21  5.35  5.17    Hemoglobin 16.4  16.3  16.1    Hematocrit 49.7  50.7  49.2    MCV 95.4  94.8  95.2    MCH 31.5  30.5  31.1    MCHC 33.0  32.1  32.7    RDW 12.2  11.8  12.8    Platelets 224  221  206      TSH          3/19/2024    08:46   TSH   TSH 1.620      Lipid Panel          3/19/2024    08:46 5/14/2024    03:51 5/23/2024    04:55   Lipid Panel   Total Cholesterol 114  94  78    Triglycerides 91  104  166    HDL Cholesterol 37  30  31    VLDL Cholesterol 18  20  27    LDL Cholesterol  59  44  20    LDL/HDL Ratio  1.44  0.45           Data reviewed: Cardiology studies        Results for orders placed during the hospital encounter of 08/20/24    Adult Transthoracic Echo Complete W/ Cont if Necessary Per Protocol    Interpretation Summary    Left ventricular ejection fraction appears to be 46 - 50%.    Left ventricular wall thickness is consistent with mild to moderate concentric hypertrophy.    Left ventricular diastolic function is consistent with (grade I) impaired relaxation.    The left atrial cavity is mildly dilated.                   Assessment and Plan        Diagnoses and all orders for this visit:    1. Chronic HFrEF (heart failure with reduced ejection fraction) (Primary)  -     Adult Transthoracic Echo Limited W/ Cont if Necessary Per Protocol; Future    2. Leg swelling  -     US Venous Doppler Lower Extremity Left (duplex for insufficiency); Future    3. Hyperlipidemia LDL goal <70    4. Cerebrovascular accident (CVA) due to embolism of posterior cerebral artery, unspecified blood vessel laterality    5.  Mixed hyperlipidemia    Other orders  -     ezetimibe (ZETIA) 10 MG tablet; Take 1 tablet by mouth Daily.  Dispense: 30 tablet; Refill: 11          I will continue with optimal medical therapy as tolerated.  I will hold statin therapy to start Zetia 10 mg oral daily and repeat lipid profile in 6 to 8 weeks.  I will obtain a 2D echocardiogram limited to evaluate for improvement of the ejection fraction.  I will obtain a venous duplex ultrasound of left lower extremity to evaluate the leg swelling.  Continue with lifestyle modification and heart healthy diet.  Continue regular exercise.    Follow Up     Return for After testing.    Patient was given instructions and counseling regarding his condition or for health maintenance advice. Please see specific information pulled into the AVS if appropriate.

## 2025-03-14 ENCOUNTER — TELEPHONE (OUTPATIENT)
Dept: CARDIOLOGY | Facility: CLINIC | Age: 55
End: 2025-03-14
Payer: COMMERCIAL

## 2025-03-14 NOTE — TELEPHONE ENCOUNTER
PA FOR EZETIMIBE INITIATED, GARRIDO [id number]    PA WAS APPROVED.  COVERAGE STARTS ON 3/14/2025 THROUGH 03/14/2026.

## 2025-03-14 NOTE — TELEPHONE ENCOUNTER
The [facility] received a fax that requires your attention. The document has been indexed to the patient’s chart for your review.      Reason for sending: EXTERNAL MEDICAL RECORD NOTIFICATION     Documents Description: EZETIMIBE PENG-3.13.25    Name of Sender: LESLIE     Date Indexed: 3.13.25

## 2025-03-27 ENCOUNTER — OFFICE VISIT (OUTPATIENT)
Dept: NEUROLOGY | Facility: CLINIC | Age: 55
End: 2025-03-27
Payer: COMMERCIAL

## 2025-03-27 ENCOUNTER — HOSPITAL ENCOUNTER (OUTPATIENT)
Facility: HOSPITAL | Age: 55
Discharge: HOME | End: 2025-03-27
Admitting: INTERNAL MEDICINE
Payer: COMMERCIAL

## 2025-03-27 VITALS
BODY MASS INDEX: 31.54 KG/M2 | HEART RATE: 65 BPM | HEIGHT: 73 IN | SYSTOLIC BLOOD PRESSURE: 118 MMHG | DIASTOLIC BLOOD PRESSURE: 69 MMHG | WEIGHT: 238 LBS

## 2025-03-27 DIAGNOSIS — I50.22 CHRONIC HFREF (HEART FAILURE WITH REDUCED EJECTION FRACTION): ICD-10-CM

## 2025-03-27 DIAGNOSIS — I69.30 SEQUELAE, POST-STROKE: Primary | ICD-10-CM

## 2025-03-27 LAB
AORTIC DIMENSIONLESS INDEX: 0.87 (DI)
AV MEAN PRESS GRAD SYS DOP V1V2: 3.4 MMHG
AV VMAX SYS DOP: 124.9 CM/SEC
BH CV ECHO LEFT VENTRICLE GLOBAL LONGITUDINAL STRAIN: -21 %
BH CV ECHO MEAS - 2D AUTO EF SIEMENS: 57.8 %
BH CV ECHO MEAS - ACS: 1.92 CM
BH CV ECHO MEAS - AO MAX PG: 6.2 MMHG
BH CV ECHO MEAS - AO ROOT DIAM: 3.2 CM
BH CV ECHO MEAS - AO V2 VTI: 22.7 CM
BH CV ECHO MEAS - AVA(I,D): 3.3 CM2
BH CV ECHO MEAS - IVS/LVPW: 1.15 CM
BH CV ECHO MEAS - IVSD: 1.5 CM
BH CV ECHO MEAS - LA DIMENSION: 4.1 CM
BH CV ECHO MEAS - LAT PEAK E' VEL: 9 CM/SEC
BH CV ECHO MEAS - LV MAX PG: 4 MMHG
BH CV ECHO MEAS - LV MEAN PG: 1.9 MMHG
BH CV ECHO MEAS - LV V1 MAX: 100 CM/SEC
BH CV ECHO MEAS - LV V1 VTI: 19.8 CM
BH CV ECHO MEAS - LVIDD: 4.8 CM
BH CV ECHO MEAS - LVIDS: 3.2 CM
BH CV ECHO MEAS - LVOT AREA: 3.8 CM2
BH CV ECHO MEAS - LVOT DIAM: 2.2 CM
BH CV ECHO MEAS - LVPWD: 1.3 CM
BH CV ECHO MEAS - MED PEAK E' VEL: 15.7 CM/SEC
BH CV ECHO MEAS - MV A MAX VEL: 52.5 CM/SEC
BH CV ECHO MEAS - MV E MAX VEL: 57.5 CM/SEC
BH CV ECHO MEAS - MV E/A: 1.1
BH CV ECHO MEAS - MV MAX PG: 3.9 MMHG
BH CV ECHO MEAS - MV MEAN PG: 1.5 MMHG
BH CV ECHO MEAS - MV V2 VTI: 30.7 CM
BH CV ECHO MEAS - MVA(VTI): 2.45 CM2
BH CV ECHO MEAS - RVDD: 3.7 CM
BH CV ECHO MEAS - SV(LVOT): 75.3 ML
BH CV ECHO MEAS - TAPSE (>1.6): 2.41 CM
BH CV ECHO MEASUREMENTS AVERAGE E/E' RATIO: 4.66
BH CV XLRA - TDI S': 17 CM/SEC
IVRT: 58 MS
LEFT ATRIUM VOLUME INDEX: 77.7 ML/M2
LV EF BIPLANE MOD: 57.8 %

## 2025-03-27 PROCEDURE — 93308 TTE F-UP OR LMTD: CPT

## 2025-03-27 NOTE — PROGRESS NOTES
Chief Complaint  Follow-up (Re-eval. )    Subjective          Nick Turk is a 54 y.o. male who presents to [facility]  History of Present Illness      History of Present Illness  The patient presents for evaluation of blurred vision.    He reports a progressive deterioration in his visual acuity, particularly noticeable in the afternoons. He continues to take daily naps and experiences a rapid onset of blurry vision if he does not rest his eyes intermittently. The blurriness typically lasts for a couple of hours and is somewhat alleviated by a 30 to 45-minute rest period. His vision is clear upon waking, allowing him to forego his glasses for a significant portion of the morning. However, as the day progresses, his vision becomes increasingly blurry, necessitating the use of his glasses. Despite this, his vision remains blurry in the afternoon, even with the aid of his glasses. He reports no double vision and notes that the blurriness affects both eyes equally. He also reports dry eyes, which he manages with Visine and teardrops, providing some relief. He has been under the care of an optometrist, Dr. Faulkner, who has noted an improvement in his vision. He believes his initial visual disturbances were stroke-related, as he experienced double vision at the onset. He also reports frequent blinking and wonders if his blood pressure medication may be contributing to his symptoms. His primary care provider is Ashley Vargas, a nurse practitioner, with whom he discusses his blood pressure management. He is currently on aspirin therapy. He has consulted an optometrist four times this year.    He has noticed a recurrence of unsteady gait, muscle weakness, and nighttime tremors during sleep. He recently acquired a new mask from his sleep specialist, whom he last consulted a month ago. He has not yet discussed these symptoms with his sleep specialist. He was previously referred to  "psychiatry but has not yet had a consultation. His sleep specialist has reported a significant reduction in his episodes of apnea, from 30-50 times per night to only a few instances.    Supplemental Information  He had shoulder surgery and received a steroid injection for pain management.    MEDICATIONS  Aspirin.      Objective   Vital Signs:   /69 (BP Location: Left arm, Patient Position: Sitting, Cuff Size: Adult)   Pulse 65   Ht 185.4 cm (72.99\")   Wt 108 kg (238 lb)   BMI 31.41 kg/m²     Physical Exam   He is alert, fluent, phasic, follows commands well.  EOMs full all directions of gaze, there is no ptosis.     Results  Imaging  MRI of the brain shows no hardening of the arteries, except for the area where the stroke occurred.         Assessment and Plan  Diagnoses and all orders for this visit:    1. Sequelae, post-stroke (Primary)  -     Ambulatory Referral to Neurology         Assessment & Plan  1. Blurred vision.  His blurred vision is not attributable to the stroke, as strokes do not cause transient symptoms in the afternoon.  A second opinion will be made at the [facility].    2. Sleep disturbances.  He reports experiencing shaking at night during sleep. He was advised to follow up with his sleep doctor to address these symptoms and discuss any potential adjustments to his current treatment plan.    3. Muscle weakness and unsteady gait.  He reports starting to walk crooked again and experiencing muscle weakness. These symptoms are not related to his previous stroke. A referral to a stroke specialist at the [facility] will be made for a second opinion on his condition.    PROCEDURE  The patient had shoulder surgery and received a steroid injection for pain management.      Total time spent with the patient and coordinating patient care was 45 minutes.    Follow Up  No follow-ups on file.  Patient was given instructions and counseling regarding his condition or for health " maintenance advice. Please see specific information pulled into the AVS if appropriate.     Patient or patient representative verbalized consent for the use of Ambient Listening during the visit with  Aiden Martinez MD for chart documentation. 3/27/2025  09:38 EDT

## 2025-04-01 ENCOUNTER — OFFICE VISIT (OUTPATIENT)
Dept: CARDIOLOGY | Facility: CLINIC | Age: 55
End: 2025-04-01
Payer: COMMERCIAL

## 2025-04-01 VITALS
WEIGHT: 241 LBS | DIASTOLIC BLOOD PRESSURE: 87 MMHG | HEIGHT: 72 IN | SYSTOLIC BLOOD PRESSURE: 143 MMHG | HEART RATE: 72 BPM | BODY MASS INDEX: 32.64 KG/M2

## 2025-04-01 DIAGNOSIS — E78.5 HYPERLIPIDEMIA LDL GOAL <70: ICD-10-CM

## 2025-04-01 DIAGNOSIS — I10 ESSENTIAL HYPERTENSION: Primary | ICD-10-CM

## 2025-04-01 DIAGNOSIS — G47.33 OSA ON CPAP: ICD-10-CM

## 2025-04-01 DIAGNOSIS — I63.439 CEREBROVASCULAR ACCIDENT (CVA) DUE TO EMBOLISM OF POSTERIOR CEREBRAL ARTERY, UNSPECIFIED BLOOD VESSEL LATERALITY: ICD-10-CM

## 2025-04-01 PROCEDURE — 99214 OFFICE O/P EST MOD 30 MIN: CPT | Performed by: INTERNAL MEDICINE

## 2025-04-01 NOTE — PROGRESS NOTES
CARDIOLOGY FOLLOW-UP PROGRESS NOTE        Chief Complaint  Congestive Heart Failure, Follow-up (Discuss medications), and Hypertension    Subjective            Nick Turk presents to [facility] CARDIOLOGY  History of Present Illness      Patient is cardiac wise stable with precautions cardiac symptoms..  His latest 2D echo showed normal ejection fraction and normal global longitudinal strain.  No significant valvular disease observed.       Past History:    Medical History:  Past Medical History:   Diagnosis Date    Back pain     CHF (congestive heart failure)     Chronic pain syndrome     CVA (cerebral vascular accident) 05/14/2024    Essential hypertension 05/20/2024    Forgetfulness     Hyperlipidemia LDL goal <70 07/15/2024    Leg pain     Lumbago 2015    Lumbar spondylosis     Pain, generalized     Pars defect of lumbar spine 2015    Sleep apnea     Sleep disorder     Spondylolisthesis, lumbar region 2015    L4/5       Surgical History: has a past surgical history that includes Cholecystectomy; Ganglion cyst excision; Lumbar epidural injection; Lumbar fusion (2015); Colonoscopy (N/A, 11/16/2021); Shoulder arthroscopy w/ rotator cuff repair (Left, 05/12/2022); Back surgery; Spinal fusion; and Cardiac catheterization (Left, 05/21/2024).     Family History: family history includes Heart disease in his mother; Heart failure in his mother and sister; No Known Problems in his father.     Social History: reports that he has quit smoking. His smoking use included cigarettes. He started smoking about 40 years ago. He has a 39.6 pack-year smoking history. He has been exposed to tobacco smoke. He has never used smokeless tobacco. He reports that he does not drink alcohol and does not use drugs.    Allergies: Bee venom and Meloxicam    Current Outpatient Medications on File Prior to Visit   Medication Sig    aspirin 81 MG chewable tablet Chew 1 tablet Daily.    carvedilol (COREG) 12.5 MG tablet Take  "1 tablet by mouth 2 (Two) Times a Day.    Cholecalciferol 10 MCG (400 UNIT) tablet Take 1 tablet by mouth Daily.    dapagliflozin Propanediol (Farxiga) 10 MG tablet Take 10 mg by mouth Daily.    ezetimibe (ZETIA) 10 MG tablet Take 1 tablet by mouth Daily.    ipratropium (ATROVENT) 0.03 % nasal spray Administer 2 sprays into the nostril(s) as directed by provider 3 (Three) Times a Day.    Omega-3 Fatty Acids (fish oil) 1000 MG capsule capsule Take 1 capsule by mouth Daily With Breakfast.    sacubitril-valsartan (Entresto) 49-51 MG tablet Take 1 tablet by mouth 2 (Two) Times a Day.    vitamin B-12 (CYANOCOBALAMIN) 1000 MCG tablet Take 1 tablet by mouth Daily.     No current facility-administered medications on file prior to visit.          Review of Systems : All systems were reviewed and negative observable    Objective     /87 (BP Location: Left arm)   Pulse 72   Ht 182.9 cm (72\")   Wt 109 kg (241 lb)   BMI 32.69 kg/m²       Physical Exam    General : Alert, awake, no acute distress  Neck : Supple, no carotid bruit, no jugular venous distention  CVS : Regular rate and rhythm, no murmur, rubs or gallops  Lungs: Clear to auscultation bilaterally, no crackles or rhonchi  Abdomen: Soft, nontender, bowel sounds heard in all 4 quadrants  Extremities: Warm, well-perfused, no pedal edema  Neurologic. No motor deficits.  Good volume 140    Result Review :     The following data was reviewed by: Lv Bautista MD on 04/01/2025:    CMP          8/26/2024    10:54 10/25/2024    14:25 1/13/2025    16:17   CMP   Glucose 111  96  107    BUN 15  13  11    Creatinine 1.00  0.91  0.93    EGFR 90.0  100.8  98.2    Sodium 137  142  137    Potassium 4.5  4.2  4.1    Chloride 105  108  101    Calcium 9.6  9.4  9.3    Total Protein 7.0   7.2    Albumin 4.3   4.1    Globulin 2.7   3.1    Total Bilirubin 0.6   0.7    Alkaline Phosphatase 52   61    AST (SGOT) 20   19    ALT (SGPT) 31   26    Albumin/Globulin Ratio 1.6   1.3  "   BUN/Creatinine Ratio 15.0  14.3  11.8    Anion Gap 9.7  12.2  7.7      CBC          8/26/2024    10:54 10/25/2024    14:25 1/13/2025    16:17   CBC   WBC 8.40  7.95  9.49    RBC 5.21  5.35  5.17    Hemoglobin 16.4  16.3  16.1    Hematocrit 49.7  50.7  49.2    MCV 95.4  94.8  95.2    MCH 31.5  30.5  31.1    MCHC 33.0  32.1  32.7    RDW 12.2  11.8  12.8    Platelets 224  221  206        Lipid Panel          5/14/2024    03:51 5/23/2024    04:55   Lipid Panel   Total Cholesterol 94  78    Triglycerides 104  166    HDL Cholesterol 30  31    VLDL Cholesterol 20  27    LDL Cholesterol  44  20    LDL/HDL Ratio 1.44  0.45           Data reviewed: Cardiology studies        Results for orders placed during the hospital encounter of 03/27/25    Adult Transthoracic Echo Limited W/ Cont if Necessary Per Protocol    Interpretation Summary    Left ventricular ejection fraction appears to be 56 - 60%.    Left ventricular wall thickness is consistent with mild to moderate concentric hypertrophy.    Left ventricular diastolic function is consistent with (grade I) impaired relaxation.    The left atrial cavity is mildly dilated.    The Global Longitudinal Strain is normal.                   Assessment and Plan        Diagnoses and all orders for this visit:    1. Essential hypertension (Primary)    2. DAQUAN on CPAP    3. Hyperlipidemia LDL goal <70    4. Cerebrovascular accident (CVA) due to embolism of posterior cerebral artery, unspecified blood vessel laterality        Patient is cardiac wise stable without any recurrent symptoms.  Will continue with Entresto, Farxiga and carvedilol as tolerated.  Continue with low-dose aspirin and lipid-lowering therapy.  Continue with lifestyle modification and heart healthy diet.  Continue with regular exercise.  Follow-up with neurology for previous embolic stroke.      Follow Up     Return in about 6 months (around 10/1/2025).    Patient was given instructions and counseling regarding his  condition or for health maintenance advice. Please see specific information pulled into the AVS if appropriate.

## 2025-04-02 ENCOUNTER — HOSPITAL ENCOUNTER (OUTPATIENT)
Dept: CARDIOLOGY | Facility: HOSPITAL | Age: 55
Discharge: HOME | End: 2025-04-02
Admitting: INTERNAL MEDICINE
Payer: COMMERCIAL

## 2025-04-02 DIAGNOSIS — M79.89 LEG SWELLING: ICD-10-CM

## 2025-04-02 LAB
BH CV LOWER VASCULAR LEFT COMMON FEMORAL AUGMENT: NORMAL
BH CV LOWER VASCULAR LEFT COMMON FEMORAL COMPETENT: NORMAL
BH CV LOWER VASCULAR LEFT COMMON FEMORAL COMPRESS: NORMAL
BH CV LOWER VASCULAR LEFT COMMON FEMORAL PHASIC: NORMAL
BH CV LOWER VASCULAR LEFT COMMON FEMORAL SPONT: NORMAL
BH CV LOWER VASCULAR LEFT DISTAL FEMORAL COMPRESS: NORMAL
BH CV LOWER VASCULAR LEFT GASTRONEMIUS COMPRESS: NORMAL
BH CV LOWER VASCULAR LEFT GREATER SAPH AK AUGMENT: NORMAL
BH CV LOWER VASCULAR LEFT GREATER SAPH AK COMPETENT: NORMAL
BH CV LOWER VASCULAR LEFT GREATER SAPH AK COMPRESS: NORMAL
BH CV LOWER VASCULAR LEFT GREATER SAPH AK PHASIC: NORMAL
BH CV LOWER VASCULAR LEFT GREATER SAPH AK SPONT: NORMAL
BH CV LOWER VASCULAR LEFT GREATER SAPH BK COMPRESS: NORMAL
BH CV LOWER VASCULAR LEFT LESSER SAPH COMPRESS: NORMAL
BH CV LOWER VASCULAR LEFT MID FEMORAL AUGMENT: NORMAL
BH CV LOWER VASCULAR LEFT MID FEMORAL COMPETENT: NORMAL
BH CV LOWER VASCULAR LEFT MID FEMORAL COMPRESS: NORMAL
BH CV LOWER VASCULAR LEFT MID FEMORAL PHASIC: NORMAL
BH CV LOWER VASCULAR LEFT MID FEMORAL SPONT: NORMAL
BH CV LOWER VASCULAR LEFT PERONEAL AUGMENT: NORMAL
BH CV LOWER VASCULAR LEFT PERONEAL COMPETENT: NORMAL
BH CV LOWER VASCULAR LEFT PERONEAL COMPRESS: NORMAL
BH CV LOWER VASCULAR LEFT PERONEAL PHASIC: NORMAL
BH CV LOWER VASCULAR LEFT PERONEAL SPONT: NORMAL
BH CV LOWER VASCULAR LEFT POPLITEAL AUGMENT: NORMAL
BH CV LOWER VASCULAR LEFT POPLITEAL COMPETENT: NORMAL
BH CV LOWER VASCULAR LEFT POPLITEAL COMPRESS: NORMAL
BH CV LOWER VASCULAR LEFT POPLITEAL PHASIC: NORMAL
BH CV LOWER VASCULAR LEFT POPLITEAL SPONT: NORMAL
BH CV LOWER VASCULAR LEFT POSTERIOR TIBIAL AUGMENT: NORMAL
BH CV LOWER VASCULAR LEFT POSTERIOR TIBIAL COMPETENT: NORMAL
BH CV LOWER VASCULAR LEFT POSTERIOR TIBIAL COMPRESS: NORMAL
BH CV LOWER VASCULAR LEFT POSTERIOR TIBIAL PHASIC: NORMAL
BH CV LOWER VASCULAR LEFT POSTERIOR TIBIAL SPONT: NORMAL
BH CV LOWER VASCULAR LEFT PROXIMAL FEMORAL COMPRESS: NORMAL
BH CV LOWER VASCULAR LEFT SAPHENOFEMORAL JUNCTION COMPETENT: NORMAL
BH CV LOWER VASCULAR RIGHT COMMON FEMORAL AUGMENT: NORMAL
BH CV LOWER VASCULAR RIGHT COMMON FEMORAL COMPETENT: NORMAL
BH CV LOWER VASCULAR RIGHT COMMON FEMORAL COMPRESS: NORMAL
BH CV LOWER VASCULAR RIGHT COMMON FEMORAL PHASIC: NORMAL
BH CV LOWER VASCULAR RIGHT COMMON FEMORAL SPONT: NORMAL

## 2025-04-02 PROCEDURE — 93971 EXTREMITY STUDY: CPT

## 2025-04-11 ENCOUNTER — OFFICE VISIT (OUTPATIENT)
Dept: PSYCHIATRY | Facility: CLINIC | Age: 55
End: 2025-04-11
Payer: COMMERCIAL

## 2025-04-11 VITALS
HEIGHT: 72 IN | BODY MASS INDEX: 32.78 KG/M2 | WEIGHT: 242 LBS | HEART RATE: 64 BPM | DIASTOLIC BLOOD PRESSURE: 73 MMHG | SYSTOLIC BLOOD PRESSURE: 132 MMHG

## 2025-04-11 DIAGNOSIS — F41.1 GENERALIZED ANXIETY DISORDER: ICD-10-CM

## 2025-04-11 DIAGNOSIS — F33.1 MAJOR DEPRESSIVE DISORDER, RECURRENT EPISODE, MODERATE: Primary | ICD-10-CM

## 2025-04-11 DIAGNOSIS — F51.05 INSOMNIA DUE TO MENTAL CONDITION: ICD-10-CM

## 2025-04-11 RX ORDER — ESCITALOPRAM OXALATE 10 MG/1
10 TABLET ORAL DAILY
Qty: 30 TABLET | Refills: 2 | Status: SHIPPED | OUTPATIENT
Start: 2025-04-11

## 2025-04-11 NOTE — TREATMENT PLAN
Multi-Disciplinary Problems (from Behavioral Health Treatment Plan)      Active Problems       Problem: Anxiety  Start Date: 04/11/25      Problem Details: The patient self-scales this problem as a 3 with 10 being the worst.  medical conditions        Goal Priority Start Date Expected End Date End Date    Patient will develop and implement behavioral and cognitive strategies to reduce anxiety and irrational fears. -- 04/11/25 10/10/25 --    Goal Details: Progress toward goal:  Not appropriate to rate progress toward goal since this is the initial treatment plan.        Goal Intervention Frequency Start Date End Date    Help patient explore past emotional issues in relation to present anxiety. Q Month 04/11/25 --    Intervention Details: Duration of treatment until remission of symptoms.        Goal Intervention Frequency Start Date End Date    Help patient develop an awareness of their cognitive and physical responses to anxiety. Q Month 04/11/25 --    Intervention Details: Duration of treatment until remission of symptoms.                Problem: Depression  Start Date: 04/11/25      Problem Details: The patient self-scales this problem as a 4 with 10 being the worst.  medical conditions        Goal Priority Start Date Expected End Date End Date    Patient will demonstrate the ability to initiate new constructive life skills outside of sessions on a consistent basis. -- 04/11/25 10/10/25 --    Goal Details: Progress toward goal:  Not appropriate to rate progress toward goal since this is the initial treatment plan.        Goal Intervention Frequency Start Date End Date    Assist patient in setting attainable activities of daily living goals. PRN 04/11/25 --      Goal Intervention Frequency Start Date End Date    Provide education about depression Q Month 04/11/25 --    Intervention Details: Duration of treatment until remission of symptoms.        Goal Intervention Frequency Start Date End Date    Assist patient in  developing healthy coping strategies. Q Month 04/11/25 --    Intervention Details: Duration of treatment until remission of symptoms.                        Reviewed By       Yuni Santiago MD 04/11/25 [id number]                     I have discussed and reviewed this treatment plan with the patient.
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
## 2025-04-30 ENCOUNTER — OFFICE VISIT (OUTPATIENT)
Dept: CARDIOLOGY | Facility: CLINIC | Age: 55
End: 2025-04-30
Payer: COMMERCIAL

## 2025-04-30 VITALS
DIASTOLIC BLOOD PRESSURE: 81 MMHG | HEART RATE: 64 BPM | HEIGHT: 72 IN | SYSTOLIC BLOOD PRESSURE: 125 MMHG | BODY MASS INDEX: 32.37 KG/M2 | WEIGHT: 239 LBS

## 2025-04-30 DIAGNOSIS — G47.33 OSA ON CPAP: ICD-10-CM

## 2025-04-30 DIAGNOSIS — I42.8 NON-ISCHEMIC CARDIOMYOPATHY: ICD-10-CM

## 2025-04-30 DIAGNOSIS — I63.439 CEREBROVASCULAR ACCIDENT (CVA) DUE TO EMBOLISM OF POSTERIOR CEREBRAL ARTERY, UNSPECIFIED BLOOD VESSEL LATERALITY: ICD-10-CM

## 2025-04-30 DIAGNOSIS — E78.5 HYPERLIPIDEMIA LDL GOAL <70: ICD-10-CM

## 2025-04-30 DIAGNOSIS — I10 ESSENTIAL HYPERTENSION: Primary | ICD-10-CM

## 2025-04-30 PROCEDURE — 99214 OFFICE O/P EST MOD 30 MIN: CPT | Performed by: INTERNAL MEDICINE

## 2025-04-30 RX ORDER — CARVEDILOL 12.5 MG/1
12.5 TABLET ORAL 2 TIMES DAILY
Qty: 180 TABLET | Refills: 3 | Status: SHIPPED | OUTPATIENT
Start: 2025-04-30

## 2025-04-30 NOTE — PROGRESS NOTES
CARDIOLOGY FOLLOW-UP PROGRESS NOTE        Chief Complaint  Hypertension, Follow-up, and Congestive Heart Failure    Subjective            Nick Turk presents to [facility] CARDIOLOGY  History of Present Illness      The patient is cardiac wise stable and comfort follow-up.  Denies angina or dyspnea with complaints of gets tired.  Last 2D echo showed normal ejection fraction and LVH with diastolic dysfunction which is mild.  He had recent episode of double vision and so the eye doctor prescribed new glasses.       Past History:    Medical History:  Past Medical History:   Diagnosis Date    Back pain     CHF (congestive heart failure)     Chronic pain syndrome     CVA (cerebral vascular accident) 05/14/2024    Essential hypertension 05/20/2024    Forgetfulness     Hyperlipidemia LDL goal <70 07/15/2024    Leg pain     Lumbago 2015    Lumbar spondylosis     Pain, generalized     Pars defect of lumbar spine 2015    Sleep apnea     Sleep disorder     Spondylolisthesis, lumbar region 2015    L4/5       Surgical History: has a past surgical history that includes Cholecystectomy; Ganglion cyst excision; Lumbar epidural injection; Lumbar fusion (2015); Colonoscopy (N/A, 11/16/2021); Shoulder arthroscopy w/ rotator cuff repair (Left, 05/12/2022); Back surgery; Spinal fusion; and Cardiac catheterization (Left, 05/21/2024).     Family History: family history includes Heart disease in his mother; Heart failure in his mother and sister; No Known Problems in his father.     Social History: reports that he has quit smoking. His smoking use included cigarettes. He started smoking about 41 years ago. He has a 39.6 pack-year smoking history. He has been exposed to tobacco smoke. He has never used smokeless tobacco. He reports that he does not drink alcohol and does not use drugs.    Allergies: Bee venom and Meloxicam    Current Outpatient Medications on File Prior to Visit   Medication Sig    aspirin 81 MG  "chewable tablet Chew 1 tablet Daily.    carvedilol (COREG) 12.5 MG tablet Take 1 tablet by mouth 2 (Two) Times a Day.    Cholecalciferol 10 MCG (400 UNIT) tablet Take 1 tablet by mouth Daily.    dapagliflozin Propanediol (Farxiga) 10 MG tablet Take 10 mg by mouth Daily.    escitalopram (Lexapro) 10 MG tablet Take 1 tablet by mouth Daily.    ezetimibe (ZETIA) 10 MG tablet Take 1 tablet by mouth Daily.    ipratropium (ATROVENT) 0.03 % nasal spray Administer 2 sprays into the nostril(s) as directed by provider 3 (Three) Times a Day. (Patient not taking: Reported on 4/11/2025)    Omega-3 Fatty Acids (fish oil) 1000 MG capsule capsule Take 1 capsule by mouth Daily With Breakfast.    sacubitril-valsartan (Entresto) 49-51 MG tablet Take 1 tablet by mouth 2 (Two) Times a Day.    vitamin B-12 (CYANOCOBALAMIN) 1000 MCG tablet Take 1 tablet by mouth Daily.     No current facility-administered medications on file prior to visit.          Review of Systems : All systems were reviewed and negative except for recent episode of double vision no neurological findings.    Objective     /81 (BP Location: Left arm)   Pulse 64   Ht 182.9 cm (72\")   Wt 108 kg (239 lb)   BMI 32.41 kg/m²       Physical Exam    General : Alert, awake, no acute distress  Neck : Supple, no carotid bruit, no jugular venous distention  CVS : Regular rate and rhythm, no murmur, rubs or gallops  Lungs: Clear to auscultation bilaterally, no crackles or rhonchi  Abdomen: Soft, nontender, bowel sounds heard in all 4 quadrants  Extremities: Warm, well-perfused, no pedal edema  Neurologic.  No apparent motor deficit    Result Review :     The following data was reviewed by: Lv Bautista MD on 04/30/2025:    CMP          8/26/2024    10:54 10/25/2024    14:25 1/13/2025    16:17   CMP   Glucose 111  96  107    BUN 15  13  11    Creatinine 1.00  0.91  0.93    EGFR 90.0  100.8  98.2    Sodium 137  142  137    Potassium 4.5  4.2  4.1    Chloride 105  108  101  "   Calcium 9.6  9.4  9.3    Total Protein 7.0   7.2    Albumin 4.3   4.1    Globulin 2.7   3.1    Total Bilirubin 0.6   0.7    Alkaline Phosphatase 52   61    AST (SGOT) 20   19    ALT (SGPT) 31   26    Albumin/Globulin Ratio 1.6   1.3    BUN/Creatinine Ratio 15.0  14.3  11.8    Anion Gap 9.7  12.2  7.7      CBC          8/26/2024    10:54 10/25/2024    14:25 1/13/2025    16:17   CBC   WBC 8.40  7.95  9.49    RBC 5.21  5.35  5.17    Hemoglobin 16.4  16.3  16.1    Hematocrit 49.7  50.7  49.2    MCV 95.4  94.8  95.2    MCH 31.5  30.5  31.1    MCHC 33.0  32.1  32.7    RDW 12.2  11.8  12.8    Platelets 224  221  206        Lipid Panel          5/14/2024    03:51 5/23/2024    04:55   Lipid Panel   Total Cholesterol 94  78    Triglycerides 104  166    HDL Cholesterol 30  31    VLDL Cholesterol 20  27    LDL Cholesterol  44  20    LDL/HDL Ratio 1.44  0.45           Data reviewed: Cardiology studies        Results for orders placed during the hospital encounter of 03/27/25    Adult Transthoracic Echo Limited W/ Cont if Necessary Per Protocol    Interpretation Summary    Left ventricular ejection fraction appears to be 56 - 60%.    Left ventricular wall thickness is consistent with mild to moderate concentric hypertrophy.    Left ventricular diastolic function is consistent with (grade I) impaired relaxation.    The left atrial cavity is mildly dilated.    The Global Longitudinal Strain is normal.                   Assessment and Plan        Diagnoses and all orders for this visit:    1. Essential hypertension (Primary)    2. DAQUAN on CPAP    3. Cerebrovascular accident (CVA) due to embolism of posterior cerebral artery, unspecified blood vessel laterality    4. Hyperlipidemia LDL goal <70    5. Non-ischemic cardiomyopathy        I will continue with optimal guideline directed medical therapy with blood pressure control and statins.  Venous duplex ultrasound of left lower extremity was unremarkable continue with heart healthy  lifestyle and diet.  Advised to walk at least 45 minutes/day 5 times per week.  He should follow-up with neurology.  Will reevaluate in few months      Follow Up     Return in about 6 months (around 10/30/2025).    Patient was given instructions and counseling regarding his condition or for health maintenance advice. Please see specific information pulled into the AVS if appropriate.

## 2025-05-15 ENCOUNTER — TELEPHONE (OUTPATIENT)
Dept: CARDIOLOGY | Facility: CLINIC | Age: 55
End: 2025-05-15

## 2025-05-15 NOTE — TELEPHONE ENCOUNTER
Rx Refill Note  Requested Prescriptions      No prescriptions requested or ordered in this encounter        LAST OFFICE VISIT:  4/30/2025     NEXT OFFICE VISIT:  10/1/2025     Does the medication requests match the last office note:    [] Yes   [] No    Does this refill request meet protocol details for MA to approve:     [] Yes   [] No   [] No Protocols Provided

## 2025-05-23 ENCOUNTER — OFFICE VISIT (OUTPATIENT)
Dept: PSYCHIATRY | Facility: CLINIC | Age: 55
End: 2025-05-23
Payer: COMMERCIAL

## 2025-05-23 VITALS
HEART RATE: 66 BPM | DIASTOLIC BLOOD PRESSURE: 76 MMHG | BODY MASS INDEX: 32.94 KG/M2 | WEIGHT: 243.2 LBS | SYSTOLIC BLOOD PRESSURE: 120 MMHG | HEIGHT: 72 IN

## 2025-05-23 DIAGNOSIS — F41.1 GENERALIZED ANXIETY DISORDER: Primary | ICD-10-CM

## 2025-05-23 DIAGNOSIS — F33.1 MAJOR DEPRESSIVE DISORDER, RECURRENT EPISODE, MODERATE: ICD-10-CM

## 2025-05-23 DIAGNOSIS — F51.05 INSOMNIA DUE TO MENTAL CONDITION: ICD-10-CM

## 2025-05-23 RX ORDER — BUSPIRONE HYDROCHLORIDE 10 MG/1
10 TABLET ORAL 2 TIMES DAILY
Qty: 60 TABLET | Refills: 2 | Status: SHIPPED | OUTPATIENT
Start: 2025-05-23 | End: 2025-05-23 | Stop reason: SDUPTHER

## 2025-05-23 RX ORDER — ATORVASTATIN CALCIUM 80 MG/1
80 TABLET, FILM COATED ORAL NIGHTLY
COMMUNITY
Start: 2025-05-11

## 2025-05-23 RX ORDER — BUSPIRONE HYDROCHLORIDE 10 MG/1
10 TABLET ORAL 2 TIMES DAILY
Qty: 60 TABLET | Refills: 2 | Status: SHIPPED | OUTPATIENT
Start: 2025-05-23
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
## 2025-05-23 NOTE — PATIENT INSTRUCTIONS
1.  Please return to clinic at your next scheduled visit.  Contact the clinic ([phone]) at least 24 hours prior in the event you need to cancel.  2.  Do no harm to yourself or others.    3.  Avoid alcohol and drugs.    4.  Take all medications as prescribed.  Please contact the clinic with any concerns. If you are in need of medication refills, please call the clinic at [phone].    5. Should you want to get in touch with your provider, Dr. Yuni Santiago, please utilize [employer] or contact the office ([phone]), and staff will be able to page Dr. Santiago directly.  6.  In the event you have personal crisis, contact the following crisis numbers: Suicide Prevention Hotline 1-639.653.5659; [facility] Helpline 2-648-721-ZCMF; [facility] Emergency Room [phone]; text HELLO to [phone]; or 911.     Raisa Purcell for marriage counseling [phone]

## 2025-06-12 DIAGNOSIS — F51.05 INSOMNIA DUE TO MENTAL CONDITION: ICD-10-CM

## 2025-06-12 DIAGNOSIS — F41.1 GENERALIZED ANXIETY DISORDER: ICD-10-CM

## 2025-06-12 DIAGNOSIS — F33.1 MAJOR DEPRESSIVE DISORDER, RECURRENT EPISODE, MODERATE: ICD-10-CM

## 2025-06-12 RX ORDER — ESCITALOPRAM OXALATE 10 MG/1
10 TABLET ORAL DAILY
Qty: 30 TABLET | Refills: 2 | OUTPATIENT
Start: 2025-06-12

## 2025-06-12 NOTE — TELEPHONE ENCOUNTER
The original prescription was discontinued on 5/23/2025 by Yuni Santiago MD for the following reason: Non-compliance. Renewing this prescription may not be appropriate.
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
## 2025-07-14 ENCOUNTER — OFFICE VISIT (OUTPATIENT)
Dept: PSYCHIATRY | Facility: CLINIC | Age: 55
End: 2025-07-14
Payer: COMMERCIAL

## 2025-07-14 VITALS
DIASTOLIC BLOOD PRESSURE: 69 MMHG | HEART RATE: 69 BPM | SYSTOLIC BLOOD PRESSURE: 131 MMHG | HEIGHT: 72 IN | WEIGHT: 242 LBS | BODY MASS INDEX: 32.78 KG/M2 | OXYGEN SATURATION: 95 %

## 2025-07-14 DIAGNOSIS — F51.05 INSOMNIA DUE TO MENTAL CONDITION: ICD-10-CM

## 2025-07-14 DIAGNOSIS — F33.1 MAJOR DEPRESSIVE DISORDER, RECURRENT EPISODE, MODERATE: ICD-10-CM

## 2025-07-14 DIAGNOSIS — F41.1 GENERALIZED ANXIETY DISORDER: Primary | ICD-10-CM

## 2025-07-14 PROCEDURE — 99214 OFFICE O/P EST MOD 30 MIN: CPT | Performed by: STUDENT IN AN ORGANIZED HEALTH CARE EDUCATION/TRAINING PROGRAM

## 2025-07-14 PROCEDURE — 90833 PSYTX W PT W E/M 30 MIN: CPT | Performed by: STUDENT IN AN ORGANIZED HEALTH CARE EDUCATION/TRAINING PROGRAM

## 2025-07-14 RX ORDER — ESCITALOPRAM OXALATE 10 MG/1
1 TABLET ORAL DAILY
COMMUNITY
Start: 2025-06-11 | End: 2025-07-14 | Stop reason: SDDI

## 2025-07-15 ENCOUNTER — TELEPHONE (OUTPATIENT)
Dept: CARDIOLOGY | Facility: CLINIC | Age: 55
End: 2025-07-15
Payer: COMMERCIAL

## 2025-07-15 ENCOUNTER — TRANSCRIBE ORDERS (OUTPATIENT)
Dept: ADMINISTRATIVE | Facility: HOSPITAL | Age: 55
End: 2025-07-15
Payer: COMMERCIAL

## 2025-07-15 ENCOUNTER — LAB (OUTPATIENT)
Dept: LAB | Facility: HOSPITAL | Age: 55
End: 2025-07-15
Payer: COMMERCIAL

## 2025-07-15 DIAGNOSIS — I69.30 SEQUELAE OF CEREBRAL INFARCTION: Primary | ICD-10-CM

## 2025-07-15 DIAGNOSIS — H53.2 DIPLOPIA: ICD-10-CM

## 2025-07-15 DIAGNOSIS — R53.1 ASTHENIA: ICD-10-CM

## 2025-07-15 DIAGNOSIS — R53.83 TIREDNESS: ICD-10-CM

## 2025-07-15 DIAGNOSIS — I69.30 SEQUELAE OF CEREBRAL INFARCTION: ICD-10-CM

## 2025-07-15 LAB — TSH SERPL DL<=0.05 MIU/L-ACNC: 0.86 UIU/ML (ref 0.27–4.2)

## 2025-07-15 PROCEDURE — 86043 ACETYLCHOLN RCPTR MODLG ANTB: CPT

## 2025-07-15 PROCEDURE — 36415 COLL VENOUS BLD VENIPUNCTURE: CPT

## 2025-07-15 PROCEDURE — 86041 ACETYLCHOLN RCPTR BNDNG ANTB: CPT

## 2025-07-15 PROCEDURE — 86042 ACETYLCHOLN RCPTR BLCKG ANTB: CPT

## 2025-07-15 PROCEDURE — 84443 ASSAY THYROID STIM HORMONE: CPT

## 2025-07-15 PROCEDURE — 86366 MUSCLE-SPECIFIC KINASE ANTB: CPT

## 2025-07-15 RX ORDER — SACUBITRIL AND VALSARTAN 49; 51 MG/1; MG/1
1 TABLET, FILM COATED ORAL 2 TIMES DAILY
Qty: 180 TABLET | Refills: 3 | Status: SHIPPED | OUTPATIENT
Start: 2025-07-15

## 2025-07-21 LAB
ACHR BIND AB SER-SCNC: 0.13 NMOL/L (ref 0–0.24)
ACHR MOD AB SER QL FC: 0 % (ref 0–45)

## 2025-07-22 LAB — ACHR BLOCK AB SER-ACNC: 15 % (ref 0–25)

## 2025-07-26 LAB — MUSK AB SER IA-ACNC: <1 U/ML

## 2025-07-28 ENCOUNTER — OFFICE VISIT (OUTPATIENT)
Dept: ORTHOPEDIC SURGERY | Facility: CLINIC | Age: 55
End: 2025-07-28
Payer: COMMERCIAL

## 2025-07-28 ENCOUNTER — OFFICE VISIT (OUTPATIENT)
Dept: OTOLARYNGOLOGY | Facility: CLINIC | Age: 55
End: 2025-07-28
Payer: COMMERCIAL

## 2025-07-28 VITALS
HEIGHT: 72 IN | WEIGHT: 241 LBS | OXYGEN SATURATION: 95 % | HEART RATE: 61 BPM | DIASTOLIC BLOOD PRESSURE: 82 MMHG | SYSTOLIC BLOOD PRESSURE: 132 MMHG | BODY MASS INDEX: 32.64 KG/M2

## 2025-07-28 VITALS
BODY MASS INDEX: 32.64 KG/M2 | SYSTOLIC BLOOD PRESSURE: 128 MMHG | TEMPERATURE: 98.4 F | WEIGHT: 241 LBS | HEIGHT: 72 IN | HEART RATE: 64 BPM | DIASTOLIC BLOOD PRESSURE: 79 MMHG

## 2025-07-28 DIAGNOSIS — M75.82 ROTATOR CUFF TENDONITIS, LEFT: ICD-10-CM

## 2025-07-28 DIAGNOSIS — J35.8 ASYMMETRIC TONSILS: ICD-10-CM

## 2025-07-28 DIAGNOSIS — J35.1 TONSILLAR HYPERTROPHY: Primary | ICD-10-CM

## 2025-07-28 DIAGNOSIS — M25.512 LEFT SHOULDER PAIN, UNSPECIFIED CHRONICITY: Primary | ICD-10-CM

## 2025-07-28 PROCEDURE — 99203 OFFICE O/P NEW LOW 30 MIN: CPT | Performed by: OTOLARYNGOLOGY

## 2025-07-28 RX ADMIN — TRIAMCINOLONE ACETONIDE 40 MG: 40 INJECTION, SUSPENSION INTRA-ARTICULAR; INTRAMUSCULAR at 09:11

## 2025-07-28 RX ADMIN — LIDOCAINE HYDROCHLORIDE 5 ML: 10 INJECTION, SOLUTION INFILTRATION; PERINEURAL at 09:11

## 2025-07-28 NOTE — PROGRESS NOTES
"Chief Complaint  Follow-up of the Left Shoulder       Subjective      Nick Turk presents to [facility] for follow up of his left shoulder. He was last seen on 11/20/24 where he had a left shoulder steroid injection. He states this injection gave him great relief but has recently worn off and having an increase in pain. He denies any new injury or fall.    Allergies   Allergen Reactions    Bee Venom Swelling    Meloxicam Confusion and Other (See Comments)        Social History     Socioeconomic History    Marital status:     Number of children: 3    Highest education level: 12th grade   Tobacco Use    Smoking status: Former     Average packs/day: 1 pack/day for 39.6 years (39.6 ttl pk-yrs)     Types: Cigarettes     Start date: 4/13/1984     Passive exposure: Past    Smokeless tobacco: Never   Vaping Use    Vaping status: Never Used   Substance and Sexual Activity    Alcohol use: Never    Drug use: Never    Sexual activity: Defer     Partners: Male        I reviewed the patient's chief complaint, history of present illness, review of systems, past medical history, surgical history, family history, social history, medications, and allergy list.     Review of Systems     Constitutional: Denies fevers, chills, weight loss  Cardiovascular: Denies chest pain, shortness of breath  Skin: Denies rashes, acute skin changes  Neurologic: Denies headache, loss of consciousness  MSK: left shoulder pain       Vital Signs:   /82   Pulse 61   Ht 182.9 cm (72\")   Wt 109 kg (241 lb)   SpO2 95%   BMI 32.69 kg/m²          Physical Exam  General: Alert. No acute distress    Left upper extremity: Full ROM of the shoulder.  Just pain and aching daily.  Positive Cross body adduction. Supraspinatus strength 5/5. Infraspinatus Strength 5/5. Infrared subscap 5/5.  Sensation intact to light touch, median, radial, ulnar nerve. Positive AIN, PIN, ulnar nerve motor. Positive pulses.. Good " strength in triceps, biceps, deltoid, wrist extensors and wrist flexors. Tender to palpation to the anterior aspect of the shoulder and down the arm.  Pain with empty can testing.      Ortho Exam        Large Joint: L subacromial bursa  Date/Time: 7/28/2025 9:11 AM  Consent given by: patient  Site marked: site marked  Timeout: Immediately prior to procedure a time out was called to verify the correct patient, procedure, equipment, support staff and site/side marked as required   Supporting Documentation  Indications: pain   Procedure Details  Location: shoulder - L subacromial bursa  Preparation: Patient was prepped and draped in the usual sterile fashion  Needle gauge: 21 G.  Medications administered: 5 mL lidocaine 1 %; 40 mg triamcinolone acetonide 40 MG/ML  Patient tolerance: patient tolerated the procedure well with no immediate complications      This injection documentation was Scribed for Abdoulaye Minaya MD by Nuvia Reyes MA.  07/28/25   09:13 EDT       Imaging Results (Most Recent)       None             Result Review :         No results found.           Assessment and Plan     Diagnoses and all orders for this visit:    1. Left shoulder pain, unspecified chronicity (Primary)    2. Rotator cuff tendonitis, left    Other orders  -     Large Joint: L subacromial bursa        The patient presents here today for a follow up of his left shoulder. He is here for a repeat left shoulder steroid injection.     Discussed the risks and benefits of a left shoulder steroid injection today in office. Patient expressed understanding and wishes to proceed. Patient tolerated the injection well without any complications.      Patient will continue home exercises and will continue over the counter medications for pain control.     Call or return if worsening symptoms.    Follow Up     PRN       Patient was given instructions and counseling regarding his condition or for health maintenance advice. Please see  specific information pulled into the AVS if appropriate.     TranScribed for Abdoulaye Minaya MD by Dana Lowe   07/28/25   09:10 EDT      I have personally performed the services described in this document as scribed by the above individual and it is both accurate and complete. Abdoulaye Minaya MD 07/30/25

## 2025-07-28 NOTE — PATIENT INSTRUCTIONS
1.  Patient was referred here due to asymmetric tonsil with left tonsillar enlargement in the was concerned about a cyst.  However on examination it appears to be just tonsillar hypertrophy appearing irregular but they unremarkable for any suspicion of malignancy.  2.  I recommend leaving it alone as it is most likely benign.  3.  I will see patient as needed.

## 2025-07-28 NOTE — PROGRESS NOTES
Patient Name: Nick Turk   Visit Date: 07/28/2025   Patient ID: [id number]  Provider: Jaspal Jeffrey MD    Sex: male  Location: [facility]   YOB: 1970  Location Address: [address] [place] [address], [address] [place] [address],?[id number]    Primary Care Provider Ashley Cabezas APRN  Location Phone: [phone]    Referring Provider: OMID Pascual        Chief Complaint  Establish Care and tonsillar cyst    History of Present Illness  Nick Turk is a 55 y.o. male who presents to [facility] for Establish Care and tonsillar cyst.   Patient reports that he had been seen by dentist and noted that the left tonsil had cyst or extra growth and recommended that he be evaluated by ENT.  Otherwise he has no other symptoms.    Past Medical History:   Diagnosis Date    Arthritis of back     Back pain     CHF (congestive heart failure)     Chronic pain syndrome     CVA (cerebral vascular accident) 05/14/2024    Essential hypertension 05/20/2024    Forgetfulness     Hyperlipidemia LDL goal <70 07/15/2024    Leg pain     Lumbago 2015    Lumbar spondylosis     Pain, generalized     Pars defect of lumbar spine 2015    Rotator cuff syndrome     Sleep apnea     Sleep disorder     Spondylolisthesis, lumbar region 2015    L4/5       Past Surgical History:   Procedure Laterality Date    BACK SURGERY      CARDIAC CATHETERIZATION Left 05/21/2024    Procedure: Cardiac Catheterization/Vascular Study;  Surgeon: Lv Hunt MD;  Location: [facility] CATH INVASIVE LOCATION;  Service: Cardiovascular;  Laterality: Left;    CHOLECYSTECTOMY      COLONOSCOPY N/A 11/16/2021    Procedure: COLONOSCOPY WITH POLYPECTOMIES, CLIP APPLICATION X1, CAUTERY;  Surgeon: Nolberto Onofre MD;  Location: [facility] ENDOSCOPY;  Service: General;  Laterality: N/A;  COLON POLYPS    GANGLION CYST EXCISION      LUMBAR EPIDURAL INJECTION      LUMBAR FUSION  2015    SHOULDER ARTHROSCOPY W/ ROTATOR CUFF REPAIR  "Left 05/12/2022    Procedure: LEFT SHOULDER ARTHROSCOPY WITH ROTATOR CUFF REPAIR, SUBACROMINAL DECOMPRESSION, DISTAL CLAVICULECTOMY, BICEPS TENODESIS;  Surgeon: Abdoulaye Minaya MD;  Location: [facility];  Service: Orthopedics;  Laterality: Left;    SHOULDER SURGERY      SPINAL FUSION           Current Outpatient Medications:     aspirin 81 MG chewable tablet, Chew 1 tablet Daily., Disp: , Rfl:     busPIRone (BUSPAR) 10 MG tablet, Take 1 tablet by mouth 2 (Two) Times a Day., Disp: 60 tablet, Rfl: 2    carvedilol (COREG) 12.5 MG tablet, Take 1 tablet by mouth 2 (Two) Times a Day., Disp: 180 tablet, Rfl: 3    dapagliflozin Propanediol (Farxiga) 10 MG tablet, Take 10 mg by mouth Daily., Disp: 90 tablet, Rfl: 3    sacubitril-valsartan (Entresto) 49-51 MG tablet, Take 1 tablet by mouth 2 (Two) Times a Day., Disp: 180 tablet, Rfl: 3    Cholecalciferol 10 MCG (400 UNIT) tablet, Take 1 tablet by mouth Daily. (Patient not taking: Reported on 7/28/2025), Disp: , Rfl:     ezetimibe (ZETIA) 10 MG tablet, Take 1 tablet by mouth Daily. (Patient not taking: Reported on 7/28/2025), Disp: 30 tablet, Rfl: 11     Allergies   Allergen Reactions    Bee Venom Swelling    Meloxicam Confusion and Other (See Comments)       Social History     Tobacco Use    Smoking status: Former     Average packs/day: 1 pack/day for 39.6 years (39.6 ttl pk-yrs)     Types: Cigarettes     Start date: 4/13/1984     Passive exposure: Past    Smokeless tobacco: Never   Vaping Use    Vaping status: Never Used   Substance Use Topics    Alcohol use: Never    Drug use: Never        Objective     Vital Signs:   Vitals:    07/28/25 0753   BP: 128/79   Pulse: 64   Temp: 98.4 °F (36.9 °C)   Weight: 109 kg (241 lb)   Height: 182.9 cm (72\")       Tobacco Use: Medium Risk (7/28/2025)    Patient History     Smoking Tobacco Use: Former     Smokeless Tobacco Use: Never     Passive Exposure: Past         Physical Exam    Constitutional   Appearance  well developed, " well-nourished, alert and in no acute distress, voice clear and strong    Head   Inspection  no deformities or lesions      Face   Inspection  no facial lesions; House-Brackmann I/VI bilaterally   Palpation  no TMJ crepitus nor  muscle tenderness bilaterally     Eyes/Vision   Visual Fields  extraocular movements are intact, no spontaneous or gaze-induced nystagmus  Conjunctivae  clear   Sclerae  clear   Pupils and Irises  pupils equal, round, and reactive to light.   Nystagmus  not present     Ears, Nose, Mouth and Throat  Ears  External Ears  appearance within normal limits, no lesions present   Otoscopic Examination  tympanic membrane appearance within normal limits bilaterally without perforations, well-aerated middle ears   Hearing  intact to conversational voice both ears   Tunning fork testing    Rinne:  Miller:    Nose  External Nose  appearance normal   Intranasal Exam  mucosa within normal limits, vestibules normal, no intranasal lesions present, septum midline, sinuses non tender to percussion   Modified Nakita Test:    Oral Cavity  Oral Mucosa  oral mucosa normal without pallor or cyanosis   Lips  lip appearance normal   Teeth  normal dentition for age   Gums  gums pink, non-swollen, no bleeding present   Tongue  tongue appearance normal; normal mobility   Palate  hard palate normal, soft palate appearance normal with symmetric mobility     Throat  Oropharynx  no inflammation or lesions present, left tonsil has extra redundancy of tissue making it look like a cyst or growth attachment.  Otherwise palpation revealed be nice and soft.  1-2+  Right tonsil remains unremarkable at 0-1+  Hypopharynx  appearance within normal limits   Larynx  voice normal     Neck  Inspection/Palpation  normal appearance, no masses or tenderness, trachea midline; thyroid size normal, nontender, no nodules or masses present on palpation     Lymphatic  Neck  no lymphadenopathy present   Supraclavicular Nodes  no  "lymphadenopathy present   Preauricular Nodes  no lymphadenopathy present     Respiratory  Respiratory Effort  breathing unlabored   Inspection of Chest  normal appearance, no retractions     Musculoskeletal   Cervical back: Normal range of motion and neck supple.      Skin and Subcutaneous Tissue  General Inspection  Regarding face and neck - there are no rashes present, no lesions present, and no areas of discoloration     Neurologic  Cranial Nerves  cranial nerves II-XII are grossly intact bilaterally   Gait and Station  normal gait, able to stand without diffculty    Psychiatric  Judgement and Insight  judgment and insight intact   Mood and Affect  mood normal, affect appropriate       RESULTS REVIEWED    I have reviewed the following information:   [x]  Previous Internal Note  []  Previous External Note:     Ordered Tests & Results:      Pathology: No results found for: \"MICRO\"    TSH   Date Value Ref Range Status   07/15/2025 0.859 0.270 - 4.200 uIU/mL Final     Calcium   Date Value Ref Range Status   01/13/2025 9.3 8.6 - 10.5 mg/dL Final       No Images in the past 120 days found..    I have discussed the interpretation of the above results with the patient.    Procedures          Assessment and Plan   Diagnoses and all orders for this visit:    1. Tonsillar hypertrophy (Primary)    2. Asymmetric tonsils        (J35.1) Tonsillar hypertrophy    (J35.8) Asymmetric tonsils     Nick Turk  reports that he has quit smoking. His smoking use included cigarettes. He started smoking about 41 years ago. He has a 39.6 pack-year smoking history. He has been exposed to tobacco smoke. He has never used smokeless tobacco.         Plan:  Patient Instructions   1.  Patient was referred here due to asymmetric tonsil with left tonsillar enlargement in the was concerned about a cyst.  However on examination it appears to be just tonsillar hypertrophy appearing irregular but they unremarkable for any suspicion of " malignancy.  2.  I recommend leaving it alone as it is most likely benign.  3.  I will see patient as needed.        Follow Up   Return if symptoms worsen or fail to improve.  Patient was given instructions and counseling regarding his condition or for health maintenance advice. Please see specific information pulled into the AVS if appropriate.

## 2025-07-30 RX ORDER — LIDOCAINE HYDROCHLORIDE 10 MG/ML
5 INJECTION, SOLUTION INFILTRATION; PERINEURAL
Status: COMPLETED | OUTPATIENT
Start: 2025-07-28 | End: 2025-07-28

## 2025-07-30 RX ORDER — TRIAMCINOLONE ACETONIDE 40 MG/ML
40 INJECTION, SUSPENSION INTRA-ARTICULAR; INTRAMUSCULAR
Status: COMPLETED | OUTPATIENT
Start: 2025-07-28 | End: 2025-07-28

## 2025-07-31 ENCOUNTER — PATIENT ROUNDING (BHMG ONLY) (OUTPATIENT)
Dept: OTOLARYNGOLOGY | Facility: CLINIC | Age: 55
End: 2025-07-31
Payer: COMMERCIAL

## 2025-07-31 NOTE — PROGRESS NOTES
A [employer] message has been send to the patient for PATIENT ROUNDING with [facility].

## 2025-08-12 ENCOUNTER — OFFICE VISIT (OUTPATIENT)
Dept: SLEEP MEDICINE | Facility: HOSPITAL | Age: 55
End: 2025-08-12
Payer: COMMERCIAL

## 2025-08-12 VITALS — BODY MASS INDEX: 30.75 KG/M2 | HEIGHT: 73 IN | OXYGEN SATURATION: 96 % | HEART RATE: 77 BPM | WEIGHT: 232 LBS

## 2025-08-12 DIAGNOSIS — E66.811 CLASS 1 OBESITY WITH SERIOUS COMORBIDITY AND BODY MASS INDEX (BMI) OF 30.0 TO 30.9 IN ADULT, UNSPECIFIED OBESITY TYPE: ICD-10-CM

## 2025-08-12 DIAGNOSIS — G47.33 OBSTRUCTIVE SLEEP APNEA, ADULT: Primary | ICD-10-CM

## 2025-08-12 PROCEDURE — 99213 OFFICE O/P EST LOW 20 MIN: CPT | Performed by: NURSE PRACTITIONER

## 2025-08-12 PROCEDURE — G0463 HOSPITAL OUTPT CLINIC VISIT: HCPCS | Performed by: NURSE PRACTITIONER

## (undated) DEVICE — CATH F6 ST PIG 155 110CM 6SH: Brand: SUPER TORQUE

## (undated) DEVICE — TP NDL SCORPION MULTIFIRE

## (undated) DEVICE — SHOULDER ARTHROSCOPY-LF: Brand: MEDLINE INDUSTRIES, INC.

## (undated) DEVICE — SOL IRRG H2O PL/BG 1000ML STRL

## (undated) DEVICE — BLD CUT FORMLA AGGR PLS 4.0MM

## (undated) DEVICE — BUR BRL FORMLA 12FLUT 4MM

## (undated) DEVICE — INTENDED FOR TISSUE SEPARATION, AND OTHER PROCEDURES THAT REQUIRE A SHARP SURGICAL BLADE TO PUNCTURE OR CUT.: Brand: BARD-PARKER ® CARBON RIB-BACK BLADES

## (undated) DEVICE — THE SINGLE USE ETRAP – POLYP TRAP IS USED FOR SUCTION RETRIEVAL OF ENDOSCOPICALLY REMOVED POLYPS.: Brand: ETRAP

## (undated) DEVICE — DRSNG GZ PETROLTM XEROFORM CURAD 1X8IN STRL

## (undated) DEVICE — GW FC FLOP/TP .035 260CM 3MM

## (undated) DEVICE — FMS FLUID MANAGEMENT SYSTEM INFLOW TUBING (FMS VUE): Brand: FMS

## (undated) DEVICE — 90-S CRUISE, SUCTION PROBE, NON-BENDABLE, MAX CUT LEVEL 1: Brand: SERFAS ENERGY

## (undated) DEVICE — UNDYED BRAIDED (POLYGLACTIN 910), SYNTHETIC ABSORBABLE SUTURE: Brand: COATED VICRYL

## (undated) DEVICE — STERILE POLYISOPRENE POWDER-FREE SURGICAL GLOVES: Brand: PROTEXIS

## (undated) DEVICE — SUT VIC UD BR COAT 0 CP2 27IN

## (undated) DEVICE — PROXIMATE RH ROTATING HEAD SKIN STAPLERS (35 WIDE) CONTAINS 35 STAINLESS STEEL STAPLES: Brand: PROXIMATE

## (undated) DEVICE — SHOULDER P.A.D. III: Brand: DEROYAL

## (undated) DEVICE — COLON KIT: Brand: MEDLINE INDUSTRIES, INC.

## (undated) DEVICE — GLV SURG SENSICARE W/ALOE PF LF 7 STRL

## (undated) DEVICE — SNAR E/S POLYP SNAREMASTER OVL/10MM 2.8X2300MM YEL

## (undated) DEVICE — GLV SURG SENSICARE SLT PF LF 7 STRL

## (undated) DEVICE — CATH F6 ST JR 3.5 100CM: Brand: SUPERTORQUE

## (undated) DEVICE — GLV SURG ULTRAFREE MAX LTX PF 8

## (undated) DEVICE — GLIDESHEATH SLENDER STAINLESS STEEL KIT: Brand: GLIDESHEATH SLENDER

## (undated) DEVICE — CATH F6 ST JL 3.5 100CM: Brand: SUPERTORQUE

## (undated) DEVICE — PAD GRND REM POLYHESIVE A/ DISP

## (undated) DEVICE — MAT FLR ABS W/BLU/LINER 56X72IN WHT

## (undated) DEVICE — [THREADED CANNULA.  DO NOT RESTERILIZE,  DO NOT USE IF PACKAGE IS DAMAGED]: Brand: DRI-LOK

## (undated) DEVICE — PENCL E/S SMOKEEVAC W/TELESCP CANN

## (undated) DEVICE — PAD GRND E/S W/CORD SPLT A/

## (undated) DEVICE — CANN TRPL DAM 7X7MM

## (undated) DEVICE — COLD THERAPY BLANKET: Brand: DEROYAL

## (undated) DEVICE — SLV DISTRACTION STAR VELCRO XL DISP

## (undated) DEVICE — ELECTRD BLD EDGE COAT 3IN

## (undated) DEVICE — GAUZE,SPONGE,4"X4",16PLY,STRL,LF,10/TRAY: Brand: MEDLINE

## (undated) DEVICE — COLD THERAPY WRAP: Brand: DEROYAL

## (undated) DEVICE — SUT ETHLN 3-0 FS118IN 663H

## (undated) DEVICE — Device: Brand: DEFENDO AIR/WATER/SUCTION AND BIOPSY VALVE

## (undated) DEVICE — FMS FLUID MANAGEMENT SYSTEM OUTFLOW TUBING WITHOUT ONE-WAY VALVE (FMS VUE OR FMS DUO PLUS): Brand: FMS

## (undated) DEVICE — SOL IRR NACL 0.9PCT 3000ML